# Patient Record
Sex: MALE | Race: WHITE | Employment: OTHER | URBAN - METROPOLITAN AREA
[De-identification: names, ages, dates, MRNs, and addresses within clinical notes are randomized per-mention and may not be internally consistent; named-entity substitution may affect disease eponyms.]

---

## 2017-01-05 RX ORDER — AZELASTINE 1 MG/ML
SPRAY, METERED NASAL
Qty: 1 BOTTLE | Refills: 5 | Status: SHIPPED | OUTPATIENT
Start: 2017-01-05 | End: 2018-03-11 | Stop reason: SDUPTHER

## 2017-01-26 ENCOUNTER — ANTI-COAG VISIT (OUTPATIENT)
Dept: INTERNAL MEDICINE CLINIC | Age: 77
End: 2017-01-26

## 2017-01-26 DIAGNOSIS — I48.20 CHRONIC ATRIAL FIBRILLATION (HCC): ICD-10-CM

## 2017-01-26 LAB
INR BLD: 1.8
PT POC: NORMAL SEC
VALID INTERNAL CONTROL?: YES

## 2017-01-26 NOTE — PROGRESS NOTES
Mr. Armida Rosas is here today for anticoagulation monitoring for the diagnosis of Atrial Fibrillation. His INR goal is 2.0-3.0 and his current Coumadin dose is 30 mg weekly. Today's findings include an INR of 1.8 (normal INR range 0.8-1.2). Considering Mr. Kirby Bonds past history, todays findings, and per the coumadin policy/protocol, Mr. Brown was instructed to take Coumadin as follows,  Continue same dose of 5 mg 5 days a week and 2.5 mg the other two days. He was also instructed to schedule an appointment in 2 weeks prior to leaving for an INR check. A full discussion of the nature of anticoagulants has been carried out. A full discussion of the need for frequent and regular monitoring, precise dosage adjustment and compliance was stressed. Side effects of potential bleeding were discussed and Mr. Brown was instructed to call [phone] if there are any signs of abnormal bleeding. Mr. Brown was instructed to avoid any OTC items containing aspirin or ibuprofen and prior to starting any new OTC products to consult with his physician or pharmacist to ensure no drug interactions are present. Mr. Brown was instructed to avoid any major changes in his general diet and to avoid alcohol consumption. .    Mr. Brown was provided a literature booklet, \"Treatment with Warfarin (Coumadin)\", that includes topics on understanding coumadin therapy, drug interaction considerations, vitamin K and coumadin use, interactions with foods and supplements containing vitamin K, and the use of herbal products. Mr. Brown verbalized his understanding of all instructions and will call the office with any questions, concerns, or signs of abnormal bleeding or blood clot.

## 2017-02-09 ENCOUNTER — ANTI-COAG VISIT (OUTPATIENT)
Dept: INTERNAL MEDICINE CLINIC | Age: 77
End: 2017-02-09

## 2017-02-09 DIAGNOSIS — I48.20 CHRONIC ATRIAL FIBRILLATION (HCC): ICD-10-CM

## 2017-02-09 LAB
INR BLD: 2
PT POC: NORMAL SEC
VALID INTERNAL CONTROL?: YES

## 2017-02-09 NOTE — PROGRESS NOTES
Mr. Mammie Felty is here today for anticoagulation monitoring for the diagnosis of Atrial Fibrillation. His INR goal is 2.0-3.0 and his current Coumadin dose is 2.5 mg on Mon and Fri, 5mg all other days. Today's findings include an INR of 2.0 (normal INR range 2.0-3.0). Considering Mr. [place] past history, todays findings, and per the coumadin policy/protocol, Mr. Lb Nye was instructed to take Coumadin as follows,   2.5 mg on Mon and Fri, 5mg all other days. He was also instructed to schedule an appointment in 2 1/2 weeks prior to leaving for an INR check. A full discussion of the nature of anticoagulants has been carried out. A full discussion of the need for frequent and regular monitoring, precise dosage adjustment and compliance was stressed. Side effects of potential bleeding were discussed and Mr. Lb Nye was instructed to call [phone] if there are any signs of abnormal bleeding. Mr. Lb Nye was instructed to avoid any OTC items containing aspirin or ibuprofen and prior to starting any new OTC products to consult with his physician or pharmacist to ensure no drug interactions are present. Mr. Lb Nye was instructed to avoid any major changes in his general diet and to avoid alcohol consumption. .      Mr. Lb Nye verbalized his understanding of all instructions and will call the office with any questions, concerns, or signs of abnormal bleeding or blood clot.

## 2017-03-06 ENCOUNTER — ANTI-COAG VISIT (OUTPATIENT)
Dept: INTERNAL MEDICINE CLINIC | Age: 77
End: 2017-03-06

## 2017-03-06 DIAGNOSIS — I48.20 CHRONIC ATRIAL FIBRILLATION (HCC): ICD-10-CM

## 2017-03-06 LAB
INR BLD: 2.6
PT POC: NORMAL SEC
VALID INTERNAL CONTROL?: YES

## 2017-03-06 NOTE — PROGRESS NOTES
Mr. Randi Soria is here today for anticoagulation monitoring for the diagnosis of Atrial Fibrillation. His INR goal is 2.0-3.0 and his current Coumadin dose is 30 mg weekly. Today's findings include an INR of 2.6 (normal INR range 0.8-1.2). Considering Mr. Lydia Lieberman past history, todays findings, and per the coumadin policy/protocol, Mr. Hallie Ricketts was instructed to take Coumadin as follows,  Continue same dose. He was also instructed to schedule an appointment in 2.5 weeks prior to leaving for an INR check. A full discussion of the nature of anticoagulants has been carried out. A full discussion of the need for frequent and regular monitoring, precise dosage adjustment and compliance was stressed. Side effects of potential bleeding were discussed and Mr. Hallie Ricketts was instructed to call [phone] if there are any signs of abnormal bleeding. Mr. Hallie Ricketts was instructed to avoid any OTC items containing aspirin or ibuprofen and prior to starting any new OTC products to consult with his physician or pharmacist to ensure no drug interactions are present. Mr. Hallie Ricketts was instructed to avoid any major changes in his general diet and to avoid alcohol consumption. .    Mr. Hallie Ricketts was provided a literature booklet, \"Treatment with Warfarin (Coumadin)\", that includes topics on understanding coumadin therapy, drug interaction considerations, vitamin K and coumadin use, interactions with foods and supplements containing vitamin K, and the use of herbal products. Mr. Hallie Ricketts verbalized his understanding of all instructions and will call the office with any questions, concerns, or signs of abnormal bleeding or blood clot.

## 2017-03-21 ENCOUNTER — HOSPITAL ENCOUNTER (OUTPATIENT)
Dept: LAB | Age: 77
Discharge: HOME | End: 2017-03-21
Payer: MEDICARE

## 2017-03-21 ENCOUNTER — OFFICE VISIT (OUTPATIENT)
Dept: INTERNAL MEDICINE CLINIC | Age: 77
End: 2017-03-21

## 2017-03-21 VITALS
SYSTOLIC BLOOD PRESSURE: 138 MMHG | TEMPERATURE: 96.8 F | WEIGHT: 172 LBS | DIASTOLIC BLOOD PRESSURE: 82 MMHG | HEART RATE: 73 BPM | RESPIRATION RATE: 16 BRPM | HEIGHT: 66 IN | BODY MASS INDEX: 27.64 KG/M2 | OXYGEN SATURATION: 100 %

## 2017-03-21 DIAGNOSIS — I48.20 CHRONIC ATRIAL FIBRILLATION (HCC): ICD-10-CM

## 2017-03-21 DIAGNOSIS — Z12.5 SCREENING FOR PROSTATE CANCER: Primary | ICD-10-CM

## 2017-03-21 DIAGNOSIS — Z12.5 SCREENING FOR PROSTATE CANCER: ICD-10-CM

## 2017-03-21 DIAGNOSIS — R42 DIZZINESS: ICD-10-CM

## 2017-03-21 DIAGNOSIS — R11.0 NAUSEA ALONE: ICD-10-CM

## 2017-03-21 LAB
ALBUMIN SERPL BCP-MCNC: 3.7 G/DL (ref 3.4–5)
ALBUMIN/GLOB SERPL: 0.9 {RATIO} (ref 0.8–1.7)
ALP SERPL-CCNC: 174 U/L (ref 45–117)
ALT SERPL-CCNC: 15 U/L (ref 16–61)
ANION GAP BLD CALC-SCNC: 5 MMOL/L (ref 3–18)
AST SERPL W P-5'-P-CCNC: 26 U/L (ref 15–37)
BASOPHILS # BLD AUTO: 0 K/UL (ref 0–0.06)
BASOPHILS # BLD: 1 % (ref 0–2)
BILIRUB SERPL-MCNC: 0.9 MG/DL (ref 0.2–1)
BUN SERPL-MCNC: 16 MG/DL (ref 7–18)
BUN/CREAT SERPL: 20 (ref 12–20)
CALCIUM SERPL-MCNC: 9 MG/DL (ref 8.5–10.1)
CHLORIDE SERPL-SCNC: 100 MMOL/L (ref 100–108)
CO2 SERPL-SCNC: 31 MMOL/L (ref 21–32)
CREAT SERPL-MCNC: 0.8 MG/DL (ref 0.6–1.3)
DIFFERENTIAL METHOD BLD: ABNORMAL
EOSINOPHIL # BLD: 0 K/UL (ref 0–0.4)
EOSINOPHIL NFR BLD: 1 % (ref 0–5)
ERYTHROCYTE [DISTWIDTH] IN BLOOD BY AUTOMATED COUNT: 15.6 % (ref 11.6–14.5)
GLOBULIN SER CALC-MCNC: 3.9 G/DL (ref 2–4)
GLUCOSE SERPL-MCNC: 68 MG/DL (ref 74–99)
HCT VFR BLD AUTO: 36 % (ref 36–48)
HGB BLD-MCNC: 12.1 G/DL (ref 13–16)
INR PPP: 2.4 (ref 0.8–1.2)
LYMPHOCYTES # BLD AUTO: 23 % (ref 21–52)
LYMPHOCYTES # BLD: 1 K/UL (ref 0.9–3.6)
MCH RBC QN AUTO: 33.4 PG (ref 24–34)
MCHC RBC AUTO-ENTMCNC: 33.6 G/DL (ref 31–37)
MCV RBC AUTO: 99.4 FL (ref 74–97)
MONOCYTES # BLD: 0.4 K/UL (ref 0.05–1.2)
MONOCYTES NFR BLD AUTO: 10 % (ref 3–10)
NEUTS SEG # BLD: 2.7 K/UL (ref 1.8–8)
NEUTS SEG NFR BLD AUTO: 65 % (ref 40–73)
PLATELET # BLD AUTO: 159 K/UL (ref 135–420)
PMV BLD AUTO: 10 FL (ref 9.2–11.8)
POTASSIUM SERPL-SCNC: 4.7 MMOL/L (ref 3.5–5.5)
PROT SERPL-MCNC: 7.6 G/DL (ref 6.4–8.2)
PROTHROMBIN TIME: 25.1 SEC (ref 11.5–15.2)
PSA SERPL-MCNC: 0.8 NG/ML (ref 0–4)
RBC # BLD AUTO: 3.62 M/UL (ref 4.7–5.5)
SODIUM SERPL-SCNC: 136 MMOL/L (ref 136–145)
WBC # BLD AUTO: 4.2 K/UL (ref 4.6–13.2)

## 2017-03-21 PROCEDURE — 85025 COMPLETE CBC W/AUTO DIFF WBC: CPT | Performed by: INTERNAL MEDICINE

## 2017-03-21 PROCEDURE — 80053 COMPREHEN METABOLIC PANEL: CPT | Performed by: INTERNAL MEDICINE

## 2017-03-21 PROCEDURE — 85610 PROTHROMBIN TIME: CPT | Performed by: INTERNAL MEDICINE

## 2017-03-21 PROCEDURE — 84153 ASSAY OF PSA TOTAL: CPT | Performed by: INTERNAL MEDICINE

## 2017-03-21 NOTE — PROGRESS NOTES
This is a Subsequent Medicare Annual Wellness Visit providing Personalized Prevention Plan Services (PPPS) (Performed 12 months after initial AWV and PPPS )    I have reviewed the patient's medical history in detail and updated the computerized patient record. History   The patient remains on anticoagulation and Omeprazole due to cancer of the esophagus. The patient is doing ok but he had an episode of severe lightl headedness without a clear  Inciting event. The symptoms were not orthostatic in nature. The episode occurred one week ago. It lasted several minutes. It then resolved. Past Medical History:   Diagnosis Date    Abdominal pain, unspecified site     Acute upper respiratory infections of unspecified site     Atrial fibrillation (HCC)     Cancer of esophagus ([facility] [address] 2001    RT and adjuvant chemotherapy    Esophageal reflux     Generalized osteoarthrosis, involving multiple sites     Gout, unspecified     Nausea alone     Sinoatrial node dysfunction (HCC)     Unspecified cataract     Unspecified constipation       Past Surgical History:   Procedure Laterality Date    HX PACEMAKER  2012     Current Outpatient Prescriptions   Medication Sig Dispense Refill    omeprazole (PRILOSEC) 20 mg capsule 1 capsule each AM 30 Cap 5    azelastine (ASTELIN) 137 mcg (0.1 %) nasal spray USE 1 SPRAY INTO EACH NOSTRIL TWICE DAILY 1 Bottle 5    raNITIdine (ZANTAC) 150 mg tablet TAKE ONE TABLET BY MOUTH TWICE DAILY 30 Tab 3    allopurinol (ZYLOPRIM) 300 mg tablet TAKE ONE TABLET BY MOUTH ONCE DAILY 90 Tab 2    lansoprazole (PREVACID) 30 mg capsule TAKE ONE CAPSULE BY MOUTH ONCE DAILY BEFORE BREAKFAST 30 Cap 4    warfarin (COUMADIN) 5 mg tablet TAKE ONE AND 1/2 TABLET BY MOUTH DAILY 60 Tab 2    promethazine (PHENERGAN) 25 mg tablet TAKE 1/2 TO 1 TABLET BY MOUTH EVERY 8 HOURS AS NEEDED FOR NAUSEA 40 Tab 2    docusate sodium (COLACE) 100 mg capsule Take 100 mg by mouth two (2) times a day.       CALCIUM POLYCARBOPHIL (FIBERCON PO) Take  by mouth.  magnesium 250 mg tab Take  by mouth.  cyanocobalamin (VITAMIN B12) 500 mcg tablet Take 500 mcg by mouth daily.  cholecalciferol, vitamin D3, 2,000 unit tab Take  by mouth.  metoprolol succinate (TOPROL-XL) 50 mg XL tablet Take 75 mg in the AM and 50 mg in the PM       Allergies   Allergen Reactions    Parafon Forte Dsc [Chlorzoxazone] Anaphylaxis     Family History   Problem Relation Age of Onset    Heart Disease Father     Alzheimer Father      Social History   Substance Use Topics    Smoking status: Never Smoker    Smokeless tobacco: Never Used    Alcohol use No     Patient Active Problem List   Diagnosis Code    Unspecified constipation K59.00    Abdominal pain, unspecified site R10.9    Unspecified cataract H26.9    Nausea alone R11.0    Esophageal reflux K21.9    Gout, unspecified M10.9    Sinoatrial node dysfunction (HCC) I49.5    Generalized osteoarthrosis, involving multiple sites M15.9    Atrial fibrillation (HCC) I48.91       Depression Risk Factor Screening:     PHQ 2 / 9, over the last two weeks 3/21/2017   Little interest or pleasure in doing things Not at all   Feeling down, depressed or hopeless Not at all   Total Score PHQ 2 0     Alcohol Risk Factor Screening: On any occasion during the past 3 months, have you had more than 4 drinks containing alcohol? No    Do you average more than 14 drinks per week? No      Functional Ability and Level of Safety:     Hearing Loss   mild    Activities of Daily Living   Self-care. Requires assistance with: no ADLs    Fall Risk     Fall Risk Assessment, last 12 mths 3/21/2017   Able to walk? Yes   Fall in past 12 months? No     Abuse Screen   Patient is not abused    Review of Systems   A comprehensive review of systems was negative except for that written in the HPI.     Physical Examination     Evaluation of Cognitive Function:  Mood/affect:  happy  Appearance: age appropriate  Family member/caregiver input: None    Visit Vitals    /82 (BP 1 Location: Left arm, BP Patient Position: Sitting)    Pulse 73    Temp 96.8 °F (36 °C) (Tympanic)    Resp 16    Ht 5' 6\" (1.676 m)    Wt 172 lb (78 kg)    SpO2 100%    BMI 27.76 kg/m2     General appearance: alert, cooperative, no distress, appears stated age  Neck: supple, symmetrical, trachea midline, no adenopathy, thyroid: not enlarged, symmetric, no tenderness/mass/nodules, no carotid bruit and no JVD  Back: symmetric, no curvature. ROM normal. No CVA tenderness. Lungs: clear to auscultation bilaterally  Chest wall: no tenderness  Heart: regular rate and rhythm, S1, S2 normal, no murmur, click, rub or gallop  Abdomen: soft, non-tender. Bowel sounds normal. No masses,  no organomegaly  Extremities: extremities normal, atraumatic, no cyanosis or edema  Pulses: 2+ and symmetric    Patient Care Team:  Angie Ramirez MD as PCP - General (Internal Medicine)    Advice/Referrals/Counseling   Education and counseling provided:  Are appropriate based on today's review and evaluation  The patient was advised and counseled regarding advanced directives. The patient was provided with an information packet      Assessment/Plan       ICD-10-CM ICD-9-CM    1. Screening for prostate cancer Z12.5 V76.44 PSA SCREENING (SCREENING)      AL COLLECTION VENOUS BLOOD,VENIPUNCTURE   2. Chronic atrial fibrillation (HCC) I48.2 427.31 CBC WITH AUTOMATED DIFF      METABOLIC PANEL, COMPREHENSIVE      PROTHROMBIN TIME + INR      AL COLLECTION VENOUS BLOOD,VENIPUNCTURE   3. Nausea alone R11.0 787.02 CBC WITH AUTOMATED DIFF      METABOLIC PANEL, COMPREHENSIVE      PROTHROMBIN TIME + INR      AL COLLECTION VENOUS BLOOD,VENIPUNCTURE   4. Dizziness R42 780.4 DUPLEX CAROTID BILATERAL   . Duplex of carotid arteries  Labs  he was advised to continue his maintenance medications    Next appointment 6 months    I asked Beatriz Cates if he has any questions and I answered the questions. Chip Brown states that he understands the treatment plan and agrees with the treatment plan

## 2017-03-21 NOTE — PROGRESS NOTES
1. Have you been to the ER, urgent care clinic since your last visit? Hospitalized since your last visit? No    2. Have you seen or consulted any other health care providers outside of the [address] [place] [address] since your last visit? Include any pap smears or colon screening.  No

## 2017-03-21 NOTE — MR AVS SNAPSHOT
Visit Information Date & Time Provider Department Dept. Phone Encounter #  
 3/21/2017  8:45 AM Derek Chilel MD [facility] INTERNAL MEDICINE OF [address] [place] [address] [phone] [id number] Upcoming Health Maintenance Date Due ZOSTER VACCINE AGE 60> 2/11/1999 GLAUCOMA SCREENING Q2Y 2/11/2004 DTaP/Tdap/Td series (1 - Tdap) 11/21/2009 Pneumococcal 65+ Low/Medium Risk (2 of 2 - PPSV23) 11/17/2010 MEDICARE YEARLY EXAM 1/19/2017 Allergies as of 3/21/2017  Review Complete On: 3/21/2017 By: Derek Chilel MD  
  
 Severity Noted Reaction Type Reactions Parafon Forte Dsc [Chlorzoxazone] High   Anaphylaxis Current Immunizations  Never Reviewed Name Date Influenza High Dose Vaccine PF 9/13/2016 11:14 AM  
 Influenza Vaccine (Quad) PF 10/13/2015 Pneumococcal Vaccine (Unspecified Type) 11/17/2005 Td 11/20/2009 Not reviewed this visit You Were Diagnosed With   
  
 Codes Comments Screening for prostate cancer    -  Primary ICD-10-CM: Z12.5 ICD-9-CM: V76.44 Chronic atrial fibrillation (HCC)     ICD-10-CM: V31.8 ICD-9-CM: 427.31 Nausea alone     ICD-10-CM: R11.0 ICD-9-CM: 787.02 Unspecified cataract     ICD-10-CM: H26.9 ICD-9-CM: 366.9 Vitals BP Pulse Temp Resp Height(growth percentile) Weight(growth percentile) 138/82 (BP 1 Location: Left arm, BP Patient Position: Sitting) 73 96.8 °F (36 °C) (Tympanic) 16 5' 6\" (1.676 m) 172 lb (78 kg) SpO2 BMI Smoking Status 100% 27.76 kg/m2 Never Smoker Vitals History BMI and BSA Data Body Mass Index Body Surface Area  
 27.76 kg/m 2 1.91 m 2 Preferred Pharmacy Pharmacy Name Phone [facility] [address] [facility], [facility] [address] [place], [place] [address] [phone] Your Updated Medication List  
  
   
This list is accurate as of: 3/21/17 10:00 AM.  Always use your most recent med list.  
  
  
  
  
 allopurinol 300 mg tablet Commonly known as:  Orpha Conquest  
 TAKE ONE TABLET BY MOUTH ONCE DAILY  
  
 azelastine 137 mcg (0.1 %) nasal spray Commonly known as:  ASTELIN  
USE 1 SPRAY INTO EACH NOSTRIL TWICE DAILY  
  
 cholecalciferol (vitamin D3) 2,000 unit Tab Take  by mouth.  
  
 cyanocobalamin 500 mcg tablet Commonly known as:  VITAMIN B12 Take 500 mcg by mouth daily. docusate sodium 100 mg capsule Commonly known as:  Lynne Neighbor Take 100 mg by mouth two (2) times a day. FIBERCON PO Take  by mouth.  
  
 lansoprazole 30 mg capsule Commonly known as:  PREVACID TAKE ONE CAPSULE BY MOUTH ONCE DAILY BEFORE BREAKFAST  
  
 magnesium 250 mg Tab Take  by mouth.  
  
 metoprolol succinate 50 mg XL tablet Commonly known as:  TOPROL-XL Take 75 mg in the AM and 50 mg in the PM  
  
 promethazine 25 mg tablet Commonly known as:  PHENERGAN  
TAKE 1/2 TO 1 TABLET BY MOUTH EVERY 8 HOURS AS NEEDED FOR NAUSEA  
  
 raNITIdine 150 mg tablet Commonly known as:  ZANTAC TAKE ONE TABLET BY MOUTH TWICE DAILY  
  
 warfarin 5 mg tablet Commonly known as:  COUMADIN  
TAKE ONE AND 1/2 TABLET BY MOUTH DAILY Patient Instructions Health Maintenance Due Topic Date Due  Shingles Vaccine  02/11/1999  Glaucoma Screening   02/11/2004  DTaP/Tdap/Td  (1 - Tdap) 11/21/2009  Pneumococcal Vaccine (2 of 2 - PPSV23) 11/17/2010 [facility] Annual Well Visit  01/19/2017 Introducing [facility]! Olivier Bailey introduces [employer] patient portal. Now you can access parts of your medical record, email your doctor's office, and request medication refills online. 1. In your internet browser, go to [email or web address] 2. Click on the First Time User? Click Here link in the Sign In box. You will see the New Member Sign Up page. 3. Enter your [employer] Access Code exactly as it appears below. You will not need to use this code after youve completed the sign-up process.  If you do not sign up before the expiration date, you must request a new code. · Amura Access Code: [email or web address] Expires: 3/30/2017  1:41 PM 
 
4. Enter the last four digits of your Social Security Number (xxxx) and Date of Birth (mm/dd/yyyy) as indicated and click Submit. You will be taken to the next sign-up page. 5. Create a Amura ID. This will be your Amura login ID and cannot be changed, so think of one that is secure and easy to remember. 6. Create a Amura password. You can change your password at any time. 7. Enter your Password Reset Question and Answer. This can be used at a later time if you forget your password. 8. Enter your e-mail address. You will receive e-mail notification when new information is available in [address] 9. Click Sign Up. You can now view and download portions of your medical record. 10. Click the Download Summary menu link to download a portable copy of your medical information. If you have questions, please visit the Frequently Asked Questions section of the Amura website. Remember, Amura is NOT to be used for urgent needs. For medical emergencies, dial 911. Now available from your iPhone and Android! Please provide this summary of care documentation to your next provider. Your primary care clinician is listed as Jesse Lara. If you have any questions after today's visit, please call [phone].

## 2017-03-21 NOTE — ACP (ADVANCE CARE PLANNING)
The patient has made an advanced directive. he was advised to bring a copy into the office for us to scan into the system.

## 2017-03-22 RX ORDER — OMEPRAZOLE 20 MG/1
CAPSULE, DELAYED RELEASE ORAL
Qty: 30 CAP | Refills: 5 | Status: SHIPPED | OUTPATIENT
Start: 2017-03-22 | End: 2017-11-27 | Stop reason: SDUPTHER

## 2017-03-22 NOTE — TELEPHONE ENCOUNTER
Patients insurance no longer covers prevacid. He would like for Dr Hermelindo Mar to send in prescription for Omeprazole to [address] [facility] on [address] [facility]

## 2017-03-27 ENCOUNTER — HOSPITAL ENCOUNTER (OUTPATIENT)
Dept: VASCULAR SURGERY | Age: 77
Discharge: HOME | End: 2017-03-27
Attending: INTERNAL MEDICINE
Payer: MEDICARE

## 2017-03-27 DIAGNOSIS — R42 DIZZINESS: ICD-10-CM

## 2017-03-27 PROCEDURE — 93880 EXTRACRANIAL BILAT STUDY: CPT

## 2017-03-27 NOTE — PROCEDURES
[facility]  *** [facility] ***    Name: Arlet Box  MRN: [id number]    Outpatient  : 1940  HIS Order #: [id number]  [address] [facility] Visit #: [id number]  Date: 27 Mar 2017    TYPE OF TEST: Cerebrovascular Duplex    REASON FOR TEST  Dizziness/vertigo    Right Carotid:-             Proximal               Mid                 Distal  cm/s  Systolic  Diastolic  Systolic  Diastolic  Systolic  Diastolic  CCA:     70.9      12.0       62.0      13.0       71.0      17.0  Bulb:  ECA:     71.0  ICA:     67.0      13.0       71.0      17.0       71.0      17.0  ICA/CCA:  1.0       1.0    ICA Stenosis: <50%    Right Vertebral:-  Finding:  Sys:  Emma:    Right Subclavian: Normal    Left Carotid:-            Proximal                Mid                 Distal  cm/s  Systolic  Diastolic  Systolic  Diastolic  Systolic  Diastolic  CCA:     68.6      14.0       77.0      14.0       71.0      14.0  Bulb:  ECA:     63.0      13.0  ICA:     58.0      12.0       81.0      20.0       73.0      18.0  ICA/CCA:  0.9       1.4    ICA Stenosis: <50%    Left Vertebral:-  Finding: Antegrade  Sys:       43.0  Emma:        8.0    Left Subclavian: Normal    INTERPRETATION/FINDINGS  Duplex images were obtained using 2-D gray scale, color flow and  spectral doppler analysis. 1. Bilateral <50% stenosis of the internal carotid arteries. 2. No significant stenosis in the external carotid arteries  bilaterally. 3. Antegrade flow in the left vertebral artery. 4. Triphasic doppler signals in both subclavian arteries. Plaque Morphology:  1. Heterogeneous plaque in the bulb and right ICA. 2. Heterogeneous plaque in the bulb and left ICA. ADDITIONAL COMMENTS  Right vertebral artery inadvertently omitted due to technical error. I have personally reviewed the data relevant to the interpretation of  this  study. TECHNOLOGIST: Efrain Acevedo RVKRYSTAL  Signed: 2017 02:26 PM    PHYSICIAN: Anais Francois MD  Signed: 2017 03:42 PM

## 2017-04-18 ENCOUNTER — ANTI-COAG VISIT (OUTPATIENT)
Dept: INTERNAL MEDICINE CLINIC | Age: 77
End: 2017-04-18

## 2017-04-18 DIAGNOSIS — I48.20 CHRONIC ATRIAL FIBRILLATION (HCC): ICD-10-CM

## 2017-04-18 LAB
INR BLD: 1.9
PT POC: NORMAL SEC
VALID INTERNAL CONTROL?: YES

## 2017-04-18 NOTE — PROGRESS NOTES
Mr. Yesenia Batista is here today for anticoagulation monitoring for the diagnosis of Atrial Fibrillation. His INR goal is 2.0-3.0 and his current Coumadin dose is 30 mg weekly. Today's findings include an INR of 1.9 (normal INR range 0.8-1.2). Considering Mr. Keara Wilkins past history, todays findings, and per the coumadin policy/protocol, Mr. Hay Diallo was instructed to take Coumadin as follows,  Continue same dose. He was also instructed to schedule an appointment in 2.5 weeks prior to leaving for an INR check. A full discussion of the nature of anticoagulants has been carried out. A full discussion of the need for frequent and regular monitoring, precise dosage adjustment and compliance was stressed. Side effects of potential bleeding were discussed and Mr. Hay Diallo was instructed to call [phone] if there are any signs of abnormal bleeding. Mr. Hay Diallo was instructed to avoid any OTC items containing aspirin or ibuprofen and prior to starting any new OTC products to consult with his physician or pharmacist to ensure no drug interactions are present. Mr. Hay Diallo was instructed to avoid any major changes in his general diet and to avoid alcohol consumption. .    Mr. Hay Diallo was provided a literature booklet, \"Treatment with Warfarin (Coumadin)\", that includes topics on understanding coumadin therapy, drug interaction considerations, vitamin K and coumadin use, interactions with foods and supplements containing vitamin K, and the use of herbal products. Mr. Hay Diallo verbalized his understanding of all instructions and will call the office with any questions, concerns, or signs of abnormal bleeding or blood clot.

## 2017-05-24 RX ORDER — WARFARIN SODIUM 5 MG/1
TABLET ORAL
Qty: 45 TAB | Refills: 1 | Status: SHIPPED | OUTPATIENT
Start: 2017-05-24 | End: 2017-08-15 | Stop reason: SDUPTHER

## 2017-05-25 ENCOUNTER — ANTI-COAG VISIT (OUTPATIENT)
Dept: INTERNAL MEDICINE CLINIC | Age: 77
End: 2017-05-25

## 2017-05-25 DIAGNOSIS — I48.20 CHRONIC ATRIAL FIBRILLATION (HCC): ICD-10-CM

## 2017-05-25 LAB
INR BLD: 1.8
PT POC: NORMAL SECONDS
VALID INTERNAL CONTROL?: YES

## 2017-05-25 NOTE — PROGRESS NOTES
Mr. Bhupinder Perez is here today for anticoagulation monitoring for the diagnosis of Atrial Fibrillation. His INR goal is 2.0-3.0 and his current Coumadin dose is 2.5mg Mon and Fri then 5 mg all other days. Today's findings include an INR of 1.8 (normal INR range 0.8-1.2). Considering Mr. Gareth Martin past history, todays findings, and per the coumadin policy/protocol, Mr. Brown was instructed to take Coumadin as follows,  2.5 mg Sun then 5 mg all other days. He was also instructed to schedule an appointment in 2 weeks prior to leaving for an INR check. A full discussion of the nature of anticoagulants has been carried out. A full discussion of the need for frequent and regular monitoring, precise dosage adjustment and compliance was stressed. Side effects of potential bleeding were discussed and Mr. Brown was instructed to call [phone] if there are any signs of abnormal bleeding. Mr. Brown was instructed to avoid any OTC items containing aspirin or ibuprofen and prior to starting any new OTC products to consult with his physician or pharmacist to ensure no drug interactions are present. Mr. Brown was instructed to avoid any major changes in his general diet and to avoid alcohol consumption. .      Mr. Brown verbalized his understanding of all instructions and will call the office with any questions, concerns, or signs of abnormal bleeding or blood clot.

## 2017-05-25 NOTE — PATIENT INSTRUCTIONS
Taking Warfarin Safely: Care Instructions  Your Care Instructions  Warfarin is a medicine that you take to prevent blood clots. It is often called a blood thinner. Doctors give warfarin (such as Coumadin) to reduce the risk of blood clots. You may be at risk for blood clots if you have atrial fibrillation or deep vein thrombosis. Some other health problems may also put you at risk. Warfarin slows the amount of time it takes for your blood to clot. It can cause bleeding problems. Even if you've been taking warfarin for a while, it's important to know how to take it safely. Foods and other medicines can affect the way warfarin works. Some can make warfarin work too well. This can cause bleeding problems. And some can make it work poorly, so that it does not prevent blood clots very well. You will need regular blood tests to check how long it takes for your blood to form a clot. This test is called a PT or prothrombin time test. The result of the test is called an INR level. Depending on the test results, your doctor or anticoagulation clinic may adjust your dose of warfarin. Follow-up care is a key part of your treatment and safety. Be sure to make and go to all appointments, and call your doctor if you are having problems. It's also a good idea to know your test results and keep a list of the medicines you take. How can you care for yourself at home? Take warfarin safely  · Take your warfarin at the same time each day. · If you miss a dose of warfarin, don't take an extra dose to make up for it. Your doctor can tell you exactly what to do so you don't take too much or too little. · Wear medical alert jewelry that lets others know that you take warfarin. You can buy this at most drugstores. · Don't take warfarin if you are pregnant or planning to get pregnant. Talk to your doctor about how you can prevent getting pregnant while you are taking it.   · Don't change your dose or stop taking warfarin unless your doctor tells you to. Effects of medicines and food on warfarin  · Don't start or stop taking any medicines, vitamins, or natural remedies unless you first talk to your doctor. Many medicines can affect how warfarin works. These include aspirin and other pain relievers, over-the-counter medicines, multivitamins, dietary supplements, and herbal products. · Tell all of your doctors and pharmacists that you take warfarin. Some prescription medicines can affect how warfarin works. · Keep the amount of vitamin K in your diet about the same from day to day. Do not suddenly eat a lot more or a lot less food that is rich in vitamin K than you usually do. Vitamin K affects how warfarin works and how your blood clots. Talk with your doctor before making big changes in your diet. Foods that have a lot of vitamin K include cooked green vegetables, such as:  ¨ Kale, spinach, turnip greens, coy greens, Swiss chard, and mustard greens. ¨ [place] sprouts, broccoli, and asparagus. · Limit your use of alcohol. Avoid bleeding by preventing falls and injuries  · Wear slippers or shoes with nonskid soles. · Remove throw rugs and clutter. · Rearrange furniture and electrical cords to keep them out of walking paths. · Keep stairways, porches, and outside walkways well lit. Use night-lights in hallways and bathrooms. · Be extra careful when you work with sharp tools or knives. When should you call for help? Call 911 anytime you think you may need emergency care. For example, call if:  · You have a sudden, severe headache that is different from past headaches. Call your doctor now or seek immediate medical care if:  · You have any abnormal bleeding, such as:  ¨ Nosebleeds. ¨ Vaginal bleeding that is different (heavier, more frequent, at a different time of the month) than what you are used to. ¨ Bloody or black stools, or rectal bleeding. ¨ Bloody or pink urine.   Watch closely for changes in your health, and be sure to contact your doctor if you have any problems. Where can you learn more? Go to http://bird-vito.info/. Enter M522 in the search box to learn more about \"Taking Warfarin Safely: Care Instructions. \"  Current as of: November 15, 2016  Content Version: 11.2  © 1338-8549 [employer]. Care instructions adapted under license by [employer] (which disclaims liability or warranty for this information). If you have questions about a medical condition or this instruction, always ask your healthcare professional. [address] any warranty or liability for your use of this information.

## 2017-06-08 ENCOUNTER — ANTI-COAG VISIT (OUTPATIENT)
Dept: INTERNAL MEDICINE CLINIC | Age: 77
End: 2017-06-08

## 2017-06-08 DIAGNOSIS — I48.91 ATRIAL FIBRILLATION, UNSPECIFIED TYPE (HCC): ICD-10-CM

## 2017-06-08 LAB
INR BLD: 3.3
PT POC: NORMAL SECONDS
VALID INTERNAL CONTROL?: YES

## 2017-06-08 NOTE — PROGRESS NOTES
Mr. Charla Donovan is here today for anticoagulation monitoring for the diagnosis of Atrial Fibrillation. His INR goal is 2.0-3.0 and his current Coumadin dose is 2.5 mg on Sun then 5 mg all other days. Today's findings include an INR of 3.3 (normal INR range 0.8-1.2). Considering Mr. Vana Krabbe past history, todays findings, and per the coumadin policy/protocol, Mr. Walton was instructed to take Coumadin as follows,  2.5 mg Tues and Thurs then 5 mg all other days. He was also instructed to schedule an appointment in 2 weeks prior to leaving for an INR check. A full discussion of the nature of anticoagulants has been carried out. A full discussion of the need for frequent and regular monitoring, precise dosage adjustment and compliance was stressed. Side effects of potential bleeding were discussed and Mr. Walton was instructed to call [phone] if there are any signs of abnormal bleeding. Mr. Walton was instructed to avoid any OTC items containing aspirin or ibuprofen and prior to starting any new OTC products to consult with his physician or pharmacist to ensure no drug interactions are present. Mr. Walton was instructed to avoid any major changes in his general diet and to avoid alcohol consumption. .      Mr. Walton verbalized his understanding of all instructions and will call the office with any questions, concerns, or signs of abnormal bleeding or blood clot.

## 2017-06-08 NOTE — PATIENT INSTRUCTIONS
Taking Warfarin Safely: Care Instructions  Your Care Instructions  Warfarin is a medicine that you take to prevent blood clots. It is often called a blood thinner. Doctors give warfarin (such as Coumadin) to reduce the risk of blood clots. You may be at risk for blood clots if you have atrial fibrillation or deep vein thrombosis. Some other health problems may also put you at risk. Warfarin slows the amount of time it takes for your blood to clot. It can cause bleeding problems. Even if you've been taking warfarin for a while, it's important to know how to take it safely. Foods and other medicines can affect the way warfarin works. Some can make warfarin work too well. This can cause bleeding problems. And some can make it work poorly, so that it does not prevent blood clots very well. You will need regular blood tests to check how long it takes for your blood to form a clot. This test is called a PT or prothrombin time test. The result of the test is called an INR level. Depending on the test results, your doctor or anticoagulation clinic may adjust your dose of warfarin. Follow-up care is a key part of your treatment and safety. Be sure to make and go to all appointments, and call your doctor if you are having problems. It's also a good idea to know your test results and keep a list of the medicines you take. How can you care for yourself at home? Take warfarin safely  · Take your warfarin at the same time each day. · If you miss a dose of warfarin, don't take an extra dose to make up for it. Your doctor can tell you exactly what to do so you don't take too much or too little. · Wear medical alert jewelry that lets others know that you take warfarin. You can buy this at most drugstores. · Don't take warfarin if you are pregnant or planning to get pregnant. Talk to your doctor about how you can prevent getting pregnant while you are taking it.   · Don't change your dose or stop taking warfarin unless your doctor tells you to. Effects of medicines and food on warfarin  · Don't start or stop taking any medicines, vitamins, or natural remedies unless you first talk to your doctor. Many medicines can affect how warfarin works. These include aspirin and other pain relievers, over-the-counter medicines, multivitamins, dietary supplements, and herbal products. · Tell all of your doctors and pharmacists that you take warfarin. Some prescription medicines can affect how warfarin works. · Keep the amount of vitamin K in your diet about the same from day to day. Do not suddenly eat a lot more or a lot less food that is rich in vitamin K than you usually do. Vitamin K affects how warfarin works and how your blood clots. Talk with your doctor before making big changes in your diet. Foods that have a lot of vitamin K include cooked green vegetables, such as:  ¨ Kale, spinach, turnip greens, coy greens, Swiss chard, and mustard greens. ¨ [place] sprouts, broccoli, and asparagus. · Limit your use of alcohol. Avoid bleeding by preventing falls and injuries  · Wear slippers or shoes with nonskid soles. · Remove throw rugs and clutter. · Rearrange furniture and electrical cords to keep them out of walking paths. · Keep stairways, porches, and outside walkways well lit. Use night-lights in hallways and bathrooms. · Be extra careful when you work with sharp tools or knives. When should you call for help? Call 911 anytime you think you may need emergency care. For example, call if:  · You have a sudden, severe headache that is different from past headaches. Call your doctor now or seek immediate medical care if:  · You have any abnormal bleeding, such as:  ¨ Nosebleeds. ¨ Vaginal bleeding that is different (heavier, more frequent, at a different time of the month) than what you are used to. ¨ Bloody or black stools, or rectal bleeding. ¨ Bloody or pink urine.   Watch closely for changes in your health, and be sure to contact your doctor if you have any problems. Where can you learn more? Go to http://bird-vito.info/. Enter A270 in the search box to learn more about \"Taking Warfarin Safely: Care Instructions. \"  Current as of: November 15, 2016  Content Version: 11.2  © 8572-6056 [employer]. Care instructions adapted under license by [employer] (which disclaims liability or warranty for this information). If you have questions about a medical condition or this instruction, always ask your healthcare professional. [address] any warranty or liability for your use of this information.

## 2017-06-16 ENCOUNTER — ANTI-COAG VISIT (OUTPATIENT)
Dept: INTERNAL MEDICINE CLINIC | Age: 77
End: 2017-06-16

## 2017-06-16 DIAGNOSIS — I48.91 ATRIAL FIBRILLATION, UNSPECIFIED TYPE (HCC): ICD-10-CM

## 2017-06-16 LAB
INR BLD: 2
PT POC: NORMAL SECONDS
VALID INTERNAL CONTROL?: YES

## 2017-06-16 NOTE — PATIENT INSTRUCTIONS
Taking Warfarin Safely: Care Instructions  Your Care Instructions  Warfarin is a medicine that you take to prevent blood clots. It is often called a blood thinner. Doctors give warfarin (such as Coumadin) to reduce the risk of blood clots. You may be at risk for blood clots if you have atrial fibrillation or deep vein thrombosis. Some other health problems may also put you at risk. Warfarin slows the amount of time it takes for your blood to clot. It can cause bleeding problems. Even if you've been taking warfarin for a while, it's important to know how to take it safely. Foods and other medicines can affect the way warfarin works. Some can make warfarin work too well. This can cause bleeding problems. And some can make it work poorly, so that it does not prevent blood clots very well. You will need regular blood tests to check how long it takes for your blood to form a clot. This test is called a PT or prothrombin time test. The result of the test is called an INR level. Depending on the test results, your doctor or anticoagulation clinic may adjust your dose of warfarin. Follow-up care is a key part of your treatment and safety. Be sure to make and go to all appointments, and call your doctor if you are having problems. It's also a good idea to know your test results and keep a list of the medicines you take. How can you care for yourself at home? Take warfarin safely  · Take your warfarin at the same time each day. · If you miss a dose of warfarin, don't take an extra dose to make up for it. Your doctor can tell you exactly what to do so you don't take too much or too little. · Wear medical alert jewelry that lets others know that you take warfarin. You can buy this at most drugstores. · Don't take warfarin if you are pregnant or planning to get pregnant. Talk to your doctor about how you can prevent getting pregnant while you are taking it.   · Don't change your dose or stop taking warfarin unless your doctor tells you to. Effects of medicines and food on warfarin  · Don't start or stop taking any medicines, vitamins, or natural remedies unless you first talk to your doctor. Many medicines can affect how warfarin works. These include aspirin and other pain relievers, over-the-counter medicines, multivitamins, dietary supplements, and herbal products. · Tell all of your doctors and pharmacists that you take warfarin. Some prescription medicines can affect how warfarin works. · Keep the amount of vitamin K in your diet about the same from day to day. Do not suddenly eat a lot more or a lot less food that is rich in vitamin K than you usually do. Vitamin K affects how warfarin works and how your blood clots. Talk with your doctor before making big changes in your diet. Foods that have a lot of vitamin K include cooked green vegetables, such as:  ¨ Kale, spinach, turnip greens, coy greens, Swiss chard, and mustard greens. ¨ [place] sprouts, broccoli, and asparagus. · Limit your use of alcohol. Avoid bleeding by preventing falls and injuries  · Wear slippers or shoes with nonskid soles. · Remove throw rugs and clutter. · Rearrange furniture and electrical cords to keep them out of walking paths. · Keep stairways, porches, and outside walkways well lit. Use night-lights in hallways and bathrooms. · Be extra careful when you work with sharp tools or knives. When should you call for help? Call 911 anytime you think you may need emergency care. For example, call if:  · You have a sudden, severe headache that is different from past headaches. Call your doctor now or seek immediate medical care if:  · You have any abnormal bleeding, such as:  ¨ Nosebleeds. ¨ Vaginal bleeding that is different (heavier, more frequent, at a different time of the month) than what you are used to. ¨ Bloody or black stools, or rectal bleeding. ¨ Bloody or pink urine.   Watch closely for changes in your health, and be sure to contact your doctor if you have any problems. Where can you learn more? Go to http://bird-vito.info/. Enter J969 in the search box to learn more about \"Taking Warfarin Safely: Care Instructions. \"  Current as of: November 15, 2016  Content Version: 11.2  © 5397-1287 [employer]. Care instructions adapted under license by [employer] (which disclaims liability or warranty for this information). If you have questions about a medical condition or this instruction, always ask your healthcare professional. [address] any warranty or liability for your use of this information.

## 2017-06-28 ENCOUNTER — ANTI-COAG VISIT (OUTPATIENT)
Dept: INTERNAL MEDICINE CLINIC | Age: 77
End: 2017-06-28

## 2017-06-28 DIAGNOSIS — I48.91 ATRIAL FIBRILLATION, UNSPECIFIED TYPE (HCC): ICD-10-CM

## 2017-06-28 LAB
INR BLD: 2.3
PT POC: NORMAL SECONDS
VALID INTERNAL CONTROL?: YES

## 2017-06-28 NOTE — PROGRESS NOTES
Mr. Toi Mccallum is here today for anticoagulation monitoring for the diagnosis of Atrial Fibrillation. His INR goal is 2.0-3.0 and his current Coumadin dose is 30 mg weekly. Today's findings include an INR of 2.3 (normal INR range 0.8-1.2). Considering Mr. Jonathan Cortez past history, todays findings, and per the coumadin policy/protocol, Mr. SAVAGE [facility] was instructed to take Coumadin as follows,  Continue same dose. He was also instructed to schedule an appointment in 4 weeks prior to leaving for an INR check. A full discussion of the nature of anticoagulants has been carried out. A full discussion of the need for frequent and regular monitoring, precise dosage adjustment and compliance was stressed. Side effects of potential bleeding were discussed and Mr. SAVAGE [facility]ly[facility] was instructed to call [phone] if there are any signs of abnormal bleeding. Mr. SAVAGE [place] was instructed to avoid any OTC items containing aspirin or ibuprofen and prior to starting any new OTC products to consult with his physician or pharmacist to ensure no drug interactions are present. Mr. SAVAGE [place] was instructed to avoid any major changes in his general diet and to avoid alcohol consumption. .    Mr. SAVAGE [facility] was provided a literature booklet, \"Treatment with Warfarin (Coumadin)\", that includes topics on understanding coumadin therapy, drug interaction considerations, vitamin K and coumadin use, interactions with foods and supplements containing vitamin K, and the use of herbal products. Mr. SAVAGE [facility] verbalized his understanding of all instructions and will call the office with any questions, concerns, or signs of abnormal bleeding or blood clot.

## 2017-08-15 ENCOUNTER — ANTI-COAG VISIT (OUTPATIENT)
Dept: INTERNAL MEDICINE CLINIC | Age: 77
End: 2017-08-15

## 2017-08-15 DIAGNOSIS — I48.20 CHRONIC ATRIAL FIBRILLATION (HCC): ICD-10-CM

## 2017-08-15 LAB
INR BLD: 2.4
PT POC: NORMAL SECONDS
VALID INTERNAL CONTROL?: YES

## 2017-08-15 RX ORDER — PROMETHAZINE HYDROCHLORIDE 25 MG/1
TABLET ORAL
Qty: 40 TAB | Refills: 1 | Status: SHIPPED | OUTPATIENT
Start: 2017-08-15 | End: 2017-12-07

## 2017-08-15 RX ORDER — ALLOPURINOL 300 MG/1
TABLET ORAL
Qty: 90 TAB | Refills: 1 | Status: SHIPPED | OUTPATIENT
Start: 2017-08-15 | End: 2017-09-19 | Stop reason: SDUPTHER

## 2017-08-15 RX ORDER — WARFARIN SODIUM 5 MG/1
TABLET ORAL
Qty: 45 TAB | Refills: 0 | Status: SHIPPED | OUTPATIENT
Start: 2017-08-15 | End: 2017-10-23 | Stop reason: SDUPTHER

## 2017-08-15 NOTE — PROGRESS NOTES
Mr. Andrew Pierre is here today for anticoagulation monitoring for the diagnosis of Atrial Fibrillation. His INR goal is 2.0-3.0 and his current Coumadin dose is 2.5 mg Tues and Thurs then 5 mg all other days     Today's findings include an INR of 2.4 (normal INR range 0.8-1.2). Considering Mr. Michi Tavera past history, todays findings, and per the coumadin policy/protocol, Mr. Janis Barros was instructed to take Coumadin as follows,  2.5 mg Tues and Thurs then 5 mg all other days. He was also instructed to schedule an appointment in 3 weeks prior to leaving for an INR check. A full discussion of the nature of anticoagulants has been carried out. A full discussion of the need for frequent and regular monitoring, precise dosage adjustment and compliance was stressed. Side effects of potential bleeding were discussed and Mr. Janis Barros was instructed to call [phone] if there are any signs of abnormal bleeding. Mr. Janis Barros was instructed to avoid any OTC items containing aspirin or ibuprofen and prior to starting any new OTC products to consult with his physician or pharmacist to ensure no drug interactions are present. Mr. Janis Barros was instructed to avoid any major changes in his general diet and to avoid alcohol consumption. .        Mr. Janis Barros verbalized his understanding of all instructions and will call the office with any questions, concerns, or signs of abnormal bleeding or blood clot.

## 2017-08-15 NOTE — PATIENT INSTRUCTIONS
Taking Warfarin Safely: Care Instructions  Your Care Instructions  Warfarin is a medicine that you take to prevent blood clots. It is often called a blood thinner. Doctors give warfarin (such as Coumadin) to reduce the risk of blood clots. You may be at risk for blood clots if you have atrial fibrillation or deep vein thrombosis. Some other health problems may also put you at risk. Warfarin slows the amount of time it takes for your blood to clot. It can cause bleeding problems. Even if you've been taking warfarin for a while, it's important to know how to take it safely. Foods and other medicines can affect the way warfarin works. Some can make warfarin work too well. This can cause bleeding problems. And some can make it work poorly, so that it does not prevent blood clots very well. You will need regular blood tests to check how long it takes for your blood to form a clot. This test is called a PT or prothrombin time test. The result of the test is called an INR level. Depending on the test results, your doctor or anticoagulation clinic may adjust your dose of warfarin. Follow-up care is a key part of your treatment and safety. Be sure to make and go to all appointments, and call your doctor if you are having problems. It's also a good idea to know your test results and keep a list of the medicines you take. How can you care for yourself at home? Take warfarin safely  · Take your warfarin at the same time each day. · If you miss a dose of warfarin, don't take an extra dose to make up for it. Your doctor can tell you exactly what to do so you don't take too much or too little. · Wear medical alert jewelry that lets others know that you take warfarin. You can buy this at most drugstores. · Don't take warfarin if you are pregnant or planning to get pregnant. Talk to your doctor about how you can prevent getting pregnant while you are taking it.   · Don't change your dose or stop taking warfarin unless your doctor tells you to. Effects of medicines and food on warfarin  · Don't start or stop taking any medicines, vitamins, or natural remedies unless you first talk to your doctor. Many medicines can affect how warfarin works. These include aspirin and other pain relievers, over-the-counter medicines, multivitamins, dietary supplements, and herbal products. · Tell all of your doctors and pharmacists that you take warfarin. Some prescription medicines can affect how warfarin works. · Keep the amount of vitamin K in your diet about the same from day to day. Do not suddenly eat a lot more or a lot less food that is rich in vitamin K than you usually do. Vitamin K affects how warfarin works and how your blood clots. Talk with your doctor before making big changes in your diet. Foods that have a lot of vitamin K include cooked green vegetables, such as:  ¨ Kale, spinach, turnip greens, coy greens, Swiss chard, and mustard greens. ¨ [place] sprouts, broccoli, and asparagus. · Limit your use of alcohol. Avoid bleeding by preventing falls and injuries  · Wear slippers or shoes with nonskid soles. · Remove throw rugs and clutter. · Rearrange furniture and electrical cords to keep them out of walking paths. · Keep stairways, porches, and outside walkways well lit. Use night-lights in hallways and bathrooms. · Be extra careful when you work with sharp tools or knives. When should you call for help? Call 911 anytime you think you may need emergency care. For example, call if:  · You have a sudden, severe headache that is different from past headaches. Call your doctor now or seek immediate medical care if:  · You have any abnormal bleeding, such as:  ¨ Nosebleeds. ¨ Vaginal bleeding that is different (heavier, more frequent, at a different time of the month) than what you are used to. ¨ Bloody or black stools, or rectal bleeding. ¨ Bloody or pink urine.   Watch closely for changes in your health, and be sure to contact your doctor if you have any problems. Where can you learn more? Go to http://bird-vito.info/. Enter G347 in the search box to learn more about \"Taking Warfarin Safely: Care Instructions. \"  Current as of: November 15, 2016  Content Version: 11.3  © 6799-4182 [employer]. Care instructions adapted under license by [employer] (which disclaims liability or warranty for this information). If you have questions about a medical condition or this instruction, always ask your healthcare professional. [address] any warranty or liability for your use of this information.

## 2017-09-08 ENCOUNTER — ANTI-COAG VISIT (OUTPATIENT)
Dept: INTERNAL MEDICINE CLINIC | Age: 77
End: 2017-09-08

## 2017-09-08 DIAGNOSIS — I48.20 CHRONIC ATRIAL FIBRILLATION (HCC): ICD-10-CM

## 2017-09-08 LAB
INR BLD: 2
PT POC: NORMAL SECONDS
VALID INTERNAL CONTROL?: YES

## 2017-09-08 NOTE — PROGRESS NOTES
Mr. Andrew Pierre is here today for anticoagulation monitoring for the diagnosis of Atrial Fibrillation. His INR goal is 2.0-3.0 and his current Coumadin dose is 2.5 mg tues and thurs then 5 mg all other days . Today's findings include an INR of 2.0 (normal INR range 0.8-1.2). Considering Mr. Michi Tavera past history, todays findings, and per the coumadin policy/protocol, Mr. SAVAGE [facility] was instructed to take Coumadin as follows,  2.5 mg tues and thurs then 5 mg all other days. He was also instructed to schedule an appointment in 2 weeks prior to leaving for an INR check. A full discussion of the nature of anticoagulants has been carried out. A full discussion of the need for frequent and regular monitoring, precise dosage adjustment and compliance was stressed. Side effects of potential bleeding were discussed and Mr. SAVAGE [facility] was instructed to call [phone] if there are any signs of abnormal bleeding. Mr. SAVAGE [facility] was instructed to avoid any OTC items containing aspirin or ibuprofen and prior to starting any new OTC products to consult with his physician or pharmacist to ensure no drug interactions are present. Mr. SAVAGE [facility] was instructed to avoid any major changes in his general diet and to avoid alcohol consumption. .      Mr. SAVAGE [facility] verbalized his understanding of all instructions and will call the office with any questions, concerns, or signs of abnormal bleeding or blood clot.

## 2017-09-19 ENCOUNTER — OFFICE VISIT (OUTPATIENT)
Dept: INTERNAL MEDICINE CLINIC | Age: 77
End: 2017-09-19

## 2017-09-19 VITALS
BODY MASS INDEX: 27.32 KG/M2 | OXYGEN SATURATION: 98 % | WEIGHT: 170 LBS | DIASTOLIC BLOOD PRESSURE: 72 MMHG | SYSTOLIC BLOOD PRESSURE: 132 MMHG | HEART RATE: 65 BPM | RESPIRATION RATE: 16 BRPM | TEMPERATURE: 96.7 F | HEIGHT: 66 IN

## 2017-09-19 DIAGNOSIS — I48.20 CHRONIC ATRIAL FIBRILLATION (HCC): Primary | ICD-10-CM

## 2017-09-19 DIAGNOSIS — Z23 ENCOUNTER FOR IMMUNIZATION: ICD-10-CM

## 2017-09-19 DIAGNOSIS — K21.9 GASTROESOPHAGEAL REFLUX DISEASE WITHOUT ESOPHAGITIS: ICD-10-CM

## 2017-09-19 NOTE — PROGRESS NOTES
1. Have you been to the ER, urgent care clinic since your last visit? Hospitalized since your last visit? No    2. Have you seen or consulted any other health care providers outside of the [address] [place] [address] since your last visit? Include any pap smears or colon screening.  No

## 2017-09-19 NOTE — MR AVS SNAPSHOT
Visit Information Date & Time Provider Department Dept. Phone Encounter #  
 9/19/2017  8:30 AM Cyndi Bernal MD [facility] [place] [phone] [id number] Follow-up Instructions Return in about 3 months (around 12/11/2017). Upcoming Health Maintenance Date Due ZOSTER VACCINE AGE 60> 12/11/1998 GLAUCOMA SCREENING Q2Y 2/11/2004 DTaP/Tdap/Td series (1 - Tdap) 11/21/2009 Pneumococcal 65+ Low/Medium Risk (2 of 2 - PPSV23) 11/17/2010 MEDICARE YEARLY EXAM 3/22/2018 Allergies as of 9/19/2017  Review Complete On: 9/19/2017 By: Yehuda Mathew LPN Severity Noted Reaction Type Reactions Parafon Forte Dsc [Chlorzoxazone] High   Anaphylaxis Current Immunizations  Never Reviewed Name Date Influenza High Dose Vaccine PF 9/19/2017  9:05 AM, 9/13/2016 11:14 AM  
 Influenza Vaccine (Quad) PF 10/13/2015 Pneumococcal Vaccine (Unspecified Type) 11/17/2005 Td 11/20/2009 Not reviewed this visit You Were Diagnosed With   
  
 Codes Comments Chronic atrial fibrillation (HCC)    -  Primary ICD-10-CM: Q61.1 ICD-9-CM: 427.31 Encounter for immunization     ICD-10-CM: V98 ICD-9-CM: V03.89 Gastroesophageal reflux disease without esophagitis     ICD-10-CM: K21.9 ICD-9-CM: 530.81 Vitals BP Pulse Temp Resp Height(growth percentile) 132/72 (BP 1 Location: Left arm, BP Patient Position: Sitting) 65 96.7 °F (35.9 °C) (Tympanic) 16 5' 6\" (1.676 m) Weight(growth percentile) SpO2 BMI Smoking Status 170 lb (77.1 kg) 98% 27.44 kg/m2 Never Smoker Vitals History BMI and BSA Data Body Mass Index Body Surface Area  
 27.44 kg/m 2 1.89 m 2 Preferred Pharmacy Pharmacy Name Phone [facility] [place], [address] [place] [address],[address] [place], [place] [address] [phone] Your Updated Medication List  
  
   
This list is accurate as of: 9/19/17 10:12 AM.  Always use your most recent med list.  
  
  
 allopurinol 300 mg tablet Commonly known as:  ZYLOPRIM  
TAKE ONE TABLET BY MOUTH ONCE DAILY  
  
 azelastine 137 mcg (0.1 %) nasal spray Commonly known as:  ASTELIN  
USE 1 SPRAY INTO EACH NOSTRIL TWICE DAILY  
  
 cholecalciferol (vitamin D3) 2,000 unit Tab Take  by mouth.  
  
 cyanocobalamin 500 mcg tablet Commonly known as:  VITAMIN B12 Take 500 mcg by mouth daily. docusate sodium 100 mg capsule Commonly known as:  Candelario Colby Take 100 mg by mouth two (2) times a day. FIBERCON PO Take  by mouth.  
  
 lansoprazole 30 mg capsule Commonly known as:  PREVACID TAKE ONE CAPSULE BY MOUTH ONCE DAILY BEFORE BREAKFAST  
  
 magnesium 250 mg Tab Take  by mouth.  
  
 metoprolol succinate 50 mg XL tablet Commonly known as:  TOPROL-XL Take 75 mg in the AM and 50 mg in the PM  
  
 omeprazole 20 mg capsule Commonly known as:  PRILOSEC  
1 capsule each AM  
  
 promethazine 25 mg tablet Commonly known as:  PHENERGAN  
TAKE 1/2 TO ONE TABLET BY MOUTH EVERY 8 HOURS AS NEEDED FOR NAUSEA  
  
 raNITIdine 150 mg tablet Commonly known as:  ZANTAC TAKE ONE TABLET BY MOUTH TWICE DAILY  
  
 warfarin 5 mg tablet Commonly known as:  COUMADIN  
TAKE ONE AND ONE-HALF TABLET BY MOUTH ONCE DAILY We Performed the Following ADMIN INFLUENZA VIRUS VAC [ [facility]] INFLUENZA VIRUS VACCINE, HIGH DOSE SEASONAL, PRESERVATIVE FREE [05445 CPT(R)] REFERRAL TO GASTROENTEROLOGY [UGG93 [facility]] Comments: Hx of esophogeal cancer with early satiety Follow-up Instructions Return in about 3 months (around 12/11/2017). To-Do List   
 09/19/2017 Lab:  METABOLIC PANEL, COMPREHENSIVE   
  
 09/19/2017 Lab:  PROTHROMBIN TIME + INR   
  
 09/19/2017 Lab:  TSH 3RD GENERATION   
  
 01/19/2018 Lab:  CBC WITH AUTOMATED DIFF Referral Information Referral ID Referred By Referred To [id number] [address] [place] bruno, [address] Phone: [phone] Fax: [phone] Visits Status Start Date End Date 1 New Request 9/19/17 9/19/18 If your referral has a status of pending review or denied, additional information will be sent to support the outcome of this decision. Patient Instructions Health Maintenance Due Topic Date Due  Shingles Vaccine  12/11/1998  Glaucoma Screening   02/11/2004  DTaP/Tdap/Td  (1 - Tdap) 11/21/2009  Pneumococcal Vaccine (2 of 2 - PPSV23) 11/17/2010  Flu Vaccine  08/01/2017 Introducing [facility]! Vazquez Alvarez introduces Pensqr patient portal. Now you can access parts of your medical record, email your doctor's office, and request medication refills online. 1. In your internet browser, go to [email or web address] 2. Click on the First Time User? Click Here link in the Sign In box. You will see the New Member Sign Up page. 3. Enter your Pensqr Access Code exactly as it appears below. You will not need to use this code after youve completed the sign-up process. If you do not sign up before the expiration date, you must request a new code. · Pensqr Access Code: [email or web address] Expires: 12/7/2017  2:08 PM 
 
4. Enter the last four digits of your Social Security Number (xxxx) and Date of Birth (mm/dd/yyyy) as indicated and click Submit. You will be taken to the next sign-up page. 5. Create a [employer] ID. This will be your Pensqr login ID and cannot be changed, so think of one that is secure and easy to remember. 6. Create a Pensqr password. You can change your password at any time. 7. Enter your Password Reset Question and Answer. This can be used at a later time if you forget your password. 8. Enter your e-mail address.  You will receive e-mail notification when new information is available in iRise. 9. Click Sign Up. You can now view and download portions of your medical record. 10. Click the Download Summary menu link to download a portable copy of your medical information. If you have questions, please visit the Frequently Asked Questions section of the iRise website. Remember, iRise is NOT to be used for urgent needs. For medical emergencies, dial 911. Now available from your iPhone and Android! Please provide this summary of care documentation to your next provider. Your primary care clinician is listed as Lujan Buys. If you have any questions after today's visit, please call [phone].

## 2017-09-19 NOTE — PATIENT INSTRUCTIONS
Health Maintenance Due   Topic Date Due    Shingles Vaccine  12/11/1998    Glaucoma Screening   02/11/2004    DTaP/Tdap/Td  (1 - Tdap) 11/21/2009    Pneumococcal Vaccine (2 of 2 - PPSV23) 11/17/2010    Flu Vaccine  08/01/2017

## 2017-09-22 NOTE — PROGRESS NOTES
The patient presents to the office today with the chief complaint of atrial fibrillation    HPI    The patient remains on coumadin due to chronic atrial fibrillation. The patient is status post surgery for esophogeal cancer years ago. The patient has no evidence of reoccurrence but recently his developed mild dysphasia and early satiety. The patient's weight has been stable. Review of Systems   Respiratory: Negative for shortness of breath. Cardiovascular: Negative for chest pain and leg swelling. Gastrointestinal: Positive for heartburn. Allergies   Allergen Reactions    Parafon Forte Dsc [Chlorzoxazone] Anaphylaxis       Current Outpatient Prescriptions   Medication Sig Dispense Refill    promethazine (PHENERGAN) 25 mg tablet TAKE 1/2 TO ONE TABLET BY MOUTH EVERY 8 HOURS AS NEEDED FOR NAUSEA 40 Tab 1    warfarin (COUMADIN) 5 mg tablet TAKE ONE AND ONE-HALF TABLET BY MOUTH ONCE DAILY 45 Tab 0    omeprazole (PRILOSEC) 20 mg capsule 1 capsule each AM 30 Cap 5    azelastine (ASTELIN) 137 mcg (0.1 %) nasal spray USE 1 SPRAY INTO EACH NOSTRIL TWICE DAILY 1 Bottle 5    raNITIdine (ZANTAC) 150 mg tablet TAKE ONE TABLET BY MOUTH TWICE DAILY 30 Tab 3    allopurinol (ZYLOPRIM) 300 mg tablet TAKE ONE TABLET BY MOUTH ONCE DAILY 90 Tab 2    docusate sodium (COLACE) 100 mg capsule Take 100 mg by mouth two (2) times a day.  CALCIUM POLYCARBOPHIL (FIBERCON PO) Take  by mouth.  magnesium 250 mg tab Take  by mouth.  cyanocobalamin (VITAMIN B12) 500 mcg tablet Take 500 mcg by mouth daily.  cholecalciferol, vitamin D3, 2,000 unit tab Take  by mouth.       metoprolol succinate (TOPROL-XL) 50 mg XL tablet Take 75 mg in the AM and 50 mg in the PM         Past Medical History:   Diagnosis Date    Abdominal pain, unspecified site     Acute upper respiratory infections of unspecified site     [facility]rial fibrillation ([facility]     Cancer of esophagus ([facility] 2001    RT and adjuvant chemotherapy    Esophageal reflux     Generalized osteoarthrosis, involving multiple sites     Gout, unspecified     Nausea alone     Sinoatrial node dysfunction (HCC)     Unspecified cataract     Unspecified constipation        Past Surgical History:   Procedure Laterality Date    HX PACEMAKER  2012       Social History     Social History    Marital status: SINGLE     Spouse name: N/A    Number of children: N/A    Years of education: N/A     Occupational History    Not on file. Social History Main Topics    Smoking status: Never Smoker    Smokeless tobacco: Never Used    Alcohol use No    Drug use: No    Sexual activity: No     Other Topics Concern    Not on file     Social History Narrative       Patient does not have an advanced directive on file    Visit Vitals    /72 (BP 1 Location: Left arm, BP Patient Position: Sitting)    Pulse 65    Temp 96.7 °F (35.9 °C) (Tympanic)    Resp 16    Ht 5' 6\" (1.676 m)    Wt 170 lb (77.1 kg)    SpO2 98%    BMI 27.44 kg/m2       Physical Exam   No Cervical Lymphadenopathy  No Supraclavicular Lymphadenopathy  Thyroid is Normal  Lungs are clear to ausculation and percussion  Heart:  S1 S2 are normal, No gallops, No mummers  No Carotid Bruits  Abdomen:  Normal Bowel Sounds. No tenderness. No masses. No Hepatomegaly or Splenomegly  LE:  Strong Pedal Pulses. No Edema      BMI:  OK    Anti-Coag visit on 09/08/2017   Component Date Value Ref Range Status    VALID INTERNAL CONTROL POC 09/08/2017 Yes   Final    INR POC 09/08/2017 2.0   Final   Anti-Coag visit on 08/15/2017   Component Date Value Ref Range Status    VALID INTERNAL CONTROL POC 08/15/2017 Yes   Final    INR POC 08/15/2017 2.4   Final   Anti-Coag visit on 06/28/2017   Component Date Value Ref Range Status    VALID INTERNAL CONTROL POC 06/28/2017 Yes   Final    INR POC 06/28/2017 2.3   Final       .No results found for any visits on 09/19/17. Assessment / Plan      ICD-10-CM ICD-9-CM    1. Chronic atrial fibrillation (HCC) I48.2 427.31 INFLUENZA VIRUS VACCINE, HIGH DOSE SEASONAL, PRESERVATIVE FREE      ADMIN INFLUENZA VIRUS VAC      CBC WITH AUTOMATED DIFF      METABOLIC PANEL, COMPREHENSIVE      TSH 3RD GENERATION      PROTHROMBIN TIME + INR      REFERRAL TO GASTROENTEROLOGY   2. Encounter for immunization Z23 V03.89 INFLUENZA VIRUS VACCINE, HIGH DOSE SEASONAL, PRESERVATIVE FREE      ADMIN INFLUENZA VIRUS VAC      CBC WITH AUTOMATED DIFF      METABOLIC PANEL, COMPREHENSIVE      TSH 3RD GENERATION      PROTHROMBIN TIME + INR      REFERRAL TO GASTROENTEROLOGY   3. Gastroesophageal reflux disease without esophagitis K21.9 530.81 INFLUENZA VIRUS VACCINE, HIGH DOSE SEASONAL, PRESERVATIVE FREE      ADMIN INFLUENZA VIRUS VAC      CBC WITH AUTOMATED DIFF      METABOLIC PANEL, COMPREHENSIVE      TSH 3RD GENERATION      PROTHROMBIN TIME + INR      REFERRAL TO GASTROENTEROLOGY     Labs  To GI  Flu Shot    he was advised to continue his maintenance medications      Follow-up Disposition:  Return in about 6 months (around 3/19/2018). I asked Tasneem Cates if he has any questions and I answered the questions. Tasneem Brown states that he understands the treatment plan and agrees with the treatment plan

## 2017-09-28 ENCOUNTER — HOSPITAL ENCOUNTER (OUTPATIENT)
Dept: LAB | Age: 77
Discharge: HOME | End: 2017-09-28
Payer: MEDICARE

## 2017-09-28 DIAGNOSIS — Z23 ENCOUNTER FOR IMMUNIZATION: ICD-10-CM

## 2017-09-28 DIAGNOSIS — I48.20 CHRONIC ATRIAL FIBRILLATION (HCC): ICD-10-CM

## 2017-09-28 DIAGNOSIS — K21.9 GASTROESOPHAGEAL REFLUX DISEASE WITHOUT ESOPHAGITIS: ICD-10-CM

## 2017-09-28 LAB
ALBUMIN SERPL-MCNC: 3.6 G/DL (ref 3.4–5)
ALBUMIN/GLOB SERPL: 0.9 {RATIO} (ref 0.8–1.7)
ALP SERPL-CCNC: 193 U/L (ref 45–117)
ALT SERPL-CCNC: 15 U/L (ref 16–61)
ANION GAP SERPL CALC-SCNC: 7 MMOL/L (ref 3–18)
AST SERPL-CCNC: 25 U/L (ref 15–37)
BASOPHILS # BLD: 0 K/UL (ref 0–0.06)
BASOPHILS NFR BLD: 1 % (ref 0–2)
BILIRUB SERPL-MCNC: 0.8 MG/DL (ref 0.2–1)
BUN SERPL-MCNC: 12 MG/DL (ref 7–18)
BUN/CREAT SERPL: 14 (ref 12–20)
CALCIUM SERPL-MCNC: 8.9 MG/DL (ref 8.5–10.1)
CHLORIDE SERPL-SCNC: 98 MMOL/L (ref 100–108)
CO2 SERPL-SCNC: 28 MMOL/L (ref 21–32)
CREAT SERPL-MCNC: 0.85 MG/DL (ref 0.6–1.3)
DIFFERENTIAL METHOD BLD: ABNORMAL
EOSINOPHIL # BLD: 0 K/UL (ref 0–0.4)
EOSINOPHIL NFR BLD: 1 % (ref 0–5)
ERYTHROCYTE [DISTWIDTH] IN BLOOD BY AUTOMATED COUNT: 15.2 % (ref 11.6–14.5)
GLOBULIN SER CALC-MCNC: 4.2 G/DL (ref 2–4)
GLUCOSE SERPL-MCNC: 66 MG/DL (ref 74–99)
HCT VFR BLD AUTO: 35.4 % (ref 36–48)
HGB BLD-MCNC: 12 G/DL (ref 13–16)
INR PPP: 1.9 (ref 0.8–1.2)
LYMPHOCYTES # BLD: 1 K/UL (ref 0.9–3.6)
LYMPHOCYTES NFR BLD: 24 % (ref 21–52)
MCH RBC QN AUTO: 33.6 PG (ref 24–34)
MCHC RBC AUTO-ENTMCNC: 33.9 G/DL (ref 31–37)
MCV RBC AUTO: 99.2 FL (ref 74–97)
MONOCYTES # BLD: 0.3 K/UL (ref 0.05–1.2)
MONOCYTES NFR BLD: 6 % (ref 3–10)
NEUTS SEG # BLD: 3 K/UL (ref 1.8–8)
NEUTS SEG NFR BLD: 68 % (ref 40–73)
PLATELET # BLD AUTO: 164 K/UL (ref 135–420)
PMV BLD AUTO: 9.8 FL (ref 9.2–11.8)
POTASSIUM SERPL-SCNC: 4.4 MMOL/L (ref 3.5–5.5)
PROT SERPL-MCNC: 7.8 G/DL (ref 6.4–8.2)
PROTHROMBIN TIME: 20.7 SEC (ref 11.5–15.2)
RBC # BLD AUTO: 3.57 M/UL (ref 4.7–5.5)
SODIUM SERPL-SCNC: 133 MMOL/L (ref 136–145)
TSH SERPL DL<=0.05 MIU/L-ACNC: 1.8 UIU/ML (ref 0.36–3.74)
WBC # BLD AUTO: 4.4 K/UL (ref 4.6–13.2)

## 2017-09-28 PROCEDURE — 85610 PROTHROMBIN TIME: CPT | Performed by: INTERNAL MEDICINE

## 2017-09-28 PROCEDURE — 80053 COMPREHEN METABOLIC PANEL: CPT | Performed by: INTERNAL MEDICINE

## 2017-09-28 PROCEDURE — 84443 ASSAY THYROID STIM HORMONE: CPT | Performed by: INTERNAL MEDICINE

## 2017-09-28 PROCEDURE — 85025 COMPLETE CBC W/AUTO DIFF WBC: CPT | Performed by: INTERNAL MEDICINE

## 2017-10-23 ENCOUNTER — ANTI-COAG VISIT (OUTPATIENT)
Dept: INTERNAL MEDICINE CLINIC | Age: 77
End: 2017-10-23

## 2017-10-23 DIAGNOSIS — I48.20 CHRONIC ATRIAL FIBRILLATION (HCC): ICD-10-CM

## 2017-10-23 LAB
INR BLD: 2.6
PT POC: NORMAL SECONDS
VALID INTERNAL CONTROL?: YES

## 2017-10-23 RX ORDER — WARFARIN SODIUM 5 MG/1
TABLET ORAL
Qty: 45 TAB | Refills: 0 | Status: SHIPPED | OUTPATIENT
Start: 2017-10-23 | End: 2018-02-05 | Stop reason: SDUPTHER

## 2017-10-23 NOTE — PROGRESS NOTES
Mr. Shawna Abdi is here today for anticoagulation monitoring for the diagnosis of Atrial Fibrillation. His INR goal is 2.0-3.0 and his current Coumadin dose is 30 mg weekly. Today's findings include an INR of 2.6 (normal INR range 0.8-1.2). Considering Mr. Willis Officer past history, todays findings, and per the coumadin policy/protocol, Mr. Sharon Hatchet was instructed to take Coumadin as follows,  Continue same dose. He was also instructed to schedule an appointment in 3 weeks prior to leaving for an INR check. A full discussion of the nature of anticoagulants has been carried out. A full discussion of the need for frequent and regular monitoring, precise dosage adjustment and compliance was stressed. Side effects of potential bleeding were discussed and Mr. Sharon Hatchet was instructed to call [phone] if there are any signs of abnormal bleeding. Mr. Sharon Hatchet was instructed to avoid any OTC items containing aspirin or ibuprofen and prior to starting any new OTC products to consult with his physician or pharmacist to ensure no drug interactions are present. Mr. Sharon Hatchet was instructed to avoid any major changes in his general diet and to avoid alcohol consumption. .    Mr. Sharon Hatchet was provided a literature booklet, \"Treatment with Warfarin (Coumadin)\", that includes topics on understanding coumadin therapy, drug interaction considerations, vitamin K and coumadin use, interactions with foods and supplements containing vitamin K, and the use of herbal products. Mr. Sharon Hatchet verbalized his understanding of all instructions and will call the office with any questions, concerns, or signs of abnormal bleeding or blood clot.

## 2017-10-24 RX ORDER — RANITIDINE 150 MG/1
TABLET, FILM COATED ORAL
Qty: 60 TAB | Refills: 3 | Status: SHIPPED | OUTPATIENT
Start: 2017-10-24 | End: 2018-11-15 | Stop reason: SDUPTHER

## 2017-11-15 RX ORDER — WARFARIN SODIUM 5 MG/1
TABLET ORAL
Qty: 45 TAB | Refills: 0 | Status: SHIPPED | OUTPATIENT
Start: 2017-11-15 | End: 2017-12-12 | Stop reason: SDUPTHER

## 2017-11-20 ENCOUNTER — ANTI-COAG VISIT (OUTPATIENT)
Dept: INTERNAL MEDICINE CLINIC | Age: 77
End: 2017-11-20

## 2017-11-20 DIAGNOSIS — I48.20 CHRONIC ATRIAL FIBRILLATION (HCC): ICD-10-CM

## 2017-11-24 LAB
INR BLD: 1.9
PT POC: NORMAL SECONDS
VALID INTERNAL CONTROL?: YES

## 2017-11-27 RX ORDER — PROMETHAZINE HYDROCHLORIDE 25 MG/1
TABLET ORAL
Qty: 40 TAB | Refills: 1 | Status: SHIPPED | OUTPATIENT
Start: 2017-11-27 | End: 2018-02-08 | Stop reason: SDUPTHER

## 2017-11-27 RX ORDER — OMEPRAZOLE 20 MG/1
CAPSULE, DELAYED RELEASE ORAL
Qty: 30 CAP | Refills: 4 | Status: SHIPPED | OUTPATIENT
Start: 2017-11-27 | End: 2018-06-11 | Stop reason: SDUPTHER

## 2017-11-30 ENCOUNTER — TELEPHONE (OUTPATIENT)
Dept: INTERNAL MEDICINE CLINIC | Age: 77
End: 2017-11-30

## 2017-11-30 RX ORDER — ENOXAPARIN SODIUM 100 MG/ML
INJECTION SUBCUTANEOUS
Qty: 10 SYRINGE | Refills: 0 | OUTPATIENT
Start: 2017-11-30 | End: 2018-04-19 | Stop reason: ALTCHOICE

## 2017-11-30 NOTE — TELEPHONE ENCOUNTER
Patient is having Colonoscopy on 12/13/17 by Dr Shakira Aponte. They want him to go on Lovenox. Please call him at [phone] to advise.

## 2017-11-30 NOTE — TELEPHONE ENCOUNTER
Called and spoke with patient--informed [facility] of [facility]. Advised if there are any additional questions upon picking up rx to call the office. Understanding was voiced.

## 2017-12-05 ENCOUNTER — ANTI-COAG VISIT (OUTPATIENT)
Dept: INTERNAL MEDICINE CLINIC | Age: 77
End: 2017-12-05

## 2017-12-05 DIAGNOSIS — I48.20 CHRONIC ATRIAL FIBRILLATION (HCC): ICD-10-CM

## 2017-12-05 LAB
INR BLD: 1.8
PT POC: NORMAL SECONDS
VALID INTERNAL CONTROL?: YES

## 2017-12-05 NOTE — PROGRESS NOTES
Mr. Randi Soria is here today for anticoagulation monitoring for the diagnosis of Atrial Fibrillation. His INR goal is 2.0-3.0 and his current Coumadin dose is 30 mg weekly. Today's findings include an INR of 1.8 (normal INR range 0.8-1.2). Considering Mr. Lydia Lieberman past history, todays findings, and per the coumadin policy/protocol, Mr. Hallie Ricketts was instructed to take Coumadin as follows,  See chart (patient bridging with lovenox for procedure). He was also instructed to schedule an appointment for 12/18/17 prior to leaving for an INR check. A full discussion of the nature of anticoagulants has been carried out. A full discussion of the need for frequent and regular monitoring, precise dosage adjustment and compliance was stressed. Side effects of potential bleeding were discussed and Mr. Hallie Ricketts was instructed to call [phone] if there are any signs of abnormal bleeding. Mr. Hallie Ricketts was instructed to avoid any OTC items containing aspirin or ibuprofen and prior to starting any new OTC products to consult with his physician or pharmacist to ensure no drug interactions are present. Mr. Hallie Ricketts was instructed to avoid any major changes in his general diet and to avoid alcohol consumption. .    Mr. Hallie Ricketts was provided a literature booklet, \"Treatment with Warfarin (Coumadin)\", that includes topics on understanding coumadin therapy, drug interaction considerations, vitamin K and coumadin use, interactions with foods and supplements containing vitamin K, and the use of herbal products. Mr. Hallie Ricketts verbalized his understanding of all instructions and will call the office with any questions, concerns, or signs of abnormal bleeding or blood clot.

## 2017-12-07 RX ORDER — METOPROLOL TARTRATE 50 MG/1
50 TABLET ORAL 2 TIMES DAILY
COMMUNITY
End: 2021-08-04

## 2017-12-08 ENCOUNTER — HOSPITAL ENCOUNTER (OUTPATIENT)
Dept: PREADMISSION TESTING | Age: 77
Discharge: HOME | End: 2017-12-08
Payer: MEDICARE

## 2017-12-08 DIAGNOSIS — K21.9 ESOPHAGEAL REFLUX: ICD-10-CM

## 2017-12-08 DIAGNOSIS — Z12.12 SCREENING FOR COLORECTAL CANCER: ICD-10-CM

## 2017-12-08 DIAGNOSIS — R11.2 NAUSEA WITH VOMITING: ICD-10-CM

## 2017-12-08 DIAGNOSIS — Z12.11 SCREENING FOR COLORECTAL CANCER: ICD-10-CM

## 2017-12-08 LAB
ANION GAP SERPL CALC-SCNC: 5 MMOL/L (ref 3–18)
BUN SERPL-MCNC: 15 MG/DL (ref 7–18)
BUN/CREAT SERPL: 16 (ref 12–20)
CALCIUM SERPL-MCNC: 8.8 MG/DL (ref 8.5–10.1)
CHLORIDE SERPL-SCNC: 97 MMOL/L (ref 100–108)
CO2 SERPL-SCNC: 31 MMOL/L (ref 21–32)
CREAT SERPL-MCNC: 0.92 MG/DL (ref 0.6–1.3)
GLUCOSE SERPL-MCNC: 96 MG/DL (ref 74–99)
POTASSIUM SERPL-SCNC: 4.9 MMOL/L (ref 3.5–5.5)
SODIUM SERPL-SCNC: 133 MMOL/L (ref 136–145)

## 2017-12-08 PROCEDURE — 80048 BASIC METABOLIC PNL TOTAL CA: CPT | Performed by: NURSE PRACTITIONER

## 2017-12-08 PROCEDURE — 36415 COLL VENOUS BLD VENIPUNCTURE: CPT | Performed by: NURSE PRACTITIONER

## 2017-12-12 ENCOUNTER — OFFICE VISIT (OUTPATIENT)
Dept: INTERNAL MEDICINE CLINIC | Age: 77
End: 2017-12-12

## 2017-12-12 ENCOUNTER — ANESTHESIA EVENT (OUTPATIENT)
Dept: ENDOSCOPY | Age: 77
End: 2017-12-12
Payer: MEDICARE

## 2017-12-12 VITALS
DIASTOLIC BLOOD PRESSURE: 84 MMHG | OXYGEN SATURATION: 98 % | SYSTOLIC BLOOD PRESSURE: 140 MMHG | HEIGHT: 66 IN | RESPIRATION RATE: 16 BRPM | TEMPERATURE: 97 F | BODY MASS INDEX: 25.71 KG/M2 | WEIGHT: 160 LBS | HEART RATE: 72 BPM

## 2017-12-12 DIAGNOSIS — R42 DIZZINESS: Primary | ICD-10-CM

## 2017-12-12 DIAGNOSIS — I48.20 CHRONIC ATRIAL FIBRILLATION (HCC): ICD-10-CM

## 2017-12-12 DIAGNOSIS — R13.19 OTHER DYSPHAGIA: ICD-10-CM

## 2017-12-12 NOTE — PROGRESS NOTES
1. Have you been to the ER, urgent care clinic since your last visit? Hospitalized since your last visit? No    2. Have you seen or consulted any other health care providers outside of the [address] [place] [address] since your last visit? Include any pap smears or colon screening.  No

## 2017-12-12 NOTE — PATIENT INSTRUCTIONS
Health Maintenance Due   Topic Date Due    Shingles Vaccine  12/11/1998    Glaucoma Screening   02/11/2004    DTaP/Tdap/Td  (1 - Tdap) 11/21/2009    Pneumococcal Vaccine (2 of 2 - PPSV23) 11/17/2010

## 2017-12-13 ENCOUNTER — ANESTHESIA (OUTPATIENT)
Dept: ENDOSCOPY | Age: 77
End: 2017-12-13
Payer: MEDICARE

## 2017-12-13 ENCOUNTER — HOSPITAL ENCOUNTER (OUTPATIENT)
Age: 77
Setting detail: OUTPATIENT SURGERY
Discharge: HOME | End: 2017-12-13
Attending: INTERNAL MEDICINE | Admitting: INTERNAL MEDICINE
Payer: MEDICARE

## 2017-12-13 VITALS
TEMPERATURE: 97 F | HEIGHT: 66 IN | OXYGEN SATURATION: 100 % | WEIGHT: 162.38 LBS | BODY MASS INDEX: 26.09 KG/M2 | SYSTOLIC BLOOD PRESSURE: 130 MMHG | RESPIRATION RATE: 18 BRPM | HEART RATE: 74 BPM | DIASTOLIC BLOOD PRESSURE: 75 MMHG

## 2017-12-13 PROCEDURE — 77030013992 HC SNR POLYP ENDOSC BSC -B: Performed by: INTERNAL MEDICINE

## 2017-12-13 PROCEDURE — 77030011640 HC PAD GRND REM COVD -A: Performed by: INTERNAL MEDICINE

## 2017-12-13 PROCEDURE — 77030018846 HC SOL IRR STRL H20 ICUM -A: Performed by: INTERNAL MEDICINE

## 2017-12-13 PROCEDURE — 74011250636 HC RX REV CODE- 250/636: Performed by: NURSE ANESTHETIST, CERTIFIED REGISTERED

## 2017-12-13 PROCEDURE — 74011250636 HC RX REV CODE- 250/636

## 2017-12-13 PROCEDURE — 76060000032 HC ANESTHESIA 0.5 TO 1 HR: Performed by: INTERNAL MEDICINE

## 2017-12-13 PROCEDURE — 88305 TISSUE EXAM BY PATHOLOGIST: CPT | Performed by: INTERNAL MEDICINE

## 2017-12-13 PROCEDURE — 74011000250 HC RX REV CODE- 250: Performed by: NURSE ANESTHETIST, CERTIFIED REGISTERED

## 2017-12-13 PROCEDURE — 76040000007: Performed by: INTERNAL MEDICINE

## 2017-12-13 PROCEDURE — 77030008565 HC TBNG SUC IRR ERBE -B: Performed by: INTERNAL MEDICINE

## 2017-12-13 RX ORDER — PROPOFOL 10 MG/ML
INJECTION, EMULSION INTRAVENOUS AS NEEDED
Status: DISCONTINUED | OUTPATIENT
Start: 2017-12-13 | End: 2017-12-13 | Stop reason: HOSPADM

## 2017-12-13 RX ORDER — SODIUM CHLORIDE, SODIUM LACTATE, POTASSIUM CHLORIDE, CALCIUM CHLORIDE 600; 310; 30; 20 MG/100ML; MG/100ML; MG/100ML; MG/100ML
50 INJECTION, SOLUTION INTRAVENOUS CONTINUOUS
Status: DISCONTINUED | OUTPATIENT
Start: 2017-12-13 | End: 2017-12-13 | Stop reason: HOSPADM

## 2017-12-13 RX ORDER — SODIUM CHLORIDE 0.9 % (FLUSH) 0.9 %
5-10 SYRINGE (ML) INJECTION AS NEEDED
Status: DISCONTINUED | OUTPATIENT
Start: 2017-12-13 | End: 2017-12-13 | Stop reason: HOSPADM

## 2017-12-13 RX ORDER — INSULIN LISPRO 100 [IU]/ML
INJECTION, SOLUTION INTRAVENOUS; SUBCUTANEOUS ONCE
Status: DISCONTINUED | OUTPATIENT
Start: 2017-12-13 | End: 2017-12-13 | Stop reason: HOSPADM

## 2017-12-13 RX ORDER — SODIUM CHLORIDE, SODIUM LACTATE, POTASSIUM CHLORIDE, CALCIUM CHLORIDE 600; 310; 30; 20 MG/100ML; MG/100ML; MG/100ML; MG/100ML
INJECTION, SOLUTION INTRAVENOUS
Status: DISCONTINUED | OUTPATIENT
Start: 2017-12-13 | End: 2017-12-13 | Stop reason: HOSPADM

## 2017-12-13 RX ORDER — SODIUM CHLORIDE, SODIUM LACTATE, POTASSIUM CHLORIDE, CALCIUM CHLORIDE 600; 310; 30; 20 MG/100ML; MG/100ML; MG/100ML; MG/100ML
75 INJECTION, SOLUTION INTRAVENOUS CONTINUOUS
Status: DISCONTINUED | OUTPATIENT
Start: 2017-12-13 | End: 2017-12-13 | Stop reason: HOSPADM

## 2017-12-13 RX ORDER — LIDOCAINE HYDROCHLORIDE 10 MG/ML
0.1 INJECTION, SOLUTION EPIDURAL; INFILTRATION; INTRACAUDAL; PERINEURAL AS NEEDED
Status: DISCONTINUED | OUTPATIENT
Start: 2017-12-13 | End: 2017-12-13 | Stop reason: HOSPADM

## 2017-12-13 RX ORDER — SODIUM CHLORIDE 0.9 % (FLUSH) 0.9 %
5-10 SYRINGE (ML) INJECTION EVERY 8 HOURS
Status: DISCONTINUED | OUTPATIENT
Start: 2017-12-13 | End: 2017-12-13 | Stop reason: HOSPADM

## 2017-12-13 RX ADMIN — PROPOFOL 100 MG: 10 INJECTION, EMULSION INTRAVENOUS at 09:55

## 2017-12-13 RX ADMIN — SODIUM CHLORIDE, SODIUM LACTATE, POTASSIUM CHLORIDE, AND CALCIUM CHLORIDE 75 ML/HR: 600; 310; 30; 20 INJECTION, SOLUTION INTRAVENOUS at 09:24

## 2017-12-13 RX ADMIN — PROPOFOL 30 MG: 10 INJECTION, EMULSION INTRAVENOUS at 09:44

## 2017-12-13 RX ADMIN — PROPOFOL 20 MG: 10 INJECTION, EMULSION INTRAVENOUS at 10:03

## 2017-12-13 RX ADMIN — SODIUM CHLORIDE, SODIUM LACTATE, POTASSIUM CHLORIDE, CALCIUM CHLORIDE: 600; 310; 30; 20 INJECTION, SOLUTION INTRAVENOUS at 09:36

## 2017-12-13 RX ADMIN — PROPOFOL 50 MG: 10 INJECTION, EMULSION INTRAVENOUS at 09:42

## 2017-12-13 RX ADMIN — FAMOTIDINE 20 MG: 10 INJECTION, SOLUTION INTRAVENOUS at 09:25

## 2017-12-13 NOTE — DISCHARGE INSTRUCTIONS
Resume Coumadin tomorrow. Upper GI Endoscopy: What to Expect at [address] [place] [address]  After you have an endoscopy, you will stay at the hospital or clinic for 1 to 2 hours. This will allow the medicine to wear off. You will be able to go home after your doctor or nurse checks to make sure you are not having any problems. You may have to stay overnight if you had treatment during the test. You may have a sore throat for a day or two after the test.  This care sheet gives you a general idea about what to expect after the test.  How can you care for yourself at home? Activity  · Rest as much as you need to after you go home. · You should be able to go back to your usual activities the day after the test.  Diet  · Follow your doctor's directions for eating after the test.  · Drink plenty of fluids (unless your doctor has told you not to). Medications  · If you have a sore throat the day after the test, use an over-the-counter spray to numb your throat. Follow-up care is a key part of your treatment and safety. Be sure to make and go to all appointments, and call your doctor if you are having problems. It's also a good idea to know your test results and keep a list of the medicines you take. When should you call for help? Call 911 anytime you think you may need emergency care. For example, call if:  ? · You passed out (loses consciousness). ? · You have trouble breathing. ? · You pass maroon or bloody stools. ?Call your doctor now or seek immediate medical care if:  ? · You have pain that does not get better after your take pain medicine. ? · You have new or worse belly pain. ? · You have blood in your stools. ? · You are sick to your stomach and cannot keep fluids down. ? · You have a fever. ? · You cannot pass stools or gas. ? Watch closely for changes in your health, and be sure to contact your doctor if:  ? · Your throat still hurts after a day or two.    ? · You do not get better as expected. Where can you learn more? Go to http://bird-vito.info/. Enter [phone] in the search box to learn more about \"Upper GI Endoscopy: What to Expect at Home. \"  Current as of: May 12, 2017  Content Version: 11.4  © 7645-8391 [employer]. Care instructions adapted under license by [employer] (which disclaims liability or warranty for this information). If you have questions about a medical condition or this instruction, always ask your healthcare professional. [address] any warranty or liability for your use of this information. Colonoscopy: What to Expect at [address] [place] [address]  After you have a colonoscopy, you will stay at the clinic for 1 to 2 hours until the medicines wear off. Then you can go home. But you will need to arrange for a ride. Your doctor will tell you when you can eat and do your other usual activities. Your doctor will talk to you about when you will need your next colonoscopy. Your doctor can help you decide how often you need to be checked. This will depend on the results of your test and your risk for colorectal cancer. After the test, you may be bloated or have gas pains. You may need to pass gas. If a biopsy was done or a polyp was removed, you may have streaks of blood in your stool (feces) for a few days. This care sheet gives you a general idea about how long it will take for you to recover. But each person recovers at a different pace. Follow the steps below to get better as quickly as possible. How can you care for yourself at home? Activity  ? · Rest when you feel tired. ? · You can do your normal activities when it feels okay to do so. Diet  ? · Follow your doctor's directions for eating. ? · Unless your doctor has told you not to, drink plenty of fluids. This helps to replace the fluids that were lost during the colon prep. ? · Do not drink alcohol. Medicines  ?  · Your doctor will tell you if and when you can restart your medicines. He or she will also give you instructions about taking any new medicines. ? · If you take blood thinners, such as warfarin (Coumadin), clopidogrel (Plavix), or aspirin, be sure to talk to your doctor. He or she will tell you if and when to start taking those medicines again. Make sure that you understand exactly what your doctor wants you to do. ? · If polyps were removed or a biopsy was done during the test, your doctor may tell you not to take aspirin or other anti-inflammatory medicines for a few days. These include ibuprofen (Advil, Motrin) and naproxen (Aleve). Other instructions  ? · For your safety, do not drive or operate machinery until the medicine wears off and you can think clearly. Your doctor may tell you not to drive or operate machinery until the day after your test.   ? · Do not sign legal documents or make major decisions until the medicine wears off and you can think clearly. The anesthesia can make it hard for you to fully understand what you are agreeing to. Follow-up care is a key part of your treatment and safety. Be sure to make and go to all appointments, and call your doctor if you are having problems. It's also a good idea to know your test results and keep a list of the medicines you take. When should you call for help? Call 911 anytime you think you may need emergency care. For example, call if:  ? · You passed out (lost consciousness). ? · You pass maroon or bloody stools. ? · You have trouble breathing. ?Call your doctor now or seek immediate medical care if:  ? · You have pain that does not get better after you take pain medicine. ? · You are sick to your stomach or cannot drink fluids. ? · You have new or worse belly pain. ? · You have blood in your stools. ? · You have a fever. ? · You cannot pass stools or gas. ? Watch closely for changes in your health, and be sure to contact your doctor if you have any problems. Where can you learn more? Go to http://bird-vito.info/. Enter E264 in the search box to learn more about \"Colonoscopy: What to Expect at Home. \"  Current as of: May 12, 2017  Content Version: 11.4  © 9145-9733 [employer]. Care instructions adapted under license by [employer] (which disclaims liability or warranty for this information). If you have questions about a medical condition or this instruction, always ask your healthcare professional. [address] any warranty or liability for your use of this information. DISCHARGE SUMMARY from Nurse    PATIENT INSTRUCTIONS:    After general anesthesia or intravenous sedation, for 24 hours or while taking prescription Narcotics:  · Limit your activities  · Do not drive and operate hazardous machinery  · Do not make important personal or business decisions  · Do  not drink alcoholic beverages  · If you have not urinated within 8 hours after discharge, please contact your surgeon on call. Report the following to your surgeon:  · Excessive pain, swelling, redness or odor of or around the surgical area  · Temperature over 100.5  · Nausea and vomiting lasting longer than 4 hours or if unable to take medications  · Any signs of decreased circulation or nerve impairment to extremity: change in color, persistent  numbness, tingling, coldness or increase pain  · Any questions    *  Please give a list of your current medications to your Primary Care Provider. *  Please update this list whenever your medications are discontinued, doses are      changed, or new medications (including over-the-counter products) are added. *  Please carry medication information at all times in case of emergency situations.     These are general instructions for a healthy lifestyle:    No smoking/ No tobacco products/ Avoid exposure to second hand smoke  Surgeon General's Warning:  Quitting smoking now greatly reduces serious risk to your health. Obesity, smoking, and sedentary lifestyle greatly increases your risk for illness    A healthy diet, regular physical exercise & weight monitoring are important for maintaining a healthy lifestyle    You may be retaining fluid if you have a history of heart failure or if you experience any of the following symptoms:  Weight gain of 3 pounds or more overnight or 5 pounds in a week, increased swelling in our hands or feet or shortness of breath while lying flat in bed. Please call your doctor as soon as you notice any of these symptoms; do not wait until your next office visit. Recognize signs and symptoms of STROKE:    F-face looks uneven    A-arms unable to move or move unevenly    S-speech slurred or non-existent    T-time-call 911 as soon as signs and symptoms begin-DO NOT go       Back to bed or wait to see if you get better-TIME IS BRAIN. Warning Signs of HEART ATTACK     Call 911 if you have these symptoms:   Chest discomfort. Most heart attacks involve discomfort in the center of the chest that lasts more than a few minutes, or that goes away and comes back. It can feel like uncomfortable pressure, squeezing, fullness, or pain.  Discomfort in other areas of the upper body. Symptoms can include pain or discomfort in one or both arms, the back, neck, jaw, or stomach.  Shortness of breath with or without chest discomfort.  Other signs may include breaking out in a cold sweat, nausea, or lightheadedness. Don't wait more than five minutes to call 911 - MINUTES MATTER! Fast action can save your life. Calling 911 is almost always the fastest way to get lifesaving treatment. Emergency Medical Services staff can begin treatment when they arrive -- up to an hour sooner than if someone gets to the hospital by car. The discharge information has been reviewed with the patient. The patient verbalized understanding.   Discharge medications reviewed with the patient and appropriate educational materials and side effects teaching were provided. ___________________________________________________________________________________________________________________________________    Colon Polyps: Care Instructions  Your Care Instructions    Colon polyps are growths in the colon or the rectum. The cause of most colon polyps is not known, and most people who get them do not have any problems. But a certain kind can turn into cancer. For this reason, regular testing for colon polyps is important for people age 48 and older and anyone who has an increased risk for colon cancer. Polyps are usually found through routine colon cancer screening tests. Although most colon polyps are not cancerous, they are usually removed and then tested for cancer. Screening for colon cancer saves lives because the cancer can usually be cured if it is caught early. If you have a polyp that is the type that can turn into cancer, you may need more tests to examine your entire colon. The doctor will remove any other polyps that he or she finds, and you will be tested more often. Follow-up care is a key part of your treatment and safety. Be sure to make and go to all appointments, and call your doctor if you are having problems. It's also a good idea to know your test results and keep a list of the medicines you take. How can you care for yourself at home? Regular exams to look for colon polyps are the best way to prevent polyps from turning into colon cancer. These can include stool tests, sigmoidoscopy, colonoscopy, and CT colonography. Talk with your doctor about a testing schedule that is right for you. To prevent polyps  There is no home treatment that can prevent colon polyps. But these steps may help lower your risk for cancer. · Stay active. Being active can help you get to and stay at a healthy weight. Try to exercise on most days of the week. Walking is a good choice. · Eat well.  Choose a variety of vegetables, fruits, legumes (such as peas and beans), fish, poultry, and whole grains. · Do not smoke. If you need help quitting, talk to your doctor about stop-smoking programs and medicines. These can increase your chances of quitting for good. · If you drink alcohol, limit how much you drink. Limit alcohol to 2 drinks a day for men and 1 drink a day for women. When should you call for help? Call your doctor now or seek immediate medical care if:  ? · You have severe belly pain. ? · Your stools are maroon or very bloody. ? Watch closely for changes in your health, and be sure to contact your doctor if:  ? · You have a fever. ? · You have nausea or vomiting. ? · You have a change in bowel habits (new constipation or diarrhea). ? · Your symptoms get worse or are not improving as expected. Where can you learn more? Go to http://bird-vito.info/. Enter [id number] in the search box to learn more about \"Colon Polyps: Care Instructions. \"  Current as of: May 12, 2017  Content Version: 11.4  © 2838-8363 [employer]. Care instructions adapted under license by [employer] (which disclaims liability or warranty for this information). If you have questions about a medical condition or this instruction, always ask your healthcare professional. [address] any warranty or liability for your use of this information.

## 2017-12-13 NOTE — IP AVS SNAPSHOT
Jamie Monroe 
 
 
 [address] Patient: Estefania Gonzáles 
MRN: [id number] WEO:[id number] About your hospitalization You were admitted on:  December 13, 2017 You last received care in the:  [facility] PHASE 2 RECOVERY You were discharged on:  December 13, 2017 Why you were hospitalized Your primary diagnosis was:  Not on File Things You Need To Do (next 8 weeks) Follow up with Citlali Hernandez MD  
  
Phone:  [phone] Where:  [address], [address] Follow up with Bronwyn Valentino MD  
Follow up within 4 to 6 weeks. Phone:  [phone] Where:  [address], [address] [facility] [address], [facility] [place], [place] [facility] [place] [address] Discharge Orders None A check landry indicates which time of day the medication should be taken. My Medications TAKE these medications as instructed Instructions Each Dose to Equal  
 Morning Noon Evening Bedtime  
 allopurinol 300 mg tablet Commonly known as:  Jose Gutierrez Your last dose was: Your next dose is: TAKE ONE TABLET BY MOUTH ONCE DAILY  
     
   
   
   
  
 azelastine 137 mcg (0.1 %) nasal spray Commonly known as:  ASTELIN Your last dose was: Your next dose is:    
   
   
 USE 1 SPRAY INTO EACH NOSTRIL TWICE DAILY  
     
   
   
   
  
 cholecalciferol (vitamin D3) 2,000 unit Tab Your last dose was: Your next dose is: Take  by mouth.  
     
   
   
   
  
 cyanocobalamin 500 mcg tablet Commonly known as:  VITAMIN B12 Your last dose was: Your next dose is: Take 500 mcg by mouth daily. 500 mcg  
    
   
   
   
  
 docusate sodium 100 mg capsule Commonly known as:  Micki Eisenmenger Your last dose was: Your next dose is: Take 100 mg by mouth two (2) times a day.   
 100 mg  
    
   
 enoxaparin 80 mg/0.8 mL injection Commonly known as:  LOVENOX Your last dose was: Your next dose is:    
   
   
 Last coumadin 12/7.12/10-11 take injection twice daily Nothing 12/12. Restart coumadin 12/13 pm 12/14-16 add lovenox twice daily. Recheck INR 12/18 FIBERCON PO Your last dose was: Your next dose is: Take  by mouth.  
     
   
   
   
  
 magnesium 250 mg Tab Your last dose was: Your next dose is: Take  by mouth.  
     
   
   
   
  
 metoprolol tartrate 50 mg tablet Commonly known as:  LOPRESSOR Your last dose was: Your next dose is: Take 50 mg by mouth two (2) times a day. 50 mg  
    
   
   
   
  
 omeprazole 20 mg capsule Commonly known as:  PRILOSEC Your last dose was: Your next dose is: TAKE ONE CAPSULE BY MOUTH EVERY MORNING  
     
   
   
   
  
 promethazine 25 mg tablet Commonly known as:  PHENERGAN Your last dose was: Your next dose is: TAKE 1/2 TO ONE TABLET BY MOUTH EVERY 8 HOURS AS NEEDED FOR NAUSEA  
     
   
   
   
  
 raNITIdine 150 mg tablet Commonly known as:  ZANTAC Your last dose was: Your next dose is: TAKE ONE TABLET BY MOUTH TWICE DAILY  
     
   
   
   
  
 warfarin 5 mg tablet Commonly known as:  COUMADIN Your last dose was: Your next dose is: TAKE ONE AND ONE-HALF TABLET BY MOUTH ONCE DAILY Discharge Instructions Resume Coumadin tomorrow. Upper GI Endoscopy: What to Expect at [facility] Your Recovery After you have an endoscopy, you will stay at the hospital or clinic for 1 to 2 hours. This will allow the medicine to wear off. You will be able to go home after your doctor or nurse checks to make sure you are not having any problems. You may have to stay overnight if you had treatment during the test. You may have a sore throat for a day or two after the test. 
This care sheet gives you a general idea about what to expect after the test. 
How can you care for yourself at home? Activity · Rest as much as you need to after you go home. · You should be able to go back to your usual activities the day after the test. 
Diet · Follow your doctor's directions for eating after the test. 
· Drink plenty of fluids (unless your doctor has told you not to). Medications · If you have a sore throat the day after the test, use an over-the-counter spray to numb your throat. Follow-up care is a key part of your treatment and safety. Be sure to make and go to all appointments, and call your doctor if you are having problems. It's also a good idea to know your test results and keep a list of the medicines you take. When should you call for help? Call 911 anytime you think you may need emergency care. For example, call if: 
? · You passed out (loses consciousness). ? · You have trouble breathing. ? · You pass maroon or bloody stools. ?Call your doctor now or seek immediate medical care if: 
? · You have pain that does not get better after your take pain medicine. ? · You have new or worse belly pain. ? · You have blood in your stools. ? · You are sick to your stomach and cannot keep fluids down. ? · You have a fever. ? · You cannot pass stools or gas. ? Watch closely for changes in your health, and be sure to contact your doctor if: 
? · Your throat still hurts after a day or two. ? · You do not get better as expected. Where can you learn more? Go to http://bird-vito.info/. Enter [phone] in the search box to learn more about \"Upper GI Endoscopy: What to Expect at Home. \" Current as of: May 12, 2017 Content Version: 11.4 © 3152-9341 Healthwise, Incorporated.  Care instructions adapted under license by [address] (which disclaims liability or warranty for this information). If you have questions about a medical condition or this instruction, always ask your healthcare professional. [address] any warranty or liability for your use of this information. Colonoscopy: What to Expect at [facility] Your Recovery After you have a colonoscopy, you will stay at the clinic for 1 to 2 hours until the medicines wear off. Then you can go home. But you will need to arrange for a ride. Your doctor will tell you when you can eat and do your other usual activities. Your doctor will talk to you about when you will need your next colonoscopy. Your doctor can help you decide how often you need to be checked. This will depend on the results of your test and your risk for colorectal cancer. After the test, you may be bloated or have gas pains. You may need to pass gas. If a biopsy was done or a polyp was removed, you may have streaks of blood in your stool (feces) for a few days. This care sheet gives you a general idea about how long it will take for you to recover. But each person recovers at a different pace. Follow the steps below to get better as quickly as possible. How can you care for yourself at home? Activity ? · Rest when you feel tired. ? · You can do your normal activities when it feels okay to do so. Diet ? · Follow your doctor's directions for eating. ? · Unless your doctor has told you not to, drink plenty of fluids. This helps to replace the fluids that were lost during the colon prep. ? · Do not drink alcohol. Medicines ? · Your doctor will tell you if and when you can restart your medicines. He or she will also give you instructions about taking any new medicines. ? · If you take blood thinners, such as warfarin (Coumadin), clopidogrel (Plavix), or aspirin, be sure to talk to your doctor.  He or she will tell you if and when to start taking those medicines again. Make sure that you understand exactly what your doctor wants you to do. ? · If polyps were removed or a biopsy was done during the test, your doctor may tell you not to take aspirin or other anti-inflammatory medicines for a few days. These include ibuprofen (Advil, Motrin) and naproxen (Aleve). Other instructions ? · For your safety, do not drive or operate machinery until the medicine wears off and you can think clearly. Your doctor may tell you not to drive or operate machinery until the day after your test.  
? · Do not sign legal documents or make major decisions until the medicine wears off and you can think clearly. The anesthesia can make it hard for you to fully understand what you are agreeing to. Follow-up care is a key part of your treatment and safety. Be sure to make and go to all appointments, and call your doctor if you are having problems. It's also a good idea to know your test results and keep a list of the medicines you take. When should you call for help? Call 911 anytime you think you may need emergency care. For example, call if: 
? · You passed out (lost consciousness). ? · You pass maroon or bloody stools. ? · You have trouble breathing. ?Call your doctor now or seek immediate medical care if: 
? · You have pain that does not get better after you take pain medicine. ? · You are sick to your stomach or cannot drink fluids. ? · You have new or worse belly pain. ? · You have blood in your stools. ? · You have a fever. ? · You cannot pass stools or gas. ? Watch closely for changes in your health, and be sure to contact your doctor if you have any problems. Where can you learn more? Go to http://bird-vito.info/. Enter E264 in the search box to learn more about \"Colonoscopy: What to Expect at Home. \" Current as of: May 12, 2017 Content Version: 11.4 © 1366-8848 Healthwise, [employer]. Care instructions adapted under license by [employer] (which disclaims liability or warranty for this information). If you have questions about a medical condition or this instruction, always ask your healthcare professional. [address] any warranty or liability for your use of this information. DISCHARGE SUMMARY from Nurse PATIENT INSTRUCTIONS: 
 
 
F-face looks uneven A-arms unable to move or move unevenly S-speech slurred or non-existent T-time-call 911 as soon as signs and symptoms begin-DO NOT go Back to bed or wait to see if you get better-TIME IS BRAIN. Warning Signs of HEART ATTACK Call 911 if you have these symptoms: 
? Chest discomfort. Most heart attacks involve discomfort in the center of the chest that lasts more than a few minutes, or that goes away and comes back. It can feel like uncomfortable pressure, squeezing, fullness, or pain. ? Discomfort in other areas of the upper body. Symptoms can include pain or discomfort in one or both arms, the back, neck, jaw, or stomach. ? Shortness of breath with or without chest discomfort. ? Other signs may include breaking out in a cold sweat, nausea, or lightheadedness. Don't wait more than five minutes to call [address]! Fast action can save your life. Calling 911 is almost always the fastest way to get lifesaving treatment. Emergency Medical Services staff can begin treatment when they arrive  up to an hour sooner than if someone gets to the hospital by car. The discharge information has been reviewed with the patient. The patient verbalized understanding. Discharge medications reviewed with the patient and appropriate educational materials and side effects teaching were provided.  
___________________________________________________________________________ ________________________________________________________ Colon Polyps: Care Instructions Your Care Instructions Colon polyps are growths in the colon or the rectum. The cause of most colon polyps is not known, and most people who get them do not have any problems. But a certain kind can turn into cancer. For this reason, regular testing for colon polyps is important for people age 48 and older and anyone who has an increased risk for colon cancer. Polyps are usually found through routine colon cancer screening tests. Although most colon polyps are not cancerous, they are usually removed and then tested for cancer. Screening for colon cancer saves lives because the cancer can usually be cured if it is caught early. If you have a polyp that is the type that can turn into cancer, you may need more tests to examine your entire colon. The doctor will remove any other polyps that he or she finds, and you will be tested more often. Follow-up care is a key part of your treatment and safety. Be sure to make and go to all appointments, and call your doctor if you are having problems. It's also a good idea to know your test results and keep a list of the medicines you take. How can you care for yourself at home? Regular exams to look for colon polyps are the best way to prevent polyps from turning into colon cancer. These can include stool tests, sigmoidoscopy, colonoscopy, and CT colonography. Talk with your doctor about a testing schedule that is right for you. To prevent polyps There is no home treatment that can prevent colon polyps. But these steps may help lower your risk for cancer. · Stay active. Being active can help you get to and stay at a healthy weight. Try to exercise on most days of the week. Walking is a good choice. · Eat well. Choose a variety of vegetables, fruits, legumes (such as peas and beans), fish, poultry, and whole grains. · Do not smoke. If you need help quitting, talk to your doctor about stop-smoking programs and medicines. These can increase your chances of quitting for good. · If you drink alcohol, limit how much you drink. Limit alcohol to 2 drinks a day for men and 1 drink a day for women. When should you call for help? Call your doctor now or seek immediate medical care if: 
? · You have severe belly pain. ? · Your stools are maroon or very bloody. ? Watch closely for changes in your health, and be sure to contact your doctor if: 
? · You have a fever. ? · You have nausea or vomiting. ? · You have a change in bowel habits (new constipation or diarrhea). ? · Your symptoms get worse or are not improving as expected. Where can you learn more? Go to http://bird-vito.info/. Enter [phone] [id number] in the search box to learn more about \"Colon Polyps: Care Instructions. \" Current as of: May 12, 2017 Content Version: 11.4 © 5196-7849 [employer]. Care instructions adapted under license by [employer] (which disclaims liability or warranty for this information). If you have questions about a medical condition or this instruction, always ask your healthcare professional. [place] [address] any warranty or liability for your use of this information. ACO Transitions of Care Introducing [employer] [address] Kacy Tomlin offers a voluntary care coordination program to provide high quality service and care to [facility] fee-for-service beneficiaries. Vilma Mcnair was designed to help you enhance your health and well-being through the following services: ? Transitions of Care  support for individuals who are transitioning from one care setting to another (example: Hospital to home). ?  Chronic and Complex Care Coordination  support for individuals and caregivers of those with serious or chronic illnesses or with more than one chronic (ongoing) condition and those who take a number of different medications. If you meet specific medical criteria, a [facility] [address] may call you directly to coordinate your care with your primary care physician and your other care providers. For questions about the [facility] programs, please, contact your physicians office. For general questions or additional information about Accountable Care Organizations: 
Please visit www.medicare.gov/acos. html or call 1-800-MEDICARE (4-827.637.5305) TTY users should call 7-211.438.5793. Introducing [facility]! Amy Mejia introduces enosiX patient portal. Now you can access parts of your medical record, email your doctor's office, and request medication refills online. 1. In your internet browser, go to [email or web address] 2. Click on the First Time User? Click Here link in the Sign In box. You will see the New Member Sign Up page. 3. Enter your enosiX Access Code exactly as it appears below. You will not need to use this code after youve completed the sign-up process. If you do not sign up before the expiration date, you must request a new code. · enosiX Access Code: [email or web address] Expires: 3/8/2018 12:03 PM 
 
4. Enter the last four digits of your Social Security Number (xxxx) and Date of Birth (mm/dd/yyyy) as indicated and click Submit. You will be taken to the next sign-up page. 5. Create a Keynoirt ID. This will be your enosiX login ID and cannot be changed, so think of one that is secure and easy to remember. 6. Create a enosiX password. You can change your password at any time. 7. Enter your Password Reset Question and Answer. This can be used at a later time if you forget your password. 8. Enter your e-mail address.  You will receive e-mail notification when new information is available in [employer]. 9. Click Sign Up. You can now view and download portions of your medical record. 10. Click the Download Summary menu link to download a portable copy of your medical information. If you have questions, please visit the Frequently Asked Questions section of the Compressus website. Remember, Compressus is NOT to be used for urgent needs. For medical emergencies, dial 911. Now available from your iPhone and Android! Providers Seen During Your Hospitalization Provider Specialty Primary office phone Bronwyn Valentino MD Gastroenterology [phone] Your Primary Care Physician (PCP) Primary Care Physician Office Phone Office Fax [address] [place] , [address] [place] [address] [address] [phone] You are allergic to the following Allergen Reactions Parafon Forte Dsc (Chlorzoxazone) Anaphylaxis Aspirin Other (comments) Cannot take due to on coumadin Recent Documentation Height Weight BMI Smoking Status 1.676 m 73.7 kg 26.21 kg/m2 Former Smoker Emergency Contacts Name Discharge Info Relation Home Work Mobile Ary Cates DISCHARGE CAREGIVER [3] Daughter [21] [phone] Patient Belongings The following personal items are in your possession at time of discharge: 
  Dental Appliances: None  Visual Aid: Glasses Please provide this summary of care documentation to your next provider. Signatures-by signing, you are acknowledging that this After Visit Summary has been reviewed with you and you have received a copy. Patient Signature:  ____________________________________________________________ Date:  ____________________________________________________________  
  
[facility] Provider Signature:  ____________________________________________________________ Date:  ____________________________________________________________

## 2017-12-13 NOTE — ANESTHESIA POSTPROCEDURE EVALUATION
Post-Anesthesia Evaluation & Assessment    Visit Vitals    /64    Pulse 71    Temp 36.2 °C (97.2 °F)    Resp 13    Ht 5' 6\" (1.676 m)    Wt 73.7 kg (162 lb 6 oz)    SpO2 100%    BMI 26.21 kg/m2       Post-operative hydration adequate. Pain score (VAS): 0 Pain Scale 1: Numeric (0 - 10) (12/13/17 1035)  Pain Intensity 1: 0 (12/13/17 1035)   Managed. Mental status & Level of consciousness: returned to baseline    Neurological status: returned to baseline    Pulmonary status: airway patent, oxygen given as needed. Complications related to anesthesia: none    Patient has met all discharge requirements.     Additional comments:        Avi Clemons MD  December 13, 2017

## 2017-12-13 NOTE — PROCEDURES
Ariel  [facility], [address]      Brief Procedure Note    Tiffany Ovalles  1940  [id number]    Date of Procedure: 12/13/2017    Preoperative diagnosis: Gastroesophageal reflux disease, esophagitis presence not specified [K21.9]  Amphotericin-induced nausea and vomiting [R11.2, T36.7X5A]  Early satiety [R68.81]  Abnormal intentional weight loss [R63.4]    Postoperative diagnosis:  esophagectomy, gastritis, ascending polyps, rectal polyp, hemorrhoids    Type of Anesthesia: MAC (monitered anesthesia care)    Description of Findings: same as post op dx    Procedure: Procedure(s):  COLONOSCOPY with hot polypectomies  ENDOSCOPY    :  Dr. Keegan Mares MD    Assistant(s): [unfilled]    Type of Anesthesia:MAC     EBL:None    Specimens:   ID Type Source Tests Collected by Time Destination   1 : ascending polyps Preservative Colon  Keegan Mares MD 12/13/2017 7062 Pathology   2 : rectal polyp Preservative Rectum  Keegan Mares MD 12/13/2017 1007 Pathology       Findings: See printed and scanned procedure note    Complications: None    Dr. Keegan Mares MD  12/13/2017  10:19 AM

## 2017-12-13 NOTE — ANESTHESIA PREPROCEDURE EVALUATION
Anesthetic History   No history of anesthetic complications            Review of Systems / Medical History  Patient summary reviewed and pertinent labs reviewed    Pulmonary  Within defined limits                 Neuro/Psych   Within defined limits           Cardiovascular            Dysrhythmias : atrial fibrillation  Pacemaker         GI/Hepatic/Renal  Within defined limits              Endo/Other        Arthritis     Other Findings   Comments:   Risk Factors for Postoperative nausea/vomiting:       History of postoperative nausea/vomiting? NO       Female? NO       Motion sickness? NO       Intended opioid administration for postoperative analgesia? NO      Smoking Abstinence  Current Smoker? NO  Elective Surgery? YES  Seen preoperatively by anesthesiologist or proxy prior to day of surgery? YES  Pt abstained from smoking 24 hours prior to anesthesia?  N/A         Physical Exam    Airway  Mallampati: II  TM Distance: 4 - 6 cm  Neck ROM: normal range of motion   Mouth opening: Normal     Cardiovascular    Rhythm: irregular  Rate: abnormal         Dental    Dentition: Poor dentition     Pulmonary  Breath sounds clear to auscultation               Abdominal  GI exam deferred       Other Findings            Anesthetic Plan    ASA: 3  Anesthesia type: MAC          Induction: Intravenous  Anesthetic plan and risks discussed with: Patient

## 2017-12-13 NOTE — H&P
Chief Complaint: Dysphagia and weight loss. History of present illness: Incomplete colonoscopy last time. PMH:   Past Medical History:   Diagnosis Date    Abdominal pain, unspecified site     Acute upper respiratory infections of unspecified site     [facility]     Cancer of esophagus ([facility] 2001    RT and adjuvant chemotherapy, surgery    Esophageal reflux     Generalized osteoarthrosis, involving multiple sites     Gout, unspecified     Nausea alone     Sinoatrial node dysfunction ([facility]     Thromboembolus ([facility] 2003 (approx)    DVT in leg (IVC filter placed)    Unspecified cataract     Unspecified constipation      Allergies:    Allergies   Allergen Reactions    Parafon Forte Dsc [Chlorzoxazone] Anaphylaxis    Aspirin Other (comments)     Cannot take due to on coumadin     Medications:   Current Facility-Administered Medications:     lidocaine (PF) (XYLOCAINE) 10 mg/mL (1 %) injection 0.1 mL, 0.1 mL, SubCUTAneous, PRN, Sarah Borer, CRNA    lactated Ringers infusion, 75 mL/hr, IntraVENous, CONTINUOUS, Sarah Borer, CRNA, Last Rate: 75 mL/hr at 12/13/17 0924, 75 mL/hr at 12/13/17 0924    sodium chloride (NS) flush 5-10 mL, 5-10 mL, IntraVENous, Q8H, Sarah Borer, CRNA    sodium chloride (NS) flush 5-10 mL, 5-10 mL, IntraVENous, PRN, Sarah Borer, CRNA    insulin lispro (HUMALOG) injection, , SubCUTAneous, ONCE, Sarah Borer, CRNA  FH:   Family History   Problem Relation Age of Onset    Heart Disease Father     Alzheimer Father      Social:   Social History     Social History    Marital status: SINGLE     Spouse name: N/A    Number of children: N/A    Years of education: N/A     Social History Main Topics    Smoking status: Former Smoker    Smokeless tobacco: Never Used    Alcohol use Yes      Comment: 2 drinks per day    Drug use: No    Sexual activity: No     Other Topics Concern    None     Social History Narrative     Surgical H:   Past Surgical History:   Procedure Laterality Date    HX GI      esophagectomy    HX HEENT  2001    Esophageal Cancer Surgery    HX HIP REPLACEMENT Left 2005 (approx)    HX OTHER SURGICAL  2003    ivc filter placed    HX PACEMAKER  2012    [employer]       ROS: positive for dysphagia and change in bowel habits    Physical Exam:   Visit Vitals    /75    Pulse 72    Temp 97.4 °F (36.3 °C)    Resp 16    Ht 5' 6\" (1.676 m)    Wt 73.7 kg (162 lb 6 oz)    SpO2 97%    BMI 26.21 kg/m2     General appearance: alert, no distress  Eyes: pupils equal and reactive, extraocular eye movements intact  Nodes: No gross adenopathy in neck. Skin: no spider angiomata, jaundice, palmar erythema   Respiratory: clear to auscultation bilaterally  Cardiovascular: regular heart rate, no murmurs, no JVD, normal rate and regular rhythm  Abdomen: soft, non-tender, liver not enlarged, spleen not palpable, no obvious ascites  Extremities: no muscle wasting, no gross arthritic changes  Neurologic: alert and oriented, cranial nerves grossly intact, no asterixis    Labs: No results found for this or any previous visit (from the past 24 hour(s)). Imp/ Plan: Will proceed with egd and colonoscopy as planned. Risk benefits alternative including but not limited to infection, bleeding, perforation of viscous, allergic reaction and resultant morbidity and mortality was discussed. Chance of missing a significant lesion due to various reasons were discussed.       Jose Manuel Queen MD  [facility], [address]

## 2017-12-18 ENCOUNTER — ANTI-COAG VISIT (OUTPATIENT)
Dept: INTERNAL MEDICINE CLINIC | Age: 77
End: 2017-12-18

## 2017-12-18 DIAGNOSIS — I48.20 CHRONIC ATRIAL FIBRILLATION (HCC): Primary | ICD-10-CM

## 2017-12-21 ENCOUNTER — ANTI-COAG VISIT (OUTPATIENT)
Dept: INTERNAL MEDICINE CLINIC | Age: 77
End: 2017-12-21

## 2017-12-21 DIAGNOSIS — I48.20 CHRONIC ATRIAL FIBRILLATION (HCC): ICD-10-CM

## 2017-12-21 LAB
INR BLD: 1.5
PT POC: NORMAL SECONDS
VALID INTERNAL CONTROL?: YES

## 2017-12-21 NOTE — PROGRESS NOTES
Mr. Rosalina Oconnell is here today for anticoagulation monitoring for the diagnosis of Atrial Fibrillation. His INR goal is 2.0-3.0 and his current Coumadin dose is 5 mg five days and 2.5 mg two days. Today's findings include an INR of 1.5 (normal INR range 0.8-1.2). Considering Mr. Jodie Ridley past history, todays findings, and per the coumadin policy/protocol, Mr. Walton was instructed to take Coumadin as follows,  5 mg daily. He was also instructed to schedule an appointment in 1 weeks prior to leaving for an INR check. A full discussion of the nature of anticoagulants has been carried out. A full discussion of the need for frequent and regular monitoring, precise dosage adjustment and compliance was stressed. Side effects of potential bleeding were discussed and Mr. Walton was instructed to call [phone] if there are any signs of abnormal bleeding. Mr. Walton was instructed to avoid any OTC items containing aspirin or ibuprofen and prior to starting any new OTC products to consult with his physician or pharmacist to ensure no drug interactions are present. Mr. Walton was instructed to avoid any major changes in his general diet and to avoid alcohol consumption. .      Mr. Walton verbalized his understanding of all instructions and will call the office with any questions, concerns, or signs of abnormal bleeding or blood clot.

## 2017-12-21 NOTE — PATIENT INSTRUCTIONS
Taking Warfarin Safely: Care Instructions  Your Care Instructions    Warfarin is a medicine that you take to prevent blood clots. It is often called a blood thinner. Doctors give warfarin (such as Coumadin) to reduce the risk of blood clots. You may be at risk for blood clots if you have atrial fibrillation or deep vein thrombosis. Some other health problems may also put you at risk. Warfarin slows the amount of time it takes for your blood to clot. It can cause bleeding problems. Even if you've been taking warfarin for a while, it's important to know how to take it safely. Foods and other medicines can affect the way warfarin works. Some can make warfarin work too well. This can cause bleeding problems. And some can make it work poorly, so that it does not prevent blood clots very well. You will need regular blood tests to check how long it takes for your blood to form a clot. This test is called a PT or prothrombin time test. The result of the test is called an INR level. Depending on the test results, your doctor or anticoagulation clinic may adjust your dose of warfarin. Follow-up care is a key part of your treatment and safety. Be sure to make and go to all appointments, and call your doctor if you are having problems. It's also a good idea to know your test results and keep a list of the medicines you take. How can you care for yourself at home? Take warfarin safely  · Take your warfarin at the same time each day. · If you miss a dose of warfarin, don't take an extra dose to make up for it. Your doctor can tell you exactly what to do so you don't take too much or too little. · Wear medical alert jewelry that lets others know that you take warfarin. You can buy this at most drugstores. · Don't take warfarin if you are pregnant or planning to get pregnant. Talk to your doctor about how you can prevent getting pregnant while you are taking it.   · Don't change your dose or stop taking warfarin unless your doctor tells you to. Effects of medicines and food on warfarin  · Don't start or stop taking any medicines, vitamins, or natural remedies unless you first talk to your doctor. Many medicines can affect how warfarin works. These include aspirin and other pain relievers, over-the-counter medicines, multivitamins, dietary supplements, and herbal products. · Tell all of your doctors and pharmacists that you take warfarin. Some prescription medicines can affect how warfarin works. · Keep the amount of vitamin K in your diet about the same from day to day. Do not suddenly eat a lot more or a lot less food that is rich in vitamin K than you usually do. Vitamin K affects how warfarin works and how your blood clots. Talk with your doctor before making big changes in your diet. Foods that have a lot of vitamin K include cooked green vegetables, such as:  ¨ Kale, spinach, turnip greens, coy greens, Swiss chard, and mustard greens. ¨ [place] sprouts, broccoli, and asparagus. · Limit your use of alcohol. Avoid bleeding by preventing falls and injuries  · Wear slippers or shoes with nonskid soles. · Remove throw rugs and clutter. · Rearrange furniture and electrical cords to keep them out of walking paths. · Keep stairways, porches, and outside walkways well lit. Use night-lights in hallways and bathrooms. · Be extra careful when you work with sharp tools or knives. When should you call for help? Call 911 anytime you think you may need emergency care. For example, call if:  ? · You have a sudden, severe headache that is different from past headaches. ?Call your doctor now or seek immediate medical care if:  ? · You have any abnormal bleeding, such as:  ¨ Nosebleeds. ¨ Vaginal bleeding that is different (heavier, more frequent, at a different time of the month) than what you are used to. ¨ Bloody or black stools, or rectal bleeding. ¨ Bloody or pink urine. ? Watch closely for changes in your health, and be sure to contact your doctor if you have any problems. Where can you learn more? Go to http://bird-vito.info/. Enter H030 in the search box to learn more about \"Taking Warfarin Safely: Care Instructions. \"  Current as of: September 21, 2016  Content Version: 11.4  © 3757-6084 [employer]. Care instructions adapted under license by [employer] (which disclaims liability or warranty for this information). If you have questions about a medical condition or this instruction, always ask your healthcare professional. [address] any warranty or liability for your use of this information.

## 2017-12-26 LAB
INR BLD: 1.4
INR BLD: NORMAL
PT POC: NORMAL SECONDS
PT POC: NORMAL SECONDS
VALID INTERNAL CONTROL?: YES
VALID INTERNAL CONTROL?: YES

## 2018-01-10 ENCOUNTER — ANTI-COAG VISIT (OUTPATIENT)
Dept: INTERNAL MEDICINE CLINIC | Age: 78
End: 2018-01-10

## 2018-01-10 DIAGNOSIS — I48.20 CHRONIC ATRIAL FIBRILLATION (HCC): ICD-10-CM

## 2018-01-25 ENCOUNTER — ANTI-COAG VISIT (OUTPATIENT)
Dept: INTERNAL MEDICINE CLINIC | Age: 78
End: 2018-01-25

## 2018-01-25 DIAGNOSIS — I48.20 CHRONIC ATRIAL FIBRILLATION (HCC): ICD-10-CM

## 2018-01-31 LAB
INR BLD: 2.4
PT POC: NORMAL SECONDS
VALID INTERNAL CONTROL?: YES

## 2018-02-05 RX ORDER — WARFARIN SODIUM 5 MG/1
TABLET ORAL
Qty: 45 TAB | Refills: 3 | Status: SHIPPED | OUTPATIENT
Start: 2018-02-05 | End: 2018-08-10 | Stop reason: SDUPTHER

## 2018-02-05 NOTE — TELEPHONE ENCOUNTER
[facility] sent fax requesting refill on warfarin. Contacted patient by phone (confirmed name/) to confirm current dosing of warfarin is still 7.5 mg daily using a 5 mg tablet #45 per month. Patient denied any signs/symptoms of bruising/bleeding or any concerns for clotting. No missed doses reported. Patient is requesting more than one refill at this time - will refill x3 per Dr. Andrew Navarrete verbal order. Patient to have f/u INR as previously planned. MD to approve e-prescription.     Howard Covarrubias, PharmD, BCACP

## 2018-02-06 ENCOUNTER — TELEPHONE (OUTPATIENT)
Dept: INTERNAL MEDICINE CLINIC | Age: 78
End: 2018-02-06

## 2018-02-08 RX ORDER — PROMETHAZINE HYDROCHLORIDE 25 MG/1
TABLET ORAL
Qty: 40 TAB | Refills: 1 | Status: SHIPPED | OUTPATIENT
Start: 2018-02-08 | End: 2018-10-27 | Stop reason: SDUPTHER

## 2018-02-14 ENCOUNTER — ANTI-COAG VISIT (OUTPATIENT)
Dept: INTERNAL MEDICINE CLINIC | Age: 78
End: 2018-02-14

## 2018-02-14 ENCOUNTER — TELEPHONE (OUTPATIENT)
Dept: INTERNAL MEDICINE CLINIC | Age: 78
End: 2018-02-14

## 2018-02-14 DIAGNOSIS — I48.20 CHRONIC ATRIAL FIBRILLATION (HCC): ICD-10-CM

## 2018-02-14 LAB
INR BLD: 2.2
PT POC: NORMAL SECONDS
VALID INTERNAL CONTROL?: YES

## 2018-02-14 NOTE — TELEPHONE ENCOUNTER
Prior Auth request received from Pharmacy for Promethazine. PA initiated on [email or web address]. Awaiting response.

## 2018-02-16 NOTE — PROGRESS NOTES
Mr. Franine Rosado is here today for anticoagulation monitoring for the diagnosis of Atrial Fibrillation. His INR goal is 2.0-3.0 and his current Coumadin dose is 5 mg daily. Today's findings include an INR of 2.2 (normal INR range 0.8-1.2). Considering Mr. Horta Blind past history, todays findings, and per the coumadin policy/protocol, Mr. HUSSEIN VILLASEÑOR [facility] MARY was instructed to take Coumadin as follows,  5 mg daily. He was also instructed to schedule an appointment in 3 weeks prior to leaving for an INR check. A full discussion of the nature of anticoagulants has been carried out. A full discussion of the need for frequent and regular monitoring, precise dosage adjustment and compliance was stressed. Side effects of potential bleeding were discussed and Mr. HUSSEIN VILLASEÑOR [facility] was instructed to call [phone] if there are any signs of abnormal bleeding. Mr. SAVAGE [facility] was instructed to avoid any OTC items containing aspirin or ibuprofen and prior to starting any new OTC products to consult with his physician or pharmacist to ensure no drug interactions are present. Mr. SAVAGE [facility]ARY was instructed to avoid any major changes in his general diet and to avoid alcohol consumption. .        Mr. SAVAGE [facility] verbalized his understanding of all instructions and will call the office with any questions, concerns, or signs of abnormal bleeding or blood clot.

## 2018-02-27 ENCOUNTER — ANTI-COAG VISIT (OUTPATIENT)
Dept: INTERNAL MEDICINE CLINIC | Age: 78
End: 2018-02-27

## 2018-02-27 DIAGNOSIS — I48.20 CHRONIC ATRIAL FIBRILLATION (HCC): ICD-10-CM

## 2018-02-27 LAB
INR BLD: 2.3
PT POC: NORMAL SECONDS
VALID INTERNAL CONTROL?: YES

## 2018-03-01 NOTE — PATIENT INSTRUCTIONS
Taking Warfarin Safely: Care Instructions  Your Care Instructions    Warfarin is a medicine that you take to prevent blood clots. It is often called a blood thinner. Doctors give warfarin (such as Coumadin) to reduce the risk of blood clots. You may be at risk for blood clots if you have atrial fibrillation or deep vein thrombosis. Some other health problems may also put you at risk. Warfarin slows the amount of time it takes for your blood to clot. It can cause bleeding problems. Even if you've been taking warfarin for a while, it's important to know how to take it safely. Foods and other medicines can affect the way warfarin works. Some can make warfarin work too well. This can cause bleeding problems. And some can make it work poorly, so that it does not prevent blood clots very well. You will need regular blood tests to check how long it takes for your blood to form a clot. This test is called a PT or prothrombin time test. The result of the test is called an INR level. Depending on the test results, your doctor or anticoagulation clinic may adjust your dose of warfarin. Follow-up care is a key part of your treatment and safety. Be sure to make and go to all appointments, and call your doctor if you are having problems. It's also a good idea to know your test results and keep a list of the medicines you take. How can you care for yourself at home? Take warfarin safely  · Take your warfarin at the same time each day. · If you miss a dose of warfarin, don't take an extra dose to make up for it. Your doctor can tell you exactly what to do so you don't take too much or too little. · Wear medical alert jewelry that lets others know that you take warfarin. You can buy this at most drugstores. · Don't take warfarin if you are pregnant or planning to get pregnant. Talk to your doctor about how you can prevent getting pregnant while you are taking it.   · Don't change your dose or stop taking warfarin unless your doctor tells you to. Effects of medicines and food on warfarin  · Don't start or stop taking any medicines, vitamins, or natural remedies unless you first talk to your doctor. Many medicines can affect how warfarin works. These include aspirin and other pain relievers, over-the-counter medicines, multivitamins, dietary supplements, and herbal products. · Tell all of your doctors and pharmacists that you take warfarin. Some prescription medicines can affect how warfarin works. · Keep the amount of vitamin K in your diet about the same from day to day. Do not suddenly eat a lot more or a lot less food that is rich in vitamin K than you usually do. Vitamin K affects how warfarin works and how your blood clots. Talk with your doctor before making big changes in your diet. Foods that have a lot of vitamin K include cooked green vegetables, such as:  ¨ Kale, spinach, turnip greens, coy greens, Swiss chard, and mustard greens. ¨ [place] sprouts, broccoli, and asparagus. · Limit your use of alcohol. Avoid bleeding by preventing falls and injuries  · Wear slippers or shoes with nonskid soles. · Remove throw rugs and clutter. · Rearrange furniture and electrical cords to keep them out of walking paths. · Keep stairways, porches, and outside walkways well lit. Use night-lights in hallways and bathrooms. · Be extra careful when you work with sharp tools or knives. When should you call for help? Call 911 anytime you think you may need emergency care. For example, call if:  ? · You have a sudden, severe headache that is different from past headaches. ?Call your doctor now or seek immediate medical care if:  ? · You have any abnormal bleeding, such as:  ¨ Nosebleeds. ¨ Vaginal bleeding that is different (heavier, more frequent, at a different time of the month) than what you are used to. ¨ Bloody or black stools, or rectal bleeding. ¨ Bloody or pink urine. ? Watch closely for changes in your health, and be sure to contact your doctor if you have any problems. Where can you learn more? Go to http://bird-vito.info/. Enter C281 in the search box to learn more about \"Taking Warfarin Safely: Care Instructions. \"  Current as of: September 21, 2016  Content Version: 11.4  © 5493-4249 [employer]. Care instructions adapted under license by [employer] (which disclaims liability or warranty for this information). If you have questions about a medical condition or this instruction, always ask your healthcare professional. [address] any warranty or liability for your use of this information.

## 2018-03-01 NOTE — PROGRESS NOTES
Mr. John Syed is here today for anticoagulation monitoring for the diagnosis of Atrial Fibrillation. His INR goal is 2.0-3.0 and his current Coumadin dose is 5 mg daily. Today's findings include an INR of 2.3 (normal INR range 0.8-1.2). Considering Mr. Chawla Even past history, todays findings, and per the coumadin policy/protocol, Mr. SAVAGE [facility] was instructed to take Coumadin as follows,  5 mg daily. He was also instructed to schedule an appointment in 3 weeks prior to leaving for an INR check. A full discussion of the nature of anticoagulants has been carried out. A full discussion of the need for frequent and regular monitoring, precise dosage adjustment and compliance was stressed. Side effects of potential bleeding were discussed and [facility]. SAVAGE [facility] was instructed to call [phone] if there are any signs of abnormal bleeding. Mr. SAVAGE [facility] was instructed to avoid any OTC items containing aspirin or ibuprofen and prior to starting any new OTC products to consult with his physician or pharmacist to ensure no drug interactions are present. Mr. SAVAGE [facility] was instructed to avoid any major changes in his general diet and to avoid alcohol consumption. .      Mr. SAVAGE [facility] verbalized his understanding of all instructions and will call the office with any questions, concerns, or signs of abnormal bleeding or blood clot.

## 2018-03-12 RX ORDER — AZELASTINE 1 MG/ML
SPRAY, METERED NASAL
Qty: 1 BOTTLE | Refills: 2 | Status: SHIPPED | OUTPATIENT
Start: 2018-03-12 | End: 2019-11-15 | Stop reason: ALTCHOICE

## 2018-03-12 RX ORDER — ALLOPURINOL 300 MG/1
TABLET ORAL
Qty: 90 TAB | Refills: 1 | Status: SHIPPED | OUTPATIENT
Start: 2018-03-12 | End: 2018-10-16 | Stop reason: SDUPTHER

## 2018-03-13 ENCOUNTER — OFFICE VISIT (OUTPATIENT)
Dept: INTERNAL MEDICINE CLINIC | Age: 78
End: 2018-03-13

## 2018-03-13 DIAGNOSIS — R13.19 ESOPHAGEAL DYSPHAGIA: ICD-10-CM

## 2018-03-13 DIAGNOSIS — K21.9 GASTROESOPHAGEAL REFLUX DISEASE WITHOUT ESOPHAGITIS: ICD-10-CM

## 2018-03-13 DIAGNOSIS — Z95.0 PACEMAKER: ICD-10-CM

## 2018-03-13 DIAGNOSIS — I48.20 CHRONIC ATRIAL FIBRILLATION (HCC): Primary | ICD-10-CM

## 2018-03-13 DIAGNOSIS — C15.9 MALIGNANT NEOPLASM OF ESOPHAGUS, UNSPECIFIED LOCATION (HCC): ICD-10-CM

## 2018-03-13 NOTE — MR AVS SNAPSHOT
[address] [place] [address], [address] [place] [id number] 
[phone] Patient: Marisela Vogt 
MRN: [id number] Visit Information Date & Time Provider Department Dept. Phone Encounter #  
 3/13/2018  9:45 AM Per Miramontes MD [facility] INTERNAL MEDICINE [facility] [phone] [id number] Follow-up Instructions Return in about 4 months (around 7/13/2018). Upcoming Health Maintenance Date Due ZOSTER VACCINE AGE 60> 12/11/1998 GLAUCOMA SCREENING Q2Y 2/11/2004 Bone Densitometry (Dexa) Screening 2/11/2004 DTaP/Tdap/Td series (1 - Tdap) 11/21/2009 Pneumococcal 65+ Low/Medium Risk (2 of 2 - PPSV23) 11/17/2010 MEDICARE YEARLY EXAM 3/22/2018 Allergies as of 3/13/2018  Review Complete On: 12/16/2017 By: Per Miramontes MD  
  
 Severity Noted Reaction Type Reactions Parafon Forte Dsc [Chlorzoxazone] High   Anaphylaxis Aspirin  12/13/2017    Other (comments) Cannot take due to on coumadin Current Immunizations  Never Reviewed Name Date Influenza High Dose Vaccine PF 9/19/2017  9:05 AM, 9/13/2016 11:14 AM  
 Influenza Vaccine (Quad) PF 10/13/2015 Pneumococcal Vaccine (Unspecified Type) 11/17/2005 Td 11/20/2009 Not reviewed this visit Vitals Smoking Status Former Smoker Preferred Pharmacy Pharmacy Name Phone [facility] [address] [place] [address], [address] [place] [address],[address] [facility] [address] [place] [address] [phone] Your Updated Medication List  
  
   
This list is accurate as of 3/13/18 11:30 AM.  Always use your most recent med list.  
  
  
  
  
 allopurinol 300 mg tablet Commonly known as:  ZYLOPRIM  
TAKE ONE TABLET BY MOUTH ONCE DAILY  
  
 azelastine 137 mcg (0.1 %) nasal spray Commonly known as:  ASTELIN  
USE 1 SPRAY INTO EACH NOSTRIL TWICE DAILY  
  
 cholecalciferol (vitamin D3) 2,000 unit Tab Take  by mouth.  
  
 cyanocobalamin 500 mcg tablet Commonly known as:  VITAMIN B12 Take 500 mcg by mouth daily. docusate sodium 100 mg capsule Commonly known as:  Rolley Ke Take 100 mg by mouth two (2) times a day. enoxaparin 80 mg/0.8 mL injection Commonly known as:  LOVENOX Last coumadin 12/7.12/10-11 take injection twice daily Nothing 12/12. Restart coumadin 12/13 pm 12/14-16 add lovenox twice daily. Recheck INR 12/18 FIBERCON PO Take  by mouth.  
  
 magnesium 250 mg Tab Take  by mouth.  
  
 metoprolol tartrate 50 mg tablet Commonly known as:  LOPRESSOR Take 50 mg by mouth two (2) times a day. omeprazole 20 mg capsule Commonly known as:  PRILOSEC  
TAKE ONE CAPSULE BY MOUTH EVERY MORNING  
  
 promethazine 25 mg tablet Commonly known as:  PHENERGAN  
TAKE 1/2 TO 1 TABLET BY MOUTH EVERY 8 HOURS AS NEEDED FOR NAUSEA  
  
 raNITIdine 150 mg tablet Commonly known as:  ZANTAC TAKE ONE TABLET BY MOUTH TWICE DAILY  
  
 warfarin 5 mg tablet Commonly known as:  COUMADIN  
TAKE ONE AND ONE-HALF TABLETS (7.5 MG) BY MOUTH ONCE DAILY OR AS INSTRUCTED BASED ON LABWORK. Indications: PREVENT THROMBOEMBOLISM IN CHRONIC ATRIAL FIBRILLATION Follow-up Instructions Return in about 4 months (around 7/13/2018). Introducing [facility]! Cody Rodriguez introduces [employer] patient portal. Now you can access parts of your medical record, email your doctor's office, and request medication refills online. 1. In your internet browser, go to [email or web address] 2. Click on the First Time User? Click Here link in the Sign In box. You will see the New Member Sign Up page. 3. Enter your [employer] Access Code exactly as it appears below. You will not need to use this code after youve completed the sign-up process. If you do not sign up before the expiration date, you must request a new code. · [employer] Access Code: [email or web address] Expires: 6/11/2018 11:30 AM 
 
 4. Enter the last four digits of your Social Security Number (xxxx) and Date of Birth (mm/dd/yyyy) as indicated and click Submit. You will be taken to the next sign-up page. 5. Create a [employer] ID. This will be your [employer] login ID and cannot be changed, so think of one that is secure and easy to remember. 6. Create a [employer] password. You can change your password at any time. 7. Enter your Password Reset Question and Answer. This can be used at a later time if you forget your password. 8. Enter your e-mail address. You will receive e-mail notification when new information is available in [address] 9. Click Sign Up. You can now view and download portions of your medical record. 10. Click the Download Summary menu link to download a portable copy of your medical information. If you have questions, please visit the Frequently Asked Questions section of the [employer] website. Remember, [employer] is NOT to be used for urgent needs. For medical emergencies, dial 911. Now available from your iPhone and Android! Please provide this summary of care documentation to your next provider. Your primary care clinician is listed as Chely Mancini. If you have any questions after today's visit, please call [phone].

## 2018-03-17 VITALS
SYSTOLIC BLOOD PRESSURE: 138 MMHG | OXYGEN SATURATION: 96 % | HEART RATE: 82 BPM | TEMPERATURE: 98.4 F | DIASTOLIC BLOOD PRESSURE: 82 MMHG | HEIGHT: 66 IN | WEIGHT: 163.5 LBS | BODY MASS INDEX: 26.28 KG/M2

## 2018-03-17 PROBLEM — C15.9 MALIGNANT NEOPLASM OF ESOPHAGUS (HCC): Status: ACTIVE | Noted: 2018-03-17

## 2018-03-17 PROBLEM — Z95.0 PACEMAKER: Status: ACTIVE | Noted: 2018-03-17

## 2018-03-17 NOTE — PROGRESS NOTES
The patient presents to the office today with the chief complaint of dysphagia    HPI    The patient has a history of cancer of the esophagus. There is no evidence of reoccurrence. The patient has dysthagia with food sticking in his mid throat. The patient has been followed by GI. The patient has reflux which is fair control on medications. The patient remains on Coumadin due to atrial fibrillation. The patient continues with a pacemaker due to sinoatrial dysfunction      Review of Systems   Respiratory: Negative for shortness of breath. Cardiovascular: Negative for chest pain and leg swelling. Gastrointestinal:        Dysphagia       Allergies   Allergen Reactions    Parafon Forte Dsc [Chlorzoxazone] Anaphylaxis    Aspirin Other (comments)     Cannot take due to on coumadin       Current Outpatient Prescriptions   Medication Sig Dispense Refill    azelastine (ASTELIN) 137 mcg (0.1 %) nasal spray USE 1 SPRAY INTO EACH NOSTRIL TWICE DAILY 1 Bottle 2    allopurinol (ZYLOPRIM) 300 mg tablet TAKE ONE TABLET BY MOUTH ONCE DAILY 90 Tab 1    warfarin (COUMADIN) 5 mg tablet TAKE ONE AND ONE-HALF TABLETS (7.5 MG) BY MOUTH ONCE DAILY OR AS INSTRUCTED BASED ON LABWORK. Indications: PREVENT THROMBOEMBOLISM IN CHRONIC ATRIAL FIBRILLATION 45 Tab 3    metoprolol tartrate (LOPRESSOR) 50 mg tablet Take 50 mg by mouth two (2) times a day.  omeprazole (PRILOSEC) 20 mg capsule TAKE ONE CAPSULE BY MOUTH EVERY MORNING 30 Cap 4    raNITIdine (ZANTAC) 150 mg tablet TAKE ONE TABLET BY MOUTH TWICE DAILY 60 Tab 3    docusate sodium (COLACE) 100 mg capsule Take 100 mg by mouth two (2) times a day.  CALCIUM POLYCARBOPHIL (FIBERCON PO) Take  by mouth.  magnesium 250 mg tab Take  by mouth.  cyanocobalamin (VITAMIN B12) 500 mcg tablet Take 500 mcg by mouth daily.  cholecalciferol, vitamin D3, 2,000 unit tab Take  by mouth.       promethazine (PHENERGAN) 25 mg tablet TAKE 1/2 TO 1 TABLET BY MOUTH EVERY 8 HOURS AS NEEDED FOR NAUSEA 40 Tab 1    enoxaparin (LOVENOX) 80 mg/0.8 mL injection Last coumadin 12/7.12/10-11 take injection twice daily Nothing 12/12. Restart coumadin 12/13 pm 12/14-16 add lovenox twice daily. Recheck INR 12/18 10 Syringe 0       Past Medical History:   Diagnosis Date    Abdominal pain, unspecified site     Acute upper respiratory infections of unspecified site     Atrial fibrillation ([address]     Cancer of esophagus ([address] 2001    RT and adjuvant chemotherapy, surgery    Esophageal reflux     Generalized osteoarthrosis, involving multiple sites     Gout, unspecified     Nausea alone     Sinoatrial node dysfunction ([address]     Thromboembolus ([address] 2003 (approx)    DVT in leg (IVC filter placed)    Unspecified cataract     Unspecified constipation        Past Surgical History:   Procedure Laterality Date    COLONOSCOPY N/A 12/13/2017    COLONOSCOPY with hot polypectomies performed by Jack Denis MD at [address] [facility]      esophagectomy    HX HEENT  2001    Esophageal Cancer Surgery    HX HIP REPLACEMENT Left 2005 (approx)    HX OTHER SURGICAL  2003    ivc filter placed    HX PACEMAKER  2012    [employer]       Social History     Social History    Marital status: SINGLE     Spouse name: N/A    Number of children: N/A    Years of education: N/A     Occupational History    Not on file.      Social History Main Topics    Smoking status: Former Smoker    Smokeless tobacco: Never Used    Alcohol use Yes      Comment: 2 drinks per day    Drug use: No    Sexual activity: No     Other Topics Concern    Not on file     Social History Narrative       Patient does not have an advanced directive on file    Visit Vitals    /82 (BP 1 Location: Right arm, BP Patient Position: Sitting)    Pulse 82    Temp 98.4 °F (36.9 °C)    Ht 5' 6\" (1.676 m)    Wt 163 lb 8 oz (74.2 kg)    SpO2 96%    BMI 26.39 kg/m2       Physical Exam   No Cervical Lymphadenopathy  No Supraclavicular Lymphadenopathy  Thyroid is Normal  Lungs are normal to percussion. Clear to auscultation   Heart:  S1 S2 are normal, No gallops, No mummers  No Carotid Bruits  Abdomen:  Normal Bowel Sounds. No tenderness. No masses. No Hepatomegaly or Splenomegly  LE:  Strong Pedal Pulses. No Edema    BMI:  OK    Anti-Coag visit on 02/27/2018   Component Date Value Ref Range Status    VALID INTERNAL CONTROL POC 02/27/2018 Yes   Final    INR POC 02/27/2018 2.3   Final   Anti-Coag visit on 02/14/2018   Component Date Value Ref Range Status    VALID INTERNAL CONTROL POC 02/14/2018 Yes   Final    INR POC 02/14/2018 2.2   Final   Anti-Coag visit on 01/10/2018   Component Date Value Ref Range Status    VALID INTERNAL CONTROL POC 01/10/2018 Yes   Final    INR POC 01/10/2018 2.4   Final   Anti-Coag visit on 12/21/2017   Component Date Value Ref Range Status    VALID INTERNAL CONTROL POC 12/21/2017 Yes   Final    INR POC 12/21/2017 1.5   Final   Anti-Coag visit on 12/18/2017   Component Date Value Ref Range Status    VALID INTERNAL CONTROL POC 12/26/2017 Yes   Final    VALID INTERNAL CONTROL POC 12/18/2017 Yes   Final    INR POC 12/18/2017 1.4   Final       .No results found for any visits on 03/13/18. Assessment / Plan      ICD-10-CM ICD-9-CM    1. Chronic atrial fibrillation (HCC) I48.2 427.31    2. Gastroesophageal reflux disease without esophagitis K21.9 530.81    3. Esophageal dysphagia R13.10 787.20    4. Malignant neoplasm of esophagus, unspecified location (HCC) C15.9 150.9    5. Pacemaker Z95.0 V45.01        he was advised to continue his maintenance medications  Encouraged the patient to inform GI of the continued dysphagia    Follow-up Disposition:  Return in about 4 months (around 7/13/2018). I asked Osiris Cates if he has any questions and I answered the questions. Osiris Lee  [facility] states that he understands the treatment plan and agrees with the treatment plan

## 2018-03-23 ENCOUNTER — ANTI-COAG VISIT (OUTPATIENT)
Dept: INTERNAL MEDICINE CLINIC | Age: 78
End: 2018-03-23

## 2018-03-23 DIAGNOSIS — I48.20 CHRONIC ATRIAL FIBRILLATION (HCC): ICD-10-CM

## 2018-03-23 LAB
INR BLD: 2.6
PT POC: NORMAL SECONDS
VALID INTERNAL CONTROL?: YES

## 2018-03-26 NOTE — PROGRESS NOTES
Mr. Roger Pacheco is here today for anticoagulation monitoring for the diagnosis of Atrial Fibrillation. His INR goal is 2.0-3.0 and his current Coumadin dose is 5 mg dainly. Today's findings include an INR of 2.6 (normal INR range 0.8-1.2). Considering Mr. Lisa Colby past history, todays findings, and per the coumadin policy/protocol, Mr. Walton was instructed to take Coumadin as follows,  5 mg daily. He was also instructed to schedule an appointment in 3 weeks prior to leaving for an INR check. A full discussion of the nature of anticoagulants has been carried out. A full discussion of the need for frequent and regular monitoring, precise dosage adjustment and compliance was stressed. Side effects of potential bleeding were discussed and Mr. Walton was instructed to call [phone] if there are any signs of abnormal bleeding. Mr. Walton was instructed to avoid any OTC items containing aspirin or ibuprofen and prior to starting any new OTC products to consult with his physician or pharmacist to ensure no drug interactions are present. Mr. Walton was instructed to avoid any major changes in his general diet and to avoid alcohol consumption. .      Mr. Walton verbalized his understanding of all instructions and will call the office with any questions, concerns, or signs of abnormal bleeding or blood clot.

## 2018-04-17 ENCOUNTER — OFFICE VISIT (OUTPATIENT)
Dept: INTERNAL MEDICINE CLINIC | Age: 78
End: 2018-04-17

## 2018-04-17 VITALS
HEART RATE: 82 BPM | HEIGHT: 66 IN | WEIGHT: 165 LBS | BODY MASS INDEX: 26.52 KG/M2 | SYSTOLIC BLOOD PRESSURE: 132 MMHG | TEMPERATURE: 96.8 F | DIASTOLIC BLOOD PRESSURE: 88 MMHG | OXYGEN SATURATION: 99 %

## 2018-04-17 DIAGNOSIS — I48.20 CHRONIC ATRIAL FIBRILLATION (HCC): ICD-10-CM

## 2018-04-17 DIAGNOSIS — Z95.0 PACEMAKER: ICD-10-CM

## 2018-04-17 DIAGNOSIS — C15.9 MALIGNANT NEOPLASM OF ESOPHAGUS, UNSPECIFIED LOCATION (HCC): ICD-10-CM

## 2018-04-17 DIAGNOSIS — R42 DIZZINESS: Primary | ICD-10-CM

## 2018-04-17 NOTE — MR AVS SNAPSHOT
[address] [place] [address] 
 
 
 [address] [facility], [address] [facility] [address] 
[phone] Patient: Samira Moss 
MRN: [id number] BENNY:[id number] Visit Information Date & Time Provider Department Dept. Phone Encounter #  
 4/17/2018 12:45 PM Jose Roberto Mcnair MD [facility]NAL MEDICINE OF [facility] [phone] [id number] Your Appointments 7/10/2018  9:15 AM  
Follow Up with Jose Roberto Mcnair MD  
[facility] INTERNAL MEDICINE OF [facility]) Appt Note: 4 mo f/u  
 [address], [address] [facility] [id number]  
  
   
 [address] [facility] [address], [address] [facility] [address] Upcoming Health Maintenance Date Due ZOSTER VACCINE AGE 60> 12/11/1998 GLAUCOMA SCREENING Q2Y 2/11/2004 DTaP/Tdap/Td series (1 - Tdap) 11/21/2009 Pneumococcal 65+ High/Highest Risk (2 of 2 - PPSV23) 11/17/2010 MEDICARE YEARLY EXAM 3/22/2018 Allergies as of 4/17/2018  Review Complete On: 4/17/2018 By: Italo Soler LPN Severity Noted Reaction Type Reactions Parafon Forte Dsc [Chlorzoxazone] High   Anaphylaxis Aspirin  12/13/2017    Other (comments) Cannot take due to on coumadin Current Immunizations  Never Reviewed Name Date Influenza High Dose Vaccine PF 9/19/2017  9:05 AM, 9/13/2016 11:14 AM  
 Influenza Vaccine (Quad) PF 10/13/2015 Pneumococcal Vaccine (Unspecified Type) 11/17/2005 Td 11/20/2009 Not reviewed this visit You Were Diagnosed With   
  
 Codes Comments Dizziness    -  Primary ICD-10-CM: U14 ICD-9-CM: 780.4 Vitals BP Pulse Temp Height(growth percentile) Weight(growth percentile) SpO2  
 132/88 (BP 1 Location: Right arm, BP Patient Position: Sitting) 82 96.8 °F (36 °C) (Tympanic) 5' 6\" (1.676 m) 165 lb (74.8 kg) 99% BMI Smoking Status 26.63 kg/m2 Former Smoker Vitals History BMI and BSA Data Body Mass Index Body Surface Area 26.63 kg/m 2 1.87 m 2 Preferred Pharmacy Pharmacy Name Phone [facility] - [address] [facility], [address] [place] [address],[address] [place] [address] [phone] Your Updated Medication List  
  
   
This list is accurate as of 4/17/18  1:41 PM.  Always use your most recent med list.  
  
  
  
  
 allopurinol 300 mg tablet Commonly known as:  ZYLOPRIM  
TAKE ONE TABLET BY MOUTH ONCE DAILY  
  
 azelastine 137 mcg (0.1 %) nasal spray Commonly known as:  ASTELIN  
USE 1 SPRAY INTO EACH NOSTRIL TWICE DAILY  
  
 cholecalciferol (vitamin D3) 2,000 unit Tab Take  by mouth.  
  
 cyanocobalamin 500 mcg tablet Commonly known as:  VITAMIN B12 Take 500 mcg by mouth daily. docusate sodium 100 mg capsule Commonly known as:  [place] Pian Take 100 mg by mouth two (2) times a day. enoxaparin 80 mg/0.8 mL injection Commonly known as:  LOVENOX Last coumadin 12/7.12/10-11 take injection twice daily Nothing 12/12. Restart coumadin 12/13 pm 12/14-16 add lovenox twice daily. Recheck INR 12/18 FIBERCON PO Take  by mouth.  
  
 magnesium 250 mg Tab Take  by mouth.  
  
 metoprolol tartrate 50 mg tablet Commonly known as:  LOPRESSOR Take 50 mg by mouth two (2) times a day. omeprazole 20 mg capsule Commonly known as:  PRILOSEC  
TAKE ONE CAPSULE BY MOUTH EVERY MORNING  
  
 promethazine 25 mg tablet Commonly known as:  PHENERGAN  
TAKE 1/2 TO 1 TABLET BY MOUTH EVERY 8 HOURS AS NEEDED FOR NAUSEA  
  
 raNITIdine 150 mg tablet Commonly known as:  ZANTAC TAKE ONE TABLET BY MOUTH TWICE DAILY  
  
 warfarin 5 mg tablet Commonly known as:  COUMADIN  
TAKE ONE AND ONE-HALF TABLETS (7.5 MG) BY MOUTH ONCE DAILY OR AS INSTRUCTED BASED ON LABWORK. Indications: PREVENT THROMBOEMBOLISM IN CHRONIC ATRIAL FIBRILLATION To-Do List   
 04/18/2018 Imaging:  CT HEAD W WO CONT Introducing [facility]!    
 Liana Roth introduces [employer] patient portal. Now you can access parts of your medical record, email your doctor's office, and request medication refills online. 1. In your internet browser, go to [email or web address] 2. Click on the First Time User? Click Here link in the Sign In box. You will see the New Member Sign Up page. 3. Enter your [employer] Access Code exactly as it appears below. You will not need to use this code after youve completed the sign-up process. If you do not sign up before the expiration date, you must request a new code. · [employer] Access Code: [email or web address] Expires: 6/11/2018 11:30 AM 
 
4. Enter the last four digits of your Social Security Number (xxxx) and Date of Birth (mm/dd/yyyy) as indicated and click Submit. You will be taken to the next sign-up page. 5. Create a [employer] ID. This will be your [employer] login ID and cannot be changed, so think of one that is secure and easy to remember. 6. Create a [employer] password. You can change your password at any time. 7. Enter your Password Reset Question and Answer. This can be used at a later time if you forget your password. 8. Enter your e-mail address. You will receive e-mail notification when new information is available in [address] 9. Click Sign Up. You can now view and download portions of your medical record. 10. Click the Download Summary menu link to download a portable copy of your medical information. If you have questions, please visit the Frequently Asked Questions section of the [employer] website. Remember, [employer] is NOT to be used for urgent needs. For medical emergencies, dial 911. Now available from your iPhone and Android! Please provide this summary of care documentation to your next provider. Your primary care clinician is listed as Kori Cardoso. If you have any questions after today's visit, please call [phone].

## 2018-04-19 ENCOUNTER — HOSPITAL ENCOUNTER (OUTPATIENT)
Dept: CT IMAGING | Age: 78
Discharge: HOME | End: 2018-04-19
Attending: INTERNAL MEDICINE
Payer: MEDICARE

## 2018-04-19 DIAGNOSIS — R42 DIZZINESS: ICD-10-CM

## 2018-04-19 PROCEDURE — 70470 CT HEAD/BRAIN W/O & W/DYE: CPT

## 2018-04-19 PROCEDURE — 74011636320 HC RX REV CODE- 636/320: Performed by: INTERNAL MEDICINE

## 2018-04-19 RX ADMIN — IOPAMIDOL 100 ML: 612 INJECTION, SOLUTION INTRAVENOUS at 08:51

## 2018-04-19 NOTE — PROGRESS NOTES
The patient presents to the office today with the chief complaint of dizziness    HPI    The patient is doing fairly well but the patient has intermittent dizziness. As of late the dizziness is worse. Not truly vertiginous. Not orthorstatic. The symptoms respond some to Meclizine. The patient has Atrial Fibrillation with a Pacemaker in place. He remains on anticoagulation. He is doing well on the therapy. The patient is status post esophagectomy for esophagageal cancer. There is no evidence of reoccurrence. The patient has some dysphagia - this is stable and the weight is stable      Review of Systems   Respiratory: Negative for shortness of breath. Cardiovascular: Negative for chest pain and leg swelling. Neurological: Positive for dizziness. Negative for headaches. Allergies   Allergen Reactions    Parafon Forte Dsc [Chlorzoxazone] Anaphylaxis    Aspirin Other (comments)     Cannot take due to on coumadin       Current Outpatient Prescriptions   Medication Sig Dispense Refill    azelastine (ASTELIN) 137 mcg (0.1 %) nasal spray USE 1 SPRAY INTO EACH NOSTRIL TWICE DAILY 1 Bottle 2    allopurinol (ZYLOPRIM) 300 mg tablet TAKE ONE TABLET BY MOUTH ONCE DAILY 90 Tab 1    promethazine (PHENERGAN) 25 mg tablet TAKE 1/2 TO 1 TABLET BY MOUTH EVERY 8 HOURS AS NEEDED FOR NAUSEA 40 Tab 1    warfarin (COUMADIN) 5 mg tablet TAKE ONE AND ONE-HALF TABLETS (7.5 MG) BY MOUTH ONCE DAILY OR AS INSTRUCTED BASED ON LABWORK. Indications: PREVENT THROMBOEMBOLISM IN CHRONIC ATRIAL FIBRILLATION 45 Tab 3    metoprolol tartrate (LOPRESSOR) 50 mg tablet Take 50 mg by mouth two (2) times a day.  omeprazole (PRILOSEC) 20 mg capsule TAKE ONE CAPSULE BY MOUTH EVERY MORNING 30 Cap 4    raNITIdine (ZANTAC) 150 mg tablet TAKE ONE TABLET BY MOUTH TWICE DAILY 60 Tab 3    docusate sodium (COLACE) 100 mg capsule Take 100 mg by mouth two (2) times a day.  CALCIUM POLYCARBOPHIL (FIBERCON PO) Take  by mouth.       magnesium 250 mg tab Take  by mouth.  cyanocobalamin (VITAMIN B12) 500 mcg tablet Take 500 mcg by mouth daily.  cholecalciferol, vitamin D3, 2,000 unit tab Take  by mouth. Past Medical History:   Diagnosis Date    Abdominal pain, unspecified site     Acute upper respiratory infections of unspecified site     Atrial fibrillation ([address]     Cancer of es[address] ([facility] 2001    RT and adjuvant chemotherapy, surgery    Esophageal reflux     Generalized osteoarthrosis, involving multiple sites     Gout, unspecified     Nausea alone     Sinoatrial node dysfunction ([address]     Thromboembolus ([facility] 2003 (approx)    DVT in leg (IVC filter placed)    Unspecified cataract     Unspecified constipation        Past Surgical History:   Procedure Laterality Date    COLONOSCOPY N/A 12/13/2017    COLONOSCOPY with hot polypectomies performed by Timothy Puente MD at [address] HX GI      esophagectomy    HX HEENT  2001    Esophageal Cancer Surgery    HX HIP REPLACEMENT Left 2005 (approx)    HX OTHER SURGICAL  2003    ivc filter placed    HX PACEMAKER  2012    [employer]       Social History     Social History    Marital status: SINGLE     Spouse name: N/A    Number of children: N/A    Years of education: N/A     Occupational History    Not on file.      Social History Main Topics    Smoking status: Former Smoker    Smokeless tobacco: Never Used    Alcohol use Yes      Comment: 2 drinks per day    Drug use: No    Sexual activity: No     Other Topics Concern    Not on file     Social History Narrative       Patient does not have an advanced directive on file    Visit Vitals    /88 (BP 1 Location: Right arm, BP Patient Position: Sitting)    Pulse 82    Temp 96.8 °F (36 °C) (Tympanic)    Ht 5' 6\" (1.676 m)    Wt 165 lb (74.8 kg)    SpO2 99%    BMI 26.63 kg/m2   O    Physical Exam   Ears:  Normal  No Cervical Lymphadenopathy  No Supraclavicular Lymphadenopathy  Thyroid is Normal  Lungs are normal to percussion. Clear to auscultation   Heart:  S1 S2 are normal, No gallops, No mummers  No Carotid Bruits  Abdomen:  Normal Bowel Sounds. No tenderness. No masses. No Hepatomegaly or Splenomegly  LE:  Strong Pedal Pulses. No Edema      BMI:  OK    Anti-Coag visit on 03/23/2018   Component Date Value Ref Range Status    VALID INTERNAL CONTROL POC 03/23/2018 Yes   Final    INR POC 03/23/2018 2.6   Final   Anti-Coag visit on 02/27/2018   Component Date Value Ref Range Status    VALID INTERNAL CONTROL POC 02/27/2018 Yes   Final    INR POC 02/27/2018 2.3   Final   Anti-Coag visit on 02/14/2018   Component Date Value Ref Range Status    VALID INTERNAL CONTROL POC 02/14/2018 Yes   Final    INR POC 02/14/2018 2.2   Final       .No results found for any visits on 04/17/18. Assessment / Plan      ICD-10-CM ICD-9-CM    1. Dizziness R42 780.4 CT HEAD W WO CONT   2. Malignant neoplasm of esophagus, unspecified location (HCC) C15.9 150.9    3. Chronic atrial fibrillation (HCC) I48.2 427.31    4. Pacemaker Z95.0 V45.01      Head CT  he was advised to continue his maintenance medications  Further plans will be based on head ct results    Follow-up Disposition:  Return in about 4 months (around 8/17/2018). I asked Dianna Cates if he has any questions and I answered the questions. Dianna Brown states that he understands the treatment plan and agrees with the treatment plan

## 2018-04-24 ENCOUNTER — TELEPHONE (OUTPATIENT)
Dept: INTERNAL MEDICINE CLINIC | Age: 78
End: 2018-04-24

## 2018-04-26 ENCOUNTER — ANTI-COAG VISIT (OUTPATIENT)
Dept: INTERNAL MEDICINE CLINIC | Age: 78
End: 2018-04-26

## 2018-04-26 DIAGNOSIS — I48.20 CHRONIC ATRIAL FIBRILLATION (HCC): ICD-10-CM

## 2018-04-26 LAB
INR BLD: 3
PT POC: NORMAL SECONDS
VALID INTERNAL CONTROL?: YES

## 2018-05-01 NOTE — TELEPHONE ENCOUNTER
[address] [place] [address] and informed Him of CT results per Dr Davie Cisneros request. Jaime Guerra expressed understanding.  CT results normal

## 2018-05-08 ENCOUNTER — OFFICE VISIT (OUTPATIENT)
Dept: INTERNAL MEDICINE CLINIC | Age: 78
End: 2018-05-08

## 2018-05-08 VITALS
TEMPERATURE: 96.9 F | OXYGEN SATURATION: 97 % | RESPIRATION RATE: 16 BRPM | WEIGHT: 165 LBS | DIASTOLIC BLOOD PRESSURE: 76 MMHG | HEIGHT: 66 IN | BODY MASS INDEX: 26.52 KG/M2 | SYSTOLIC BLOOD PRESSURE: 130 MMHG | HEART RATE: 72 BPM

## 2018-05-08 DIAGNOSIS — Z85.01 PERSONAL HISTORY OF ESOPHAGEAL CANCER: ICD-10-CM

## 2018-05-08 DIAGNOSIS — R42 DIZZINESS: Primary | ICD-10-CM

## 2018-05-08 NOTE — PROGRESS NOTES
Chief Complaint   Patient presents with    Results     CT 04/17/2018    Insect Bite     tick yesterday      . Is someone accompanying this pt? no    Is the patient using any DME equipment during OV? no    Depression Screening:  PHQ over the last two weeks 3/21/2017 10/15/2015   Little interest or pleasure in doing things Not at all Not at all   Feeling down, depressed or hopeless Not at all Not at all   Total Score PHQ 2 0 0       Learning Assessment:  Learning Assessment 1/19/2016   PRIMARY LEARNER Patient   HIGHEST LEVEL OF EDUCATION - PRIMARY LEARNER  SOME COLLEGE   BARRIERS PRIMARY LEARNER NONE   CO-LEARNER CAREGIVER No   PRIMARY LANGUAGE ENGLISH    NEED No   LEARNER PREFERENCE PRIMARY DEMONSTRATION   ANSWERED BY patient   RELATIONSHIP SELF       Abuse Screening:  Abuse Screening Questionnaire 9/13/2016   Do you ever feel afraid of your partner? N   Are you in a relationship with someone who physically or mentally threatens you? N   Is it safe for you to go home? Y       Fall Risk  Fall Risk Assessment, last 12 mths 3/21/2017 10/15/2015   Able to walk? Yes Yes   Fall in past 12 months? No No         Lizzeth Berger is not updated on all     Pt currently taking Antiplatelet therapy? yes    Advance Directive:  1. Do you have an advance directive in place? Patient Reply:yes        Coordination of Care:  1. Have you been to the ER, urgent care clinic since your last visit? Hospitalized since your last visit? no    2. Have you seen or consulted any other health care providers outside of the [address] [place] [address] since your last visit? Include any pap smears or colon screening.  no

## 2018-05-22 ENCOUNTER — ANTI-COAG VISIT (OUTPATIENT)
Dept: INTERNAL MEDICINE CLINIC | Age: 78
End: 2018-05-22

## 2018-05-22 DIAGNOSIS — I48.20 CHRONIC ATRIAL FIBRILLATION (HCC): ICD-10-CM

## 2018-05-22 PROBLEM — Z85.01 PERSONAL HISTORY OF ESOPHAGEAL CANCER: Status: ACTIVE | Noted: 2018-05-22

## 2018-05-22 PROBLEM — C15.9 MALIGNANT NEOPLASM OF ESOPHAGUS (HCC): Status: RESOLVED | Noted: 2018-03-17 | Resolved: 2018-05-22

## 2018-05-22 LAB
INR BLD: 3.6
PT POC: NORMAL SECONDS
VALID INTERNAL CONTROL?: YES

## 2018-05-22 NOTE — PROGRESS NOTES
Mr. Luna Bailey is here today for anticoagulation monitoring for the diagnosis of Atrial Fibrillation. His INR goal is 2.0-3.0 and his current Coumadin dose is 5 gm daily. Today's findings include an INR of 3.6 (normal INR range 0.8-1.2)     Considering Mr. Erickson Quiet past history, todays findings, and per the coumadin policy/protocol, Mr. Cali Parkinson was instructed to take Coumadin as follows,  Hold for 2 days then resume . He was also instructed to schedule an appointment in 1 weeks prior to leaving for an INR check. A full discussion of the nature of anticoagulants has been carried out. A full discussion of the need for frequent and regular monitoring, precise dosage adjustment and compliance was stressed. Side effects of potential bleeding were discussed and Mr. Cali Parkinson was instructed to call [phone] if there are any signs of abnormal bleeding. Mr. Cali Parkinson was instructed to avoid any OTC items containing aspirin or ibuprofen and prior to starting any new OTC products to consult with his physician or pharmacist to ensure no drug interactions are present. Mr. Cali Parkinson was instructed to avoid any major changes in his general diet and to avoid alcohol consumption. ..    Mr. Cali Parkinson verbalized his understanding of all instructions and will call the office with any questions, concerns, or signs of abnormal bleeding or blood clot.

## 2018-05-23 NOTE — PROGRESS NOTES
The patient presents to the office today with the chief complaint of dizziness    HPI    The patient persists with dizziness. Medications have not been helpful. The patient has a history of cancer of the esophagus. There is no evidence of reoccurrence. The patient has mild dysphagia      Review of Systems   Respiratory: Negative for shortness of breath. Cardiovascular: Negative for chest pain and leg swelling. Neurological: Positive for dizziness. Allergies   Allergen Reactions    Parafon Forte Dsc [Chlorzoxazone] Anaphylaxis    Aspirin Other (comments)     Cannot take due to on coumadin       Current Outpatient Prescriptions   Medication Sig Dispense Refill    azelastine (ASTELIN) 137 mcg (0.1 %) nasal spray USE 1 SPRAY INTO EACH NOSTRIL TWICE DAILY 1 Bottle 2    allopurinol (ZYLOPRIM) 300 mg tablet TAKE ONE TABLET BY MOUTH ONCE DAILY 90 Tab 1    promethazine (PHENERGAN) 25 mg tablet TAKE 1/2 TO 1 TABLET BY MOUTH EVERY 8 HOURS AS NEEDED FOR NAUSEA 40 Tab 1    warfarin (COUMADIN) 5 mg tablet TAKE ONE AND ONE-HALF TABLETS (7.5 MG) BY MOUTH ONCE DAILY OR AS INSTRUCTED BASED ON LABWORK. Indications: PREVENT THROMBOEMBOLISM IN CHRONIC ATRIAL FIBRILLATION 45 Tab 3    metoprolol tartrate (LOPRESSOR) 50 mg tablet Take 50 mg by mouth two (2) times a day.  omeprazole (PRILOSEC) 20 mg capsule TAKE ONE CAPSULE BY MOUTH EVERY MORNING 30 Cap 4    raNITIdine (ZANTAC) 150 mg tablet TAKE ONE TABLET BY MOUTH TWICE DAILY 60 Tab 3    docusate sodium (COLACE) 100 mg capsule Take 100 mg by mouth two (2) times a day.  CALCIUM POLYCARBOPHIL (FIBERCON PO) Take  by mouth.  magnesium 250 mg tab Take  by mouth.  cyanocobalamin (VITAMIN B12) 500 mcg tablet Take 500 mcg by mouth daily.  cholecalciferol, vitamin D3, 2,000 unit tab Take  by mouth.          Past Medical History:   Diagnosis Date    Abdominal pain, unspecified site     Acute upper respiratory infections of unspecified site     [facility]     Cancer of esophagus ([facility] [address] 2001    RT and adjuvant chemotherapy, surgery    Esophageal reflux     Generalized osteoarthrosis, involving multiple sites     Gout, unspecified     Nausea alone     Sinoatrial node dysfunction ([address]     Thromboembolus ([address] 2003 (approx)    DVT in leg (IVC filter placed)    Unspecified cataract     Unspecified constipation        Past Surgical History:   Procedure Laterality Date    COLONOSCOPY N/A 12/13/2017    COLONOSCOPY with hot polypectomies performed by Saba Bond MD at [address] [facility]      esophagectomy    HX HEENT  2001    Esophageal Cancer Surgery    HX HIP REPLACEMENT Left 2005 (approx)    HX OTHER SURGICAL  2003    ivc filter placed    HX PACEMAKER  2012    [employer]       Social History     Social History    Marital status: SINGLE     Spouse name: N/A    Number of children: N/A    Years of education: N/A     Occupational History    Not on file. Social History Main Topics    Smoking status: Former Smoker    Smokeless tobacco: Never Used    Alcohol use Yes      Comment: 2 drinks per day    Drug use: No    Sexual activity: No     Other Topics Concern    Not on file     Social History Narrative       Patient does not have an advanced directive on file    Visit Vitals    /76 (BP 1 Location: Left arm, BP Patient Position: Sitting)    Pulse 72    Temp 96.9 °F (36.1 °C) (Tympanic)    Resp 16    Ht 5' 6\" (1.676 m)    Wt 165 lb (74.8 kg)    SpO2 97%    BMI 26.63 kg/m2       Physical Exam   Neck: Carotid bruit is not present. Cardiovascular: Exam reveals no gallop. No murmur heard. Pulmonary/Chest: He has no wheezes. He has no rales.        BMI:  OK    Anti-Coag visit on 04/26/2018   Component Date Value Ref Range Status    VALID INTERNAL CONTROL POC 04/26/2018 Yes   Final    INR POC 04/26/2018 3.0   Final   Anti-Coag visit on 03/23/2018   Component Date Value Ref Range Status    VALID INTERNAL CONTROL POC 03/23/2018 Yes   Final    INR POC 03/23/2018 2.6   Final   Anti-Coag visit on 02/27/2018   Component Date Value Ref Range Status    VALID INTERNAL CONTROL POC 02/27/2018 Yes   Final    INR POC 02/27/2018 2.3   Final   Anti-Coag visit on 02/14/2018   Component Date Value Ref Range Status    VALID INTERNAL CONTROL POC 02/14/2018 Yes   Final    INR POC 02/14/2018 2.2   Final       .No results found for any visits on 05/08/18. Assessment / Plan      ICD-10-CM ICD-9-CM    1. Dizziness R42 780.4 REFERRAL TO ENT-OTOLARYNGOLOGY   2. Personal history of esophageal cancer Z85.01 V10.03        To Neuro otolaryngology  he was advised to continue his maintenance medications      Follow-up Disposition:  Return in about 3 months (around 8/8/2018). I asked Sofi Cates if he has any questions and I answered the questions. Sofi Soto  [facility] states that he understands the treatment plan and agrees with the treatment plan

## 2018-06-01 ENCOUNTER — ANTI-COAG VISIT (OUTPATIENT)
Dept: INTERNAL MEDICINE CLINIC | Age: 78
End: 2018-06-01

## 2018-06-01 DIAGNOSIS — I48.20 CHRONIC ATRIAL FIBRILLATION (HCC): ICD-10-CM

## 2018-06-01 LAB
INR BLD: 2
PT POC: NORMAL SECONDS
VALID INTERNAL CONTROL?: YES

## 2018-06-01 NOTE — PROGRESS NOTES
Mr. Miguel Bar is here today for anticoagulation monitoring for the diagnosis of Atrial Fibrillation. His INR goal is 2.0-3.0 and his current Coumadin dose is 5 mg daily. Today's findings include an INR of 2.0 (normal INR range 0.8-1.2)    Considering Mr. Asha Contreras past history, todays findings, and per the coumadin policy/protocol, Mr. Charly Whalen was instructed to take Coumadin as follows,  Continue same dose . He was also instructed to schedule an appointment in 3 weeks prior to leaving for an INR check. A full discussion of the nature of anticoagulants has been carried out. A full discussion of the need for frequent and regular monitoring, precise dosage adjustment and compliance was stressed. Side effects of potential bleeding were discussed and Mr. Charly Whalen was instructed to call [phone] if there are any signs of abnormal bleeding. Mr. Charly Whalen was instructed to avoid any OTC items containing aspirin or ibuprofen and prior to starting any new OTC products to consult with his physician or pharmacist to ensure no drug interactions are present. Mr. Charly Whalen was instructed to avoid any major changes in his general diet and to avoid alcohol consumption. .    Mr. Charly Whalen verbalized his understanding of all instructions and will call the office with any questions, concerns, or signs of abnormal bleeding or blood clot.

## 2018-06-11 RX ORDER — OMEPRAZOLE 20 MG/1
CAPSULE, DELAYED RELEASE ORAL
Qty: 30 CAP | Refills: 2 | Status: SHIPPED | OUTPATIENT
Start: 2018-06-11 | End: 2018-11-15 | Stop reason: SDUPTHER

## 2018-06-21 ENCOUNTER — ANTI-COAG VISIT (OUTPATIENT)
Dept: INTERNAL MEDICINE CLINIC | Age: 78
End: 2018-06-21

## 2018-06-21 DIAGNOSIS — I48.20 CHRONIC ATRIAL FIBRILLATION (HCC): ICD-10-CM

## 2018-06-21 LAB
INR BLD: 2.5
PT POC: NORMAL SECONDS
VALID INTERNAL CONTROL?: YES

## 2018-06-21 NOTE — PROGRESS NOTES
Mr. Lizz Cabrera is here today for anticoagulation monitoring for the diagnosis of Atrial Fibrillation. His INR goal is 2.0-3.0 and his current Coumadin dose is 5 mg daily. Today's findings include an INR of 2.5 (normal INR range 0.8-1.2). Considering Mr. Jesus Goncalves past history, todays findings, and per the coumadin policy/protocol, Mr. SAVAGE [facility] was instructed to take Coumadin as follows,  5 mg daily. He was also instructed to schedule an appointment in 3 weeks prior to leaving for an INR check. A full discussion of the nature of anticoagulants has been carried out. A full discussion of the need for frequent and regular monitoring, precise dosage adjustment and compliance was stressed. Side effects of potential bleeding were discussed and Mr. SAVAGE [facility] was instructed to call [phone] if there are any signs of abnormal bleeding. Mr. SAVAGE [facility] was instructed to avoid any OTC items containing aspirin or ibuprofen and prior to starting any new OTC products to consult with his physician or pharmacist to ensure no drug interactions are present. Mr. SAVAGE [facility] was instructed to avoid any major changes in his general diet and to avoid alcohol consumption. .      Mr. SAVAGE [facility] verbalized his understanding of all instructions and will call the office with any questions, concerns, or signs of abnormal bleeding or blood clot.

## 2018-07-10 ENCOUNTER — HOSPITAL ENCOUNTER (OUTPATIENT)
Dept: LAB | Age: 78
Discharge: HOME | End: 2018-07-10
Payer: MEDICARE

## 2018-07-10 ENCOUNTER — OFFICE VISIT (OUTPATIENT)
Dept: INTERNAL MEDICINE CLINIC | Age: 78
End: 2018-07-10

## 2018-07-10 VITALS
TEMPERATURE: 97.5 F | BODY MASS INDEX: 24.91 KG/M2 | HEIGHT: 66 IN | OXYGEN SATURATION: 93 % | DIASTOLIC BLOOD PRESSURE: 78 MMHG | SYSTOLIC BLOOD PRESSURE: 136 MMHG | HEART RATE: 78 BPM | WEIGHT: 155 LBS | RESPIRATION RATE: 16 BRPM

## 2018-07-10 DIAGNOSIS — Z85.01 PERSONAL HISTORY OF ESOPHAGEAL CANCER: ICD-10-CM

## 2018-07-10 DIAGNOSIS — Z23 ENCOUNTER FOR IMMUNIZATION: ICD-10-CM

## 2018-07-10 DIAGNOSIS — Z12.5 PROSTATE CANCER SCREENING: ICD-10-CM

## 2018-07-10 DIAGNOSIS — Z95.0 PACEMAKER: ICD-10-CM

## 2018-07-10 DIAGNOSIS — I48.20 CHRONIC ATRIAL FIBRILLATION (HCC): ICD-10-CM

## 2018-07-10 DIAGNOSIS — K21.9 GASTROESOPHAGEAL REFLUX DISEASE WITHOUT ESOPHAGITIS: ICD-10-CM

## 2018-07-10 DIAGNOSIS — R63.4 WEIGHT LOSS: ICD-10-CM

## 2018-07-10 DIAGNOSIS — Z00.00 MEDICARE ANNUAL WELLNESS VISIT, SUBSEQUENT: ICD-10-CM

## 2018-07-10 DIAGNOSIS — Z85.01 PERSONAL HISTORY OF ESOPHAGEAL CANCER: Primary | ICD-10-CM

## 2018-07-10 LAB
ALBUMIN SERPL-MCNC: 3.7 G/DL (ref 3.4–5)
ALBUMIN/GLOB SERPL: 0.9 {RATIO} (ref 0.8–1.7)
ALP SERPL-CCNC: 157 U/L (ref 45–117)
ALT SERPL-CCNC: 15 U/L (ref 16–61)
ANION GAP SERPL CALC-SCNC: 7 MMOL/L (ref 3–18)
AST SERPL-CCNC: 25 U/L (ref 15–37)
BASOPHILS # BLD: 0 K/UL (ref 0–0.1)
BASOPHILS NFR BLD: 1 % (ref 0–2)
BILIRUB SERPL-MCNC: 0.9 MG/DL (ref 0.2–1)
BUN SERPL-MCNC: 16 MG/DL (ref 7–18)
BUN/CREAT SERPL: 22 (ref 12–20)
CALCIUM SERPL-MCNC: 9.4 MG/DL (ref 8.5–10.1)
CHLORIDE SERPL-SCNC: 99 MMOL/L (ref 100–108)
CO2 SERPL-SCNC: 29 MMOL/L (ref 21–32)
CREAT SERPL-MCNC: 0.73 MG/DL (ref 0.6–1.3)
DIFFERENTIAL METHOD BLD: ABNORMAL
EOSINOPHIL # BLD: 0.1 K/UL (ref 0–0.4)
EOSINOPHIL NFR BLD: 1 % (ref 0–5)
ERYTHROCYTE [DISTWIDTH] IN BLOOD BY AUTOMATED COUNT: 15.5 % (ref 11.6–14.5)
GLOBULIN SER CALC-MCNC: 4.3 G/DL (ref 2–4)
GLUCOSE SERPL-MCNC: 91 MG/DL (ref 74–99)
HCT VFR BLD AUTO: 36.6 % (ref 36–48)
HGB BLD-MCNC: 12.4 G/DL (ref 13–16)
INR PPP: 3 (ref 0.8–1.2)
LYMPHOCYTES # BLD: 1 K/UL (ref 0.9–3.6)
LYMPHOCYTES NFR BLD: 22 % (ref 21–52)
MCH RBC QN AUTO: 33.4 PG (ref 24–34)
MCHC RBC AUTO-ENTMCNC: 33.9 G/DL (ref 31–37)
MCV RBC AUTO: 98.7 FL (ref 74–97)
MONOCYTES # BLD: 0.4 K/UL (ref 0.05–1.2)
MONOCYTES NFR BLD: 10 % (ref 3–10)
NEUTS SEG # BLD: 2.8 K/UL (ref 1.8–8)
NEUTS SEG NFR BLD: 66 % (ref 40–73)
PLATELET # BLD AUTO: 157 K/UL (ref 135–420)
PMV BLD AUTO: 9.8 FL (ref 9.2–11.8)
POTASSIUM SERPL-SCNC: 5.1 MMOL/L (ref 3.5–5.5)
PROT SERPL-MCNC: 8 G/DL (ref 6.4–8.2)
PROTHROMBIN TIME: 30.6 SEC (ref 11.5–15.2)
PSA SERPL-MCNC: 1 NG/ML (ref 0–4)
RBC # BLD AUTO: 3.71 M/UL (ref 4.7–5.5)
SODIUM SERPL-SCNC: 135 MMOL/L (ref 136–145)
TSH SERPL DL<=0.05 MIU/L-ACNC: 2.3 UIU/ML (ref 0.36–3.74)
WBC # BLD AUTO: 4.3 K/UL (ref 4.6–13.2)

## 2018-07-10 PROCEDURE — 85025 COMPLETE CBC W/AUTO DIFF WBC: CPT | Performed by: INTERNAL MEDICINE

## 2018-07-10 PROCEDURE — 85610 PROTHROMBIN TIME: CPT | Performed by: INTERNAL MEDICINE

## 2018-07-10 PROCEDURE — 80053 COMPREHEN METABOLIC PANEL: CPT | Performed by: INTERNAL MEDICINE

## 2018-07-10 PROCEDURE — 84443 ASSAY THYROID STIM HORMONE: CPT | Performed by: INTERNAL MEDICINE

## 2018-07-10 PROCEDURE — 84153 ASSAY OF PSA TOTAL: CPT | Performed by: INTERNAL MEDICINE

## 2018-07-10 PROCEDURE — 36415 COLL VENOUS BLD VENIPUNCTURE: CPT | Performed by: INTERNAL MEDICINE

## 2018-07-10 RX ORDER — METOCLOPRAMIDE 5 MG/1
5 TABLET ORAL
Qty: 30 TAB | Refills: 0 | Status: SHIPPED | OUTPATIENT
Start: 2018-07-10 | End: 2018-07-20

## 2018-07-10 NOTE — PROGRESS NOTES
The patient presents to the office today with the chief complaint of dysphagia    HPI    The patient has a history of cancer of the esophagus. There is no evidence of a reoccurrence. The patient continues to have mild dysphagia. The patient patient has reflux with occasional regurgitation. He remains on Omeprazole for this with fair control of symptoms. Review of Systems   Respiratory: Negative for shortness of breath. Cardiovascular: Negative for chest pain and leg swelling. Gastrointestinal: Positive for nausea. Allergies   Allergen Reactions    Parafon Forte Dsc [Chlorzoxazone] Anaphylaxis    Aspirin Other (comments)     Cannot take due to on coumadin       Current Outpatient Prescriptions   Medication Sig Dispense Refill    omeprazole (PRILOSEC) 20 mg capsule TAKE ONE CAPSULE BY MOUTH EVERY MORNING 30 Cap 2    azelastine (ASTELIN) 137 mcg (0.1 %) nasal spray USE 1 SPRAY INTO EACH NOSTRIL TWICE DAILY 1 Bottle 2    allopurinol (ZYLOPRIM) 300 mg tablet TAKE ONE TABLET BY MOUTH ONCE DAILY 90 Tab 1    promethazine (PHENERGAN) 25 mg tablet TAKE 1/2 TO 1 TABLET BY MOUTH EVERY 8 HOURS AS NEEDED FOR NAUSEA 40 Tab 1    warfarin (COUMADIN) 5 mg tablet TAKE ONE AND ONE-HALF TABLETS (7.5 MG) BY MOUTH ONCE DAILY OR AS INSTRUCTED BASED ON LABWORK. Indications: PREVENT THROMBOEMBOLISM IN CHRONIC ATRIAL FIBRILLATION 45 Tab 3    metoprolol tartrate (LOPRESSOR) 50 mg tablet Take 50 mg by mouth two (2) times a day.  raNITIdine (ZANTAC) 150 mg tablet TAKE ONE TABLET BY MOUTH TWICE DAILY 60 Tab 3    docusate sodium (COLACE) 100 mg capsule Take 100 mg by mouth two (2) times a day.  CALCIUM POLYCARBOPHIL (FIBERCON PO) Take  by mouth.  magnesium 250 mg tab Take  by mouth.  cyanocobalamin (VITAMIN B12) 500 mcg tablet Take 500 mcg by mouth daily.  cholecalciferol, vitamin D3, 2,000 unit tab Take  by mouth.          Past Medical History:   Diagnosis Date    Abdominal pain, unspecified site     Acute upper respiratory infections of unspecified site     Atrial fibrillation ([address]     Cancer of esophagus ([address] 2001    RT and adjuvant chemotherapy, surgery    Esophageal reflux     Generalized osteoarthrosis, involving multiple sites     Gout, unspecified     Nausea alone     Sinoatrial node dysfunction ([address]     Thromboembolus ([address] 2003 (approx)    DVT in leg (IVC filter placed)    Unspecified cataract     Unspecified constipation        Past Surgical History:   Procedure Laterality Date    COLONOSCOPY N/A 12/13/2017    COLONOSCOPY with hot polypectomies performed by Nancy Sethi MD at [address] [facility]      esophagectomy    HX HEENT  2001    Esophageal Cancer Surgery    HX HIP REPLACEMENT Left 2005 (approx)    HX OTHER SURGICAL  2003    ivc filter placed    HX PACEMAKER  2012    [employer]       Social History     Social History    Marital status: SINGLE     Spouse name: N/A    Number of children: N/A    Years of education: N/A     Occupational History    Not on file. Social History Main Topics    Smoking status: Former Smoker    Smokeless tobacco: Never Used    Alcohol use Yes      Comment: 2 drinks per day    Drug use: No    Sexual activity: No     Other Topics Concern    Not on file     Social History Narrative       Patient does not have an advanced directive on file    There were no vitals taken for this visit.     Physical Exam    BMI:  OK    Anti-Coag visit on 06/21/2018   Component Date Value Ref Range Status    VALID INTERNAL CONTROL POC 06/21/2018 Yes   Final    INR POC 06/21/2018 2.5   Final   Anti-Coag visit on 06/01/2018   Component Date Value Ref Range Status    VALID INTERNAL CONTROL POC 06/01/2018 Yes   Final    INR POC 06/01/2018 2.0   Final   Anti-Coag visit on 05/22/2018   Component Date Value Ref Range Status    VALID INTERNAL CONTROL POC 05/22/2018 Yes   Final    INR POC 05/22/2018 3.6   Final   Anti-Coag visit on 04/26/2018 Component Date Value Ref Range Status    VALID INTERNAL CONTROL POC 04/26/2018 Yes   Final    INR POC 04/26/2018 3.0   Final       .No results found for any visits on 07/10/18. Assessment / Plan      ICD-10-CM ICD-9-CM    1. Personal history of esophageal cancer Z85.01 V10.03    2. Chronic atrial fibrillation (HCC) I48.2 427.31    3. Pacemaker Z95.0 V45.01    4. Gastroesophageal reflux disease without esophagitis K21.9 530.81        he was advised to continue his maintenance medications        Follow-up Disposition: Not on File    I asked Nain Cates if he has any questions and I answered the questions. Nain Nye  [facility] states that he understands the treatment plan and agrees with the treatment plan

## 2018-07-10 NOTE — PROGRESS NOTES
Chief Complaint   Patient presents with    Well Male     4 month         Depression Screening:  PHQ over the last two weeks 3/21/2017 10/15/2015   Little interest or pleasure in doing things Not at all Not at all   Feeling down, depressed or hopeless Not at all Not at all   Total Score PHQ 2 0 0       Learning Assessment:  Learning Assessment 1/19/2016   PRIMARY LEARNER Patient   HIGHEST LEVEL OF EDUCATION - PRIMARY LEARNER  SOME COLLEGE   BARRIERS PRIMARY LEARNER NONE   CO-LEARNER CAREGIVER No   PRIMARY LANGUAGE ENGLISH    NEED No   LEARNER PREFERENCE PRIMARY DEMONSTRATION   ANSWERED BY patient   RELATIONSHIP SELF       Abuse Screening:  Abuse Screening Questionnaire 9/13/2016   Do you ever feel afraid of your partner? N   Are you in a relationship with someone who physically or mentally threatens you? N   Is it safe for you to go home? Y       Fall Risk  Fall Risk Assessment, last 12 mths 3/21/2017 10/15/2015   Able to walk? Yes Yes   Fall in past 12 months? No No         Uma Carbon is not updated on all HM tdap, glaucoma. Pt currently taking Antiplatelet therapy? yes    Advance Directive:  1. Do you have an advance directive in place? Patient Reply:yes will bring to scan         1. Have you been to the ER, urgent care clinic since your last visit? Hospitalized since your last visit? No    2. Have you seen or consulted any other health care providers outside of the [address] [place] [address] since your last visit? Include any pap smears or colon screening.  No

## 2018-07-10 NOTE — PROGRESS NOTES
This is the Subsequent Medicare Annual Wellness Exam, performed 12 months or more after the Initial AWV or the last Subsequent AWV    I have reviewed the patient's medical history in detail and updated the computerized patient record. History   The patient complains of the feeling of \"food not moving down\" in her lower esophagus. He is eating ok but he is losing weight. The patient has mild reflux. The patient remains on anticoagulation for atrial fibrillation. The patient has a pacemaker. He is due for a pacemaker check today. Past Medical History:   Diagnosis Date    Abdominal pain, unspecified site     Acute upper respiratory infections of unspecified site     Atrial fibrillation ([address]     Cancer of esophagus ([address] 2001    RT and adjuvant chemotherapy, surgery    Esophageal reflux     Generalized osteoarthrosis, involving multiple sites     Gout, unspecified     Nausea alone     Sinoatrial node dysfunction ([address]     Thromboembolus ([address] 2003 (approx)    DVT in leg (IVC filter placed)    Unspecified cataract     Unspecified constipation       Past Surgical History:   Procedure Laterality Date    COLONOSCOPY N/A 12/13/2017    COLONOSCOPY with hot polypectomies performed by Jessica Becerra MD [address] [facility]      esophagectomy    HX HEENT  2001    Esophageal Cancer Surgery    HX HIP REPLACEMENT Left 2005 (approx)    HX OTHER SURGICAL  2003    ivc filter placed    HX PACEMAKER  2012    [employer]     Current Outpatient Prescriptions   Medication Sig Dispense Refill    metoclopramide HCl (REGLAN) 5 mg tablet Take 1 Tab by mouth Before breakfast, lunch, and dinner for 10 days.  30 Tab 0    omeprazole (PRILOSEC) 20 mg capsule TAKE ONE CAPSULE BY MOUTH EVERY MORNING 30 Cap 2    azelastine (ASTELIN) 137 mcg (0.1 %) nasal spray USE 1 SPRAY INTO EACH NOSTRIL TWICE DAILY 1 Bottle 2    allopurinol (ZYLOPRIM) 300 mg tablet TAKE ONE TABLET BY MOUTH ONCE DAILY 90 Tab 1    promethazine (PHENERGAN) 25 mg tablet TAKE 1/2 TO 1 TABLET BY MOUTH EVERY 8 HOURS AS NEEDED FOR NAUSEA 40 Tab 1    warfarin (COUMADIN) 5 mg tablet TAKE ONE AND ONE-HALF TABLETS (7.5 MG) BY MOUTH ONCE DAILY OR AS INSTRUCTED BASED ON LABWORK. Indications: PREVENT THROMBOEMBOLISM IN CHRONIC ATRIAL FIBRILLATION 45 Tab 3    metoprolol tartrate (LOPRESSOR) 50 mg tablet Take 50 mg by mouth two (2) times a day.  raNITIdine (ZANTAC) 150 mg tablet TAKE ONE TABLET BY MOUTH TWICE DAILY 60 Tab 3    docusate sodium (COLACE) 100 mg capsule Take 100 mg by mouth two (2) times a day.  CALCIUM POLYCARBOPHIL (FIBERCON PO) Take  by mouth.  magnesium 250 mg tab Take  by mouth.  cyanocobalamin (VITAMIN B12) 500 mcg tablet Take 500 mcg by mouth daily.  cholecalciferol, vitamin D3, 2,000 unit tab Take  by mouth. Allergies   Allergen Reactions    Parafon Forte Dsc [Chlorzoxazone] Anaphylaxis    Aspirin Other (comments)     Cannot take due to on coumadin     Family History   Problem Relation Age of Onset    Heart Disease Father     Alzheimer Father      Social History   Substance Use Topics    Smoking status: Former Smoker    Smokeless tobacco: Never Used    Alcohol use Yes      Comment: 2 drinks per day     Patient Active Problem List   Diagnosis Code    Nausea alone R11.0    Esophageal reflux K21.9    Gout, unspecified M10.9    Generalized osteoarthrosis, involving multiple sites M15.9    Atrial fibrillation (HCC) I48.91    Pacemaker Z95.0    Dizziness R42    Personal history of esophageal cancer Z85.01    Weight loss R63.4       Depression Risk Factor Screening:     PHQ over the last two weeks 3/21/2017   Little interest or pleasure in doing things Not at all   Feeling down, depressed or hopeless Not at all   Total Score PHQ 2 0     Alcohol Risk Factor Screening: You do not drink alcohol or very rarely. Functional Ability and Level of Safety:   Hearing Loss  Hearing is good.     Activities of Daily Living  The home contains: no safety equipment. Patient does total self care    Fall Risk  Fall Risk Assessment, last 12 mths 3/21/2017   Able to walk? Yes   Fall in past 12 months? No       Abuse Screen  Patient is not abused    Cognitive Screening   Evaluation of Cognitive Function:  Has your family/caregiver stated any concerns about your memory: no  Normal     No Cervical Lymphadenopathy  No Supraclavicular Lymphadenopathy  Thyroid is Normal  Lungs are normal to percussion. Clear to auscultation   Heart:  S1 S2 are normal, No gallops, No mummers  No Carotid Bruits  Abdomen:  Normal Bowel Sounds. No tenderness. No masses. No Hepatomegaly or Splenomegly  LE:  Strong Pedal Pulses. No Edema      Patient Care Team   Patient Care Team:  Vee Smith MD as PCP - General (Internal Medicine)    Assessment/Plan   Education and counseling provided:  Are appropriate based on today's review and evaluation    Diagnoses and all orders for this visit:    1. Personal history of esophageal cancer  -     Administration fee () for Medicare insured patients  -     Pneumococcal Conjugate vaccine - 13 valent (50 years and older)  -     CBC WITH AUTOMATED DIFF; Future  -     METABOLIC PANEL, COMPREHENSIVE; Future  -     TSH 3RD GENERATION; Future  -     PROTHROMBIN TIME + INR; Future    2. Chronic atrial fibrillation (HCC)  -     Administration fee () for Medicare insured patients  -     Pneumococcal Conjugate vaccine - 13 valent (50 years and older)  -     CBC WITH AUTOMATED DIFF; Future  -     METABOLIC PANEL, COMPREHENSIVE; Future  -     TSH 3RD GENERATION; Future  -     PROTHROMBIN TIME + INR; Future    3. Pacemaker  -     Administration fee () for Medicare insured patients  -     Pneumococcal Conjugate vaccine - 13 valent (50 years and older)  -     CBC WITH AUTOMATED DIFF; Future  -     METABOLIC PANEL, COMPREHENSIVE; Future  -     TSH 3RD GENERATION; Future  -     PROTHROMBIN TIME + INR; Future    4. Gastroesophageal reflux disease without esophagitis  -     Administration fee () for Medicare insured patients  -     Pneumococcal Conjugate vaccine - 13 valent (50 years and older)  -     CBC WITH AUTOMATED DIFF; Future  -     METABOLIC PANEL, COMPREHENSIVE; Future  -     TSH 3RD GENERATION; Future  -     PROTHROMBIN TIME + INR; Future    5. Encounter for immunization  -     Administration fee () for Medicare insured patients  -     Pneumococcal Conjugate vaccine - 13 valent (50 years and older)  -     CBC WITH AUTOMATED DIFF; Future  -     METABOLIC PANEL, COMPREHENSIVE; Future  -     TSH 3RD GENERATION; Future  -     PROTHROMBIN TIME + INR; Future    6. Weight loss  -     Administration fee () for Medicare insured patients  -     Pneumococcal Conjugate vaccine - 13 valent (50 years and older)  -     CBC WITH AUTOMATED DIFF; Future  -     METABOLIC PANEL, COMPREHENSIVE; Future  -     TSH 3RD GENERATION; Future  -     PROTHROMBIN TIME + INR; Future    7. Prostate cancer screening  -     Administration fee () for Medicare insured patients  -     Pneumococcal Conjugate vaccine - 13 valent (50 years and older)  -     CBC WITH AUTOMATED DIFF; Future  -     METABOLIC PANEL, COMPREHENSIVE; Future  -     PSA SCREENING (SCREENING); Future  -     TSH 3RD GENERATION; Future  -     PROTHROMBIN TIME + INR; Future    8. Medicare annual wellness visit, subsequent    Other orders  -     metoclopramide HCl (REGLAN) 5 mg tablet; Take 1 Tab by mouth Before breakfast, lunch, and dinner for 10 days. Health Maintenance Due   Topic Date Due    ZOSTER VACCINE AGE 60>  12/11/1998    GLAUCOMA SCREENING Q2Y  02/11/2004    DTaP/Tdap/Td series (1 - Tdap) 11/21/2009       Add [place]  he was advised to continue his maintenance medications      Follow-up Disposition:  Return in about 4 months (around 11/10/2018). I asked Coleen Hamilton. Darryn if he has any questions and I answered the questions. Coleen Hamilton.  [place] states that he understands the treatment plan and agrees with the treatment plan

## 2018-07-12 ENCOUNTER — TELEPHONE (OUTPATIENT)
Dept: INTERNAL MEDICINE CLINIC | Age: 78
End: 2018-07-12

## 2018-07-16 NOTE — TELEPHONE ENCOUNTER
Unable to reach patient left message to return call to office    Patients labs per Dr Marveen Homans are normal/withon range

## 2018-08-02 ENCOUNTER — ANTI-COAG VISIT (OUTPATIENT)
Dept: INTERNAL MEDICINE CLINIC | Age: 78
End: 2018-08-02

## 2018-08-02 DIAGNOSIS — I48.20 CHRONIC ATRIAL FIBRILLATION (HCC): ICD-10-CM

## 2018-08-02 LAB
INR BLD: 2.3
PT POC: NORMAL SECONDS
VALID INTERNAL CONTROL?: YES

## 2018-08-02 NOTE — PROGRESS NOTES
Mr. Rosalina Oconnell is here today for anticoagulation monitoring for the diagnosis of Atrial Fibrillation. His INR goal is 2.0-3.0 and his current Coumadin dose is 5mg daily. Today's findings include an INR of 2.3 (normal INR range 0.8-1.2) . Considering Mr. Jodie Ridley past history, todays findings, and per the coumadin policy/protocol, Mr. HUSSEIN VILLASEÑOR [facility] MARY was instructed to take Coumadin as follows,  5mg daily . He was also instructed to schedule an appointment in 2 weeks prior to leaving for an INR check. A full discussion of the nature of anticoagulants has been carried out. A full discussion of the need for frequent and regular monitoring, precise dosage adjustment and compliance was stressed. Side effects of potential bleeding were discussed and [facility] instructed to call [phone] if there are any signs of abnormal bleeding. Mr. SAVAGE [facility] was instructed to avoid any OTC items containing aspirin or ibuprofen and prior to starting any new OTC products to consult with his physician or pharmacist to ensure no drug interactions are present. Mr. SAVAGE [facility] was instructed to avoid any major changes in his general diet and to avoid alcohol consumption. . 
 
Mr. SAVAGE [facility] verbalized his understanding of all instructions and will call the office with any questions, concerns, or signs of abnormal bleeding or blood clot.

## 2018-08-12 RX ORDER — WARFARIN SODIUM 5 MG/1
TABLET ORAL
Qty: 45 TAB | Refills: 2 | Status: SHIPPED | OUTPATIENT
Start: 2018-08-12 | End: 2019-02-28 | Stop reason: SDUPTHER

## 2018-08-14 ENCOUNTER — ANTI-COAG VISIT (OUTPATIENT)
Dept: INTERNAL MEDICINE CLINIC | Age: 78
End: 2018-08-14

## 2018-08-14 DIAGNOSIS — I48.20 CHRONIC ATRIAL FIBRILLATION (HCC): ICD-10-CM

## 2018-08-14 LAB
INR BLD: 2.8
PT POC: NORMAL SECONDS
VALID INTERNAL CONTROL?: YES

## 2018-08-14 NOTE — PROGRESS NOTES
Mr. Randi Soria is here today for anticoagulation monitoring for the diagnosis of Atrial Fibrillation. His INR goal is 2.0-3.0 and his current Coumadin dose is 5mg daily. Today's findings include an INR of 2.8 (normal INR range 0.8-1.2). Considering Mr. Lydia Lieberman past history, todays findings, and per the coumadin policy/protocol, Mr. Hallie Ricketts was instructed to take Coumadin as follows,  5mg daily . He was also instructed to schedule an appointment in 2 weeks prior to leaving for an INR check. A full discussion of the nature of anticoagulants has been carried out. A full discussion of the need for frequent and regular monitoring, precise dosage adjustment and compliance was stressed. Side effects of potential bleeding were discussed and Mr. Hallie Ricketts was instructed to call [phone] if there are any signs of abnormal bleeding. Mr. Hallie Ricketts was instructed to avoid any OTC items containing aspirin or ibuprofen and prior to starting any new OTC products to consult with his physician or pharmacist to ensure no drug interactions are present. Mr. Hallie Ricketts was instructed to avoid any major changes in his general diet and to avoid alcohol consumption. .  Mr. Hallie Ricketts verbalized his understanding of all instructions and will call the office with any questions, concerns, or signs of abnormal bleeding or blood clot.

## 2018-09-11 ENCOUNTER — OFFICE VISIT (OUTPATIENT)
Dept: INTERNAL MEDICINE CLINIC | Age: 78
End: 2018-09-11

## 2018-09-11 VITALS
SYSTOLIC BLOOD PRESSURE: 120 MMHG | HEART RATE: 93 BPM | HEIGHT: 66 IN | BODY MASS INDEX: 26.03 KG/M2 | DIASTOLIC BLOOD PRESSURE: 80 MMHG | OXYGEN SATURATION: 99 % | WEIGHT: 162 LBS | TEMPERATURE: 96.8 F

## 2018-09-11 DIAGNOSIS — R42 DIZZINESS: Primary | ICD-10-CM

## 2018-09-11 DIAGNOSIS — Z85.01 PERSONAL HISTORY OF ESOPHAGEAL CANCER: ICD-10-CM

## 2018-09-11 DIAGNOSIS — I48.20 CHRONIC ATRIAL FIBRILLATION (HCC): ICD-10-CM

## 2018-09-11 NOTE — MR AVS SNAPSHOT
[address] [facility] 
 
 
 [address], [address] [place] [address] 
[phone] Patient: George Wall 
MRN: [id number] TAWANA:[id number] Visit Information Date & Time Provider Department Dept. Phone Encounter #  
 9/11/2018  2:15 PM Nguyễn Quesada MD [facility] INTERNAL MEDICINE OF Cecilio Goldberg [phone] [id number] Your Appointments 12/11/2018  3:00 PM  
Follow Up with Nguyễn Quesada MD  
[facility] INTERNAL MEDICINE OF [place] [address]) Appt Note: 3mo  
 [address], [address] [place] [address]  
  
   
 [address], [address] [place] [address] Upcoming Health Maintenance Date Due ZOSTER VACCINE AGE 60> 12/11/1998 GLAUCOMA SCREENING Q2Y 2/11/2004 DTaP/Tdap/Td series (1 - Tdap) 11/21/2009 Influenza Age 5 to Adult 8/1/2018 MEDICARE YEARLY EXAM 7/11/2019 Allergies as of 9/11/2018  Review Complete On: 9/11/2018 By: Roshan Ivory LPN Severity Noted Reaction Type Reactions Parafon Forte Dsc [Chlorzoxazone] High   Anaphylaxis Aspirin  12/13/2017    Other (comments) Cannot take due to on coumadin Current Immunizations  Never Reviewed Name Date Influenza High Dose Vaccine PF 9/19/2017  9:05 AM, 9/13/2016 11:14 AM  
 Influenza Vaccine (Quad) PF 10/13/2015 Pneumococcal Conjugate (PCV-13) 7/10/2018 Pneumococcal Vaccine (Unspecified Type) 11/17/2005 Td 11/20/2009 Not reviewed this visit You Were Diagnosed With   
  
 Codes Comments Chronic atrial fibrillation (HCC)    -  Primary ICD-10-CM: B38.6 ICD-9-CM: 427.31 Vitals BP Pulse Temp Height(growth percentile) Weight(growth percentile) SpO2  
 120/80 (BP 1 Location: Left arm, BP Patient Position: Sitting) 93 96.8 °F (36 °C) (Tympanic) 5' 6\" (1.676 m) 162 lb (73.5 kg) 99% BMI Smoking Status 26.15 kg/m2 Former Smoker BMI and BSA Data Body Mass Index Body Surface Area  
 26.15 kg/m 2 1.85 m 2 Preferred Pharmacy Pharmacy Name [facility] #Rhina Nguyen, [address] [place] [address] [phone] Your Updated Medication List  
  
   
This list is accurate as of 9/11/18  3:02 PM.  Always use your most recent med list.  
  
  
  
  
 allopurinol 300 mg tablet Commonly known as:  ZYLOPRIM  
TAKE ONE TABLET BY MOUTH ONCE DAILY  
  
 azelastine 137 mcg (0.1 %) nasal spray Commonly known as:  ASTELIN  
USE 1 SPRAY INTO EACH NOSTRIL TWICE DAILY  
  
 cholecalciferol (vitamin D3) 2,000 unit Tab Take  by mouth.  
  
 cyanocobalamin 500 mcg tablet Commonly known as:  VITAMIN B12 Take 500 mcg by mouth daily. docusate sodium 100 mg capsule Commonly known as:  Sherl Bicker Take 100 mg by mouth two (2) times a day. FIBERCON PO Take  by mouth.  
  
 magnesium 250 mg Tab Take  by mouth.  
  
 metoprolol tartrate 50 mg tablet Commonly known as:  LOPRESSOR Take 50 mg by mouth two (2) times a day. omeprazole 20 mg capsule Commonly known as:  PRILOSEC  
TAKE ONE CAPSULE BY MOUTH EVERY MORNING  
  
 promethazine 25 mg tablet Commonly known as:  PHENERGAN  
TAKE 1/2 TO 1 TABLET BY MOUTH EVERY 8 HOURS AS NEEDED FOR NAUSEA  
  
 raNITIdine 150 mg tablet Commonly known as:  ZANTAC TAKE ONE TABLET BY MOUTH TWICE DAILY  
  
 warfarin 5 mg tablet Commonly known as:  COUMADIN  
TAKE ONE AND ONE-HALF TABLET BY MOUTH ONCE DAILY We Performed the Following AMB POC PT/INR [90353 CPT(R)] Introducing [facility]! Sebastien Gee introduces "[employer]" patient portal. Now you can access parts of your medical record, email your doctor's office, and request medication refills online. 1. In your internet browser, go to [email or web address] 2. Click on the First Time User? Click Here link in the Sign In box. You will see the New Member Sign Up page. 3. Enter your [employer] Access Code exactly as it appears below. You will not need to use this code after youve completed the sign-up process. If you do not sign up before the expiration date, you must request a new code. · [employer] Access Code: [email or web address] Expires: 9/19/2018  4:02 PM 
 
4. Enter the last four digits of your Social Security Number (xxxx) and Date of Birth (mm/dd/yyyy) as indicated and click Submit. You will be taken to the next sign-up page. 5. Create a [employer] ID. This will be your [employer] login ID and cannot be changed, so think of one that is secure and easy to remember. 6. Create a [employer] password. You can change your password at any time. 7. Enter your Password Reset Question and Answer. This can be used at a later time if you forget your password. 8. Enter your e-mail address. You will receive e-mail notification when new information is available in [address] 9. Click Sign Up. You can now view and download portions of your medical record. 10. Click the Download Summary menu link to download a portable copy of your medical information. If you have questions, please visit the Frequently Asked Questions section of the [employer] website. Remember, [employer] is NOT to be used for urgent needs. For medical emergencies, dial 911. Now available from your iPhone and Android! Please provide this summary of care documentation to your next provider. Your primary care clinician is listed as Glen Choi. If you have any questions after today's visit, please call [phone].

## 2018-09-11 NOTE — PROGRESS NOTES
Malgorazta Daniel presents today for Chief Complaint Patient presents with  Well Male Malgorzata Daniel preferred language for health care discussion is english/other. Is someone accompanying this pt? no 
 
Is the patient using any DME equipment during OV? no 
 
Depression Screening: PHQ over the last two weeks 9/11/2018 Little interest or pleasure in doing things Not at all Feeling down, depressed, irritable, or hopeless Not at all Total Score PHQ 2 0 Learning Assessment: 
Learning Assessment 1/19/2016 PRIMARY LEARNER Patient HIGHEST LEVEL OF EDUCATION - PRIMARY LEARNER  SOME COLLEGE  
BARRIERS PRIMARY LEARNER NONE  
CO-LEARNER CAREGIVER No  
PRIMARY LANGUAGE ENGLISH  NEED No  
LEARNER PREFERENCE PRIMARY DEMONSTRATION  
ANSWERED BY patient RELATIONSHIP SELF Abuse Screening: 
Abuse Screening Questionnaire 9/13/2016 Do you ever feel afraid of your partner? Freeman Guard Are you in a relationship with someone who physically or mentally threatens you? Freeman Guard Is it safe for you to go home? [employer] Fall Risk Fall Risk Assessment, last 12 mths 9/11/2018 Able to walk? Yes Fall in past 12 months? No  
 
 
Health Maintenance reviewed and discussed and ordered per Provider. Health Maintenance Due Topic Date Due  
 ZOSTER VACCINE AGE 60>  12/11/1998  GLAUCOMA SCREENING Q2Y  02/11/2004  DTaP/Tdap/Td series (1 - Tdap) 11/21/2009  Influenza Age 5 to Adult  08/01/2018 [place] Malgorzata Daniel is updated on all  Pt currently taking Antiplatelet therapy? Yes Warfarin Coordination of Care: 1. Have you been to the ER, urgent care clinic since your last visit? no Hospitalized since your last visit? no 
 
2. Have you seen or consulted any other health care providers outside of the [address] [place] [address] since your last visit? no Include any pap smears or colon screening.  no

## 2018-09-14 NOTE — PROGRESS NOTES
The patient presents to the office today with the chief complaint of dizziness HPI The patient had been greatly bothered by dizziness. The patient is post a work up and therapy. He is doing better. The patient remains on Coumadin for atrial fibrillation. His protimes have been ok. The patient has a history of cancer of the esophagus. There is no evidence of a reoccurrence Review of Systems Respiratory: Negative for shortness of breath. Cardiovascular: Negative for chest pain and leg swelling. Neurological: Positive for dizziness (Very mild). Allergies Allergen Reactions  Parafon Forte Dsc [Chlorzoxazone] Anaphylaxis  Aspirin Other (comments) Cannot take due to on coumadin Current Outpatient Prescriptions Medication Sig Dispense Refill  warfarin (COUMADIN) 5 mg tablet TAKE ONE AND ONE-HALF TABLET BY MOUTH ONCE DAILY 45 Tab 2  
 omeprazole (PRILOSEC) 20 mg capsule TAKE ONE CAPSULE BY MOUTH EVERY MORNING 30 Cap 2  
 azelastine (ASTELIN) 137 mcg (0.1 %) nasal spray USE 1 SPRAY INTO EACH NOSTRIL TWICE DAILY 1 Bottle 2  
 allopurinol (ZYLOPRIM) 300 mg tablet TAKE ONE TABLET BY MOUTH ONCE DAILY 90 Tab 1  promethazine (PHENERGAN) 25 mg tablet TAKE 1/2 TO 1 TABLET BY MOUTH EVERY 8 HOURS AS NEEDED FOR NAUSEA 40 Tab 1  
 metoprolol tartrate (LOPRESSOR) 50 mg tablet Take 50 mg by mouth two (2) times a day.  raNITIdine (ZANTAC) 150 mg tablet TAKE ONE TABLET BY MOUTH TWICE DAILY 60 Tab 3  
 docusate sodium (COLACE) 100 mg capsule Take 100 mg by mouth two (2) times a day.  CALCIUM POLYCARBOPHIL (FIBERCON PO) Take  by mouth.  magnesium 250 mg tab Take  by mouth.  cyanocobalamin (VITAMIN B12) 500 mcg tablet Take 500 mcg by mouth daily.  cholecalciferol, vitamin D3, 2,000 unit tab Take  by mouth. Past Medical History:  
Diagnosis Date  Abdominal pain, unspecified site  Acute upper respiratory infections of unspecified site  Atrial fibrillation ([facility] [address]  Cancer of esophagus ([facility] [address] 2001 RT and adjuvant chemotherapy, surgery  Esophageal reflux  Generalized osteoarthrosis, involving multiple sites  Gout, unspecified  Nausea alone  Sinoatrial node dysfunction (HCC)  Thromboembolus ([address] 2003 (approx) DVT in leg (IVC filter placed)  Unspecified cataract  Unspecified constipation Past Surgical History:  
Procedure Laterality Date  COLONOSCOPY N/A 12/13/2017 COLONOSCOPY with hot polypectomies performed by Waqas Cordova MD at [facility] ENDOSCOPY  
 HX GI    
 esophagectomy  HX HEENT  2001 Esophageal Cancer Surgery  HX HIP REPLACEMENT Left 2005 (approx)  HX OTHER SURGICAL  2003 ivc filter placed  HX PACEMAKER  2012 [employer] Social History Social History  Marital status: SINGLE Spouse name: N/A  
 Number of children: N/A  
 Years of education: N/A Occupational History  Not on file. Social History Main Topics  Smoking status: Former Smoker  Smokeless tobacco: Never Used  Alcohol use Yes Comment: 2 drinks per day  Drug use: No  
 Sexual activity: No  
 
Other Topics Concern  Not on file Social History Narrative Patient does not have an advanced directive on file Visit Vitals  /80 (BP 1 Location: Left arm, BP Patient Position: Sitting)  Pulse 93  Temp 96.8 °F (36 °C) (Tympanic)  Ht 5' 6\" (1.676 m)  Wt 162 lb (73.5 kg)  SpO2 99%  BMI 26.15 kg/m2 Physical Exam  
Neck: Carotid bruit is not present. Cardiovascular: Normal rate. Exam reveals no gallop. No murmur heard. Pulmonary/Chest: He has no wheezes. He has no rales. Abdominal: Soft. He exhibits no distension. There is no tenderness. Musculoskeletal: He exhibits no edema. BMI:  OK Anti-Coag visit on 08/14/2018 Component Date Value Ref Range Status  VALID INTERNAL CONTROL POC 08/14/2018 Yes   Final  
  INR POC 08/14/2018 2.8   Final  
Anti-Coag visit on 08/02/2018 Component Date Value Ref Range Status  VALID INTERNAL CONTROL POC 08/02/2018 Yes   Final  
 INR POC 08/02/2018 2.3   Final  
Hospital Outpatient Visit on 07/10/2018 Component Date Value Ref Range Status  WBC 07/10/2018 4.3* 4.6 - 13.2 K/uL Final  
 RBC 07/10/2018 3.71* 4.70 - 5.50 M/uL Final  
 HGB 07/10/2018 12.4* 13.0 - 16.0 g/dL Final  
 HCT 07/10/2018 36.6  36.0 - 48.0 % Final  
 MCV 07/10/2018 98.7* 74.0 - 97.0 FL Final  
 MCH 07/10/2018 33.4  24.0 - 34.0 PG Final  
 MCHC 07/10/2018 33.9  31.0 - 37.0 g/dL Final  
 RDW 07/10/2018 15.5* 11.6 - 14.5 % Final  
 PLATELET 06/65/5970 085  135 - 420 K/uL Final  
 MPV 07/10/2018 9.8  9.2 - 11.8 FL Final  
 NEUTROPHILS 07/10/2018 66  40 - 73 % Final  
 LYMPHOCYTES 07/10/2018 22  21 - 52 % Final  
 MONOCYTES 07/10/2018 10  3 - 10 % Final  
 EOSINOPHILS 07/10/2018 1  0 - 5 % Final  
 BASOPHILS 07/10/2018 1  0 - 2 % Final  
 ABS. NEUTROPHILS 07/10/2018 2.8  1.8 - 8.0 K/UL Final  
 ABS. LYMPHOCYTES 07/10/2018 1.0  0.9 - 3.6 K/UL Final  
 ABS. MONOCYTES 07/10/2018 0.4  0.05 - 1.2 K/UL Final  
 ABS. EOSINOPHILS 07/10/2018 0.1  0.0 - 0.4 K/UL Final  
 ABS. BASOPHILS 07/10/2018 0.0  0.0 - 0.1 K/UL Final  
 DF 07/10/2018 AUTOMATED    Final  
 Sodium 07/10/2018 135* 136 - 145 mmol/L Final  
 Potassium 07/10/2018 5.1  3.5 - 5.5 mmol/L Final  
 Chloride 07/10/2018 99* 100 - 108 mmol/L Final  
 CO2 07/10/2018 29  21 - 32 mmol/L Final  
 Anion gap 07/10/2018 7  3.0 - 18 mmol/L Final  
 Glucose 07/10/2018 91  74 - 99 mg/dL Final  
 BUN 07/10/2018 16  7.0 - 18 MG/DL Final  
 Creatinine 07/10/2018 0.73  0.6 - 1.3 MG/DL Final  
 BUN/Creatinine ratio 07/10/2018 22* 12 - 20   Final  
 GFR est AA 07/10/2018 >60  >60 ml/min/1.73m2 Final  
 GFR est non-AA 07/10/2018 >60  >60 ml/min/1.73m2 Final  
 Comment: (NOTE) Estimated GFR is calculated using the Modification of Diet in Renal  
 Disease (MDRD) Study equation, reported for both  Americans [facility]) and non- Americans (GFRNA), and normalized to 1.73m2  
body surface area. The physician must decide which value applies to  
the patient. The MDRD study equation should only be used in  
individuals age 25 or older. It has not been validated for the  
following: pregnant women, patients with serious comorbid conditions,  
or on certain medications, or persons with extremes of body size,  
muscle mass, or nutritional status.  Calcium 07/10/2018 9.4  8.5 - 10.1 MG/DL Final  
 Bilirubin, total 07/10/2018 0.9  0.2 - 1.0 MG/DL Final  
 ALT (SGPT) 07/10/2018 15* 16 - 61 U/L Final  
 AST (SGOT) 07/10/2018 25  15 - 37 U/L Final  
 Alk. phosphatase 07/10/2018 157* 45 - 117 U/L Final  
 Protein, total 07/10/2018 8.0  6.4 - 8.2 g/dL Final  
 Albumin 07/10/2018 3.7  3.4 - 5.0 g/dL Final  
 Globulin 07/10/2018 4.3* 2.0 - 4.0 g/dL Final  
 A-G Ratio 07/10/2018 0.9  0.8 - 1.7   Final  
 Prostate Specific Ag 07/10/2018 1.0  0.0 - 4.0 ng/mL Final  
 TSH 07/10/2018 2.30  0.36 - 3.74 uIU/mL Final  
 Prothrombin time 07/10/2018 30.6* 11.5 - 15.2 sec Final  
 INR 07/10/2018 3.0* 0.8 - 1.2   Final  
 Comment:            INR Therapeutic Ranges (on stable oral anticoagulant): INDICATION                INR 
DVT/PE/Atrial Fib          2.0-3.0 MI/Mechanical Heart Valve  2.5-3.5 Anti-Coag visit on 06/21/2018 Component Date Value Ref Range Status  VALID INTERNAL CONTROL POC 06/21/2018 Yes   Final  
 INR POC 06/21/2018 2.5   Final  
 
 
.No results found for any visits on 09/11/18. Assessment / Plan ICD-10-CM ICD-9-CM 1. Dizziness R42 780.4 2. Chronic atrial fibrillation (HCC) I48.2 427.31 AMB POC PT/INR 3. Personal history of esophageal cancer Z85.01 V10.03   
 
 
POC INR 
he was advised to continue his maintenance medications Follow-up Disposition: 
Return in about 4 months (around 1/11/2019). I asked Pat Dallas Cates if he has any questions and I answered the questions. Pat Dallas Brown states that he understands the treatment plan and agrees with the treatment plan

## 2018-10-04 ENCOUNTER — ANTI-COAG VISIT (OUTPATIENT)
Dept: INTERNAL MEDICINE CLINIC | Age: 78
End: 2018-10-04

## 2018-10-04 DIAGNOSIS — I48.20 CHRONIC ATRIAL FIBRILLATION (HCC): ICD-10-CM

## 2018-10-05 LAB
INR BLD: 4
PT POC: NORMAL SECONDS
VALID INTERNAL CONTROL?: YES

## 2018-10-05 NOTE — PROGRESS NOTES
Mr. Joni Queen is here today for anticoagulation monitoring for the diagnosis of Atrial Fibrillation. His INR goal is 2.0-3.0 and his current Coumadin dose is 5mg daily . Today's findings include an INR of 4.0 (normal INR range 0.8-1.2). Considering Mr. Dayanara Hatch past history, todays findings, and per the coumadin policy/protocol, Mr. SAVAGE Alycia [facility] MARY was instructed to take Coumadin as follows,  Skip three days 5mg after . He was also instructed to schedule an appointment in 1 weeks prior to leaving for an INR check. A full discussion of the nature of anticoagulants has been carried out. A full discussion of the need for frequent and regular monitoring, precise dosage adjustment and compliance was stressed. Side effects of potential bleeding were discussed and Mr. HUSSEIN VILLASEÑOR [facility] MARY was instructed to call [phone] if there are any signs of abnormal bleeding. Mr. SAVAGE [facility] was instructed to avoid any OTC items containing aspirin or ibuprofen and prior to starting any new OTC products to consult with his physician or pharmacist to ensure no drug interactions are present. Mr. SAVAGE [facility] MARY was instructed to avoid any major changes in his general diet and to avoid alcohol consumption. . 
 
 
 
Mr. SAVAGE [facility] verbalized his understanding of all instructions and will call the office with any questions, concerns, or signs of abnormal bleeding or blood clot.

## 2018-10-16 ENCOUNTER — ANTI-COAG VISIT (OUTPATIENT)
Dept: INTERNAL MEDICINE CLINIC | Age: 78
End: 2018-10-16

## 2018-10-16 DIAGNOSIS — I48.20 CHRONIC ATRIAL FIBRILLATION (HCC): ICD-10-CM

## 2018-10-16 LAB
INR BLD: 3.6
PT POC: NORMAL SECONDS
VALID INTERNAL CONTROL?: YES

## 2018-10-17 RX ORDER — ALLOPURINOL 300 MG/1
TABLET ORAL
Qty: 90 TAB | Refills: 0 | Status: SHIPPED | OUTPATIENT
Start: 2018-10-17 | End: 2019-01-23 | Stop reason: SDUPTHER

## 2018-10-23 ENCOUNTER — ANTI-COAG VISIT (OUTPATIENT)
Dept: INTERNAL MEDICINE CLINIC | Age: 78
End: 2018-10-23

## 2018-10-23 DIAGNOSIS — I48.20 CHRONIC ATRIAL FIBRILLATION (HCC): ICD-10-CM

## 2018-10-23 LAB
INR BLD: 2.4
PT POC: NORMAL SECONDS
VALID INTERNAL CONTROL?: YES

## 2018-10-23 NOTE — PROGRESS NOTES
Mr. Roshan Carey is here today for anticoagulation monitoring for the diagnosis of Atrial Fibrillation. His INR goal is 2.0-3.0 and his current Coumadin dose is 2.5mg Monday and Friday and 5mg all other days . Today's findings include an INR of 2.4 (normal INR range 0.8-1.2). Considering Mr. Juan Huynh past history, todays findings, and per the coumadin policy/protocol, Mr. Tamara Jimenez was instructed to take Coumadin as follows,  2.5mg Monday and Friday and 5mg all other days . He was also instructed to schedule an appointment in 2 weeks prior to leaving for an INR check. A full discussion of the nature of anticoagulants has been carried out. A full discussion of the need for frequent and regular monitoring, precise dosage adjustment and compliance was stressed. Side effects of potential bleeding were discussed and Mr. Tamara Jimenez was instructed to call [phone] if there are any signs of abnormal bleeding. Mr. Tamara Jimenez was instructed to avoid any OTC items containing aspirin or ibuprofen and prior to starting any new OTC products to consult with his physician or pharmacist to ensure no drug interactions are present. Mr. Tamara Jimenez was instructed to avoid any major changes in his general diet and to avoid alcohol consumption. . 
  
 
Mr. Tamara Jimenez verbalized his understanding of all instructions and will call the office with any questions, concerns, or signs of abnormal bleeding or blood clot.

## 2018-10-28 RX ORDER — PROMETHAZINE HYDROCHLORIDE 25 MG/1
TABLET ORAL
Qty: 40 TAB | Refills: 1 | Status: SHIPPED | OUTPATIENT
Start: 2018-10-28 | End: 2019-01-11 | Stop reason: SDUPTHER

## 2018-11-13 ENCOUNTER — ANTI-COAG VISIT (OUTPATIENT)
Dept: INTERNAL MEDICINE CLINIC | Age: 78
End: 2018-11-13

## 2018-11-13 DIAGNOSIS — I48.20 CHRONIC ATRIAL FIBRILLATION (HCC): ICD-10-CM

## 2018-11-13 LAB
INR BLD: 2.8
PT POC: NORMAL SECONDS
VALID INTERNAL CONTROL?: YES

## 2018-11-13 NOTE — PROGRESS NOTES
Mr. Bari Simms is here today for anticoagulation monitoring for the diagnosis of Atrial Fibrillation. His INR goal is 2.0-3.0 and his current Coumadin dose is 2.5 mg Monday and Friday 5 mg all other days . Today's findings include an INR of 2.8 (normal INR range 0.8-1.2). Considering Mr. Casey Flanagan past history, todays findings, and per the coumadin policy/protocol, Mr. Lb Nye was instructed to take Coumadin as follows,  Same . He was also instructed to schedule an appointment in 3 weeks prior to leaving for an INR check. A full discussion of the nature of anticoagulants has been carried out. A full discussion of the need for frequent and regular monitoring, precise dosage adjustment and compliance was stressed. Side effects of potential bleeding were discussed and Mr. Lb Nye was instructed to call [phone] if there are any signs of abnormal bleeding. Mr. Lb Nye was instructed to avoid any OTC items containing aspirin or ibuprofen and prior to starting any new OTC products to consult with his physician or pharmacist to ensure no drug interactions are present. Mr. Lb Nye was instructed to avoid any major changes in his general diet and to avoid alcohol consumption. . 
 
 
Mr. Lb Nye verbalized his understanding of all instructions and will call the office with any questions, concerns, or signs of abnormal bleeding or blood clot.

## 2018-11-15 RX ORDER — OMEPRAZOLE 20 MG/1
CAPSULE, DELAYED RELEASE ORAL
Qty: 30 CAP | Refills: 1 | Status: SHIPPED | OUTPATIENT
Start: 2018-11-15 | End: 2019-02-28 | Stop reason: SDUPTHER

## 2018-11-15 RX ORDER — RANITIDINE 150 MG/1
TABLET, FILM COATED ORAL
Qty: 60 TAB | Refills: 2 | Status: SHIPPED | OUTPATIENT
Start: 2018-11-15 | End: 2019-10-18 | Stop reason: ALTCHOICE

## 2018-12-11 ENCOUNTER — HOSPITAL ENCOUNTER (OUTPATIENT)
Dept: LAB | Age: 78
Discharge: HOME | End: 2018-12-11
Payer: MEDICARE

## 2018-12-11 ENCOUNTER — OFFICE VISIT (OUTPATIENT)
Dept: INTERNAL MEDICINE CLINIC | Age: 78
End: 2018-12-11

## 2018-12-11 VITALS
SYSTOLIC BLOOD PRESSURE: 118 MMHG | HEART RATE: 70 BPM | DIASTOLIC BLOOD PRESSURE: 68 MMHG | WEIGHT: 162 LBS | OXYGEN SATURATION: 99 % | HEIGHT: 66 IN | TEMPERATURE: 97 F | BODY MASS INDEX: 26.03 KG/M2

## 2018-12-11 DIAGNOSIS — I48.20 CHRONIC ATRIAL FIBRILLATION (HCC): ICD-10-CM

## 2018-12-11 DIAGNOSIS — R42 DIZZINESS: Primary | ICD-10-CM

## 2018-12-11 DIAGNOSIS — R42 DIZZINESS: ICD-10-CM

## 2018-12-11 LAB
INR PPP: 2.2 (ref 0.8–1.2)
PROTHROMBIN TIME: 24.3 SEC (ref 11.5–15.2)

## 2018-12-11 PROCEDURE — 36415 COLL VENOUS BLD VENIPUNCTURE: CPT

## 2018-12-11 PROCEDURE — 85610 PROTHROMBIN TIME: CPT

## 2018-12-11 PROCEDURE — 83921 ORGANIC ACID SINGLE QUANT: CPT

## 2018-12-11 NOTE — PROGRESS NOTES
Chiara Sloan presents today for   Chief Complaint   Patient presents with    Well Male       Chiara Sloan preferred language for health care discussion is Stephany Leija. Is someone accompanying this pt? no    Is the patient using any DME equipment during OV? no    Depression Screening:  PHQ over the last two weeks 9/11/2018   Little interest or pleasure in doing things Not at all   Feeling down, depressed, irritable, or hopeless Not at all   Total Score PHQ 2 0       Learning Assessment:  Learning Assessment 1/19/2016   PRIMARY LEARNER Patient   HIGHEST LEVEL OF EDUCATION - PRIMARY LEARNER  SOME COLLEGE   BARRIERS PRIMARY LEARNER NONE   CO-LEARNER CAREGIVER No   PRIMARY LANGUAGE ENGLISH    NEED No   LEARNER PREFERENCE PRIMARY DEMONSTRATION   ANSWERED BY patient   RELATIONSHIP SELF       Abuse Screening:  Abuse Screening Questionnaire 9/13/2016   Do you ever feel afraid of your partner? N   Are you in a relationship with someone who physically or mentally threatens you? N   Is it safe for you to go home? Y       Fall Risk  Fall Risk Assessment, last 12 mths 9/11/2018   Able to walk? Yes   Fall in past 12 months? No       Health Maintenance reviewed and discussed and ordered per Provider. Health Maintenance Due   Topic Date Due    Shingrix Vaccine Age 49> (1 of 2) 02/11/1989    GLAUCOMA SCREENING Q2Y  02/11/2004    DTaP/Tdap/Td series (1 - Tdap) 11/21/2009    Influenza Age 5 to Adult  08/01/2018   . Chiara Sloan is updated on all     Pt currently taking Antiplatelet therapy? no    Coordination of Care:  1. Have you been to the ER, urgent care clinic since your last visit? Yoseph Georges 12/2018  Hospitalized since your last visit? no    2. Have you seen or consulted any other health care providers outside of the [address] [place] [address] since your last visit? No  Include any pap smears or colon screening.  no

## 2018-12-15 LAB
Lab: NORMAL
METHYLMALONATE SERPL-SCNC: 201 NMOL/L (ref 0–378)

## 2018-12-15 NOTE — PROGRESS NOTES
The patient presents to the office today with the chief complaint of dizziness    HPI    The patient has episodes of dizziness. The patient has undergone physical therapy with improvement of the the symptoms. The patient remains on Coumadin due to atrial fibrillation. His protimes have been doing ok      Review of Systems   Respiratory: Negative for shortness of breath. Cardiovascular: Negative for chest pain and leg swelling. Neurological: Positive for dizziness. Allergies   Allergen Reactions    Parafon Forte Dsc [Chlorzoxazone] Anaphylaxis    Aspirin Other (comments)     Cannot take due to on coumadin       Current Outpatient Medications   Medication Sig Dispense Refill    omeprazole (PRILOSEC) 20 mg capsule TAKE ONE CAPSULE BY MOUTH EVERY MORNING 30 Cap 1    raNITIdine (ZANTAC) 150 mg tablet TAKE ONE TABLET BY MOUTH TWICE DAILY 60 Tab 2    promethazine (PHENERGAN) 25 mg tablet TAKE 1/2 TO 1 TABLET BY MOUTH EVERY 8 HOURS AS NEEDED FOR NAUSEA 40 Tab 1    allopurinol (ZYLOPRIM) 300 mg tablet TAKE ONE TABLET BY MOUTH ONCE DAILY 90 Tab 0    warfarin (COUMADIN) 5 mg tablet TAKE ONE AND ONE-HALF TABLET BY MOUTH ONCE DAILY 45 Tab 2    azelastine (ASTELIN) 137 mcg (0.1 %) nasal spray USE 1 SPRAY INTO EACH NOSTRIL TWICE DAILY 1 Bottle 2    metoprolol tartrate (LOPRESSOR) 50 mg tablet Take 50 mg by mouth two (2) times a day.  docusate sodium (COLACE) 100 mg capsule Take 100 mg by mouth two (2) times a day.  CALCIUM POLYCARBOPHIL (FIBERCON PO) Take  by mouth.  magnesium 250 mg tab Take  by mouth.  cyanocobalamin (VITAMIN B12) 500 mcg tablet Take 500 mcg by mouth daily.  cholecalciferol, vitamin D3, 2,000 unit tab Take  by mouth.          Past Medical History:   Diagnosis Date    Abdominal pain, unspecified site     Acute upper respiratory infections of unspecified site     [facility]rial [facility] ([facility]     Cancer of esophagus ([facility] [address] [facility] 2001    RT and adjuvant chemotherapy, surgery    Esophageal reflux     Generalized osteoarthrosis, involving multiple sites     Gout, unspecified     Nausea alone     S[facility]atrial node dysfunction ([facility] [address]     Thromboembolus ([facility] [address] 2003 (approx)    DVT in leg (IVC filter placed)    Unspecified cataract     Unspecified constipation        Past Surgical History:   Procedure Laterality Date    COLONOSCOPY N/A 12/13/2017    COLONOSCOPY with hot polypectomies performed by Shashank Peacock MD at [facility] END[facility]COPY    HX GI      esophagectomy    HX HEENT  2001    Esophageal Cancer Surgery    HX HIP REPLACEMENT Left 2005 (approx)    HX OTHER SURGICAL  2003    ivc filter placed    HX PACEMAKER  2012    [employer]       Social History     Socioeconomic History    Marital status: SINGLE     Spouse name: Not on file    Number of children: Not on file    Years of education: Not on file    Highest education level: Not on file   Social Needs    Financial resource strain: Not on file    Food insecurity - worry: Not on file    Food insecurity - inability: Not on file   [employer] needs - medical: Not on file   [employer] needs - non-medical: Not on file   Occupational History    Not on file   Tobacco Use    Smoking status: Former Smoker    Smokeless tobacco: Never Used   Substance and Sexual Activity    Alcohol use: Yes     Comment: 2 drinks per day    Drug use: No    Sexual activity: No   Other Topics Concern    Not on file   Social History Narrative    Not on file       Patient does not have an advanced directive on file    Visit Vitals  /68 (BP 1 Location: Left arm, BP Patient Position: Sitting)   Pulse 70   Temp 97 °F (36.1 °C) (Tympanic)   Ht 5' 6\" (1.676 m)   Wt 162 lb (73.5 kg)   SpO2 99%   BMI 26.15 kg/m²       Physical Exam    BMI:  [facility] Outpatient Visit on 12/11/2018   Component Date Value Ref Range Status    Prothrombin time 12/11/2018 24.3* 11.5 - 15.2 sec Final    INR 12/11/2018 2.2* 0.8 - 1.2   Final    Comment: INR Therapeutic Ranges         (on stable oral anticoagulant):     INDICATION                INR  DVT/PE/Atrial Fib          2.0-3.0  MI/Mechanical Heart Valve  2.5-3.5     Anti-Coag visit on 11/13/2018   Component Date Value Ref Range Status    VALID INTERNAL CONTROL POC 11/13/2018 Yes   Final    INR POC 11/13/2018 2.8   Final   Anti-Coag visit on 10/23/2018   Component Date Value Ref Range Status    VALID INTERNAL CONTROL POC 10/23/2018 Yes   Final    INR POC 10/23/2018 2.4   Final   Anti-Coag visit on 10/16/2018   Component Date Value Ref Range Status    VALID INTERNAL CONTROL POC 10/16/2018 Yes   Final    INR POC 10/16/2018 3.6   Final   Anti-Coag visit on 10/04/2018   Component Date Value Ref Range Status    VALID INTERNAL CONTROL POC 10/04/2018 Yes   Final    INR POC 10/04/2018 4.0   Final       .  Results for orders placed or performed during the hospital encounter of 12/11/18   PROTHROMBIN TIME + INR   Result Value Ref Range    Prothrombin time 24.3 (H) 11.5 - 15.2 sec    INR 2.2 (H) 0.8 - 1.2         Assessment / Plan      ICD-10-CM ICD-9-CM    1. Dizziness R42 780.4 METHYLMALONIC ACID   2. Chronic atrial fibrillation (HCC) I48.2 427.31 PROTHROMBIN TIME + INR       Labs ordered  he was advised to continue his maintenance medications      Follow-up Disposition:  Return in about 5 months (around 5/11/2019). I asked Toño Cates if he has any questions and I answered the questions. Toño Brown states that he understands the treatment plan and agrees with the treatment plan

## 2019-01-07 ENCOUNTER — ANTI-COAG VISIT (OUTPATIENT)
Dept: INTERNAL MEDICINE CLINIC | Age: 79
End: 2019-01-07

## 2019-01-07 DIAGNOSIS — I48.20 CHRONIC ATRIAL FIBRILLATION (HCC): ICD-10-CM

## 2019-01-07 LAB
INR BLD: 2.5
PT POC: NORMAL SECONDS
VALID INTERNAL CONTROL?: YES

## 2019-01-07 NOTE — PROGRESS NOTES
Mr. Ronald Steele is here today for anticoagulation monitoring for the diagnosis of Atrial Fibrillation. His INR goal is 2.0-3.0 and his current Coumadin dose is 5 mg daily except Monday and fridays he takes 2.5 mg . Today's findings include an INR of 2.5 (normal INR range 0.8-1.2) Considering Mr. Taylor Waldron past history, todays findings, and per the coumadin policy/protocol, Mr. Tricia Cabrera was instructed to take Coumadin as follows,  Continue same dose . He was also instructed to schedule an appointment in 3 weeks prior to leaving for an INR check. A full discussion of the nature of anticoagulants has been carried out. A full discussion of the need for frequent and regular monitoring, precise dosage adjustment and compliance was stressed. Side effects of potential bleeding were discussed and Mr. Tricia Cabrera was instructed to call [phone] if there are any signs of abnormal bleeding. Mr. Tricia Cabrera was instructed to avoid any OTC items containing aspirin or ibuprofen and prior to starting any new OTC products to consult with his physician or pharmacist to ensure no drug interactions are present. Mr. Tricia Cabrera was instructed to avoid any major changes in his general diet and to avoid alcohol consumption. . 
 
Mr. Tricia Cabrera verbalized his understanding of all instructions and will call the office with any questions, concerns, or signs of abnormal bleeding or blood clot.

## 2019-01-11 RX ORDER — PROMETHAZINE HYDROCHLORIDE 25 MG/1
TABLET ORAL
Qty: 40 TAB | Refills: 0 | Status: SHIPPED | OUTPATIENT
Start: 2019-01-11 | End: 2019-03-14 | Stop reason: SDUPTHER

## 2019-01-23 RX ORDER — ALLOPURINOL 300 MG/1
TABLET ORAL
Qty: 90 TAB | Refills: 0 | Status: SHIPPED | OUTPATIENT
Start: 2019-01-23

## 2019-01-24 RX ORDER — ALLOPURINOL 300 MG/1
TABLET ORAL
Qty: 90 TAB | Refills: 0 | Status: SHIPPED | OUTPATIENT
Start: 2019-01-24 | End: 2019-02-09 | Stop reason: ALTCHOICE

## 2019-01-29 ENCOUNTER — ANTI-COAG VISIT (OUTPATIENT)
Dept: INTERNAL MEDICINE CLINIC | Age: 79
End: 2019-01-29

## 2019-01-29 DIAGNOSIS — I48.20 CHRONIC ATRIAL FIBRILLATION (HCC): ICD-10-CM

## 2019-01-29 LAB
INR BLD: 2.8
PT POC: NORMAL SECONDS
VALID INTERNAL CONTROL?: YES

## 2019-01-29 NOTE — PROGRESS NOTES
Mr. Ladarius Castillo is here today for anticoagulation monitoring for the diagnosis of Atrial Fibrillation. His INR goal is 2.0-3.0 and his current Coumadin dose is 5 mg for 5 days a week and 2.5 mg 2 days a week . Today's findings include an INR of 2.5 (normal INR range 0.8-1.2) Considering Mr. Shannan Thompson past history, todays findings, and per the coumadin policy/protocol, Mr. Brown was instructed to take Coumadin as follows,  Continue same dose . He was also instructed to schedule an appointment in 2.5 weeks prior to leaving for an INR check. A full discussion of the nature of anticoagulants has been carried out. A full discussion of the need for frequent and regular monitoring, precise dosage adjustment and compliance was stressed. Side effects of potential bleeding were discussed and Mr. Brown was instructed to call [phone] if there are any signs of abnormal bleeding. Mr. Brown was instructed to avoid any OTC items containing aspirin or ibuprofen and prior to starting any new OTC products to consult with his physician or pharmacist to ensure no drug interactions are present. Mr. Brown was instructed to avoid any major changes in his general diet and to avoid alcohol consumption. . 
 
Mr. Brown verbalized his understanding of all instructions and will call the office with any questions, concerns, or signs of abnormal bleeding or blood clot.

## 2019-02-05 ENCOUNTER — OFFICE VISIT (OUTPATIENT)
Dept: INTERNAL MEDICINE CLINIC | Age: 79
End: 2019-02-05

## 2019-02-05 ENCOUNTER — HOSPITAL ENCOUNTER (OUTPATIENT)
Dept: LAB | Age: 79
Discharge: HOME | End: 2019-02-05
Payer: MEDICARE

## 2019-02-05 VITALS
SYSTOLIC BLOOD PRESSURE: 138 MMHG | DIASTOLIC BLOOD PRESSURE: 80 MMHG | HEIGHT: 66 IN | TEMPERATURE: 97.2 F | OXYGEN SATURATION: 98 % | HEART RATE: 89 BPM | WEIGHT: 158 LBS | BODY MASS INDEX: 25.39 KG/M2

## 2019-02-05 DIAGNOSIS — R53.82 CHRONIC FATIGUE: Primary | ICD-10-CM

## 2019-02-05 DIAGNOSIS — Z85.01 HISTORY OF ESOPHAGEAL CANCER: ICD-10-CM

## 2019-02-05 DIAGNOSIS — R63.4 WEIGHT LOSS: ICD-10-CM

## 2019-02-05 DIAGNOSIS — R53.82 CHRONIC FATIGUE: ICD-10-CM

## 2019-02-05 DIAGNOSIS — I48.20 CHRONIC ATRIAL FIBRILLATION (HCC): ICD-10-CM

## 2019-02-05 LAB
ALBUMIN SERPL-MCNC: 3.7 G/DL (ref 3.4–5)
ALBUMIN/GLOB SERPL: 0.9 {RATIO} (ref 0.8–1.7)
ALP SERPL-CCNC: 162 U/L (ref 45–117)
ALT SERPL-CCNC: 14 U/L (ref 16–61)
ANION GAP SERPL CALC-SCNC: 8 MMOL/L (ref 3–18)
AST SERPL-CCNC: 27 U/L (ref 15–37)
BASOPHILS # BLD: 0 K/UL (ref 0–0.1)
BASOPHILS NFR BLD: 1 % (ref 0–2)
BILIRUB SERPL-MCNC: 1 MG/DL (ref 0.2–1)
BUN SERPL-MCNC: 15 MG/DL (ref 7–18)
BUN/CREAT SERPL: 18 (ref 12–20)
CALCIUM SERPL-MCNC: 9.1 MG/DL (ref 8.5–10.1)
CHLORIDE SERPL-SCNC: 97 MMOL/L (ref 100–108)
CO2 SERPL-SCNC: 29 MMOL/L (ref 21–32)
CREAT SERPL-MCNC: 0.83 MG/DL (ref 0.6–1.3)
DIFFERENTIAL METHOD BLD: ABNORMAL
EOSINOPHIL # BLD: 0 K/UL (ref 0–0.4)
EOSINOPHIL NFR BLD: 1 % (ref 0–5)
ERYTHROCYTE [DISTWIDTH] IN BLOOD BY AUTOMATED COUNT: 15.8 % (ref 11.6–14.5)
GLOBULIN SER CALC-MCNC: 4.1 G/DL (ref 2–4)
GLUCOSE SERPL-MCNC: 116 MG/DL (ref 74–99)
HCT VFR BLD AUTO: 34.8 % (ref 36–48)
HGB BLD-MCNC: 11.9 G/DL (ref 13–16)
LYMPHOCYTES # BLD: 1 K/UL (ref 0.9–3.6)
LYMPHOCYTES NFR BLD: 22 % (ref 21–52)
MCH RBC QN AUTO: 32.5 PG (ref 24–34)
MCHC RBC AUTO-ENTMCNC: 34.2 G/DL (ref 31–37)
MCV RBC AUTO: 95.1 FL (ref 74–97)
MONOCYTES # BLD: 0.3 K/UL (ref 0.05–1.2)
MONOCYTES NFR BLD: 7 % (ref 3–10)
NEUTS SEG # BLD: 3.2 K/UL (ref 1.8–8)
NEUTS SEG NFR BLD: 69 % (ref 40–73)
PLATELET # BLD AUTO: 185 K/UL (ref 135–420)
PMV BLD AUTO: 9.9 FL (ref 9.2–11.8)
POTASSIUM SERPL-SCNC: 4.8 MMOL/L (ref 3.5–5.5)
PROT SERPL-MCNC: 7.8 G/DL (ref 6.4–8.2)
RBC # BLD AUTO: 3.66 M/UL (ref 4.7–5.5)
SODIUM SERPL-SCNC: 134 MMOL/L (ref 136–145)
TSH SERPL DL<=0.05 MIU/L-ACNC: 2.6 UIU/ML (ref 0.36–3.74)
WBC # BLD AUTO: 4.6 K/UL (ref 4.6–13.2)

## 2019-02-05 PROCEDURE — 36415 COLL VENOUS BLD VENIPUNCTURE: CPT

## 2019-02-05 PROCEDURE — 84443 ASSAY THYROID STIM HORMONE: CPT

## 2019-02-05 PROCEDURE — 85025 COMPLETE CBC W/AUTO DIFF WBC: CPT

## 2019-02-05 PROCEDURE — 80053 COMPREHEN METABOLIC PANEL: CPT

## 2019-02-05 NOTE — PROGRESS NOTES
Chief Complaint Patient presents with  Well Male Depression Screening: PHQ over the last two weeks 2/5/2019 Little interest or pleasure in doing things Not at all Feeling down, depressed, irritable, or hopeless Not at all Total Score PHQ 2 0 Learning Assessment: 
Learning Assessment 1/19/2016 PRIMARY LEARNER Patient HIGHEST LEVEL OF EDUCATION - PRIMARY LEARNER  SOME COLLEGE  
BARRIERS PRIMARY LEARNER NONE  
CO-LEARNER CAREGIVER No  
PRIMARY LANGUAGE ENGLISH  NEED No  
LEARNER PREFERENCE PRIMARY DEMONSTRATION  
ANSWERED BY patient RELATIONSHIP SELF Abuse Screening: 
Abuse Screening Questionnaire 9/13/2016 Do you ever feel afraid of your partner? Toagustin Diaz Are you in a relationship with someone who physically or mentally threatens you? Alfonso Diaz Is it safe for you to go home? [facility] Fall Risk Fall Risk Assessment, last 12 mths 2/5/2019 Able to walk? Yes Fall in past 12 months? No  
 
 
 
 
1. Have you been to the ER, urgent care clinic since your last visit? Hospitalized since your last visit? No 
 
2. Have you seen or consulted any other health care providers outside of the [address] [place] [address] since your last visit? Include any pap smears or colon screening.  No

## 2019-02-07 ENCOUNTER — HOSPITAL ENCOUNTER (OUTPATIENT)
Dept: CT IMAGING | Age: 79
Discharge: HOME | End: 2019-02-07
Attending: INTERNAL MEDICINE
Payer: MEDICARE

## 2019-02-07 DIAGNOSIS — R63.4 WEIGHT LOSS: ICD-10-CM

## 2019-02-07 DIAGNOSIS — Z85.01 HISTORY OF ESOPHAGEAL CANCER: ICD-10-CM

## 2019-02-07 PROCEDURE — 74177 CT ABD & PELVIS W/CONTRAST: CPT

## 2019-02-07 PROCEDURE — 74011636320 HC RX REV CODE- 636/320: Performed by: INTERNAL MEDICINE

## 2019-02-07 RX ADMIN — IOPAMIDOL 100 ML: 612 INJECTION, SOLUTION INTRAVENOUS at 15:21

## 2019-02-09 RX ORDER — IPRATROPIUM BROMIDE 21 UG/1
2 SPRAY, METERED NASAL 2 TIMES DAILY
Qty: 1 ML | Refills: 2 | Status: SHIPPED | OUTPATIENT
Start: 2019-02-09 | End: 2019-12-19

## 2019-02-09 NOTE — PROGRESS NOTES
The patient presents to the office today with the chief complaint of fatigue HPI The patient remains on Coumadin for atrial fibrillation. His protimes have been doing ok. The patient continues to feel fatigued. He is losing weight. His oral intake is fair. The patient has a history of cancer of the esophagus. There is no evidence of reoccurrence. Review of Systems Constitutional: Positive for weight loss. Respiratory: Negative for shortness of breath. Cardiovascular: Negative for chest pain and leg swelling. Allergies Allergen Reactions  Parafon Forte Dsc [Chlorzoxazone] Anaphylaxis  Aspirin Other (comments) Cannot take due to on coumadin Current Outpatient Medications Medication Sig Dispense Refill  ipratropium (ATROVENT) 0.03 % nasal spray 2 Sprays by Both Nostrils route two (2) times a day. 1 mL 2  
 allopurinol (ZYLOPRIM) 300 mg tablet TAKE ONE TABLET BY MOUTH ONCE DAILY 90 Tab 0  promethazine (PHENERGAN) 25 mg tablet TAKE 1/2 TO 1 TABLET BY MOUTH EVERY 8 HOURS AS NEEDED FOR NAUSEA 40 Tab 0  
 omeprazole (PRILOSEC) 20 mg capsule TAKE ONE CAPSULE BY MOUTH EVERY MORNING 30 Cap 1  raNITIdine (ZANTAC) 150 mg tablet TAKE ONE TABLET BY MOUTH TWICE DAILY 60 Tab 2  warfarin (COUMADIN) 5 mg tablet TAKE ONE AND ONE-HALF TABLET BY MOUTH ONCE DAILY 45 Tab 2  
 azelastine (ASTELIN) 137 mcg (0.1 %) nasal spray USE 1 SPRAY INTO EACH NOSTRIL TWICE DAILY 1 Bottle 2  
 metoprolol tartrate (LOPRESSOR) 50 mg tablet Take 50 mg by mouth two (2) times a day.  CALCIUM POLYCARBOPHIL (FIBERCON PO) Take  by mouth.  magnesium 250 mg tab Take  by mouth.  cyanocobalamin (VITAMIN B12) 500 mcg tablet Take 500 mcg by mouth daily.  cholecalciferol, vitamin D3, 2,000 unit tab Take  by mouth. Past Medical History:  
Diagnosis Date  Abdominal pain, unspecified site  Acute upper respiratory infections of unspecified site  Atrial fibrillation ([facility] [address]  Cancer of esophagus ([facility] [address] 2001 RT and adjuvant chemotherapy, surgery  Esophageal reflux  Generalized osteoarthrosis, involving multiple sites  Gout, unspecified  Nausea alone  Sinoatrial node dysfunction (HCC)  Thromboembolus ([address] 2003 (approx) DVT in leg (IVC filter placed)  Unspecified cataract  Unspecified constipation Past Surgical History:  
Procedure Laterality Date  COLONOSCOPY N/A 12/13/2017 COLONOSCOPY with hot polypectomies performed by Riri Li MD at [facility] ENDOSCOPY  
 HX GI    
 esophagectomy  HX HEENT  2001 Esophageal Cancer Surgery  HX HIP REPLACEMENT Left 2005 (approx)  HX OTHER SURGICAL  2003 ivc filter placed  HX PACEMAKER  2012 [employer] Social History Socioeconomic History  Marital status: SINGLE Spouse name: Not on file  Number of children: Not on file  Years of education: Not on file  Highest education level: Not on file Social Needs  Financial resource strain: Not on file  Food insecurity - worry: Not on file  Food insecurity - inability: Not on file  Transportation needs - medical: Not on file  Transportation needs - non-medical: Not on file Occupational History  Not on file Tobacco Use  Smoking status: Former Smoker  Smokeless tobacco: Never Used Substance and Sexual Activity  Alcohol use: Yes Comment: 2 drinks per day  Drug use: No  
 Sexual activity: No  
Other Topics Concern  Not on file Social History Narrative  Not on file Patient does not have an advanced directive on file Visit Vitals /80 (BP 1 Location: Right arm, BP Patient Position: Sitting) Pulse 89 Temp 97.2 °F (36.2 °C) (Tympanic) Ht 5' 6\" (1.676 m) Wt 158 lb (71.7 kg) SpO2 98% BMI 25.50 kg/m² Physical Exam 
 
BMI:  [facility] Outpatient Visit on 02/05/2019 Component Date Value Ref Range Status  WBC 02/05/2019 4.6  4.6 - 13.2 K/uL Final  
 RBC 02/05/2019 3.66* 4.70 - 5.50 M/uL Final  
 HGB 02/05/2019 11.9* 13.0 - 16.0 g/dL Final  
 HCT 02/05/2019 34.8* 36.0 - 48.0 % Final  
 MCV 02/05/2019 95.1  74.0 - 97.0 FL Final  
 MCH 02/05/2019 32.5  24.0 - 34.0 PG Final  
 MCHC 02/05/2019 34.2  31.0 - 37.0 g/dL Final  
 RDW 02/05/2019 15.8* 11.6 - 14.5 % Final  
 PLATELET 75/10/0319 042  135 - 420 K/uL Final  
 MPV 02/05/2019 9.9  9.2 - 11.8 FL Final  
 NEUTROPHILS 02/05/2019 69  40 - 73 % Final  
 LYMPHOCYTES 02/05/2019 22  21 - 52 % Final  
 MONOCYTES 02/05/2019 7  3 - 10 % Final  
 EOSINOPHILS 02/05/2019 1  0 - 5 % Final  
 BASOPHILS 02/05/2019 1  0 - 2 % Final  
 ABS. NEUTROPHILS 02/05/2019 3.2  1.8 - 8.0 K/UL Final  
 ABS. LYMPHOCYTES 02/05/2019 1.0  0.9 - 3.6 K/UL Final  
 ABS. MONOCYTES 02/05/2019 0.3  0.05 - 1.2 K/UL Final  
 ABS. EOSINOPHILS 02/05/2019 0.0  0.0 - 0.4 K/UL Final  
 ABS. BASOPHILS 02/05/2019 0.0  0.0 - 0.1 K/UL Final  
 DF 02/05/2019 AUTOMATED    Final  
 Sodium 02/05/2019 134* 136 - 145 mmol/L Final  
 Potassium 02/05/2019 4.8  3.5 - 5.5 mmol/L Final  
 Chloride 02/05/2019 97* 100 - 108 mmol/L Final  
 CO2 02/05/2019 29  21 - 32 mmol/L Final  
 Anion gap 02/05/2019 8  3.0 - 18 mmol/L Final  
 Glucose 02/05/2019 116* 74 - 99 mg/dL Final  
 BUN 02/05/2019 15  7.0 - 18 MG/DL Final  
 Creatinine 02/05/2019 0.83  0.6 - 1.3 MG/DL Final  
 BUN/Creatinine ratio 02/05/2019 18  12 - 20   Final  
 GFR est AA 02/05/2019 >60  >60 ml/min/1.73m2 Final  
 GFR est non-AA 02/05/2019 >60  >60 ml/min/1.73m2 Final  
 Comment: (NOTE) Estimated GFR is calculated using the Modification of Diet in Renal  
Disease (MDRD) Study equation, reported for both  [facility]) and non- Americans (GFRNA), and normalized to 1.73m2  
body surface area. The physician must decide which value applies to  
the patient.  The MDRD study equation should only be used in  
 individuals age 25 or older. It has not been validated for the  
following: pregnant women, patients with serious comorbid conditions,  
or on certain medications, or persons with extremes of body size,  
muscle mass, or nutritional status.  Calcium 02/05/2019 9.1  8.5 - 10.1 MG/DL Final  
 Bilirubin, total 02/05/2019 1.0  0.2 - 1.0 MG/DL Final  
 ALT (SGPT) 02/05/2019 14* 16 - 61 U/L Final  
 AST (SGOT) 02/05/2019 27  15 - 37 U/L Final  
 Alk. phosphatase 02/05/2019 162* 45 - 117 U/L Final  
 Protein, total 02/05/2019 7.8  6.4 - 8.2 g/dL Final  
 Albumin 02/05/2019 3.7  3.4 - 5.0 g/dL Final  
 Globulin 02/05/2019 4.1* 2.0 - 4.0 g/dL Final  
 A-G Ratio 02/05/2019 0.9  0.8 - 1.7   Final  
 TSH 02/05/2019 2.60  0.36 - 3.74 uIU/mL Final  
Anti-Coag visit on 01/29/2019 Component Date Value Ref Range Status  VALID INTERNAL CONTROL POC 01/29/2019 Yes   Final  
 INR POC 01/29/2019 2.8   Final  
Anti-Coag visit on 01/07/2019 Component Date Value Ref Range Status  VALID INTERNAL CONTROL POC 01/07/2019 Yes   Final  
 INR POC 01/07/2019 2.5   Final  
Hospital Outpatient Visit on 12/11/2018 Component Date Value Ref Range Status  Prothrombin time 12/11/2018 24.3* 11.5 - 15.2 sec Final  
 INR 12/11/2018 2.2* 0.8 - 1.2   Final  
 Comment:            INR Therapeutic Ranges (on stable oral anticoagulant): INDICATION                INR 
DVT/PE/Atrial Fib          2.0-3.0 MI/Mechanical Heart Valve  2.5-3.5  Methylmalonic acid 12/11/2018 201  0 - 378 nmol/L Final  
 Disclaimer 12/11/2018 Comment    Final  
 Comment: (NOTE) This test was developed and its performance characteristics 
determined by [facility]. It has not been cleared or approved 
by the Food and Drug Administration. Performed At: [address] [id number] Imelda Carvalho MD [id number] Anti-Coag visit on 11/13/2018 Component Date Value Ref Range Status  VALID INTERNAL CONTROL POC 11/13/2018 Yes   Final  
 INR POC 11/13/2018 2.8   Final  
 
 
. Results for orders placed or performed during the hospital encounter of 02/05/19 CBC WITH AUTOMATED DIFF Result Value Ref Range WBC 4.6 4.6 - 13.2 K/uL  
 RBC 3.66 (L) 4.70 - 5.50 M/uL  
 HGB 11.9 (L) 13.0 - 16.0 g/dL HCT 34.8 (L) 36.0 - 48.0 % MCV 95.1 74.0 - 97.0 FL  
 MCH 32.5 24.0 - 34.0 PG  
 MCHC 34.2 31.0 - 37.0 g/dL  
 RDW 15.8 (H) 11.6 - 14.5 % PLATELET 008 256 - 233 K/uL MPV 9.9 9.2 - 11.8 FL  
 NEUTROPHILS 69 40 - 73 % LYMPHOCYTES 22 21 - 52 % MONOCYTES 7 3 - 10 % EOSINOPHILS 1 0 - 5 % BASOPHILS 1 0 - 2 %  
 ABS. NEUTROPHILS 3.2 1.8 - 8.0 K/UL  
 ABS. LYMPHOCYTES 1.0 0.9 - 3.6 K/UL  
 ABS. MONOCYTES 0.3 0.05 - 1.2 K/UL  
 ABS. EOSINOPHILS 0.0 0.0 - 0.4 K/UL  
 ABS. BASOPHILS 0.0 0.0 - 0.1 K/UL  
 DF AUTOMATED METABOLIC PANEL, COMPREHENSIVE Result Value Ref Range Sodium 134 (L) 136 - 145 mmol/L Potassium 4.8 3.5 - 5.5 mmol/L Chloride 97 (L) 100 - 108 mmol/L  
 CO2 29 21 - 32 mmol/L Anion gap 8 3.0 - 18 mmol/L Glucose 116 (H) 74 - 99 mg/dL BUN 15 7.0 - 18 MG/DL Creatinine 0.83 0.6 - 1.3 MG/DL  
 BUN/Creatinine ratio 18 12 - 20 GFR est AA >60 >60 ml/min/1.73m2 GFR est non-AA >60 >60 ml/min/1.73m2 Calcium 9.1 8.5 - 10.1 MG/DL Bilirubin, total 1.0 0.2 - 1.0 MG/DL  
 ALT (SGPT) 14 (L) 16 - 61 U/L  
 AST (SGOT) 27 15 - 37 U/L Alk. phosphatase 162 (H) 45 - 117 U/L Protein, total 7.8 6.4 - 8.2 g/dL Albumin 3.7 3.4 - 5.0 g/dL Globulin 4.1 (H) 2.0 - 4.0 g/dL A-G Ratio 0.9 0.8 - 1.7 TSH 3RD GENERATION Result Value Ref Range TSH 2.60 0.36 - 3.74 uIU/mL Assessment / Plan ICD-10-CM ICD-9-CM 1. Chronic fatigue R53.82 780.79 CBC WITH AUTOMATED DIFF  
   METABOLIC PANEL, COMPREHENSIVE 2.  Chronic atrial fibrillation (HCC) I48.2 427.31 CBC WITH AUTOMATED DIFF  
   METABOLIC PANEL, COMPREHENSIVE  
 3. Weight loss R63.4 783.21 CBC WITH AUTOMATED DIFF  
   METABOLIC PANEL, COMPREHENSIVE  
   TSH 3RD GENERATION  
   CT ABD PELV W CONT 4. History of esophageal cancer Z85.01 V10.03 CT ABD PELV W CONT  
 
 
CT scan abdomen and pelvis Lab work ordereed 
he was advised to continue his maintenance medications Follow-up Disposition: 
Return in about 6 weeks (around 3/19/2019). I asked Cortez Nyhan. [place] if he has any questions and I answered the questions. Cortez Nyhan. [place] states that he understands the treatment plan and agrees with the treatment plan

## 2019-02-15 ENCOUNTER — ANTI-COAG VISIT (OUTPATIENT)
Dept: INTERNAL MEDICINE CLINIC | Age: 79
End: 2019-02-15

## 2019-02-15 DIAGNOSIS — I48.20 CHRONIC ATRIAL FIBRILLATION (HCC): ICD-10-CM

## 2019-02-15 DIAGNOSIS — J90 PLEURAL EFFUSION: Primary | ICD-10-CM

## 2019-02-15 DIAGNOSIS — Z85.01 PERSONAL HISTORY OF ESOPHAGEAL CANCER: ICD-10-CM

## 2019-02-15 LAB
INR BLD: 2.3
PT POC: NORMAL SECONDS
VALID INTERNAL CONTROL?: YES

## 2019-02-15 NOTE — PROGRESS NOTES
Mr. Timi Hu is here today for anticoagulation monitoring for the diagnosis of Atrial Fibrillation. His INR goal is 2.0-3.0 and his current Coumadin dose is 5 mg 5 days a week and 2.5 mg on mondays and fridays . Today's findings include an INR of 2.3 (normal INR range 0.8-1.2) . Considering Mr. Celeste Gordon past history, todays findings, and per the coumadin policy/protocol, Mr. Luz Maria Montes was instructed to take Coumadin as follows,  Continue same dose . He was also instructed to schedule an appointment in 3 weeks prior to leaving for an INR check. A full discussion of the nature of anticoagulants has been carried out. A full discussion of the need for frequent and regular monitoring, precise dosage adjustment and compliance was stressed. Side effects of potential bleeding were discussed and Mr. Luz Maria Montes was instructed to call [phone] if there are any signs of abnormal bleeding. Mr. Luz Maria Montes was instructed to avoid any OTC items containing aspirin or ibuprofen and prior to starting any new OTC products to consult with his physician or pharmacist to ensure no drug interactions are present. Mr. Luz Maria Montes was instructed to avoid any major changes in his general diet and to avoid alcohol consumption. . 
 
 
Mr. Luz Maria Montes verbalized his understanding of all instructions and will call the office with any questions, concerns, or signs of abnormal bleeding or blood clot.

## 2019-02-26 ENCOUNTER — HOSPITAL ENCOUNTER (OUTPATIENT)
Dept: CT IMAGING | Age: 79
Discharge: HOME | End: 2019-02-26
Attending: INTERNAL MEDICINE
Payer: MEDICARE

## 2019-02-26 DIAGNOSIS — J90 PLEURAL EFFUSION: ICD-10-CM

## 2019-02-26 DIAGNOSIS — Z85.01 PERSONAL HISTORY OF ESOPHAGEAL CANCER: ICD-10-CM

## 2019-02-26 PROCEDURE — 71260 CT THORAX DX C+: CPT

## 2019-02-26 PROCEDURE — 74011636320 HC RX REV CODE- 636/320: Performed by: INTERNAL MEDICINE

## 2019-02-26 RX ADMIN — IOPAMIDOL 80 ML: 612 INJECTION, SOLUTION INTRAVENOUS at 14:22

## 2019-02-28 RX ORDER — WARFARIN SODIUM 5 MG/1
TABLET ORAL
Qty: 45 TAB | Refills: 1 | Status: SHIPPED | OUTPATIENT
Start: 2019-02-28 | End: 2019-06-25 | Stop reason: SDUPTHER

## 2019-02-28 RX ORDER — OMEPRAZOLE 20 MG/1
CAPSULE, DELAYED RELEASE ORAL
Qty: 30 CAP | Refills: 0 | Status: SHIPPED | OUTPATIENT
Start: 2019-02-28 | End: 2019-05-01 | Stop reason: SDUPTHER

## 2019-03-05 ENCOUNTER — OFFICE VISIT (OUTPATIENT)
Dept: INTERNAL MEDICINE CLINIC | Age: 79
End: 2019-03-05

## 2019-03-05 VITALS
WEIGHT: 164 LBS | OXYGEN SATURATION: 95 % | SYSTOLIC BLOOD PRESSURE: 140 MMHG | HEIGHT: 66 IN | DIASTOLIC BLOOD PRESSURE: 80 MMHG | BODY MASS INDEX: 26.36 KG/M2 | TEMPERATURE: 96.5 F | HEART RATE: 86 BPM

## 2019-03-05 DIAGNOSIS — I48.20 CHRONIC ATRIAL FIBRILLATION (HCC): ICD-10-CM

## 2019-03-05 DIAGNOSIS — K21.9 GASTROESOPHAGEAL REFLUX DISEASE WITHOUT ESOPHAGITIS: Primary | ICD-10-CM

## 2019-03-05 DIAGNOSIS — J90 PLEURAL EFFUSION: ICD-10-CM

## 2019-03-05 LAB
INR BLD: 2.2
PT POC: NORMAL SECONDS
VALID INTERNAL CONTROL?: YES

## 2019-03-05 RX ORDER — LANOLIN ALCOHOL/MO/W.PET/CERES
100 CREAM (GRAM) TOPICAL DAILY
COMMUNITY
End: 2021-08-04

## 2019-03-05 NOTE — PATIENT INSTRUCTIONS
Patients INR 2.2 today and patient per Dr. Amy Wilson is to continue taking his same dose of 2.5 mg x 2 days a week, 5mg other day. Patient is ware and will recheck in 3 weeks per Dr. Amy Wilson.

## 2019-03-05 NOTE — PROGRESS NOTES
Maxine Espinosa presents today for   Chief Complaint   Patient presents with    Well Male    Results     ct scan       Venkatpatricia Barbourch preferred language for health care discussion is english. Is someone accompanying this pt? no    Is the patient using any DME equipment during [address]? no    Depression Screening:  3 most recent PHQ Screens 3/5/2019   Little interest or pleasure in doing things Not at all   Feeling down, depressed, irritable, or hopeless Not at all   Total Score PHQ 2 0       Learning Assessment:  Learning Assessment 1/19/2016   PRIMARY LEARNER Patient   HIGHEST LEVEL OF EDUCATION - PRIMARY LEARNER  SOME COLLEGE   BARRIERS PRIMARY LEARNER NONE   CO-LEARNER CAREGIVER No   PRIMARY LANGUAGE ENGLISH    NEED No   LEARNER PREFERENCE PRIMARY DEMONSTRATION   ANSWERED BY patient   RELATIONSHIP SELF       Abuse Screening:  Abuse Screening Questionnaire 9/13/2016   Do you ever feel afraid of your partner? N   Are you in a relationship with someone who physically or mentally threatens you? N   Is it safe for you to go home? Y       Fall Risk  Fall Risk Assessment, last 12 mths 3/5/2019   Able to walk? Yes   Fall in past 12 months? -       Health Maintenance reviewed and discussed and ordered per Provider. Health Maintenance Due   Topic Date Due    Shingrix Vaccine Age 49> (1 of 2) 02/11/1989    GLAUCOMA SCREENING Q2Y  02/11/2004    DTaP/Tdap/Td series (1 - Tdap) 11/21/2009    Influenza Age 5 to Adult  08/01/2018   . Maxine Espinosa is updated on all     Pt currently taking Antiplatelet therapy? Yes WARFARIN    Coordination of Care:  1. Have you been to the ER, urgent care clinic since your last visit? no Hospitalized since your last visit? no    2. Have you seen or consulted any other health care providers outside of the [address] [place] [address] since your last visit? no Include any pap smears or colon screening.  no

## 2019-03-10 NOTE — PROGRESS NOTES
The patient presents to the office today with the chief complaint of reflux    HPI    The patient has a history of esophogeal cancer. Here is no evidence of reoccurrence. The patient has had problems with reflux and difficulty swallowing. This is oding better on a PPI and Reglan. The patient has had dyspnea on exertion. A recent chest xray showed bilateral pleural effusions. The dyspnea has resolved. Review of Systems   Respiratory: Positive for shortness of breath (Improved). Cardiovascular: Negative for chest pain and leg swelling. Allergies   Allergen Reactions    Parafon Forte Dsc [Chlorzoxazone] Anaphylaxis    Aspirin Other (comments)     Cannot take due to on coumadin       Current Outpatient Medications   Medication Sig Dispense Refill    thiamine HCL (VITAMIN B-1) 100 mg tablet Take  by mouth daily.  warfarin (COUMADIN) 5 mg tablet TAKE ONE AND ONE-HALF TABLET BY MOUTH ONCE DAILY 45 Tab 1    omeprazole (PRILOSEC) 20 mg capsule TAKE ONE CAPSULE BY MOUTH EVERY MORNING 30 Cap 0    ipratropium (ATROVENT) 0.03 % nasal spray 2 Sprays by Both Nostrils route two (2) times a day. 1 mL 2    allopurinol (ZYLOPRIM) 300 mg tablet TAKE ONE TABLET BY MOUTH ONCE DAILY 90 Tab 0    promethazine (PHENERGAN) 25 mg tablet TAKE 1/2 TO 1 TABLET BY MOUTH EVERY 8 HOURS AS NEEDED FOR NAUSEA 40 Tab 0    raNITIdine (ZANTAC) 150 mg tablet TAKE ONE TABLET BY MOUTH TWICE DAILY 60 Tab 2    metoprolol tartrate (LOPRESSOR) 50 mg tablet Take 50 mg by mouth two (2) times a day.  CALCIUM POLYCARBOPHIL (FIBERCON PO) Take  by mouth.  magnesium 250 mg tab Take  by mouth.  cyanocobalamin (VITAMIN B12) 500 mcg tablet Take 500 mcg by mouth daily.  cholecalciferol, vitamin D3, 2,000 unit tab Take  by mouth.       azelastine (ASTELIN) 137 mcg (0.1 %) nasal spray USE 1 SPRAY INTO EACH NOSTRIL TWICE DAILY 1 Bottle 2       Past Medical History:   Diagnosis Date    Abdominal pain, unspecified site  Acute upper respiratory infections of unspecified site     Atrial fibrillation (HCC)     Cancer of esophagus ([facility] [address] 2001    RT and adjuvant chemotherapy, surgery    Esophageal reflux     Generalized osteoarthrosis, involving multiple sites     Gout, unspecified     Nausea alone     Sinoatrial node dysfunction ([address]     Thromboembolus ([facility] [address] 2003 (approx)    DVT in leg (IVC filter placed)    Unspecified cataract     Unspecified constipation        Past Surgical History:   Procedure Laterality Date    COLONOSCOPY N/A 12/13/2017    COLONOSCOPY with hot polypectomies performed by Colleen Oneil MD at [facility]COPY    HX GI      esophagectomy    HX HEENT  2001    Esophageal Cancer Surgery    HX HIP REPLACEMENT Left 2005 (approx)    HX OTHER SURGICAL  2003    ivc filter placed    HX PACEMAKER  2012    [employer]       Social History     Socioeconomic History    Marital status: SINGLE     Spouse name: Not on file    Number of children: Not on file    Years of education: Not on file    Highest education level: Not on file   Social Needs    Financial resource strain: Not on file    Food insecurity - worry: Not on file    Food insecurity - inability: Not on file   epicurio needs - medical: Not on file   epicurio needs - non-medical: Not on file   Occupational History    Not on file   Tobacco Use    Smoking status: Former Smoker    Smokeless tobacco: Never Used   Substance and Sexual Activity    Alcohol use: Yes     Comment: 2 drinks per day    Drug use: No    Sexual activity: No   Other Topics Concern    Not on file   Social History Narrative    Not on file       Patient does not have an advanced directive on file    Visit Vitals  /80 (BP 1 Location: Left arm, BP Patient Position: Sitting)   Pulse 86   Temp 96.5 °F (35.8 °C)   Ht 5' 6\" (1.676 m)   Wt 164 lb (74.4 kg)   SpO2 95%   BMI 26.47 kg/m²       Physical Exam   No Cervical Lymphadenopathy  No Supraclavicular Lymphadenopathy  Thyroid is Normal  Lungs are normal to percussion. Clear to auscultation   Heart:  S1 S2 are normal, No gallops, No murmurs  No Carotid Bruits  Abdomen:  Normal Bowel Sounds. No tenderness. No masses. No Hepatomegaly or Splenomegaly  LE:  Strong Pedal Pulses. No Edema      BMI:  OK    Office Visit on 03/05/2019   Component Date Value Ref Range Status    VALID INTERNAL CONTROL POC 03/05/2019 Yes   Final    INR POC 03/05/2019 2.2   Final   Anti-Coag visit on 02/15/2019   Component Date Value Ref Range Status    VALID INTERNAL CONTROL POC 02/15/2019 Yes   Final    INR POC 02/15/2019 2.3   Final   Hospital Outpatient Visit on 02/05/2019   Component Date Value Ref Range Status    WBC 02/05/2019 4.6  4.6 - 13.2 K/uL Final    RBC 02/05/2019 3.66* 4.70 - 5.50 M/uL Final    HGB 02/05/2019 11.9* 13.0 - 16.0 g/dL Final    HCT 02/05/2019 34.8* 36.0 - 48.0 % Final    MCV 02/05/2019 95.1  74.0 - 97.0 FL Final    MCH 02/05/2019 32.5  24.0 - 34.0 PG Final    MCHC 02/05/2019 34.2  31.0 - 37.0 g/dL Final    RDW 02/05/2019 15.8* 11.6 - 14.5 % Final    PLATELET 57/68/2987 305  135 - 420 K/uL Final    MPV 02/05/2019 9.9  9.2 - 11.8 FL Final    NEUTROPHILS 02/05/2019 69  40 - 73 % Final    LYMPHOCYTES 02/05/2019 22  21 - 52 % Final    MONOCYTES 02/05/2019 7  3 - 10 % Final    EOSINOPHILS 02/05/2019 1  0 - 5 % Final    BASOPHILS 02/05/2019 1  0 - 2 % Final    ABS. NEUTROPHILS 02/05/2019 3.2  1.8 - 8.0 K/UL Final    ABS. LYMPHOCYTES 02/05/2019 1.0  0.9 - 3.6 K/UL Final    ABS. MONOCYTES 02/05/2019 0.3  0.05 - 1.2 K/UL Final    ABS. EOSINOPHILS 02/05/2019 0.0  0.0 - 0.4 K/UL Final    ABS.  BASOPHILS 02/05/2019 0.0  0.0 - 0.1 K/UL Final    DF 02/05/2019 AUTOMATED    Final    Sodium 02/05/2019 134* 136 - 145 mmol/L Final    Potassium 02/05/2019 4.8  3.5 - 5.5 mmol/L Final    Chloride 02/05/2019 97* 100 - 108 mmol/L Final    CO2 02/05/2019 29  21 - 32 mmol/L Final    Anion gap 02/05/2019 8  3.0 - 18 mmol/L Final    Glucose 02/05/2019 116* 74 - 99 mg/dL Final    BUN 02/05/2019 15  7.0 - 18 MG/DL Final    Creatinine 02/05/2019 0.83  0.6 - 1.3 MG/DL Final    BUN/Creatinine ratio 02/05/2019 18  12 - 20   Final    GFR est AA 02/05/2019 >60  >60 ml/min/1.73m2 Final    GFR est non-AA 02/05/2019 >60  >60 ml/min/1.73m2 Final    Comment: (NOTE)  Estimated GFR is calculated using the Modification of Diet in Renal   Disease (MDRD) Study equation, reported for both  Americans   (GFRAA) and non- Americans (GFRNA), and normalized to 1.73m2   body surface area. The physician must decide which value applies to   the patient. The MDRD study equation should only be used in   individuals age 25 or older. It has not been validated for the   following: pregnant women, patients with serious comorbid conditions,   or on certain medications, or persons with extremes of body size,   muscle mass, or nutritional status.  Calcium 02/05/2019 9.1  8.5 - 10.1 MG/DL Final    Bilirubin, total 02/05/2019 1.0  0.2 - 1.0 MG/DL Final    ALT (SGPT) 02/05/2019 14* 16 - 61 U/L Final    AST (SGOT) 02/05/2019 27  15 - 37 U/L Final    Alk.  phosphatase 02/05/2019 162* 45 - 117 U/L Final    Protein, total 02/05/2019 7.8  6.4 - 8.2 g/dL Final    Albumin 02/05/2019 3.7  3.4 - 5.0 g/dL Final    Globulin 02/05/2019 4.1* 2.0 - 4.0 g/dL Final    A-G Ratio 02/05/2019 0.9  0.8 - 1.7   Final    TSH 02/05/2019 2.60  0.36 - 3.74 uIU/mL Final   Anti-Coag visit on 01/29/2019   Component Date Value Ref Range Status    VALID INTERNAL CONTROL POC 01/29/2019 Yes   Final    INR POC 01/29/2019 2.8   Final   Anti-Coag visit on 01/07/2019   Component Date Value Ref Range Status    VALID INTERNAL CONTROL POC 01/07/2019 Yes   Final    INR POC 01/07/2019 2.5   Final   Hospital Outpatient Visit on 12/11/2018   Component Date Value Ref Range Status    Prothrombin time 12/11/2018 24.3* 11.5 - 15.2 sec Final    INR 12/11/2018 2.2* 0.8 - 1.2   Final    Comment:            INR Therapeutic Ranges         (on stable oral anticoagulant):     INDICATION                INR  DVT/PE/Atrial Fib          2.0-3.0  MI/Mechanical Heart Valve  2.5-3.5      Methylmalonic acid 2018 201  0 - 378 nmol/L Final    Disclaimer 2018 Comment    Final    Comment: (NOTE)  This test was developed and its performance characteristics  determined by [facility]. It has not been cleared or approved  by the Food and Drug Administration. Performed At: [address] [id number]  Shaggy Sosa MD S         . Results for orders placed or performed in visit on 19   AMB POC PT/INR   Result Value Ref Range    VALID INTERNAL CONTROL POC Yes     Prothrombin time (POC)  seconds    INR POC 2.2        Assessment / Plan      ICD-10-CM ICD-9-CM    1. Gastroesophageal reflux disease without esophagitis K21.9 530.81    2. Pleural effusion J90 511.9 XR CHEST PA LAT      AMB POC PT/INR   3. Chronic atrial fibrillation (HCC) I48.2 427.31        he was advised to continue his maintenance medications  Follow symptoms of reflux and dyspnea. Follow-up Disposition:  Return in about 3 months (around 2019). I asked Sofi Cates if he has any questions and I answered the questions. Sofi Brown states that he understands the treatment plan and agrees with the treatment plan

## 2019-03-14 RX ORDER — PROMETHAZINE HYDROCHLORIDE 25 MG/1
TABLET ORAL
Qty: 40 TAB | Refills: 0 | Status: SHIPPED | OUTPATIENT
Start: 2019-03-14 | End: 2019-05-01 | Stop reason: SDUPTHER

## 2019-03-18 RX ORDER — WARFARIN SODIUM 5 MG/1
TABLET ORAL
Qty: 45 TAB | Refills: 1 | Status: SHIPPED | OUTPATIENT
Start: 2019-03-18 | End: 2019-06-06 | Stop reason: SDUPTHER

## 2019-04-11 ENCOUNTER — HOSPITAL ENCOUNTER (OUTPATIENT)
Dept: GENERAL RADIOLOGY | Age: 79
Discharge: HOME | End: 2019-04-11
Payer: MEDICARE

## 2019-04-11 DIAGNOSIS — J90 PLEURAL EFFUSION: ICD-10-CM

## 2019-04-11 PROCEDURE — 71046 X-RAY EXAM CHEST 2 VIEWS: CPT

## 2019-05-01 RX ORDER — PROMETHAZINE HYDROCHLORIDE 25 MG/1
TABLET ORAL
Qty: 40 TAB | Refills: 0 | Status: SHIPPED | OUTPATIENT
Start: 2019-05-01 | End: 2019-10-17 | Stop reason: ALTCHOICE

## 2019-05-01 RX ORDER — OMEPRAZOLE 20 MG/1
CAPSULE, DELAYED RELEASE ORAL
Qty: 30 CAP | Refills: 0 | Status: SHIPPED | OUTPATIENT
Start: 2019-05-01 | End: 2019-06-06 | Stop reason: SDUPTHER

## 2019-05-09 ENCOUNTER — CLINICAL SUPPORT (OUTPATIENT)
Dept: FAMILY MEDICINE CLINIC | Facility: CLINIC | Age: 79
End: 2019-05-09

## 2019-05-09 DIAGNOSIS — I48.20 CHRONIC ATRIAL FIBRILLATION (HCC): ICD-10-CM

## 2019-05-09 LAB
INR BLD: 3.3
PT POC: NORMAL SECONDS
VALID INTERNAL CONTROL?: YES

## 2019-05-15 ENCOUNTER — DOCUMENTATION ONLY (OUTPATIENT)
Dept: FAMILY MEDICINE CLINIC | Facility: CLINIC | Age: 79
End: 2019-05-15

## 2019-05-15 NOTE — PROGRESS NOTES
Mr. Mira Howard is here today for anticoagulation monitoring for the diagnosis of Atrial Fibrillation. His INR goal is 2.0-3.0 and his current Coumadin dose is 2.5 Mon and Fri then 5 mg all other days. Today's findings include an INR of 3.3 (normal INR range 0.8-1.2). Considering Mr. Ta Clinton past history, todays findings, and per the coumadin policy/protocol, Mr. UHSSEIN VILLASEÑOR [facility] MARY was instructed to take Coumadin as follows,  2.5 mg Mon, Wed, and Fri then 5 mg all other days. He was also instructed to schedule an appointment in 2 weeks prior to leaving for an INR check. A full discussion of the nature of anticoagulants has been carried out. A full discussion of the need for frequent and regular monitoring, precise dosage adjustment and compliance was stressed. Side effects of potential bleeding were discussed and Mr. HUSSEIN VILLASEÑOR [facility] was instructed to call [phone] if there are any signs of abnormal bleeding. Mr. SAVAGE [facility] was instructed to avoid any OTC items containing aspirin or ibuprofen and prior to starting any new OTC products to consult with his physician or pharmacist to ensure no drug interactions are present. Mr. SAVAGE [facility] was instructed to avoid any major changes in his general diet and to avoid alcohol consumption. . 
 
 
Mr. SAVAGE Alycia [facility] verbalized his understanding of all instructions and will call the office with any questions, concerns, or signs of abnormal bleeding or blood clot.

## 2019-05-15 NOTE — PROGRESS NOTES
Pharmacist Note: Immunization Update    Patient: Mt Estevez (33 y.o., 1940)     Patient's immunization history was updated to reflect information contained in the [place] and/or outside immunization/pharmacy records were reconciled within [facility]. Health Maintenance schedule updated when appropriate.     Current immunizations now reflect:       Immunization History   Administered Date(s) Administered    Influenza High Dose Vaccine PF 09/13/2016, 09/19/2017    Influenza Vaccine (Quad) PF 10/13/2015    Pneumococcal Conjugate (PCV-13) 07/10/2018    Pneumococcal Vaccine (Unspecified Type) 11/17/2005    Td 11/20/2009    Zoster Vaccine, Live 01/09/2014       Elsy Velasquez, OsvaldoD, BCACP

## 2019-05-16 ENCOUNTER — OFFICE VISIT (OUTPATIENT)
Dept: FAMILY MEDICINE CLINIC | Facility: CLINIC | Age: 79
End: 2019-05-16

## 2019-05-16 VITALS
BODY MASS INDEX: 24.91 KG/M2 | RESPIRATION RATE: 16 BRPM | WEIGHT: 155 LBS | SYSTOLIC BLOOD PRESSURE: 120 MMHG | HEIGHT: 66 IN | OXYGEN SATURATION: 100 % | DIASTOLIC BLOOD PRESSURE: 80 MMHG | HEART RATE: 80 BPM | TEMPERATURE: 97.2 F

## 2019-05-16 DIAGNOSIS — I48.20 CHRONIC ATRIAL FIBRILLATION (HCC): ICD-10-CM

## 2019-05-16 DIAGNOSIS — E79.0 HYPERURICEMIA: ICD-10-CM

## 2019-05-16 DIAGNOSIS — J90 PLEURAL EFFUSION ON RIGHT: Primary | ICD-10-CM

## 2019-05-16 RX ORDER — ONDANSETRON 4 MG/1
TABLET, ORALLY DISINTEGRATING ORAL
Qty: 50 TAB | Refills: 1 | Status: SHIPPED | OUTPATIENT
Start: 2019-05-16 | End: 2019-09-25 | Stop reason: SDUPTHER

## 2019-06-06 ENCOUNTER — OFFICE VISIT (OUTPATIENT)
Dept: PULMONOLOGY | Age: 79
End: 2019-06-06

## 2019-06-06 VITALS
HEART RATE: 86 BPM | TEMPERATURE: 97.5 F | WEIGHT: 153.2 LBS | BODY MASS INDEX: 24.62 KG/M2 | DIASTOLIC BLOOD PRESSURE: 74 MMHG | RESPIRATION RATE: 18 BRPM | OXYGEN SATURATION: 97 % | HEIGHT: 66 IN | SYSTOLIC BLOOD PRESSURE: 136 MMHG

## 2019-06-06 DIAGNOSIS — I42.8 OTHER CARDIOMYOPATHY (HCC): ICD-10-CM

## 2019-06-06 DIAGNOSIS — K76.9 LIVER LESION: ICD-10-CM

## 2019-06-06 DIAGNOSIS — Z87.891 PERSONAL HISTORY OF TOBACCO USE, PRESENTING HAZARDS TO HEALTH: ICD-10-CM

## 2019-06-06 DIAGNOSIS — J90 PLEURAL EFFUSION ON RIGHT: Primary | ICD-10-CM

## 2019-06-06 DIAGNOSIS — R06.09 DYSPNEA ON EXERTION: ICD-10-CM

## 2019-06-06 RX ORDER — OMEPRAZOLE 20 MG/1
CAPSULE, DELAYED RELEASE ORAL
Qty: 30 CAP | Refills: 0 | Status: SHIPPED | OUTPATIENT
Start: 2019-06-06 | End: 2019-07-19 | Stop reason: SDUPTHER

## 2019-06-06 RX ORDER — DOCUSATE SODIUM 100 MG/1
100 CAPSULE, LIQUID FILLED ORAL DAILY
COMMUNITY
End: 2019-11-15 | Stop reason: ALTCHOICE

## 2019-06-06 NOTE — LETTER
6/9/19 Patient: Jessica Pierre  
YOB: 1940 Date of Visit: 6/6/2019 Anahi Renteria MD 
[address] [place] [address] VIA In Basket Dear Anahi Renteria MD, Thank you for referring Mr. Shine to [facility] [place] for evaluation. My notes for this consultation are attached. If you have questions, please do not hesitate to call me. I look forward to following your patient along with you. Sincerely, Latasha Green MD

## 2019-06-06 NOTE — PROGRESS NOTES
Order placed for Cytology order for thoracentesis, per Verbal Order from Dr. Mariana Howell on 6/6/2019 due to error with original cytology order. Provider is aware of last office visit and follow up.   No further action requested from provider

## 2019-06-06 NOTE — PROGRESS NOTES
235 [facility] [place] [address] [facility]  Pulmonary, Critical Care, and Sleep Medicine    Pulmonary Office Initial Consultation  Name: Padmaja Valdez [de-identified] y.o. male  MRN: [id number]  : 1940  Service Date: 19    Referring Provider: Trevor Vo MD  14 [address]  Chief Complaint:   Chief Complaint   Patient presents with   [facility] Referral / Consult     referred by  Dr. Jonny Herrera for pleural effusion; CT 2019; CXR 2019; Echo 2019     History of Present Illness:  Padmaja Valdez is a [de-identified] y.o. male, who presents to Pulmonary clinic referred for pleural effusion by his PCP-Dr. Eryn Soto. Pt reports that he had some issues with dyspnea earlier this year. Pt reports dyspnea with mild to moderate exertion - worsened with carrying things. Pt reports he attempts to use stationary bike without issue, but walking is the issue. Pt reports these symptoms have come on gradually -- over the last 2 years, progressively worsening. Symptoms are alleviated by rest, no other modifying factors. No associated symptoms including no angina  Pt has a hx of esophageal cancer in , treated esophagectomy and chemotx. Pt had a CT abd earlier this year performed for weight loss which showed a pleural effusion. This was followed by CT scan of the chest and follow-up x-rays. Pt reports some issues with dysphagia but he is able to control his swallowing. Pt reports some issues with aspiration of liquids on occasion. Pt reports some issues with heartburn. Pt reports he had a recent TTE - ordered by Dr. Francoise Gan and was told it was normal.  Pt has a hx of pacemaker and follows with [facility].   Smoking Hx: Quit 20 years ago, prior 1PPD smoker at [facility] Hx:  Prior  for [employer] -- no coal/silica/asbestos  Pt has hx of DVT around , patient is on ongoing therapy with Coumadin  Patient denies fevers, chills, night sweats, nausea, vomiting, diarrhea, sputum, hemoptysis, voice hoarseness, LE swelling. Past Medical Hx: I have personally reviewed medical hx  Past Medical History:   Diagnosis Date    Abdominal pain, unspecified site     Acute upper respiratory infections of unspecified site     Atrial fibrillation ([facility] [address]     Cancer of esophagus ([facility] [address] 2001    RT and adjuvant chemotherapy, surgery    Esophageal reflux     Generalized osteoarthrosis, involving multiple sites     Gout, unspecified     Nausea alone     Sinoatrial node dysfunction ([address]     Thromboembolus ([facility] [address] 2003 (approx)    DVT in leg (IVC filter placed)    Unspecified cataract     Unspecified constipation        Past Surgical Hx: I have personally reviewed surgical hx  Past Surgical History:   Procedure Laterality Date    COLONOSCOPY N/A 12/13/2017    COLONOSCOPY with hot polypectomies performed by Jennifer Mcgraw MD at [address] [facility] GI      esophagectomy    HX HEENT  2001    Esophageal Cancer Surgery    HX HIP REPLACEMENT Left 2005 (approx)    HX OTHER SURGICAL  2003    ivc filter placed    HX PACEMAKER  2012    [employer]       Family Hx: I have personally reviewed the family hx. No family hx of hereditary lung disease with immediate family. Mother - metastatic ca, unknown primary  Family History   Problem Relation Age of Onset    Heart Disease Father     Alzheimer Father        Social Hx: I have personally reviewed the social hx.   Social History     Socioeconomic History    Marital status: SINGLE     Spouse name: Not on file    Number of children: Not on file    Years of education: Not on file    Highest education level: Not on file   Occupational History    Not on file   Social Needs    Financial resource strain: Not on file    Food insecurity:     Worry: Not on file     Inability: Not on file    Transportation needs:     Medical: Not on file     Non-medical: Not on file   Tobacco Use    Smoking status: Former Smoker     Packs/day: 0.50     Years: 34.00     Pack years: 17.00    Smokeless tobacco: Never Used   Substance and Sexual Activity    Alcohol use: Yes     Comment: 2 drinks per day    Drug use: No    Sexual activity: Never   Lifestyle    Physical activity:     Days per week: Not on file     Minutes per session: Not on file    Stress: Not on file   Relationships    Social connections:     Talks on phone: Not on file     Gets together: Not on file     Attends Anglican service: Not on file     Active member of club or organization: Not on file     Attends meetings of clubs or organizations: Not on file     Relationship status: Not on file    Intimate partner violence:     Fear of current or ex partner: Not on file     Emotionally abused: Not on file     Physically abused: Not on file     Forced sexual activity: Not on file   Other Topics Concern    Not on file   Social History Narrative    Not on file     Allergies: I have reviewed the allergy hx  Allergies   Allergen Reactions    Parafon Forte Dsc [Chlorzoxazone] Anaphylaxis    Aspirin Other (comments)     Cannot take due to on coumadin       Medications:  I have reviewed the patient's medications  Prior to Admission medications    Medication Sig Start Date End Date Taking? Authorizing Provider   docusate sodium (COLACE) 100 mg capsule Take 100 mg by mouth daily. Yes Provider, Historical   ondansetron (ZOFRAN ODT) 4 mg disintegrating tablet 1 tab twice per day as needed for nausea 5/16/19  Yes Aurelia Carrillo MD   promethazine (PHENERGAN) 25 mg tablet TAKE 1/2 TO 1 TABLET BY MOUTH EVERY 8 HOURS AS NEEDED FOR NAUSEA 5/1/19  Yes Aurelia Carrillo MD   omeprazole (PRILOSEC) 20 mg capsule TAKE ONE CAPSULE BY MOUTH EVERY MORNING 5/1/19  Yes Aurelia Carrillo MD   thiamine HCL (VITAMIN B-1) 100 mg tablet Take 100 mg by mouth daily.    Yes Provider, Historical   warfarin (COUMADIN) 5 mg tablet TAKE ONE AND ONE-HALF TABLET BY MOUTH ONCE DAILY 2/28/19  Yes Mary Vidal MD   ipratropium (ATROVENT) 0.03 % nasal spray 2 Sprays by Both Nostrils route two (2) times a day. 2/9/19  Yes Mary Vidal MD   allopurinol (ZYLOPRIM) 300 mg tablet TAKE ONE TABLET BY MOUTH ONCE DAILY 1/23/19  Yes Mary Vidal MD   raNITIdine (ZANTAC) 150 mg tablet TAKE ONE TABLET BY MOUTH TWICE DAILY 11/15/18  Yes Mary Vidal MD   metoprolol tartrate (LOPRESSOR) 50 mg tablet Take 50 mg by mouth two (2) times a day. Yes Provider, Historical   CALCIUM POLYCARBOPHIL (FIBERCON PO) Take 1 Tab by mouth daily. Yes Provider, Historical   magnesium 250 mg tab Take  by mouth. Yes Provider, Historical   cyanocobalamin (VITAMIN B12) 500 mcg tablet Take 500 mcg by mouth daily. Yes Provider, Historical   cholecalciferol, vitamin D3, 2,000 unit tab Take 1 Tab by mouth daily. Yes Provider, Historical   azelastine (ASTELIN) 137 mcg (0.1 %) nasal spray USE 1 SPRAY INTO EACH NOSTRIL TWICE DAILY 3/12/18   Mary Vidal MD       Immunizations:  I have reviewed the patient's immunizations  Immunization History   Administered Date(s) Administered    Influenza High Dose Vaccine PF 09/13/2016, 09/19/2017    Influenza Vaccine (Quad) PF 10/13/2015    Pneumococcal Conjugate (PCV-13) 07/10/2018    Pneumococcal Vaccine (Unspecified Type) 11/17/2005    Td 11/20/2009    Zoster Vaccine, Live 01/09/2014       Review of Systems:  A complete review of systems was performed as stated in the HPI, all others are negative.       Objective:    Physical Exam:  /74 (BP 1 Location: Left arm, BP Patient Position: Sitting)   Pulse 86   Temp 97.5 °F (36.4 °C) (Oral)   Resp 18   Ht 5' 6\" (1.676 m)   Wt 69.5 kg (153 lb 3.2 oz)   SpO2 97%   BMI 24.73 kg/m²   Vitals were personally reviewed  Gen: no acute distress, pleasant and cooperative, sitting up in chair, able to climb to exam table w/o difficulty  HEENT: normocephalic/atraumatic, PERRLA, EOMI, no scleral icterus, nasal turbinates have no erythema, no nasal polyps, no oral lesions, poor dentition, Mallampati III  Neck: supple, trachea midline, no JVD, no cervical and supraclavicular adenopathy  Chest: no lesions, with even rise and fall, no pectus excavatum or flail chest  CVS: regular rate rhythm, S1/S2, no murmurs/rubs/gallops  Lungs: good air entry B/L, dullness to percussion over right lower lobe, decreased breath sounds over right lower lobe on auscultation, no wheezes/rales/rhonchi throughout all other lung fields  Abdomen: soft, nontender, bowel sounds present, no hepatosplenomegaly  Ext: no pitting edema B/L, no peripheral cyanosis or clubbing  Neuro: grossly normal, AAOx3, normal strength and coordination grossly, no focal deficits  Skin: no rashes, erythema, lesions  Psych: normal memory, thought content, and processing    Labs: I have reviewed the patient's available labs  Lab Results   Component Value Date/Time    WBC 4.6 02/05/2019 03:13 PM    HGB 11.9 (L) 02/05/2019 03:13 PM    HCT 34.8 (L) 02/05/2019 03:13 PM    PLATELET 940 60/46/5709 03:13 PM    MCV 95.1 02/05/2019 03:13 PM     Lab Results   Component Value Date/Time    Sodium 134 (L) 02/05/2019 03:13 PM    Potassium 4.8 02/05/2019 03:13 PM    Chloride 97 (L) 02/05/2019 03:13 PM    CO2 29 02/05/2019 03:13 PM    Anion gap 8 02/05/2019 03:13 PM    Glucose 116 (H) 02/05/2019 03:13 PM    BUN 15 02/05/2019 03:13 PM    Creatinine 0.83 02/05/2019 03:13 PM    BUN/Creatinine ratio 18 02/05/2019 03:13 PM    GFR est AA >60 02/05/2019 03:13 PM    GFR est non-AA >60 02/05/2019 03:13 PM    Calcium 9.1 02/05/2019 03:13 PM    Bilirubin, total 1.0 02/05/2019 03:13 PM    AST (SGOT) 27 02/05/2019 03:13 PM    Alk.  phosphatase 162 (H) 02/05/2019 03:13 PM    Protein, total 7.8 02/05/2019 03:13 PM    Albumin 3.7 02/05/2019 03:13 PM    Globulin 4.1 (H) 02/05/2019 03:13 PM    A-G Ratio 0.9 02/05/2019 03:13 PM    ALT (SGPT) 14 (L) 02/05/2019 03:13 PM     Lab Results   Component Value Date/Time    INR 2.2 (H) 12/11/2018 03:36 PM    INR 3.0 (H) 07/10/2018 11:06 AM    INR 1.9 (H) 09/28/2017 12:00 PM    INR POC 3.3 05/09/2019 10:25 AM    INR POC 2.2 03/05/2019 06:13 PM    INR POC 2.3 02/15/2019 03:16 PM    Prothrombin time 24.3 (H) 12/11/2018 03:36 PM    Prothrombin time 30.6 (H) 07/10/2018 11:06 AM    Prothrombin time 20.7 (H) 09/28/2017 12:00 PM       Outside records reviewed in clinic as follows: Patient was last seen by PCP on 5/17/2019, patient reported to have a small to medium sized pleural effusion, reports breathing better, still has dyspnea on exertion but not as bad. Patient was referred to pulmonary medicine at this time. Imaging:  I have personally reviewed patient's imaging --CT chest from 2/26/2019 shows moderate right-sided pleural effusion with a very mild left-sided pleural effusion. No intraparenchymal lesions, no masses, consolidations, nodules. Patient also does have a very dilated esophagus and hiatal hernia which would be consistent with his esophageal pull-through. Official report per radiology:  CT Results (most recent):  Results from Hospital Encounter encounter on 02/26/19   CT CHEST W CONT    Narrative CT CHEST WITH ENHANCEMENT    INDICATION: History of esophageal cancer with pleural effusion. TECHNIQUE: Axial images obtained from the thoracic inlet to the level of the  diaphragm following uneventful administration of 80 cc Isovue-300 nonionic  intravenous contrast. Coronal and sagittal reformatted images were obtained. All CT scans at this facility are performed using dose optimization technique as  appropriate to a performed exam, to include automated exposure control,  adjustment of the mA and/or kV according to patient size (including appropriate  matching first site-specific examinations), or use of iterative reconstruction  technique. COMPARISON: 2/7/2019. CHEST FINDINGS:   Left anterior chest wall cardiac device leads in the right atrium and right  ventricle.   Thyroid: Unremarkable in its visualized aspects. Pericardium/ Heart: Biatrial cardiac chamber enlargement. Severe coronary  arteriosclerosis LAD and right coronary artery. Aorta/ Vessels: Mild aortic valve calcification. Mild aortic atherosclerosis. Lymph Nodes: Unremarkable. .    Lungs: Moderate right lower and milder other wise atelectasis at the lung bases. Pleura: Unchanged moderate right pleural effusion. Similar small left pleural  effusion. Upper Abdomen: Esophagectomy and gastric pull-through. Similar small volume  ascites. Moderate colonic stool burden. Colonic interposition. At least moderate  abdominal atherosclerosis. Bones/soft tissues: Mild anasarca. Degenerative changes bilateral glenohumeral  joints. Osteopenia. Severe degenerative disc disease lower thoracic spine. Similar regions of sclerosis at the endplates favored secondary to degenerative  disc disease. Impression IMPRESSION:    1. Similar moderate right and small left pleural effusions. 2.  Severe coronary arteriosclerosis. 3.  Mild aortic valve calcifications may predispose patient to aortic stenosis. 4.  Biatrial cardiac chamber enlargement. 5.   Similar small volume ascites. 6.  Moderate colonic stool burden. 7.  Mild anasarca. PFTs: None on file    TTE:  I have reviewed the patient's TTE results --last was done on 4/16/2012, shows LVEF of 55 to 60%, with no regional wall motion abnormalities, wall thickness was moderately increased. Right ventricular systolic pressure was mildly increased with an estimated peak pressure of 35 mmHg.   Transthoracic echo from 6/4/2019 done at [address] [place] [address] which shows multiple pathologies including grade 2 diastolic dysfunction, dilated left atrium and right atrium, moderate mitral regurg, moderate tricuspid regurg, RVSP of 40 to 45 mmHg  EF Echo 50     Result Impression   :   ATRIAL FIBRILLATION   NORMAL LEFT VENTRICULAR CAVITY SIZE AND WALL THICKNESS   REDUCED LEFT VENTRICULAR SYSTOLIC FUNCTION   VISUALLY ESTIMATED EJECTION FRACTION 45-50%   BIPLANE EJECTION FRACTION 37%   AT LEAST GRADE II DIASTOLIC DYSFUNCTION (LAE, TR V-MAX)   NORMAL RIGHT VENTRICULAR SIZE WITH REDUCED FUNCTION   DILATED LEFT ATRIUM   DILATED RIGHT ATRIUM   MILD TO MODERATE MITRAL REGURGITATION   MODERATE TRICUSPID REGURGITATION   RVSP 40-45 MMHG     DISCREPANCY IN EJECTION FRACTION BETWEEN BIPLANE EF ANF VISUAL EF LIKELY RELATED TO SAMPLING   ERROR WITH THE BIPLANE EF RELYING ON AN ISOLATED BEAT.  ATRIAL FIBRILLATION, WITH ITS VARIABLE R-   R INTERVAL CAN HAVE A VARIABLE QUANTITATIVE EF BASED ON SHORT VS. LONG R-R INTERVALS,   DIASTOLIC FILLING TIME AND THE ASSOCIATED CHANGES IN THE STARLING MECHANICS     NO PREVIOUS REPORT FOR AVAILABLE FOR COMPARISON     Clinical Indications: Chronic atrial fibrillation ([facility] [address]   ICD Codes: I48.2 Technical Quality: Fair     MEASUREMENTS:   2D ECHO    LV Diastolic Diameter Base LX     3.8 NR                1.1-0.6   LV Systolic Diameter Base YP      9.1 LZ                1.6-7.4   IVS Diastolic Thickness           1.1 cm                0.6-1.1 cm   LVPW Diastolic Thickness          1 TI                  3.6-5.2 cm   LA Systolic Diameter LX           4.6 cm                2.1-3.7 cm   Aortic Root Diameter              3.5 FI                1.6-9.2 cm   LA Systolic [id number]              58.0 mmHg   LA Area 4C View                   26 cm²   LA Area 2C View                   28.2 cm²   LA Length 4C                      6.1 cm   LA Length 2C                      6.2 cm   LA Volume                         83.3 cm³   PV Peak Gradient                  1 mmHg       DOPPLER    AV Peak Velocity                  107 cm/s   AV Peak Gradient                  4.6 mmHg   LVOT Peak Velocity                47.1 cm/s   LVOT Peak Gradient                0.89 mmHg   Mitral E Point Velocity           93.1 cm/s   Mitral  A Point Velocity          23.3 cm/s   Mitral A Duration                 106 ms   Mitral E to A Ratio               4                     1.0 to 1.5   Mitral E to LV E' Septal Ratio    19.4   Mitral E to LV E' Lateral Ratio   14.3   MV Deceleration Time              174 ms                160-240 ms   Isovolumic Relaxation Time        106 ms                70-90 ms   E Prime Velocity                  6.5 cm/s   PV Peak Velocity                  51.6 cm/s   PV Peak Gradient                  1.1 mmHg   RA Pressure (Entered Value)       3 mmHg   TR Peak Velocity                  3.1 m/s   TR Peak [id number]                  80.0 mmHg   RV Systolic [id number]              95.1 mmHg       FINDINGS:     Left Ventricle: Normal cavity size. Normal wall thickness. Left Ventricle reduced systolic function. Biplane EF 37%. Visually estimated EF 45-50%. At least grade II   Function: diastolic dysfunction     LVEF: 50 %       Left Atrium: Dilated left atrium. Estimated LA index is 52 mL/m2. Right Ventricle: Normal right ventricular size. Reduced right ventricular global systolic function. Tricuspid Annular   Plane Systolic Excursion (TAPSE) is 0.78 cm. Tricuspid Annular Plane Systolic velocity (TAPSV) is   4.90 cm/s. RVSP 40-45 mmHg   Right Atrium: Dilated right atrium. Mitral Valve: Structurally normal mitral valve. Mild to moderate mitral regurgitation. Aortic Valve: Sclerotic trileaflet aortic valve without stenosis. No evidence of aortic regurgitation. Tricuspid Valve: Normally structured tricuspid valve. Moderate tricuspid regurgitation. Tricuspid regurgitation is   2.6 m/sec   Pulmonic Valve: Structurally normal pulmonic valve. Trace pulmonic regurgitation. Aorta: Normal aortic root diameter. Pericardium: No pericardial effusion. Masses / Shunts: No masses, shunts or thrombi seen. Willian Dickinson MD   (Electronically Signed)   Final Date: 04 June 2019 13:13   Other Result Information   This result has an attachment that is not available.    Result Narrative        ECHOCARDIOGRAPHIC REPORT     Exam Date: 2019 09:31 Gender: M Referring Physician: Derek Romero   Name: Ollie Little :  Sonographer: Jayla Acevedo   CPI: [id number] BP: 146 / 81 Ht: 66 Wt: 153 BSA: 1.8     Type of Exam: ECHO CARDIOGRAM COMPLETE   Procedure: 2-D echocardiogram,Color flow analysis, Spectral Doppler analysis   ____________________________________________________________________________________________________   __   Other Result Information   Interface, [employer] - 2019  1:13 PM EDT       ECHOCARDIOGRAPHIC REPORT     Exam Date: 2019 09:31 Gender: M Referring Physician: Derek Romero   Name: Ollie Little :  Sonographer: Jayla Acevedo   CPI: [id number] BP: 146 / 81 Ht: 77 Wt: 153 BSA: 1.8     Type of Exam: ECHO CARDIOGRAM COMPLETE   Procedure: 2-D echocardiogram,Color flow analysis, Spectral Doppler analysis  ____________________________________________________________________________________________________   __   IMPRESSION:   ATRIAL FIBRILLATION   NORMAL LEFT VENTRICULAR CAVITY SIZE AND WALL THICKNESS   REDUCED LEFT VENTRICULAR SYSTOLIC FUNCTION   VISUALLY ESTIMATED EJECTION FRACTION 45-50%   BIPLANE EJECTION FRACTION 37%   AT LEAST GRADE II DIASTOLIC DYSFUNCTION (LAE, TR V-MAX)   NORMAL RIGHT VENTRICULAR SIZE WITH REDUCED FUNCTION   DILATED LEFT ATRIUM   DILATED RIGHT ATRIUM   MILD TO MODERATE MITRAL REGURGITATION   MODERATE TRICUSPID REGURGITATION   RVSP 40-45 MMHG     DISCREPANCY IN EJECTION FRACTION BETWEEN BIPLANE EF ANF VISUAL EF LIKELY RELATED TO SAMPLING   ERROR WITH THE BIPLANE EF RELYING ON AN ISOLATED BEAT.  ATRIAL FIBRILLATION, WITH ITS VARIABLE R-   R INTERVAL CAN HAVE A VARIABLE QUANTITATIVE EF BASED ON SHORT VS. LONG R-R INTERVALS,   DIASTOLIC FILLING TIME AND THE ASSOCIATED CHANGES IN THE STARLING MECHANICS     NO PREVIOUS REPORT FOR AVAILABLE FOR COMPARISON     Clinical Indications: Chronic atrial fibrillation ([address]   ICD Codes: I48.2 Technical Quality: Fair     MEASUREMENTS:   2D ECHO    LV Diastolic Diameter Base LX     3.8 cm                6.3-4.0   LV Systolic Diameter Base LX      2.9 cm                8.3-8.1   IVS Diastolic Thickness           1.1 cm                0.6-1.1 cm   LVPW Diastolic Thickness          1 cm                  9.3-3.6 cm   LA Systolic Diameter LX           4.6 cm                2.1-3.7 cm   Aortic Root Diameter              3.5 cm                1.6-4.4 cm   LA Systolic Pressure              19.7 mmHg   LA Area 4C View                   26 cm²   LA Area 2C View                   28.2 cm²   LA Length 4C                      6.1 cm   LA Length 2C                      6.2 cm   LA Volume                         83.3 cm³   PV Peak Gradient                  1 mmHg       DOPPLER    AV Peak Velocity                  107 cm/s   AV Peak Gradient                  4.6 mmHg   LVOT Peak Velocity                47.1 cm/s   LVOT Peak Gradient                0.89 mmHg   Mitral E Point Velocity           93.1 cm/s   Mitral  A Point Velocity          23.3 cm/s   Mitral A Duration                 106 ms   Mitral E to A Ratio               4                     1.0 to 1.5   Mitral E to LV E' Septal Ratio    19.4   Mitral E to LV E' Lateral Ratio   14.3   MV Deceleration Time              174 ms                160-240 ms   Isovolumic Relaxation Time        106 ms                70-90 ms   E Prime Velocity                  6.5 cm/s   PV Peak Velocity                  51.6 cm/s   PV Peak Gradient                  1.1 mmHg   RA Pressure (Entered Value)       3 mmHg   TR Peak Velocity                  3.1 m/s   TR Peak Gradient                  49.0 mmHg   RV Systolic Pressure              40.7 mmHg       FINDINGS:     Left Ventricle: Normal cavity size. Normal wall thickness. Left Ventricle reduced systolic function. Biplane EF 37%. Visually estimated EF 45-50%.  At least grade II   Function: diastolic dysfunction     LVEF: 50 %       Left Atrium: Dilated left atrium. Estimated LA index is 52 mL/m2. Right Ventricle: Normal right ventricular size. Reduced right ventricular global systolic function. Tricuspid Annular   Plane Systolic Excursion (TAPSE) is 0.78 cm. Tricuspid Annular Plane Systolic velocity (TAPSV) is   4.90 cm/s. RVSP 40-45 mmHg   Right Atrium: Dilated right atrium. Mitral Valve: Structurally normal mitral valve. Mild to moderate mitral regurgitation. Aortic Valve: Sclerotic trileaflet aortic valve without stenosis. No evidence of aortic regurgitation. Tricuspid Valve: Normally structured tricuspid valve. Moderate tricuspid regurgitation. Tricuspid regurgitation is   2.6 m/sec   Pulmonic Valve: Structurally normal pulmonic valve. Trace pulmonic regurgitation. Aorta: Normal aortic root diameter. Pericardium: No pericardial effusion. Masses / Shunts: No masses, shunts or thrombi seen. Tim Green MD   (Electronically Signed)   Final Date: 04 June 2019 13:13           Assessment and Plan:  [de-identified] y.o. male with:    Impression:  1. Right-sided pleural effusion: Incidentally found in February on CT abdomen pelvis performed for weight loss. Appears to persist on repeat imaging in April and again today in clinic. Etiology unclear. Patient does have a history of esophageal cancer status post esophagectomy with pull-through, which would make malignancy on the differential, but much less likely as patient is relatively asymptomatic and effusion has not increased in size. Otherwise the differential remains fairly broad, cardiac etiologies versus liver versus hypoalbuminemia, versus lymphatic damage during surgery. 2.  Dyspnea on exertion: Etiology likely cardiac in nature given abnormal echo, not likely due to pleural effusion as the effusion is small and the patient has no underlying intraparenchymal pulmonary pathology.   3.  Diastolic CHF/cardiomyopathy/valvular heart disease: Seen on transthoracic echo from 6/4/2019 showing biatrial dilation with moderate mitral regurg and grade 2 diastolic dysfunction  4. Liver lesion: Seen on CT abdomen pelvis with IV contrast from 2/7/2019. Report shows heterogeneous hypoattenuation of the liver with a hypodense lesion 5 mm in size in the inferior right hepatic lobe. Radiology notes that the patient likely has hepatic steatosis but they cannot exclude metastatic disease or early cirrhotic changes. 5.  History of DVT: Diagnosed in 2003. Patient on warfarin therapy for chronic A. Fib. 6.  History of tobacco use: Quit 20 years ago, prior 1PPD smoker at max  7. Mild dysphagia: Etiology due to prior esophagectomy. Plan:  -Chest x-ray PA and lateral obtained from clinic today, personally interpreted, shows chronic right-sided pleural effusion, small. Based on the persistence of this effusion, and patient's history of esophageal cancer, I have advised the patient undergo diagnostic thoracentesis. I reviewed the potential etiologies at this lesion including those listed above, noting that many of these are benign. Patient agreeable to sampling of fluid in order to assist with determining the etiology.  -Ultrasound-guided thoracentesis by IR ordered. This will need to be coordinated with patient's PCP given that the patient is on warfarin therapy, my staff will contact Dr. Dominic Vizcaino office to assist with this.  -Pleural fluid studies ordered including, cell count with differential, Gram stain and culture, LDH, protein, albumin, cytology, cholesterol. Advised patient to get labs on same day, serum LDH and hepatic function panel ordered  -Full PFTs at next visit  -Given liver lesion seen on CT abdomen, and hepatic steatosis, we will refer patient to GI for further work-up.   I will defer to their discretion whether to order CT liver protocol versus MRI liver protocol regarding liver lesion  -Advised patient to follow-up with his cardiologist regarding abnormal transthoracic echo. Further management of diastolic heart failure/cardiomyopathy/valvular heart disease per cardiology  -Counseled patient regarding aspiration lifestyle precautions. Advised patient that if he continues to have worsening dysphagia, he may pursue pharyngeal strengthening therapy with speech-language pathology  -Advised patient remain active  -Immunizations reviewed, pneumococcal and influenza vaccinations up-to-date. Follow-up and Dispositions    · Return in about 1 month (around 7/4/2019).          Orders Placed This Encounter    AMB POC PFT COMPLETE W/BRONCHODILATOR    AMB POC PFT COMPLETE W/O BRONCHODILATOR    GAS DILUT/WASHOUT LUNG VOL W/WO DISTRIB VENT&VOL    DIFFUSING CAPACITY    CULTURE, BODY FLUID W GRAM STAIN    XR CHEST PA LAT    US THORACENTESIS RT NDL W IMAGE    PROTEIN TOTAL, FLUID ([employer] Only)    ALBUMIN, FLUID ([facility] Only)    LDH, BODY FLUID    LD    HEPATIC FUNCTION PANEL    CELL COUNT AND DIFF, BODY FLUID    PATHOLOGY SPECIMEN TO LAB    CHOLESTEROL, FLUID    REFERRAL TO GASTROENTEROLOGY       Miguel A Alicia MD/MPH     Pulmonary, Critical Care Medicine  [facility]

## 2019-06-06 NOTE — PROGRESS NOTES
Malgorzata Daniel presents today for   Chief Complaint   Patient presents with   [facility] Referral / Consult     referred by  Dr. Renee Brooks for pleural effusion; CT 2/26/2019; CXR 4/11/2019; Echo 6/4/2019       Is someone accompanying this pt? No    Is the patient using any DME equipment during OV? No    -DME Company N/A    Depression Screening:  3 most recent PHQ Screens 6/6/2019   Little interest or pleasure in doing things Not at all   Feeling down, depressed, irritable, or hopeless Not at all   Total Score PHQ 2 0       Learning Assessment:  Learning Assessment 1/19/2016   PRIMARY LEARNER Patient   HIGHEST LEVEL OF EDUCATION - PRIMARY LEARNER  SOME COLLEGE   BARRIERS PRIMARY LEARNER NONE   CO-LEARNER CAREGIVER No   PRIMARY LANGUAGE ENGLISH    NEED No   LEARNER PREFERENCE PRIMARY DEMONSTRATION   ANSWERED BY patient   RELATIONSHIP SELF       Abuse Screening:  Abuse Screening Questionnaire 6/6/2019   Do you ever feel afraid of your partner? N   Are you in a relationship with someone who physically or mentally threatens you? N   Is it safe for you to go home? Y       Fall Risk  Fall Risk Assessment, last 12 mths 6/6/2019   Able to walk? Yes   Fall in past 12 months? No         Coordination of Care:  1. Have you been to the ER, urgent care clinic since your last visit? Hospitalized since your last visit? No    2. Have you seen or consulted any other health care providers outside of the [address] [place] [address] since your last visit? Include any pap smears or colon screening. Yes. Dr. Laura Maynard, Cardiologist    Medication variance in dosage/sig per patient as follows: None.

## 2019-06-07 NOTE — PROGRESS NOTES
The patient presents to the office today with the chief complaint of dizziness    HPI    The patient complains of intermittent dizziness - not orthostatic. The patient has a history of cancer of the esophagus. He has dysphagia. He is scheduled for an upper endoscopy and a colonoscopy. The patient os on anticoagulation for atrial fibrillation. Bridging Lovenox is being used. Review of Systems   Respiratory: Negative for shortness of breath. Cardiovascular: Negative for chest pain and leg swelling. Gastrointestinal:        Dysphagia       Allergies   Allergen Reactions    Parafon Forte Dsc [Chlorzoxazone] Anaphylaxis    Aspirin Other (comments)     Cannot take due to on coumadin       Current Outpatient Prescriptions   Medication Sig Dispense Refill    metoprolol tartrate (LOPRESSOR) 50 mg tablet Take 50 mg by mouth two (2) times a day.  enoxaparin (LOVENOX) 80 mg/0.8 mL injection Last coumadin 12/7.12/10-11 take injection twice daily Nothing 12/12. Restart coumadin 12/13 pm 12/14-16 add lovenox twice daily. Recheck INR 12/18 10 Syringe 0    promethazine (PHENERGAN) 25 mg tablet TAKE 1/2 TO ONE TABLET BY MOUTH EVERY 8 HOURS AS NEEDED FOR NAUSEA 40 Tab 1    omeprazole (PRILOSEC) 20 mg capsule TAKE ONE CAPSULE BY MOUTH EVERY MORNING 30 Cap 4    raNITIdine (ZANTAC) 150 mg tablet TAKE ONE TABLET BY MOUTH TWICE DAILY 60 Tab 3    warfarin (COUMADIN) 5 mg tablet TAKE ONE AND ONE-HALF TABLET BY MOUTH ONCE DAILY 45 Tab 0    azelastine (ASTELIN) 137 mcg (0.1 %) nasal spray USE 1 SPRAY INTO EACH NOSTRIL TWICE DAILY 1 Bottle 5    allopurinol (ZYLOPRIM) 300 mg tablet TAKE ONE TABLET BY MOUTH ONCE DAILY 90 Tab 2    docusate sodium (COLACE) 100 mg capsule Take 100 mg by mouth two (2) times a day.  CALCIUM POLYCARBOPHIL (FIBERCON PO) Take  by mouth.  magnesium 250 mg tab Take  by mouth.  cyanocobalamin (VITAMIN B12) 500 mcg tablet Take 500 mcg by mouth daily.       cholecalciferol, vitamin D3, 2,000 unit tab Take  by mouth. Past Medical History:   Diagnosis Date    Abdominal pain, unspecified site     Acute upper respiratory infections of unspecified site     Atrial fibrillation ([address]     Cancer of esophagus ([address] 2001    RT and adjuvant chemotherapy, surgery    Esophageal reflux     Generalized osteoarthrosis, involving multiple sites     Gout, unspecified     Nausea alone     Sinoatrial node dysfunction ([address]     Thromboembolus ([address] 2003 (approx)    DVT in leg (IVC filter placed)    Unspecified cataract     Unspecified constipation        Past Surgical History:   Procedure Laterality Date    COLONOSCOPY N/A 12/13/2017    COLONOSCOPY with hot polypectomies performed by Ross Vo MD at [address] [facility]      esophagectomy    HX HEENT  2001    Esophageal Cancer Surgery    HX HIP REPLACEMENT Left 2005 (approx)    HX OTHER SURGICAL  2003    ivc filter placed    HX PACEMAKER  2012    [employer]       Social History     Social History    Marital status: SINGLE     Spouse name: N/A    Number of children: N/A    Years of education: N/A     Occupational History    Not on file. Social History Main Topics    Smoking status: Former Smoker    Smokeless tobacco: Never Used    Alcohol use Yes      Comment: 2 drinks per day    Drug use: No    Sexual activity: No     Other Topics Concern    Not on file     Social History Narrative       Patient does not have an advanced directive on file    Visit Vitals    /84 (BP 1 Location: Left arm, BP Patient Position: Sitting)    Pulse 72    Temp 97 °F (36.1 °C) (Tympanic)    Resp 16    Ht 5' 6\" (1.676 m)    Wt 160 lb (72.6 kg)    SpO2 98%    BMI 25.82 kg/m2       Physical Exam   No Cervical Lymphadenopathy  No Supraclavicular Lymphadenopathy  Thyroid is Normal  Lungs are normal to percussion.   Clear to auscultation   Heart:  S1 S2 are normal, No gallops, No mummers  No Carotid Bruits  Abdomen:  Normal Bowel Sounds. No tenderness. No masses. No Hepatomegaly or Splenomegly  LE:  Strong Pedal Pulses. No Edema      BMI:  [facility] Out[facility] Visit on 12/08/2017   Component Date Value Ref Range Status    Sodium 12/08/2017 133* 136 - 145 mmol/L Final    Potassium 12/08/2017 4.9  3.5 - 5.5 mmol/L Final    Chloride 12/08/2017 97* 100 - 108 mmol/L Final    CO2 12/08/2017 31  21 - 32 mmol/L Final    Anion gap 12/08/2017 5  3.0 - 18 mmol/L Final    Glucose 12/08/2017 96  74 - 99 mg/dL Final    BUN 12/08/2017 15  7.0 - 18 MG/DL Final    Creatinine 12/08/2017 0.92  0.6 - 1.3 MG/DL Final    BUN/Creatinine ratio 12/08/2017 16  12 - 20   Final    GFR est AA 12/08/2017 >60  >60 ml/min/1.73m2 Final    GFR est non-AA 12/08/2017 >60  >60 ml/min/1.73m2 Final    Comment: (NOTE)  Estimated GFR is calculated using the Modification of Diet in Renal   Disease (MDRD) Study equation, reported for both  Americans   (GFRAA) and non- Americans (GFRNA), and normalized to 1.73m2   body surface area. The physician must decide which value applies to   the patient. The MDRD study equation should only be used in   individuals age 25 or older. It has not been validated for the   following: pregnant women, patients with serious comorbid conditions,   or on certain medications, or persons with extremes of body size,   muscle mass, or nutritional status.       Calcium 12/08/2017 8.8  8.5 - 10.1 MG/DL Final   Anti-Coag visit on 12/05/2017   Component Date Value Ref Range Status    VALID INTERNAL CONTROL POC 12/05/2017 Yes   Final    INR POC 12/05/2017 1.8   Final   Anti-Coag visit on 11/20/2017   Component Date Value Ref Range Status    VALID INTERNAL CONTROL POC 11/20/2017 Yes   Final    INR POC 11/20/2017 1.9   Final   Anti-Coag visit on 10/23/2017   Component Date Value Ref Range Status    VALID INTERNAL CONTROL POC 10/23/2017 Yes   Final    INR POC 10/23/2017 2.6   Final   Hospital Outpatient Visit on 09/28/2017 Component Date Value Ref Range Status    WBC 09/28/2017 4.4* 4.6 - 13.2 K/uL Final    RBC 09/28/2017 3.57* 4.70 - 5.50 M/uL Final    HGB 09/28/2017 12.0* 13.0 - 16.0 g/dL Final    HCT 09/28/2017 35.4* 36.0 - 48.0 % Final    MCV 09/28/2017 99.2* 74.0 - 97.0 FL Final    MCH 09/28/2017 33.6  24.0 - 34.0 PG Final    MCHC 09/28/2017 33.9  31.0 - 37.0 g/dL Final    RDW 09/28/2017 15.2* 11.6 - 14.5 % Final    PLATELET 44/08/5157 164  135 - 420 K/uL Final    MPV 09/28/2017 9.8  9.2 - 11.8 FL Final    NEUTROPHILS 09/28/2017 68  40 - 73 % Final    LYMPHOCYTES 09/28/2017 24  21 - 52 % Final    MONOCYTES 09/28/2017 6  3 - 10 % Final    EOSINOPHILS 09/28/2017 1  0 - 5 % Final    BASOPHILS 09/28/2017 1  0 - 2 % Final    ABS. NEUTROPHILS 09/28/2017 3.0  1.8 - 8.0 K/UL Final    ABS. LYMPHOCYTES 09/28/2017 1.0  0.9 - 3.6 K/UL Final    ABS. MONOCYTES 09/28/2017 0.3  0.05 - 1.2 K/UL Final    ABS. EOSINOPHILS 09/28/2017 0.0  0.0 - 0.4 K/UL Final    ABS. BASOPHILS 09/28/2017 0.0  0.0 - 0.06 K/UL Final    DF 09/28/2017 AUTOMATED    Final    Sodium 09/28/2017 133* 136 - 145 mmol/L Final    Potassium 09/28/2017 4.4  3.5 - 5.5 mmol/L Final    Chloride 09/28/2017 98* 100 - 108 mmol/L Final    CO2 09/28/2017 28  21 - 32 mmol/L Final    Anion gap 09/28/2017 7  3.0 - 18 mmol/L Final    Glucose 09/28/2017 66* 74 - 99 mg/dL Final    BUN 09/28/2017 12  7.0 - 18 MG/DL Final    Creatinine 09/28/2017 0.85  0.6 - 1.3 MG/DL Final    BUN/Creatinine ratio 09/28/2017 14  12 - 20   Final    GFR est AA 09/28/2017 >60  >60 ml/min/1.73m2 Final    GFR est non-AA 09/28/2017 >60  >60 ml/min/1.73m2 Final    Comment: (NOTE)  Estimated GFR is calculated using the Modification of Diet in Renal   Disease (MDRD) Study equation, reported for both  Americans   (GFRAA) and non- Americans (GFRNA), and normalized to 1.73m2   body surface area. The physician must decide which value applies to   the patient.  The MDRD study equation should only be used in   individuals age 25 or older. It has not been validated for the   following: pregnant women, patients with serious comorbid conditions,   or on certain medications, or persons with extremes of body size,   muscle mass, or nutritional status.  Calcium 09/28/2017 8.9  8.5 - 10.1 MG/DL Final    Bilirubin, total 09/28/2017 0.8  0.2 - 1.0 MG/DL Final    ALT (SGPT) 09/28/2017 15* 16 - 61 U/L Final    AST (SGOT) 09/28/2017 25  15 - 37 U/L Final    Alk. phosphatase 09/28/2017 193* 45 - 117 U/L Final    Protein, total 09/28/2017 7.8  6.4 - 8.2 g/dL Final    Albumin 09/28/2017 3.6  3.4 - 5.0 g/dL Final    Globulin 09/28/2017 4.2* 2.0 - 4.0 g/dL Final    A-G Ratio 09/28/2017 0.9  0.8 - 1.7   Final    TSH 09/28/2017 1.80  0.36 - 3.74 uIU/mL Final    Prothrombin time 09/28/2017 20.7* 11.5 - 15.2 sec Final    INR 09/28/2017 1.9* 0.8 - 1.2   Final    Comment:            INR Therapeutic Ranges         (on stable oral anticoagulant):     INDICATION                INR  DVT/PE/Atrial Fib          2.0-3.0  MI/Mechanical Heart Valve  2.5-3.5         . No results found for any visits on 12/12/17. Assessment / Plan      ICD-10-CM ICD-9-CM    1. Dizziness R42 780.4 MRI BRAIN W CONT   2. Chronic atrial fibrillation (HCC) I48.2 427.31    3. Other dysphagia R13.19 787.29        MRI Head  Proceed with endoscopy  he was advised to continue his maintenance medications      Follow-up Disposition:  Return in about 2 months (around 2/12/2018). I asked Thanh Cates if he has any questions and I answered the questions. Thanh Brown states that he understands the treatment plan and agrees with the treatment plan [Normal] : Bimanual Exam: Normal [Fully active, able to carry on all pre-disease performance without restriction] : Status 0 - Fully active, able to carry on all pre-disease performance without restriction [de-identified] : well healed LSC scars

## 2019-06-19 ENCOUNTER — APPOINTMENT (OUTPATIENT)
Dept: GENERAL RADIOLOGY | Age: 79
End: 2019-06-19
Attending: PHYSICIAN ASSISTANT
Payer: MEDICARE

## 2019-06-19 ENCOUNTER — HOSPITAL ENCOUNTER (OUTPATIENT)
Dept: ULTRASOUND IMAGING | Age: 79
Discharge: HOME | End: 2019-06-19
Attending: RADIOLOGY | Admitting: RADIOLOGY
Payer: MEDICARE

## 2019-06-19 ENCOUNTER — TELEPHONE (OUTPATIENT)
Dept: PULMONOLOGY | Age: 79
End: 2019-06-19

## 2019-06-19 VITALS
TEMPERATURE: 97.5 F | RESPIRATION RATE: 20 BRPM | WEIGHT: 154.5 LBS | DIASTOLIC BLOOD PRESSURE: 82 MMHG | HEIGHT: 66 IN | SYSTOLIC BLOOD PRESSURE: 143 MMHG | OXYGEN SATURATION: 99 % | HEART RATE: 74 BPM | BODY MASS INDEX: 24.83 KG/M2

## 2019-06-19 DIAGNOSIS — R06.09 DYSPNEA ON EXERTION: ICD-10-CM

## 2019-06-19 DIAGNOSIS — I42.8 OTHER CARDIOMYOPATHY (HCC): ICD-10-CM

## 2019-06-19 DIAGNOSIS — R06.09 DYSPNEA ON EXERTION: Primary | ICD-10-CM

## 2019-06-19 DIAGNOSIS — Z87.891 PERSONAL HISTORY OF TOBACCO USE, PRESENTING HAZARDS TO HEALTH: ICD-10-CM

## 2019-06-19 DIAGNOSIS — K76.9 LIVER LESION: ICD-10-CM

## 2019-06-19 DIAGNOSIS — J90 PLEURAL EFFUSION ON RIGHT: ICD-10-CM

## 2019-06-19 LAB
ALBUMIN FLD-MCNC: <0.6 G/DL
ANION GAP SERPL CALC-SCNC: 5 MMOL/L (ref 3–18)
APPEARANCE FLD: CLEAR
APTT PPP: 31.3 SEC (ref 23–36.4)
BUN SERPL-MCNC: 13 MG/DL (ref 7–18)
BUN/CREAT SERPL: 16 (ref 12–20)
CALCIUM SERPL-MCNC: 9.2 MG/DL (ref 8.5–10.1)
CHLORIDE SERPL-SCNC: 95 MMOL/L (ref 100–108)
CO2 SERPL-SCNC: 31 MMOL/L (ref 21–32)
COLOR FLD: YELLOW
CREAT SERPL-MCNC: 0.83 MG/DL (ref 0.6–1.3)
EOSINOPHIL NFR FLD MANUAL: 0 %
ERYTHROCYTE [DISTWIDTH] IN BLOOD BY AUTOMATED COUNT: 15.2 % (ref 11.6–14.5)
GLUCOSE SERPL-MCNC: 95 MG/DL (ref 74–99)
HCT VFR BLD AUTO: 33.7 % (ref 36–48)
HGB BLD-MCNC: 11.9 G/DL (ref 13–16)
INR PPP: 1.3 (ref 0.8–1.2)
LDH FLD L TO P-CCNC: 8 U/L
LYMPHOCYTES NFR FLD: 49 %
MACROPHAGES NFR FLD: 42 %
MCH RBC QN AUTO: 33.1 PG (ref 24–34)
MCHC RBC AUTO-ENTMCNC: 35.3 G/DL (ref 31–37)
MCV RBC AUTO: 93.6 FL (ref 74–97)
MONOCYTES NFR FLD: 8 %
NEUTROPHILS NFR FLD: 1 %
NEUTS BAND # FLD: 0 %
NUC CELL # FLD: 516 /CU MM
PATH REV BLD -IMP: ABNORMAL
PLATELET # BLD AUTO: 122 K/UL (ref 135–420)
PMV BLD AUTO: 8.9 FL (ref 9.2–11.8)
POTASSIUM SERPL-SCNC: 4 MMOL/L (ref 3.5–5.5)
PROT FLD-MCNC: <2 G/DL
PROTHROMBIN TIME: 15.7 SEC (ref 11.5–15.2)
RBC # BLD AUTO: 3.6 M/UL (ref 4.7–5.5)
RBC # FLD: 189 /CU MM
SODIUM SERPL-SCNC: 131 MMOL/L (ref 136–145)
SPECIMEN SOURCE FLD: ABNORMAL
SPECIMEN SOURCE FLD: NORMAL
WBC # BLD AUTO: 3.2 K/UL (ref 4.6–13.2)

## 2019-06-19 PROCEDURE — 88112 CYTOPATH CELL ENHANCE TECH: CPT

## 2019-06-19 PROCEDURE — 83615 LACTATE (LD) (LDH) ENZYME: CPT

## 2019-06-19 PROCEDURE — 82042 OTHER SOURCE ALBUMIN QUAN EA: CPT

## 2019-06-19 PROCEDURE — 87070 CULTURE OTHR SPECIMN AEROBIC: CPT

## 2019-06-19 PROCEDURE — 71045 X-RAY EXAM CHEST 1 VIEW: CPT

## 2019-06-19 PROCEDURE — 88305 TISSUE EXAM BY PATHOLOGIST: CPT

## 2019-06-19 PROCEDURE — 85610 PROTHROMBIN TIME: CPT

## 2019-06-19 PROCEDURE — 32555 ASPIRATE PLEURA W/ IMAGING: CPT

## 2019-06-19 PROCEDURE — 85730 THROMBOPLASTIN TIME PARTIAL: CPT

## 2019-06-19 PROCEDURE — 74011250636 HC RX REV CODE- 250/636: Performed by: INTERNAL MEDICINE

## 2019-06-19 PROCEDURE — 84157 ASSAY OF PROTEIN OTHER: CPT

## 2019-06-19 PROCEDURE — 85027 COMPLETE CBC AUTOMATED: CPT

## 2019-06-19 PROCEDURE — 84311 SPECTROPHOTOMETRY: CPT

## 2019-06-19 PROCEDURE — 80048 BASIC METABOLIC PNL TOTAL CA: CPT

## 2019-06-19 PROCEDURE — 89051 BODY FLUID CELL COUNT: CPT

## 2019-06-19 RX ORDER — SODIUM CHLORIDE 9 MG/ML
20 INJECTION, SOLUTION INTRAVENOUS CONTINUOUS
Status: DISCONTINUED | OUTPATIENT
Start: 2019-06-19 | End: 2019-06-19 | Stop reason: HOSPADM

## 2019-06-19 RX ADMIN — SODIUM CHLORIDE 20 ML/HR: 900 INJECTION, SOLUTION INTRAVENOUS at 10:22

## 2019-06-19 NOTE — DISCHARGE INSTRUCTIONS
Patient Education        Thoracentesis: What to Expect at Home  Your Recovery  Thoracentesis (say \"clez-lj-vgq-SUSANA-sis\") is a procedure to remove fluid from the space between the lungs and the chest wall (pleural space). This procedure may also be called a \"chest tap. \" It is normal to have a small amount of fluid in the pleural space. But too much fluid can build up because of problems such as infection, heart failure, or lung cancer. The procedure may have been done to help with shortness of breath and pain caused by the fluid buildup. Or you may have had this procedure so the doctor could test the fluid to find the cause of the buildup. Your chest may be sore where the doctor put the needle or catheter into your skin (the puncture site). This usually gets better after a day or two. You can go back to work or your normal activities as soon as you feel up to it. If the doctor sent the fluid to a lab for testing, it may take several days to get the results. The doctor or nurse will discuss the results with you. This care sheet gives you a general idea about how long it will take for you to recover. But each person recovers at a different pace. Follow the steps below to feel better as quickly as possible. How can you care for yourself at home? Activity    · Rest when you feel tired. Getting enough sleep will help you recover.     · Avoid strenuous activities, such as bicycle riding, jogging, weight lifting, or aerobic exercise, until your doctor says it is okay.     · You may shower. Do not take a bath until the puncture site has healed, or until your doctor tells you it is okay.     · Ask your doctor when you can drive again.     · You may need to take 1 or 2 days off from work. It depends on the type of work you do and how you feel. Diet    · You can eat your normal diet.     · Drink plenty of fluids (unless your doctor tells you not to).    Medicines    · Your doctor will tell you if and when you can restart your medicines. He or she will also give you instructions about taking any new medicines.     · If you take blood thinners, such as warfarin (Coumadin), clopidogrel (Plavix), or aspirin, be sure to talk to your doctor. He or she will tell you if and when to start taking those medicines again. Make sure that you understand exactly what your doctor wants you to do.     · Be safe with medicines. Take pain medicines exactly as directed. ? If the doctor gave you a prescription medicine for pain, take it as prescribed. ? If you are not taking a prescription pain medicine, ask your doctor if you can take an over-the-counter medicine. ? Do not take two or more pain medicines at the same time unless the doctor told you to. Many pain medicines have acetaminophen, which is Tylenol. Too much acetaminophen (Tylenol) can be harmful.     · If you think your pain medicine is making you sick to your stomach:  ? Take your medicine after meals (unless your doctor has told you not to). ? Ask your doctor for a different pain medicine.     · If your doctor prescribed antibiotics, take them as directed. Do not stop taking them just because you feel better. You need to take the full course of antibiotics.    Care of the puncture site    · Wash the area daily with warm, soapy water, and pat it dry. Don't use hydrogen peroxide or alcohol, which may delay healing. You may cover the area with a gauze bandage if it weeps or rubs against clothing. Change the bandage every day.     · Keep the area clean and dry. Follow-up care is a key part of your treatment and safety. Be sure to make and go to all appointments, and call your doctor if you are having problems. It's also a good idea to know your test results and keep a list of the medicines you take. When should you call for help? Call 911 anytime you think you may need emergency care. For example, call if:    · You passed out (lost consciousness).     · You have severe trouble breathing.   · You have sudden chest pain and shortness of breath, or you cough up blood.    Call your doctor now or seek immediate medical care if:    · You have shortness of breath that is new or getting worse.     · You have new or worse pain in your chest, especially when you take a deep breath.     · You are sick to your stomach or cannot keep fluids down.     · You have a fever over 100°F.     · Bright red blood has soaked through the bandage over your puncture site.     · You have signs of infection, such as:  ? Increased pain, swelling, warmth, or redness. ? Red streaks leading from the puncture site. ? Pus draining from the puncture site. ? Swollen lymph nodes in your neck, armpits, or groin. ? A fever.     · You cough up a lot more mucus than normal, or your mucus changes color.    Watch closely for changes in your health, and be sure to contact your doctor if you have any problems. Where can you learn more? Go to http://bird-vito.info/. Enter N508 in the search box to learn more about \"Thoracentesis: What to Expect at Home. \"  Current as of: September 5, 2018  Content Version: 11.9  © 0145-2053 [employer] Care instructions adapted under license by [employer] (which disclaims liability or warranty for this information). If you have questions about a medical condition or this instruction, always ask your healthcare professional. [place] [address] any warranty or liability for your use of this information. DISCHARGE SUMMARY from Nurse    PATIENT INSTRUCTIONS:    After general anesthesia or intravenous sedation, for 24 hours or while taking prescription Narcotics:  · Limit your activities  · Do not drive and operate hazardous machinery  · Do not make important personal or business decisions  · Do  not drink alcoholic beverages  · If you have not urinated within 8 hours after discharge, please contact your surgeon on call.     Report the following to your surgeon:  · Excessive pain, swelling, redness or odor of or around the surgical area  · Temperature over 100.5  · Nausea and vomiting lasting longer than 4 hours or if unable to take medications  · Any signs of decreased circulation or nerve impairment to extremity: change in color, persistent  numbness, tingling, coldness or increase pain  · Any questions    What to do at Home:  Recommended activity: Activity as tolerated. *  Please give a list of your current medications to your Primary Care Provider. *  Please update this list whenever your medications are discontinued, doses are      changed, or new medications (including over-the-counter products) are added. *  Please carry medication information at all times in case of emergency situations. These are general instructions for a healthy lifestyle:    No smoking/ No tobacco products/ Avoid exposure to second hand smoke  Surgeon General's Warning:  Quitting smoking now greatly reduces serious risk to your health. Obesity, smoking, and sedentary lifestyle greatly increases your risk for illness    A healthy diet, regular physical exercise & weight monitoring are important for maintaining a healthy lifestyle    You may be retaining fluid if you have a history of heart failure or if you experience any of the following symptoms:  Weight gain of 3 pounds or more overnight or 5 pounds in a week, increased swelling in our hands or feet or shortness of breath while lying flat in bed. Please call your doctor as soon as you notice any of these symptoms; do not wait until your next office visit. The discharge information has been reviewed with the patient.   The patient verbalized understanding.    ___________________________________________________________________________________________________________________________________

## 2019-06-19 NOTE — H&P
OUTPATIENT HISTORY AND PHYSICAL      Today 6/19/2019     Indication/Symptoms:   Dick Mitchell is a [de-identified] y.o. male with a right pleural effusion who presents for an image-guided right thoracentesis. Patient has held Coumadin for 5 days. Current Meds:    Prior to Admission medications    Medication Sig Start Date End Date Taking? Authorizing Provider   omeprazole (PRILOSEC) 20 mg capsule TAKE ONE CAPSULE BY MOUTH EVERY MORNING 6/6/19  Yes Burnice Carrel, MD   ondansetron (ZOFRAN ODT) 4 mg disintegrating tablet 1 tab twice per day as needed for nausea 5/16/19  Yes Burnice Carrel, MD   thiamine HCL (VITAMIN B-1) 100 mg tablet Take 100 mg by mouth daily. Yes Provider, Historical   ipratropium (ATROVENT) 0.03 % nasal spray 2 Sprays by Both Nostrils route two (2) times a day. 2/9/19  Yes Burnice Carrel, MD   allopurinol (ZYLOPRIM) 300 mg tablet TAKE ONE TABLET BY MOUTH ONCE DAILY 1/23/19  Yes Burnice Carrel, MD   metoprolol tartrate (LOPRESSOR) 50 mg tablet Take 50 mg by mouth two (2) times a day. Yes Provider, Historical   magnesium 250 mg tab Take  by mouth. Yes Provider, Historical   cyanocobalamin (VITAMIN B12) 500 mcg tablet Take 500 mcg by mouth daily. Yes Provider, Historical   cholecalciferol, vitamin D3, 2,000 unit tab Take 1 Tab by mouth daily. Yes Provider, Historical   docusate sodium (COLACE) 100 mg capsule Take 100 mg by mouth daily. Provider, Historical   promethazine (PHENERGAN) 25 mg tablet TAKE 1/2 TO 1 TABLET BY MOUTH EVERY 8 HOURS AS NEEDED FOR NAUSEA 5/1/19   Burnice Carrel, MD   warfarin (COUMADIN) 5 mg tablet TAKE ONE AND ONE-HALF TABLET BY MOUTH ONCE DAILY 2/28/19   Burnice Carrel, MD   raNITIdine (ZANTAC) 150 mg tablet TAKE ONE TABLET BY MOUTH TWICE DAILY 11/15/18   Burnice Carrel, MD   azelastine (ASTELIN) 137 mcg (0.1 %) nasal spray USE 1 SPRAY INTO EACH NOSTRIL TWICE DAILY 3/12/18   Burnice Carrel, MD   CALCIUM POLYCARBOPHIL (FIBERCON PO) Take 1 Tab by mouth daily. Provider, Historical       Allergies: Allergies   Allergen Reactions    Parafon Forte Dsc [Chlorzoxazone] Anaphylaxis    Aspirin Other (comments)     Cannot take due to on coumadin       Comorbid Conditions:    Past Medical History:   Diagnosis Date    Abdominal pain, unspecified site     Acute upper respiratory infections of unspecified site     Atrial fibrillation ([address]     Cancer of esophagus ([address] 2001    RT and adjuvant chemotherapy, surgery    Esophageal reflux     Generalized osteoarthrosis, involving multiple sites     Gout, unspecified     Nausea alone     Sinoatrial node dysfunction ([address]     Thromboembolus ([address] 2003 (approx)    DVT in leg (IVC filter placed)    Unspecified cataract     Unspecified constipation           Past Surgical History:   Procedure Laterality Date    COLONOSCOPY N/A 12/13/2017    COLONOSCOPY with hot polypectomies performed by Sonja Valladares MD at [address] [facility]      esophagectomy    HX HEENT  2001    Esophageal Cancer Surgery    HX HIP REPLACEMENT Left 2005 (approx)    HX OTHER SURGICAL  2003    ivc filter placed    HX PACEMAKER  2012    [employer]     Data:    Visit Vitals  /79 (BP 1 Location: Left arm, BP Patient Position: At rest)   Pulse 73   Temp 97.6 °F (36.4 °C)   Resp 20   Ht 5' 6\" (1.676 m)   Wt 70.1 kg (154 lb 8 oz)   SpO2 97%   BMI 24.94 kg/m²   :  Recent Labs     06/19/19  1015   *     Recent Labs     06/19/19  1015   INR 1.3*   APTT 31.3       The H & P and/or progress notes and any available imaging were reviewed. The risks, indications and possible alternatives to the procedure, including doing nothing, were discussed and informed consent was obtained. Physical Exam:      Mental status:   Alert and oriented. Examination specific to the procedure proposed to be performed and any co morbid conditions:      Heart:   Regular rate. Lungs:   Normal respiratory effort.  Symmetrical rise and fall of chest    The patient is an appropriate candidate to undergo the planned procedure.     Peggy Nava

## 2019-06-19 NOTE — PROCEDURES
RADIOLOGY POST THORACENTESIS NOTE     June 19, 2019       12:29 PM     :  Cullen Cotto PA-C    Assistant:  None. Procedure:  US-guided right thoracentesis     Pre-operative Diagnosis: Right pleural effusion    Post-operative Diagnosis: same    Procedure Details: Informed consent obtained. Standard aseptic technique. Ultrasound guidance. Right posterior intercostal approach. 1% lidocaine for local anesthesia. 5 [place] sheathed needle connected to vacuum bottle. 660mL of fluid removed. Please see final dictated report for full details. Complications:  No immediate. Estimated blood Loss:  Minimal               Condition: Stable. Impression:  Successful thoracentesis. Plan: Post procedure chest xray pending.       Peggy Louis

## 2019-06-19 NOTE — PROGRESS NOTES
Order placed for Full PFT, per Verbal Order from Dr. Caroline Becker on 6/19/2019. Last office visit: 6/6/19  Follow up Visit: Due July 2019    Provider is aware of last office visit and follow up. No further action requested from provider.

## 2019-06-19 NOTE — TELEPHONE ENCOUNTER
Per pt, Dr Osmani Pham prescribed him lasix and he wants to know if he should go ahead and start it or wait until after he sees Dr. Jodie Cardona on 7/3. Please call [phone].

## 2019-06-20 LAB
CHOLEST FLD-MCNC: <50 MG/DL
SPECIMEN SOURCE FLD: NORMAL

## 2019-06-24 LAB
BACTERIA SPEC CULT: NORMAL
GRAM STN SPEC: NORMAL
GRAM STN SPEC: NORMAL
SERVICE CMNT-IMP: NORMAL

## 2019-06-25 ENCOUNTER — CLINICAL SUPPORT (OUTPATIENT)
Dept: FAMILY MEDICINE CLINIC | Facility: CLINIC | Age: 79
End: 2019-06-25

## 2019-06-25 DIAGNOSIS — I48.20 CHRONIC ATRIAL FIBRILLATION (HCC): Primary | ICD-10-CM

## 2019-06-25 LAB
INR BLD: 1.5
PT POC: NORMAL SECONDS
VALID INTERNAL CONTROL?: YES

## 2019-06-25 RX ORDER — WARFARIN SODIUM 5 MG/1
TABLET ORAL
Qty: 45 TAB | Refills: 0 | Status: SHIPPED | OUTPATIENT
Start: 2019-06-25 | End: 2019-10-07 | Stop reason: SDUPTHER

## 2019-06-25 NOTE — PROGRESS NOTES
Mr. Joan Kapoor is here today for anticoagulation monitoring for the diagnosis of Atrial Fibrillation. His INR goal is 2.0-3.0 and his current Coumadin dose is 5mg 4 days a week, 2.5 mg 3 days a week. Today's findings include an INR of 1.5 (normal INR range 0.8-1.2) . Considering Mr. Zamzam Rubio past history, todays findings, and per the coumadin policy/protocol, Mr. Cesilia Pretty was instructed to take Coumadin as follows,  5 mg x 5 days a week, 2.5 mg x 2 days a week. He was also instructed to schedule an appointment in 1 1/2 weeks prior to leaving for an INR check. A full discussion of the nature of anticoagulants has been carried out. A full discussion of the need for frequent and regular monitoring, precise dosage adjustment and compliance was stressed. Side effects of potential bleeding were discussed and Mr. Cesilia Pretty was instructed to call [phone] if there are any signs of abnormal bleeding. Mr. Cesilia Pretty was instructed to avoid any OTC items containing aspirin or ibuprofen and prior to starting any new OTC products to consult with his physician or pharmacist to ensure no drug interactions are present. Mr. Cesilia Pretty was instructed to avoid any major changes in his general diet and to avoid alcohol consumption. .  Mr. Cesilia Pretty verbalized his understanding of all instructions and will call the office with any questions, concerns, or signs of abnormal bleeding or blood clot.

## 2019-07-09 ENCOUNTER — HOSPITAL ENCOUNTER (OUTPATIENT)
Dept: RESPIRATORY THERAPY | Age: 79
Discharge: HOME | End: 2019-07-09
Attending: INTERNAL MEDICINE
Payer: MEDICARE

## 2019-07-09 ENCOUNTER — CLINICAL SUPPORT (OUTPATIENT)
Dept: FAMILY MEDICINE CLINIC | Facility: CLINIC | Age: 79
End: 2019-07-09

## 2019-07-09 DIAGNOSIS — R06.09 DYSPNEA ON EXERTION: ICD-10-CM

## 2019-07-09 DIAGNOSIS — I48.20 CHRONIC ATRIAL FIBRILLATION (HCC): Primary | ICD-10-CM

## 2019-07-09 LAB
INR BLD: 2.2
PT POC: NORMAL SECONDS
VALID INTERNAL CONTROL?: YES

## 2019-07-09 PROCEDURE — 94729 DIFFUSING CAPACITY: CPT

## 2019-07-09 PROCEDURE — 94060 EVALUATION OF WHEEZING: CPT

## 2019-07-09 PROCEDURE — 94726 PLETHYSMOGRAPHY LUNG VOLUMES: CPT

## 2019-07-09 NOTE — PROGRESS NOTES
Mr. Jorden Holguin is here today for anticoagulation monitoring for the diagnosis of Atrial Fibrillation. His INR goal is 2.0-3.0 and his current Coumadin dose is 5 mg x 5 days a week, 2.5 mg all other days. Today's findings include an INR of 2.2 (normal INR range 0.8-1.2) . Considering Mr. Hyun Varner past history, todays findings, and per the coumadin policy/protocol, Mr. SAVAGE [facility] was instructed to take Coumadin as follows,  Same dose. He was also instructed to schedule an appointment in 3 weeks prior to leaving for an INR check. A full discussion of the nature of anticoagulants has been carried out. A full discussion of the need for frequent and regular monitoring, precise dosage adjustment and compliance was stressed. Side effects of potential bleeding were discussed and Mr. SAVAGE [facility] was instructed to call [phone] if there are any signs of abnormal bleeding. Mr. SAVAGE [facility] was instructed to avoid any OTC items containing aspirin or ibuprofen and prior to starting any new OTC products to consult with his physician or pharmacist to ensure no drug interactions are present. Mr. SAVAGE [facility] was instructed to avoid any major changes in his general diet and to avoid alcohol consumption. .      Mr. SAVAGE [facility] verbalized his understanding of all instructions and will call the office with any questions, concerns, or signs of abnormal bleeding or blood clot.

## 2019-07-18 ENCOUNTER — HOSPITAL ENCOUNTER (OUTPATIENT)
Dept: ULTRASOUND IMAGING | Age: 79
Discharge: HOME | End: 2019-07-18
Attending: INTERNAL MEDICINE
Payer: MEDICARE

## 2019-07-18 DIAGNOSIS — R93.89 ABNORMAL CT SCAN: ICD-10-CM

## 2019-07-18 DIAGNOSIS — R19.4 CHANGE IN BOWEL HABITS: ICD-10-CM

## 2019-07-18 DIAGNOSIS — K59.00 CONSTIPATION: ICD-10-CM

## 2019-07-18 PROCEDURE — 76705 ECHO EXAM OF ABDOMEN: CPT

## 2019-07-19 RX ORDER — OMEPRAZOLE 20 MG/1
CAPSULE, DELAYED RELEASE ORAL
Qty: 30 CAP | Refills: 0 | Status: SHIPPED | OUTPATIENT
Start: 2019-07-19

## 2019-07-24 ENCOUNTER — OFFICE VISIT (OUTPATIENT)
Dept: PULMONOLOGY | Age: 79
End: 2019-07-24

## 2019-07-24 VITALS
SYSTOLIC BLOOD PRESSURE: 134 MMHG | WEIGHT: 145 LBS | RESPIRATION RATE: 16 BRPM | OXYGEN SATURATION: 99 % | DIASTOLIC BLOOD PRESSURE: 72 MMHG | TEMPERATURE: 97.4 F | HEART RATE: 80 BPM | BODY MASS INDEX: 23.3 KG/M2 | HEIGHT: 66 IN

## 2019-07-24 DIAGNOSIS — J98.4 RESTRICTIVE LUNG DISEASE: ICD-10-CM

## 2019-07-24 DIAGNOSIS — I50.32 CHRONIC DIASTOLIC CONGESTIVE HEART FAILURE (HCC): ICD-10-CM

## 2019-07-24 DIAGNOSIS — J90 PLEURAL EFFUSION ON RIGHT: Primary | ICD-10-CM

## 2019-07-24 DIAGNOSIS — K76.9 LIVER DISEASE: ICD-10-CM

## 2019-07-24 DIAGNOSIS — R94.2 DECREASED DIFFUSION CAPACITY OF LUNG: ICD-10-CM

## 2019-07-24 DIAGNOSIS — R06.09 DYSPNEA ON EXERTION: ICD-10-CM

## 2019-07-24 RX ORDER — FUROSEMIDE 20 MG/1
20 TABLET ORAL DAILY
COMMUNITY
Start: 2019-06-07 | End: 2021-08-04

## 2019-07-24 RX ORDER — METOPROLOL TARTRATE 50 MG/1
TABLET ORAL
COMMUNITY
Start: 2019-03-29 | End: 2019-07-24

## 2019-07-24 NOTE — LETTER
7/27/19 Patient: Rahat Villafana  
YOB: 1940 Date of Visit: 7/24/2019 Kera Contreras MD 
[address] [facility] [address] [facility] [address] VIA In Basket Britta Jimenez [facility] [address] [address] [address] [address] VIA Facsimile: [phone] Karmen Multani MD 
[address] [facility] [address] [facility] [id number] [address] [address] VIA In Basket Dear MD Miranda Howell MD Renetta Poll, MD, Thank you for referring Mr. Shine to [facility] [place] for evaluation. My notes for this consultation are attached. If you have questions, please do not hesitate to call me. I look forward to following your patient along with you. Sincerely, Justin Soto MD/[facility] Pulmonary, Critical Care [facility]
unknown-confused/no

## 2019-07-24 NOTE — PROGRESS NOTES
[address] [facility], [address] [facility]  Pulmonary, Critical Care, and Sleep Medicine    Pulmonary Office F/U  Name: Lianna Mcnamara [de-identified] y.o. male  MRN: [id number]  : 1940  Service Date: 19  Chief Complaint:   Chief Complaint   Patient presents with    Pleural Effusion     followup form 2019    Breathing Problem     CAMPO    Other     history of tobacco use    Results     thoracentesis 2019, CXR 2019, PFT 2019     History of Present Illness:  Lianna Mcnamara is a [de-identified] y.o. male, who presents to Pulmonary clinic for follow-up of pleural effusion. Patient was last seen in our clinic on 2019  In the interval, patient underwent diagnostic thoracentesis on 2019. In the interval, patient reports that he contacted his cardiologist and was started on Lasix 20 mg once daily. Patient reports he has had increased urine output with this medication. Patient reports some issues with tingling in the legs. Patient reports no changes to dyspnea, present with mild to moderate exertion, exacerbated when carrying things. Patient reports he is able to use a stationary bike without issue. Patient does report issues walking carrying things, as well as climbing up steps. Symptoms are alleviated with rest, no other modifying factors. Patient reports some continued dysphasia with intermittent aspiration. Otherwise he is doing well. Patient denies angina, fevers, chills, night sweats, nausea, vomiting, diarrhea, sputum, hemoptysis, voice hoarseness, LE swelling.       Past Medical History:   Diagnosis Date    Abdominal pain, unspecified site     Acute upper respiratory infections of unspecified site     Atrial fibrillation (HCC)     Cancer of esophagus ([facility] [address]     RT and adjuvant chemotherapy, surgery    Esophageal reflux     Generalized osteoarthrosis, involving multiple sites     Gout, unspecified     Nausea alone     Sinoatrial node dysfunction ([facility] [address]     Status post thoracentesis 06/2019    Thromboembolus ([facility] [address] 2003 (approx)    DVT in leg (IVC filter placed)    Unspecified cataract     Unspecified constipation        Past Surgical History:   Procedure Laterality Date    COLONOSCOPY N/A 12/13/2017    COLONOSCOPY with hot polypectomies performed by Mariza Stauffer MD at [facility]    HX GI      esophagectomy    HX HEENT  2001    Esophageal Cancer Surgery    HX HIP REPLACEMENT Left 2005 (approx)    HX OTHER SURGICAL  2003    ivc filter placed    HX PACEMAKER  2012    [employer]     Mother - metastatic ca, unknown primary  Family History   Problem Relation Age of Onset    Heart Disease Father     Alzheimer Father      Social History     Socioeconomic History    Marital status: SINGLE     Spouse name: Not on file    Number of children: Not on file    Years of education: Not on file    Highest education level: Not on file   Occupational History    Not on file   Social Needs    Financial resource strain: Not on file    Food insecurity:     Worry: Not on file     Inability: Not on file    Transportation needs:     Medical: Not on file     Non-medical: Not on file   Tobacco Use    Smoking status: Former Smoker     Packs/day: 0.50     Years: 34.00     Pack years: 17.00    Smokeless tobacco: Never Used   Substance and Sexual Activity    Alcohol use: Yes     Comment: 2 drinks per day    Drug use: No    Sexual activity: Never   Lifestyle    Physical activity:     Days per week: Not on file     Minutes per session: Not on file    Stress: Not on file   Relationships    Social connections:     Talks on phone: Not on file     Gets together: Not on file     Attends [employer] service: Not on file     Active member of club or organization: Not on file     Attends meetings of clubs or organizations: Not on file     Relationship status: Not on file    Intimate partner violence:     Fear of current or ex partner: Not on file Emotionally abused: Not on file     Physically abused: Not on file     Forced sexual activity: Not on file   Other Topics Concern    Not on file   Social History Narrative    Not on file     Allergies: I have reviewed the allergy hx  Allergies   Allergen Reactions    Parafon Forte Dsc [Chlorzoxazone] Anaphylaxis    Aspirin Other (comments)     Cannot take due to on coumadin       Medications:  I have reviewed the patient's medications  Prior to Admission medications    Medication Sig Start Date End Date Taking? Authorizing Provider   furosemide (LASIX) 20 mg tablet Take 20 mg by mouth daily. 6/7/19  Yes Provider, Historical   omeprazole (PRILOSEC) 20 mg capsule TAKE ONE CAPSULE BY MOUTH EVERY MORNING 7/19/19  Yes Canelo Choi MD   warfarin (COUMADIN) 5 mg tablet TAKE ONE AND ONE-HALF TABLET BY MOUTH ONCE DAILY 6/25/19  Yes Canelo Choi MD   docusate sodium (COLACE) 100 mg capsule Take 100 mg by mouth daily. Yes Provider, Historical   ondansetron (ZOFRAN ODT) 4 mg disintegrating tablet 1 tab twice per day as needed for nausea 5/16/19  Yes Canelo Choi MD   thiamine HCL (VITAMIN B-1) 100 mg tablet Take 100 mg by mouth daily. Yes Provider, Historical   ipratropium (ATROVENT) 0.03 % nasal spray 2 Sprays by Both Nostrils route two (2) times a day. 2/9/19  Yes Canelo Choi MD   allopurinol (ZYLOPRIM) 300 mg tablet TAKE ONE TABLET BY MOUTH ONCE DAILY 1/23/19  Yes Canelo Choi MD   raNITIdine (ZANTAC) 150 mg tablet TAKE ONE TABLET BY MOUTH TWICE DAILY  Patient taking differently: Take 150 mg by mouth daily. TAKE ONE TABLET BY MOUTH TWICE DAILY 11/15/18  Yes Canelo Choi MD   azelastine (ASTELIN) 137 mcg (0.1 %) nasal spray USE 1 SPRAY INTO EACH NOSTRIL TWICE DAILY 3/12/18  Yes Canelo Choi MD   metoprolol tartrate (LOPRESSOR) 50 mg tablet Take 50 mg by mouth two (2) times a day. Yes Provider, Historical   CALCIUM POLYCARBOPHIL (FIBERCON PO) Take 1 Tab by mouth daily.    Yes Provider, Historical   magnesium 250 mg tab Take 1 Tab by mouth daily. Yes Provider, Historical   cyanocobalamin (VITAMIN B12) 500 mcg tablet Take 500 mcg by mouth daily. Yes Provider, Historical   cholecalciferol, vitamin D3, 2,000 unit tab Take 1 Tab by mouth daily. Yes Provider, Historical   metoprolol tartrate (LOPRESSOR) 50 mg tablet TAKE ONE TABLET BY MOUTH TWICE DAILY 3/29/19 7/24/19  Provider, Historical   promethazine (PHENERGAN) 25 mg tablet TAKE 1/2 TO 1 TABLET BY MOUTH EVERY 8 HOURS AS NEEDED FOR NAUSEA 5/1/19   Jill Jeronimo MD       Immunizations:  I have reviewed the patient's immunizations  Immunization History   Administered Date(s) Administered    Influenza High Dose Vaccine PF 09/13/2016, 09/19/2017    Influenza Vaccine (Quad) PF 10/13/2015    Pneumococcal Conjugate (PCV-13) 07/10/2018    Pneumococcal Vaccine (Unspecified Type) 11/17/2005    Td 11/20/2009    Zoster Vaccine, Live 01/09/2014       Review of Systems:  A complete review of systems was performed as stated in the HPI, all others are negative.       Objective:    Physical Exam:  /72 (BP 1 Location: Left arm, BP Patient Position: Sitting)   Pulse 80   Temp 97.4 °F (36.3 °C) (Oral)   Resp 16   Ht 5' 6\" (1.676 m)   Wt 65.8 kg (145 lb)   SpO2 99%   BMI 23.40 kg/m²   Vitals were personally reviewed  Gen: no acute distress, pleasant and cooperative, sitting up in chair, able to climb to exam table w/o difficulty  HEENT: normocephalic/atraumatic, PERRLA, EOMI, no scleral icterus, no oral lesions, poor dentition, Mallampati III  Neck: supple, trachea midline, no JVD, no cervical and supraclavicular adenopathy  Chest: no lesions, with even rise and fall  CVS: regular rate rhythm, S1/S2, no murmurs/rubs/gallops  Lungs: good air entry B/L, dullness to percussion over right lower lobe, decreased breath sounds over right lower lobe on auscultation, no wheezes/rales/rhonchi throughout all other lung fields  Abdomen: soft, nontender, bowel sounds present, no hepatosplenomegaly  Ext: no pitting edema B/L, no peripheral cyanosis or clubbing  Neuro: grossly normal, AAOx3, normal strength and coordination grossly, no focal deficits  Skin: no rashes, erythema, lesions  Psych: normal memory, thought content, and processing    Labs: I have reviewed the patient's available labs  Lab Results   Component Value Date/Time    WBC 3.2 (L) 06/19/2019 10:15 AM    HGB 11.9 (L) 06/19/2019 10:15 AM    HCT 33.7 (L) 06/19/2019 10:15 AM    PLATELET 686 (L) 30/39/2732 10:15 AM    MCV 93.6 06/19/2019 10:15 AM     Lab Results   Component Value Date/Time    Sodium 131 (L) 06/19/2019 10:15 AM    Potassium 4.0 06/19/2019 10:15 AM    Chloride 95 (L) 06/19/2019 10:15 AM    CO2 31 06/19/2019 10:15 AM    Anion gap 5 06/19/2019 10:15 AM    Glucose 95 06/19/2019 10:15 AM    BUN 13 06/19/2019 10:15 AM    Creatinine 0.83 06/19/2019 10:15 AM    BUN/Creatinine ratio 16 06/19/2019 10:15 AM    GFR est AA >60 06/19/2019 10:15 AM    GFR est non-AA >60 06/19/2019 10:15 AM    Calcium 9.2 06/19/2019 10:15 AM    Bilirubin, total 1.0 02/05/2019 03:13 PM    AST (SGOT) 27 02/05/2019 03:13 PM    Alk. phosphatase 162 (H) 02/05/2019 03:13 PM    Protein, total 7.8 02/05/2019 03:13 PM    Albumin 3.7 02/05/2019 03:13 PM    Globulin 4.1 (H) 02/05/2019 03:13 PM    A-G Ratio 0.9 02/05/2019 03:13 PM    ALT (SGPT) 14 (L) 02/05/2019 03:13 PM     Lab Results   Component Value Date/Time    INR 1.3 (H) 06/19/2019 10:15 AM    INR 2.2 (H) 12/11/2018 03:36 PM    INR 3.0 (H) 07/10/2018 11:06 AM    INR POC 2.2 07/09/2019 12:21 PM    INR POC 1.5 06/25/2019 12:28 PM    INR POC 3.3 05/09/2019 10:25 AM    Prothrombin time 15.7 (H) 06/19/2019 10:15 AM    Prothrombin time 24.3 (H) 12/11/2018 03:36 PM    Prothrombin time 30.6 (H) 07/10/2018 11:06 AM     Pleural fluid studies reviewed, pleural fluid protein less than 2, albumin less than 6, LDH 8, cholesterol less than 50. Consistent with transudate      Imaging:   I have personally reviewed patient's imaging --chest x-ray from 6/19/2019 shows clear lung fields bilaterally, no evidence of pneumothorax  Official report per radiology:  XR Results (most recent):  Results from [place] encounter on 06/19/19   XR CHEST PORT    Narrative EXAM:  XR CHEST PORT    INDICATION:   eval for right PTX    COMPARISON: 6/19/2019 at 12:01 PM    FINDINGS:     Semiupright exam obtained with resolution of the previously seen skin folds on  the right. No pneumothorax on either side. Lungs remain underinflated with  persistent atelectasis in both bases. Stable heart size. Impression IMPRESSION:    No pneumothorax following right thoracentesis. PFTs: Personally interpreted from 7/9/2019, spirometry suggestive of a restrictive defect, lung volumes show a moderate restrictive defect, diffusion capacity is moderately reduced. No BD response. TTE:  I have reviewed the patient's TTE results --last was done on 4/16/2012, shows LVEF of 55 to 60%, with no regional wall motion abnormalities, wall thickness was moderately increased. Right ventricular systolic pressure was mildly increased with an estimated peak pressure of 35 mmHg.   Transthoracic echo from 6/4/2019 done at [address] [place] [address] which shows multiple pathologies including grade 2 diastolic dysfunction, dilated left atrium and right atrium, moderate mitral regurg, moderate tricuspid regurg, RVSP of 40 to 45 mmHg  EF Echo 50     Result Impression   :   ATRIAL FIBRILLATION   NORMAL LEFT VENTRICULAR CAVITY SIZE AND WALL THICKNESS   REDUCED LEFT VENTRICULAR SYSTOLIC FUNCTION   VISUALLY ESTIMATED EJECTION FRACTION 45-50%   BIPLANE EJECTION FRACTION 37%   AT LEAST GRADE II DIASTOLIC DYSFUNCTION (LAE, TR V-MAX)   NORMAL RIGHT VENTRICULAR SIZE WITH REDUCED FUNCTION   DILATED LEFT ATRIUM   DILATED RIGHT ATRIUM   MILD TO MODERATE MITRAL REGURGITATION   MODERATE TRICUSPID REGURGITATION   RVSP 40-45 MMHG     DISCREPANCY IN EJECTION FRACTION BETWEEN BIPLANE EF ANF VISUAL EF LIKELY RELATED TO SAMPLING   ERROR WITH THE BIPLANE EF RELYING ON AN ISOLATED BEAT.  ATRIAL FIBRILLATION, WITH ITS VARIABLE R-   R INTERVAL CAN HAVE A VARIABLE QUANTITATIVE EF BASED ON SHORT VS. LONG R-R INTERVALS,   DIASTOLIC FILLING TIME AND THE ASSOCIATED CHANGES IN THE [employer]AR[employer]ING MECHANICS     NO PREVIOUS REPORT FOR AVAILABLE FOR COMPARISON     Clinical Indications: Chronic atrial fibrillation ([address]   ICD Codes: I48.2 Technical Quality: Fair     MEASUREMENTS:   2D ECHO    LV Diastolic Diameter Base LX     3.8 PB                6.4-4.5   LV Systolic Diameter Base FERNANDEZ      6.2 RM                8.0-6.0   IVS Diastolic Thickness           1.1 cm                0.6-1.1 cm   LVPW Diastolic Thickness          1                   9.6-1.9 cm   LA Systolic Diameter LX           4.6 cm                2.1-3.7 cm   Aortic Root Diameter              3.5 PG                0.9-5.9 cm   LA Systolic [id number]              04.8 mmHg   LA Area 4C View                   26 cm²   LA Area 2C View                   28.2 cm²   LA Length 4C                      6.1 cm   LA Length 2C                      6.2 cm   LA Volume                         83.3 cm³   PV Peak Gradient                  1 mmHg       DOPPLER    AV Peak Velocity                  107 cm/s   AV Peak Gradient                  4.6 mmHg   LVOT Peak Velocity                47.1 cm/s   LVOT Peak Gradient                0.89 mmHg   Mitral E Point Velocity           93.1 cm/s   Mitral  A Point Velocity          23.3 cm/s   Mitral A Duration                 106 ms   Mitral E to A Ratio               4                     1.0 to 1.5   Mitral E to LV E' Septal Ratio    19.4   Mitral E to LV E' Lateral Ratio   14.3   MV Deceleration Time              174 ms                160-240 ms   Isovolumic Relaxation Time        106 ms                70-90 ms   E Prime Velocity                  6.5 cm/s   PV Peak Velocity                  51.6 cm/s   PV Peak Gradient                  1.1 mmHg   RA Pressure (Entered Value)       3 mmHg   TR Peak Velocity                  3.1 m/s   TR Peak PAAGOKIA                  71.7 mmHg   RV Systolic DMJDEAUS              05.3 mmHg       FINDINGS:     Left Ventricle: Normal cavity size. Normal wall thickness. Left Ventricle reduced systolic function. Biplane EF 37%. Visually estimated EF 45-50%. At least grade II   Function: diastolic dysfunction     LVEF: 50 %       Left Atrium: Dilated left atrium. Estimated LA index is 52 mL/m2. Right Ventricle: Normal right ventricular size. Reduced right ventricular global systolic function. Tricuspid Annular   Plane Systolic Excursion (TAPSE) is 0.78 cm. Tricuspid Annular Plane Systolic velocity (TAPSV) is   4.90 cm/s. RVSP 40-45 mmHg   Right Atrium: Dilated right atrium. Mitral Valve: Structurally normal mitral valve. Mild to moderate mitral regurgitation. Aortic Valve: Sclerotic trileaflet aortic valve without stenosis. No evidence of aortic regurgitation. Tricuspid Valve: Normally structured tricuspid valve. Moderate tricuspid regurgitation. Tricuspid regurgitation is   2.6 m/sec   Pulmonic Valve: Structurally normal pulmonic valve. Trace pulmonic regurgitation. Aorta: Normal aortic root diameter. Pericardium: No pericardial effusion. Masses / Shunts: No masses, shunts or thrombi seen. Louis Felipe MD   (Electronically Signed)   Final Date: 2019 13:13   Other Result Information   This result has an attachment that is not available. Result Narrative        ECHOCARDIOGRAPHIC REPORT     Exam Date: 2019 09:31 Gender:  M Referring Physician: Jaylyn Tim   Name: Serina Roper :  Sonographer: Shakeel Carrasco   CPI: [id number] BP: 146 / 81 Ht: 66 Wt: 153 BSA: 1.8     Type of Exam: ECHO CARDIOGRAM COMPLETE   Procedure: 2-D echocardiogram,Color flow analysis, Spectral Doppler analysis ____________________________________________________________________________________________________   __   Other Result Information   Interface, [employer] - 2019  1:13 PM EDT       ECHOCARDIOGRAPHIC REPORT     Exam Date: 2019 09:31 Gender: M Referring Physician: Tao Arthur   Name: Deandre Meredith :  Sonographer: Carlos Coffman   CPI: [id number] BP: 146 / 81 Ht: 77 Wt: 153 BSA: 1.8     Type of Exam: ECHO CARDIOGRAM COMPLETE   Procedure: 2-D echocardiogram,Color flow analysis, Spectral Doppler analysis  ____________________________________________________________________________________________________   __   IMPRESSION:   ATRIAL FIBRILLATION   NORMAL LEFT VENTRICULAR CAVITY SIZE AND WALL THICKNESS   REDUCED LEFT VENTRICULAR SYSTOLIC FUNCTION   VISUALLY ESTIMATED EJECTION FRACTION 45-50%   BIPLANE EJECTION FRACTION 37%   AT LEAST GRADE II DIASTOLIC DYSFUNCTION (LAE, TR V-MAX)   NORMAL RIGHT VENTRICULAR SIZE WITH REDUCED FUNCTION   DILATED LEFT ATRIUM   DILATED RIGHT ATRIUM   MILD TO MODERATE MITRAL REGURGITATION   MODERATE TRICUSPID REGURGITATION   RVSP 40-45 MMHG     DISCREPANCY IN EJECTION FRACTION BETWEEN BIPLANE EF ANF VISUAL EF LIKELY RELATED TO SAMPLING   ERROR WITH THE BIPLANE EF RELYING ON AN ISOLATED BEAT.  ATRIAL FIBRILLATION, WITH ITS VARIABLE R-   R INTERVAL CAN HAVE A VARIABLE QUANTITATIVE EF BASED ON SHORT VS. LONG R-R INTERVALS,   DIASTOLIC FILLING TIME AND THE ASSOCIATED CHANGES IN THE [employer] MECHANICS     NO PREVIOUS REPORT FOR AVAILABLE FOR COMPARISON     Clinical Indications: Chronic atrial fibrillation ([address]   ICD Codes: I48.2 Technical Quality: Fair     MEASUREMENTS:   2D ECHO    LV Diastolic Diameter Base LX     3.8 cm                5.3-3.6   LV Systolic Diameter Base LX      2.9 cm                4.2-0.0   IVS Diastolic Thickness           1.1 cm                0.6-1.1 cm   LVPW Diastolic Thickness          1 cm                  0.6-1.1 cm LA Systolic Diameter LX           4.6 cm                2.1-3.7 cm   Aortic Root Diameter              3.5 cm                3.1-8.6 cm   LA Systolic Pressure              19.7 mmHg   LA Area 4C View                   26 cm²   LA Area 2C View                   28.2 cm²   LA Length 4C                      6.1 cm   LA Length 2C                      6.2 cm   LA Volume                         83.3 cm³   PV Peak Gradient                  1 mmHg       DOPPLER    AV Peak Velocity                  107 cm/s   AV Peak Gradient                  4.6 mmHg   LVOT Peak Velocity                47.1 cm/s   LVOT Peak Gradient                0.89 mmHg   Mitral E Point Velocity           93.1 cm/s   Mitral  A Point Velocity          23.3 cm/s   Mitral A Duration                 106 ms   Mitral E to A Ratio               4                     1.0 to 1.5   Mitral E to LV E' Septal Ratio    19.4   Mitral E to LV E' Lateral Ratio   14.3   MV Deceleration Time              174 ms                160-240 ms   Isovolumic Relaxation Time        106 ms                70-90 ms   E Prime Velocity                  6.5 cm/s   PV Peak Velocity                  51.6 cm/s   PV Peak Gradient                  1.1 mmHg   RA Pressure (Entered Value)       3 mmHg   TR Peak Velocity                  3.1 m/s   TR Peak Gradient                  93.2 mmHg   RV Systolic Pressure              40.7 mmHg       FINDINGS:     Left Ventricle: Normal cavity size. Normal wall thickness. Left Ventricle reduced systolic function. Biplane EF 37%. Visually estimated EF 45-50%. At least grade II   Function: diastolic dysfunction     LVEF: 50 %       Left Atrium: Dilated left atrium. Estimated LA index is 52 mL/m2. Right Ventricle: Normal right ventricular size. Reduced right ventricular global systolic function. Tricuspid Annular   Plane Systolic Excursion (TAPSE) is 0.78 cm. Tricuspid Annular Plane Systolic velocity (TAPSV) is   4.90 cm/s.  RVSP 40-45 mmHg   Right Atrium: Dilated right atrium. Mitral Valve: Structurally normal mitral valve. Mild to moderate mitral regurgitation. Aortic Valve: Sclerotic trileaflet aortic valve without stenosis. No evidence of aortic regurgitation. Tricuspid Valve: Normally structured tricuspid valve. Moderate tricuspid regurgitation. Tricuspid regurgitation is   2.6 m/sec   Pulmonic Valve: Structurally normal pulmonic valve. Trace pulmonic regurgitation. Aorta: Normal aortic root diameter. Pericardium: No pericardial effusion. Masses / Shunts: No masses, shunts or thrombi seen. Naun Coulter MD   (Electronically Signed)   Final Date: 04 June 2019 13:13           Assessment and Plan:  [de-identified] y.o. male with:    Impression:  1. Right-sided pleural effusion: Incidentally found in February on CT abdomen pelvis performed for weight loss. Patient underwent diagnostic thoracentesis on 6/19/2019. Pleural fluid studies are consistent with a transudate. This is secondary to patient's CHF/cardiomyopathy, as well as liver disease  2. Dyspnea on exertion: Etiology likely cardiac in nature given abnormal echo, not likely due to pleural effusion as the effusion is small and the patient has no underlying intraparenchymal pulmonary pathology. 3.  Diastolic CHF/cardiomyopathy/valvular heart disease: Seen on transthoracic echo from 6/4/2019 showing biatrial dilation with moderate mitral regurg and grade 2 diastolic dysfunction  4. Liver lesion: Seen on CT abdomen pelvis with IV contrast from 2/7/2019. Report shows heterogeneous hypoattenuation of the liver with a hypodense lesion 5 mm in size in the inferior right hepatic lobe. Radiology notes that the patient likely has hepatic steatosis but they cannot exclude metastatic disease or early cirrhotic changes. Hepatic steatosis redemonstrated on ultrasound from 7/18/2019.  5.  History of DVT: Diagnosed in 2003. Patient on warfarin therapy for chronic A. Fib.   6.  History of tobacco use: Quit 20 years ago, prior 1PPD smoker at max  7. Mild dysphagia: Etiology due to prior esophagectomy. 8.  Restrictive lung disease: Secondary to body habitus, no evidence of ILD on CT scan    Plan:  -Reviewed pleural fluid studies with the patient, reviewed most likely etiologies causing transudate. No indication for more invasive testing or procedures with regards to effusion. Advised patient that ongoing treatment will involve diuretic administration.  -Continue diuretics and management of CHFpEF/cardiomyopathy/valvular heart disease per cardiology. -Management of liver disease per GI  -Counseled patient regarding aspiration lifestyle precautions. Advised patient that if he continues to have worsening dysphagia, he may pursue pharyngeal strengthening therapy with speech-language pathology  -Advised patient remain active  -Immunizations reviewed, pneumococcal and influenza vaccinations up-to-date. Follow-up and Dispositions    · Return in about 6 months (around 1/24/2020).           Susanne Rodriguez MD/MPH     Pulmonary, Critical Care Medicine  [facility]

## 2019-07-24 NOTE — PROGRESS NOTES
Ginger Maciel presents today for   Chief Complaint   Patient presents with    Pleural Effusion     followup form 6/6/2019    Breathing Problem     CAMPO    Other     history of tobacco use    Results     thoracentesis 6/19/2019, CXR 6/19/2019, PFT 7/9/2019       Is someone accompanying this pt? No    Is the patient using any DME equipment during OV? No    -DME Company N/A    Depression Screening:  3 most recent PHQ Screens 7/24/2019   Little interest or pleasure in doing things Not at all   Feeling down, depressed, irritable, or hopeless Not at all   Total Score PHQ 2 0       Learning Assessment:  Learning Assessment 1/19/2016   PRIMARY LEARNER Patient   HIGHEST LEVEL OF EDUCATION - PRIMARY LEARNER  SOME COLLEGE   BARRIERS PRIMARY LEARNER NONE   CO-LEARNER CAREGIVER No   PRIMARY LANGUAGE ENGLISH    NEED No   LEARNER PREFERENCE PRIMARY DEMONSTRATION   ANSWERED BY patient   RELATIONSHIP SELF       Abuse Screening:  Abuse Screening Questionnaire 6/6/2019   Do you ever feel afraid of your partner? N   Are you in a relationship with someone who physically or mentally threatens you? N   Is it safe for you to go home? Y       Fall Risk  Fall Risk Assessment, last 12 mths 7/24/2019   Able to walk? Yes   Fall in past 12 months? No         Coordination of Care:  1. Have you been to the ER, urgent care clinic since your last visit? Hospitalized since your last visit? Yes; Where: [facility], When: 6/19/2019-thoracentesis d/t pleural effusion    2. Have you seen or consulted any other health care providers outside of the [address] [place] [address] since your last visit? Include any pap smears or colon screening. Yes.  Dr. Ed Walsh, cardiologist

## 2019-08-08 ENCOUNTER — CLINICAL SUPPORT (OUTPATIENT)
Dept: FAMILY MEDICINE CLINIC | Facility: CLINIC | Age: 79
End: 2019-08-08

## 2019-08-08 DIAGNOSIS — I48.20 CHRONIC ATRIAL FIBRILLATION (HCC): Primary | ICD-10-CM

## 2019-08-08 LAB
INR BLD: 1.8
PT POC: NORMAL
VALID INTERNAL CONTROL?: YES

## 2019-08-08 NOTE — PROGRESS NOTES
Mr. Camron Howe is here today for anticoagulation monitoring for the diagnosis of AVR. His INR goal is 2.0-3.0 and his current Coumadin dose is 2.5 mg Mon/tue, 5 mg all other days. Today's findings include an INR of 1.8 (normal INR range 0.8-1.2) . Considering Mr. Renny Hampton past history, todays findings, and per the coumadin policy/protocol, Mr. Lb Ney was instructed to take Coumadin as follows,  Same dose. He was also instructed to schedule an appointment in 1 1/2 weeks prior to leaving for an INR check. A full discussion of the nature of anticoagulants has been carried out. A full discussion of the need for frequent and regular monitoring, precise dosage adjustment and compliance was stressed. Side effects of potential bleeding were discussed and Mr. Lb Nye was instructed to call [phone] if there are any signs of abnormal bleeding. Mr. Lb Nye was instructed to avoid any OTC items containing aspirin or ibuprofen and prior to starting any new OTC products to consult with his physician or pharmacist to ensure no drug interactions are present. Mr. Lb Nye was instructed to avoid any major changes in his general diet and to avoid alcohol consumption. .      Mr. Lb Nye verbalized his understanding of all instructions and will call the office with any questions, concerns, or signs of abnormal bleeding or blood clot.

## 2019-08-20 ENCOUNTER — CLINICAL SUPPORT (OUTPATIENT)
Dept: FAMILY MEDICINE CLINIC | Facility: CLINIC | Age: 79
End: 2019-08-20

## 2019-08-20 DIAGNOSIS — I48.20 CHRONIC ATRIAL FIBRILLATION (HCC): Primary | ICD-10-CM

## 2019-08-20 LAB
INR BLD: 1.9
PT POC: NORMAL
VALID INTERNAL CONTROL?: YES

## 2019-08-20 NOTE — PROGRESS NOTES
Mr. Vitaly Turner is here today for anticoagulation monitoring for the diagnosis of Atrial Fibrillation. His INR goal is 2.0-3.0 and his current Coumadin dose is 5 mg x 5 days a week, 2.5 mg x 2 days a week. Today's findings include an INR of 1.9 (normal INR range 0.8-1.2) . Considering Mr. Yasmin Bryant past history, todays findings, and per the coumadin policy/protocol, Mr. SAVAGE Alycia [facility] was instructed to take Coumadin as follows,  Same dose. He was also instructed to schedule an appointment in 2 weeks prior to leaving for an INR check. A full discussion of the nature of anticoagulants has been carried out. A full discussion of the need for frequent and regular monitoring, precise dosage adjustment and compliance was stressed. Side effects of potential bleeding were discussed and Mr. SAVAGE Alycia [facility] was instructed to call [phone] if there are any signs of abnormal bleeding. Mr. SAVAGE [facility] was instructed to avoid any OTC items containing aspirin or ibuprofen and prior to starting any new OTC products to consult with his physician or pharmacist to ensure no drug interactions are present. Mr. SAVAGE [facility] was instructed to avoid any major changes in his general diet and to avoid alcohol consumption. .      Mr. SAVAGE [facility] verbalized his understanding of all instructions and will call the office with any questions, concerns, or signs of abnormal bleeding or blood clot.

## 2019-08-21 ENCOUNTER — TELEPHONE (OUTPATIENT)
Dept: PULMONOLOGY | Age: 79
End: 2019-08-21

## 2019-08-21 NOTE — TELEPHONE ENCOUNTER
PT CAME JV([phone]). WANTS TO KNOW IF DR GUERRERO HAS GOTTEN RESULTS WHEN FLUID WAS DRAINED FROM HIS LUNGS. PLEASE CALL HIM BACK.

## 2019-09-12 ENCOUNTER — CLINICAL SUPPORT (OUTPATIENT)
Dept: FAMILY MEDICINE CLINIC | Facility: CLINIC | Age: 79
End: 2019-09-12

## 2019-09-12 DIAGNOSIS — I48.20 CHRONIC ATRIAL FIBRILLATION (HCC): Primary | ICD-10-CM

## 2019-09-12 LAB
INR BLD: 1.9
PT POC: NORMAL
VALID INTERNAL CONTROL?: YES

## 2019-09-12 NOTE — PROGRESS NOTES
Mr. Roselyn March is here today for anticoagulation monitoring for the diagnosis of Atrial Fibrillation. His INR goal is 2.0-3.0 and his current Coumadin dose is 5 mg x 5 days a week, 2.5 mg all other days. Today's findings include an INR of 1.9 (normal INR range 0.8-1.2) . Considering Mr. Monserrat Alejandro past history, todays findings, and per the coumadin policy/protocol, Mr. Donny Monson was instructed to take Coumadin as follows,  Same dose. He was also instructed to schedule an appointment in 2 1/2 weeks prior to leaving for an INR check. A full discussion of the nature of anticoagulants has been carried out. A full discussion of the need for frequent and regular monitoring, precise dosage adjustment and compliance was stressed. Side effects of potential bleeding were discussed and Mr. Donny Monson was instructed to call [phone] if there are any signs of abnormal bleeding. Mr. Donny Monson was instructed to avoid any OTC items containing aspirin or ibuprofen and prior to starting any new OTC products to consult with his physician or pharmacist to ensure no drug interactions are present. Mr. Donny Monson was instructed to avoid any major changes in his general diet and to avoid alcohol consumption. .    Mr. Donny oMnson verbalized his understanding of all instructions and will call the office with any questions, concerns, or signs of abnormal bleeding or blood clot.

## 2019-09-16 RX ORDER — ALLOPURINOL 300 MG/1
TABLET ORAL
Qty: 90 TAB | Refills: 0 | Status: SHIPPED | OUTPATIENT
Start: 2019-09-16 | End: 2019-10-17 | Stop reason: ALTCHOICE

## 2019-09-16 NOTE — TELEPHONE ENCOUNTER
Requested Prescriptions     Pending Prescriptions Disp Refills    allopurinol (ZYLOPRIM) 300 mg tablet [Pharmacy Med Name: ALLOPURINOL 300 MG TABLET] 90 Tab 0     Sig: TAKE ONE TABLET BY MOUTH ONCE DAILY

## 2019-09-17 RX ORDER — ALLOPURINOL 300 MG/1
TABLET ORAL
Qty: 90 TAB | Refills: 0 | Status: SHIPPED | OUTPATIENT
Start: 2019-09-17 | End: 2019-10-17 | Stop reason: ALTCHOICE

## 2019-09-25 RX ORDER — ONDANSETRON 4 MG/1
TABLET, ORALLY DISINTEGRATING ORAL
Qty: 50 TAB | Refills: 0 | Status: SHIPPED | OUTPATIENT
Start: 2019-09-25 | End: 2019-12-23

## 2019-09-26 ENCOUNTER — HOSPITAL ENCOUNTER (OUTPATIENT)
Dept: LAB | Age: 79
Discharge: HOME | End: 2019-09-26
Payer: MEDICARE

## 2019-09-26 ENCOUNTER — CLINICAL SUPPORT (OUTPATIENT)
Dept: FAMILY MEDICINE CLINIC | Facility: CLINIC | Age: 79
End: 2019-09-26

## 2019-09-26 DIAGNOSIS — I48.20 CHRONIC ATRIAL FIBRILLATION (HCC): ICD-10-CM

## 2019-09-26 DIAGNOSIS — I48.20 CHRONIC ATRIAL FIBRILLATION (HCC): Primary | ICD-10-CM

## 2019-09-26 LAB
INR PPP: 1.6 (ref 0.8–1.2)
PROTHROMBIN TIME: 19.2 SEC (ref 11.5–15.2)

## 2019-09-26 PROCEDURE — 85610 PROTHROMBIN TIME: CPT

## 2019-09-26 PROCEDURE — 36415 COLL VENOUS BLD VENIPUNCTURE: CPT

## 2019-10-04 ENCOUNTER — TELEPHONE (OUTPATIENT)
Dept: FAMILY MEDICINE CLINIC | Facility: CLINIC | Age: 79
End: 2019-10-04

## 2019-10-07 RX ORDER — WARFARIN SODIUM 5 MG/1
TABLET ORAL
Qty: 45 TAB | Refills: 0 | Status: SHIPPED | OUTPATIENT
Start: 2019-10-07 | End: 2019-12-09 | Stop reason: SDUPTHER

## 2019-10-10 ENCOUNTER — HOSPITAL ENCOUNTER (OUTPATIENT)
Dept: LAB | Age: 79
Discharge: HOME | End: 2019-10-10
Payer: MEDICARE

## 2019-10-10 DIAGNOSIS — I48.20 CHRONIC ATRIAL FIBRILLATION (HCC): ICD-10-CM

## 2019-10-10 LAB
INR PPP: 2.1 (ref 0.8–1.2)
PROTHROMBIN TIME: 23.5 SEC (ref 11.5–15.2)

## 2019-10-10 PROCEDURE — 85610 PROTHROMBIN TIME: CPT

## 2019-10-10 PROCEDURE — 36415 COLL VENOUS BLD VENIPUNCTURE: CPT

## 2019-10-15 ENCOUNTER — OFFICE VISIT (OUTPATIENT)
Dept: FAMILY MEDICINE CLINIC | Facility: CLINIC | Age: 79
End: 2019-10-15

## 2019-10-15 VITALS
OXYGEN SATURATION: 99 % | HEART RATE: 84 BPM | HEIGHT: 66 IN | RESPIRATION RATE: 16 BRPM | TEMPERATURE: 96.5 F | BODY MASS INDEX: 23.3 KG/M2 | DIASTOLIC BLOOD PRESSURE: 62 MMHG | SYSTOLIC BLOOD PRESSURE: 121 MMHG | WEIGHT: 145 LBS

## 2019-10-15 DIAGNOSIS — Z23 ENCOUNTER FOR IMMUNIZATION: ICD-10-CM

## 2019-10-15 DIAGNOSIS — J90 PLEURAL EFFUSION ON RIGHT: ICD-10-CM

## 2019-10-15 DIAGNOSIS — R06.09 DYSPNEA ON EXERTION: Primary | ICD-10-CM

## 2019-10-15 DIAGNOSIS — Z79.01 ANTICOAGULANT LONG-TERM USE: ICD-10-CM

## 2019-10-15 DIAGNOSIS — K21.9 GASTROESOPHAGEAL REFLUX DISEASE WITHOUT ESOPHAGITIS: ICD-10-CM

## 2019-10-15 DIAGNOSIS — I48.20 CHRONIC ATRIAL FIBRILLATION (HCC): ICD-10-CM

## 2019-10-15 DIAGNOSIS — R53.1 WEAKNESS: ICD-10-CM

## 2019-10-15 RX ORDER — SPIRONOLACTONE 25 MG/1
25 TABLET ORAL DAILY
Qty: 30 TAB | Refills: 2 | Status: SHIPPED | OUTPATIENT
Start: 2019-10-15 | End: 2019-12-19

## 2019-10-15 NOTE — PROGRESS NOTES
Chief Complaint   [facility]   [facility]

## 2019-10-18 RX ORDER — FAMOTIDINE 20 MG/1
TABLET, FILM COATED ORAL
Qty: 30 TAB | Refills: 3
Start: 2019-10-18 | End: 2019-11-15 | Stop reason: ALTCHOICE

## 2019-10-18 NOTE — PROGRESS NOTES
The patient presents to the office today with the chief complaint of shortness of breath    HPI    The patient persist with shortness of breath. This is most marked with activity. The patient denies significant shortness of breath with sitting. The patient denies orthopnea. The patient symptoms have improved with Lasix and Spironolactone but persists. The patient also feels quite weak. This is been present for months. The patient remains on metoprolol for rate control of atrial fibrillation. Patient remains on Coumadin for anticoagulation. The patient has a remote history of cancer of the esophagus. There is no clear indication of recurrence. Abdominal CT performed in June of this year was negative. The patient is currently on omeprazole and Zantac for reflux. ROS  Positive for dyspnea and weakness as above  Negative for lower extremity edema    Allergies   Allergen Reactions    Parafon Forte Dsc [Chlorzoxazone] Anaphylaxis    Aspirin Other (comments)     Cannot take due to on coumadin       Current Outpatient Medications   Medication Sig Dispense Refill    famotidine (PEPCID) 20 mg tablet 1 tab daily (Stop Zantac) 30 Tab 3    spironolactone (ALDACTONE) 25 mg tablet Take 1 Tab by mouth daily. 30 Tab 2    warfarin (COUMADIN) 5 mg tablet TAKE ONE AND ONE-HALF TABLET BY MOUTH ONCE DAILY 45 Tab 0    ondansetron (ZOFRAN ODT) 4 mg disintegrating tablet 1 tab twice per day as needed for nausea 50 Tab 0    furosemide (LASIX) 20 mg tablet Take 20 mg by mouth daily.  omeprazole (PRILOSEC) 20 mg capsule TAKE ONE CAPSULE BY MOUTH EVERY MORNING 30 Cap 0    docusate sodium (COLACE) 100 mg capsule Take 100 mg by mouth daily.  thiamine HCL (VITAMIN B-1) 100 mg tablet Take 100 mg by mouth daily.  ipratropium (ATROVENT) 0.03 % nasal spray 2 Sprays by Both Nostrils route two (2) times a day.  1 mL 2    allopurinol (ZYLOPRIM) 300 mg tablet TAKE ONE TABLET BY MOUTH ONCE DAILY 90 Tab 0    azelastine (ASTELIN) 137 mcg (0.1 %) nasal spray USE 1 SPRAY INTO EACH NOSTRIL TWICE DAILY 1 Bottle 2    metoprolol tartrate (LOPRESSOR) 50 mg tablet Take 50 mg by mouth two (2) times a day.  magnesium 250 mg tab Take 1 Tab by mouth daily.  cyanocobalamin (VITAMIN B12) 500 mcg tablet Take 500 mcg by mouth daily.  cholecalciferol, vitamin D3, 2,000 unit tab Take 1 Tab by mouth daily.          Past Medical History:   Diagnosis Date    Abdominal pain, unspecified site     Acute upper respiratory infections of unspecified site     [facility]     Cancer of esophagus ([facility] 2001    RT and adjuvant chemotherapy, surgery    Esophageal reflux     Generalized osteoarthrosis, involving multiple sites     Gout, unspecified     Nausea alone     Sinoatrial node dysfunction (HCC)     Status post thoracentesis 06/2019    Thromboembolus ([facility] 2003 (approx)    DVT in leg (IVC filter placed)    Unspecified cataract     Unspecified constipation        Past Surgical History:   Procedure Laterality Date    COLONOSCOPY N/A 12/13/2017    COLONOSCOPY with hot polypectomies performed by Nan Mallory MD at [facility] ENDOSCOPY    HX GI      esophagectomy    HX HEENT  2001    Esophageal Cancer Surgery    HX HIP REPLACEMENT Left 2005 (approx)    HX OTHER SURGICAL  2003    ivc filter placed    HX PACEMAKER  2012    [employer]       Social History     Socioeconomic History    Marital status: SINGLE     Spouse name: Not on file    Number of children: Not on file    Years of education: Not on file    Highest education level: Not on file   Occupational History    Not on file   Social Needs    Financial resource strain: Not on file    Food insecurity:     Worry: Not on file     Inability: Not on file    Transportation needs:     Medical: Not on file     Non-medical: Not on file   Tobacco Use    Smoking status: Former Smoker     Packs/day: 0.50     Years: 34.00     Pack years: 17.00    Smokeless tobacco: Never Used   Substance and Sexual Activity    Alcohol use: Yes     Comment: 2 drinks per day    Drug use: No    Sexual activity: Never   Lifestyle    Physical activity:     Days per week: Not on file     Minutes per session: Not on file    Stress: Not on file   Relationships    Social connections:     Talks on phone: Not on file     Gets together: Not on file     Attends Sikh service: Not on file     Active member of club or organization: Not on file     Attends meetings of clubs or organizations: Not on file     Relationship status: Not on file    Intimate partner violence:     Fear of current or ex partner: Not on file     Emotionally abused: Not on file     Physically abused: Not on file     Forced sexual activity: Not on file   Other Topics Concern    Not on file   Social History Narrative    Not on file       Patient does not have an advanced directive on file    Visit Vitals  /62 (BP 1 Location: Left arm, BP Patient Position: Sitting)   Pulse 84   Temp 96.5 °F (35.8 °C) (Oral)   Resp 16   Ht 5' 6\" (1.676 m)   Wt 145 lb (65.8 kg)   SpO2 99%   BMI 23.40 kg/m²       Physical Exam  Patient appears thin and chronically ill  No Cervical Lymphadenopathy  No Supraclavicular Lymphadenopathy  Thyroid is Normal  Lungs are normal to percussion. Clear to auscultation   Heart:  S1 S2 are normal, No gallops, No murmurs  No Carotid Bruits  Abdomen:  Normal Bowel Sounds. No tenderness. No masses. No Hepatomegaly or Splenomegaly  LE:  Strong Pedal Pulses.   No Edema      BMI: [facility] Outpatient Visit on 10/10/2019   Component Date Value Ref Range Status    Prothrombin time 10/10/2019 23.5* 11.5 - 15.2 sec Final    INR 10/10/2019 2.1* 0.8 - 1.2   Final    Comment:            INR Therapeutic Ranges         (on stable oral anticoagulant):     INDICATION                INR  DVT/PE/Atrial Fib          2.0-3.0  MI/Mechanical Heart Valve  2.5-3.5     Hospital Outpatient Visit on 09/26/2019 Component Date Value Ref Range Status    Prothrombin time 09/26/2019 19.2* 11.5 - 15.2 sec Final    INR 09/26/2019 1.6* 0.8 - 1.2   Final    Comment:            INR Therapeutic Ranges         (on stable oral anticoagulant):     INDICATION                INR  DVT/PE/Atrial Fib          2.0-3.0  MI/Mechanical Heart Valve  2.5-3.5     Clinical Support on 09/12/2019   Component Date Value Ref Range Status    VALID INTERNAL CONTROL POC 09/12/2019 Yes   Final    INR POC 09/12/2019 1.9   Final   Clinical Support on 08/20/2019   Component Date Value Ref Range Status    VALID INTERNAL CONTROL POC 08/20/2019 Yes   Final    INR POC 08/20/2019 1.9   Final   Clinical Support on 08/08/2019   Component Date Value Ref Range Status    VALID INTERNAL CONTROL POC 08/08/2019 Yes   Final    INR POC 08/08/2019 1.8   Final       .No results found for any visits on 10/15/19. Assessment / Plan      ICD-10-CM ICD-9-CM    1. Dyspnea on exertion R06.09 786.09 ECHO ADULT COMPLETE   2. Encounter for immunization Z23 V03.89 INFLUENZA VACCINE INACTIVATED (IIV), SUBUNIT, ADJUVANTED, IM      ADMIN INFLUENZA VIRUS VAC   3. Chronic atrial fibrillation I48.20 427.31    4. Anticoagulant long-term use Z79.01 V58.61    5. Weakness R53.1 780.79    6. Pleural effusion on right J90 511.9    7. 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 I asked Ashley Cates if he has any questions and I answered the questions. Ashley Brown states that he understands the treatment plan and agrees with the treatment plan          THIS DOCUMENT WAS CREATED WITH A VOICE ACTIVATED DICTATION SYSTEM.   IT MAY CONTAIN TRANSCRIPTION ERRORS

## 2019-11-06 ENCOUNTER — HOSPITAL ENCOUNTER (OUTPATIENT)
Dept: NON INVASIVE DIAGNOSTICS | Age: 79
Discharge: HOME | End: 2019-11-06
Attending: INTERNAL MEDICINE
Payer: MEDICARE

## 2019-11-06 VITALS
SYSTOLIC BLOOD PRESSURE: 121 MMHG | WEIGHT: 145 LBS | DIASTOLIC BLOOD PRESSURE: 62 MMHG | HEIGHT: 66 IN | BODY MASS INDEX: 23.3 KG/M2

## 2019-11-06 DIAGNOSIS — R06.09 DYSPNEA ON EXERTION: ICD-10-CM

## 2019-11-06 LAB
ECHO AO ROOT DIAM: 3.44 CM
ECHO LA AREA 4C: 26.9 CM2
ECHO LA VOL 2C: 57.29 ML (ref 18–58)
ECHO LA VOL 4C: 80.24 ML (ref 18–58)
ECHO LA VOL BP: 81.69 ML (ref 18–58)
ECHO LA VOL/BSA BIPLANE: 46.82 ML/M2 (ref 16–28)
ECHO LA VOLUME INDEX A2C: 32.84 ML/M2 (ref 16–28)
ECHO LA VOLUME INDEX A4C: 45.99 ML/M2 (ref 16–28)
ECHO LV EDV A2C: 36.4 ML
ECHO LV EDV A4C: 33.6 ML
ECHO LV EDV BP: 35.3 ML (ref 67–155)
ECHO LV EDV INDEX A4C: 19.3 ML/M2
ECHO LV EDV INDEX BP: 20.2 ML/M2
ECHO LV EDV NDEX A2C: 20.9 ML/M2
ECHO LV EDV TEICHHOLZ: 0.36 ML
ECHO LV EJECTION FRACTION A2C: 42 %
ECHO LV EJECTION FRACTION A4C: 67 %
ECHO LV EJECTION FRACTION BIPLANE: 55 % (ref 55–100)
ECHO LV ESV A2C: 21.2 ML
ECHO LV ESV A4C: 11.1 ML
ECHO LV ESV BP: 15.9 ML (ref 22–58)
ECHO LV ESV INDEX A2C: 12.2 ML/M2
ECHO LV ESV INDEX A4C: 6.4 ML/M2
ECHO LV ESV INDEX BP: 9.1 ML/M2
ECHO LV ESV TEICHHOLZ: 0.2 ML
ECHO LV INTERNAL DIMENSION DIASTOLIC: 3.77 CM (ref 4.2–5.9)
ECHO LV INTERNAL DIMENSION SYSTOLIC: 2.93 CM
ECHO LV IVSD: 1.37 CM (ref 0.6–1)
ECHO LV MASS 2D: 198.8 G (ref 88–224)
ECHO LV MASS INDEX 2D: 113.9 G/M2 (ref 49–115)
ECHO LV POSTERIOR WALL DIASTOLIC: 1.23 CM (ref 0.6–1)
ECHO LVOT DIAM: 2.13 CM
ECHO MV AREA PISA: 0.2 CM2
ECHO MV EROA PISA: 0.2 CM2
ECHO MV REGURGITANT PEAK GRADIENT: 68.9 MMHG
ECHO MV REGURGITANT PEAK VELOCITY: 414.9 CM/S
ECHO MV REGURGITANT RADIUS PISA: 0.55 CM
ECHO MV REGURGITANT VOLUME: 24.82 CC
ECHO MV REGURGITANT VTIA: 135.57 CM
ECHO TV REGURGITANT MAX VELOCITY: 249.12 CM/S
ECHO TV REGURGITANT PEAK GRADIENT: 24.8 MMHG
LVFS 2D: 22.25 %
LVSV (MOD BI): 11.09 ML
LVSV (MOD SINGLE 4C): 12.85 ML
LVSV (MOD SINGLE): 8.65 ML
LVSV (TEICH): 15.82 ML

## 2019-11-06 PROCEDURE — 93325 DOPPLER ECHO COLOR FLOW MAPG: CPT

## 2019-11-07 ENCOUNTER — OFFICE VISIT (OUTPATIENT)
Dept: FAMILY MEDICINE CLINIC | Facility: CLINIC | Age: 79
End: 2019-11-07

## 2019-11-07 VITALS
WEIGHT: 142 LBS | TEMPERATURE: 96.1 F | RESPIRATION RATE: 16 BRPM | OXYGEN SATURATION: 97 % | DIASTOLIC BLOOD PRESSURE: 72 MMHG | HEART RATE: 88 BPM | SYSTOLIC BLOOD PRESSURE: 120 MMHG | BODY MASS INDEX: 22.82 KG/M2 | HEIGHT: 66 IN

## 2019-11-07 DIAGNOSIS — R63.0 ANOREXIA: Primary | ICD-10-CM

## 2019-11-07 DIAGNOSIS — I48.91 ATRIAL FIBRILLATION, UNSPECIFIED TYPE (HCC): ICD-10-CM

## 2019-11-07 DIAGNOSIS — R06.09 DYSPNEA ON EXERTION: ICD-10-CM

## 2019-11-07 LAB
INR BLD: 2.9
PT POC: NORMAL
VALID INTERNAL CONTROL?: YES

## 2019-11-07 RX ORDER — MEGESTROL ACETATE 40 MG/1
TABLET ORAL
Qty: 30 TAB | Refills: 2 | Status: SHIPPED | OUTPATIENT
Start: 2019-11-07 | End: 2022-06-01

## 2019-11-07 NOTE — PROGRESS NOTES
Sole Potter presents today for   Chief Complaint   Patient presents with    Follow-up     [address] preferred language for health care discussion is english. Is someone accompanying this pt? no    Is the patient using any DME equipment during [address]? no    Depression Screening:  3 most recent PHQ Screens 10/15/2019   Little interest or pleasure in doing things Not at all   Feeling down, depressed, irritable, or hopeless Not at all   Total Score PHQ 2 0       Learning Assessment:  Learning Assessment 1/19/2016   PRIMARY LEARNER Patient   HIGHEST LEVEL OF EDUCATION - PRIMARY LEARNER  SOME COLLEGE   BARRIERS PRIMARY LEARNER NONE   CO-LEARNER CAREGIVER No   PRIMARY LANGUAGE ENGLISH    NEED No   LEARNER PREFERENCE PRIMARY DEMONSTRATION   ANSWERED BY patient   RELATIONSHIP SELF       Abuse Screening:  Abuse Screening Questionnaire 10/15/2019   Do you ever feel afraid of your partner? N   Are you in a relationship with someone who physically or mentally threatens you? N   Is it safe for you to go home? Y       Fall Risk  Fall Risk Assessment, last 12 mths 10/15/2019   Able to walk? Yes   Fall in past 12 months? No       Health Maintenance reviewed and discussed and ordered per Provider. Health Maintenance Due   Topic Date Due    Shingrix Vaccine Age 49> (1 of 2) 02/11/1989    GLAUCOMA SCREENING Q2Y  02/11/2004    DTaP/Tdap/Td series (1 - Tdap) 11/21/2009    MEDICARE YEARLY EXAM  07/11/2019   . Sole Potter is updated on all     Pt currently taking Antiplatelet therapy? Yes warfarin    Coordination of Care:  1. Have you been to the ER, urgent care clinic since your last visit? no Hospitalized since your last visit? no    2. Have you seen or consulted any other health care providers outside of the [address] [place] [address] since your last visit? no Include any pap smears or colon screening.  no

## 2019-11-07 NOTE — PATIENT INSTRUCTIONS
Mr. Tacho Iyer is here today for anticoagulation monitoring for the diagnosis of Atrial Fibrillation. His INR goal is 2.0-3.0 and his current Coumadin dose is 5 mg x 6 days a week, 2.5 mg x 1 day a week. Today's findings include an INR of 2.9 (normal INR range 0.8-1.2) . Considering Mr. Rajesh Garay past history, todays findings, and per the coumadin policy/protocol, Mr. Ileana Alvarez was instructed to take Coumadin as follows,  Same dose. He was also instructed to schedule an appointment in 2 weeks prior to leaving for an INR check. A full discussion of the nature of anticoagulants has been carried out. A full discussion of the need for frequent and regular monitoring, precise dosage adjustment and compliance was stressed. Side effects of potential bleeding were discussed and Mr. Ileana Alvarez was instructed to call [phone] if there are any signs of abnormal bleeding. Mr. Ileana Alvarez was instructed to avoid any OTC items containing aspirin or ibuprofen and prior to starting any new OTC products to consult with his physician or pharmacist to ensure no drug interactions are present. Mr. Ileana Alvarez was instructed to avoid any major changes in his general diet and to avoid alcohol consumption. . 
 
 
Mr. Ileana Alvarez verbalized his understanding of all instructions and will call the office with any questions, concerns, or signs of abnormal bleeding or blood clot.

## 2019-11-15 NOTE — PROGRESS NOTES
The patient presents to the office today with the chief complaint of anorexia    HPI    The patient has dyspnea on exertion but this is improving. This is now mild. The patient continues with a poor appetite. His swallowing is better but the poor appetite persists. The patient remains on Coumadin for long term anticoagulation due to atrial fibrillation. Review of Systems   Respiratory: Negative for shortness of breath. Cardiovascular: Negative for chest pain and leg swelling. Allergies   Allergen Reactions    Parafon Forte Dsc [Chlorzoxazone] Anaphylaxis    Aspirin Other (comments)     Cannot take due to on coumadin       Current Outpatient Medications   Medication Sig Dispense Refill    linaCLOtide (LINZESS) 72 mcg cap capsule Take  by mouth Daily (before breakfast).  megestrol (MEGACE) 40 mg tablet 1 tab daily 30 Tab 2    spironolactone (ALDACTONE) 25 mg tablet Take 1 Tab by mouth daily. 30 Tab 2    warfarin (COUMADIN) 5 mg tablet TAKE ONE AND ONE-HALF TABLET BY MOUTH ONCE DAILY 45 Tab 0    ondansetron (ZOFRAN ODT) 4 mg disintegrating tablet 1 tab twice per day as needed for nausea 50 Tab 0    furosemide (LASIX) 20 mg tablet Take 20 mg by mouth daily.  omeprazole (PRILOSEC) 20 mg capsule TAKE ONE CAPSULE BY MOUTH EVERY MORNING 30 Cap 0    thiamine HCL (VITAMIN B-1) 100 mg tablet Take 100 mg by mouth daily.  ipratropium (ATROVENT) 0.03 % nasal spray 2 Sprays by Both Nostrils route two (2) times a day. 1 mL 2    allopurinol (ZYLOPRIM) 300 mg tablet TAKE ONE TABLET BY MOUTH ONCE DAILY 90 Tab 0    metoprolol tartrate (LOPRESSOR) 50 mg tablet Take 50 mg by mouth two (2) times a day.  magnesium 250 mg tab Take 1 Tab by mouth daily.  cyanocobalamin (VITAMIN B12) 500 mcg tablet Take 500 mcg by mouth daily.  cholecalciferol, vitamin D3, 2,000 unit tab Take 1 Tab by mouth daily.          Past Medical History:   Diagnosis Date    Abdominal pain, unspecified site  Acute upper respiratory infections of unspecified site     Atrial fibrillation (HCC)     Cancer of esophagus ([facility] [address] 2001    RT and adjuvant chemotherapy, surgery    Esophageal reflux     Generalized osteoarthrosis, involving multiple sites     Gout, unspecified     Nausea alone     Sinoatrial node dysfunction (HCC)     Status post thoracentesis 06/2019    Thromboembolus ([facility] [address] 2003 (approx)    DVT in leg (IVC filter placed)    Unspecified cataract     Unspecified constipation        Past Surgical History:   Procedure Laterality Date    COLONOSCOPY N/A 12/13/2017    COLONOSCOPY with hot polypectomies performed by Paola Medina MD at [facility]COPY    HX GI      esophagectomy    HX HEENT  2001    Esophageal Cancer Surgery    HX HIP REPLACEMENT Left 2005 (approx)    HX OTHER SURGICAL  2003    ivc filter placed    HX PACEMAKER  2012    [employer]       Social History     Socioeconomic History    Marital status: SINGLE     Spouse name: Not on file    Number of children: Not on file    Years of education: Not on file    Highest education level: Not on file   Occupational History    Not on file   Social Needs    Financial resource strain: Not on file    Food insecurity:     Worry: Not on file     Inability: Not on file    Transportation needs:     Medical: Not on file     Non-medical: Not on file   Tobacco Use    Smoking status: Former Smoker     Packs/day: 0.50     Years: 34.00     Pack years: 17.00    Smokeless tobacco: Never Used   Substance and Sexual Activity    Alcohol use: Yes     Comment: 2 drinks per day    Drug use: No    Sexual activity: Never   Lifestyle    Physical activity:     Days per week: Not on file     Minutes per session: Not on file    Stress: Not on file   Relationships    Social connections:     Talks on phone: Not on file     Gets together: Not on file     Attends Rastafari service: Not on file     Active member of club or organization: Not on file     Attends meetings of clubs or organizations: Not on file     Relationship status: Not on file    Intimate partner violence:     Fear of current or ex partner: Not on file     Emotionally abused: Not on file     Physically abused: Not on file     Forced sexual activity: Not on file   Other Topics Concern    Not on file   Social History Narrative    Not on file       Patient does not have an advanced directive on file    Visit Vitals  /72 (BP 1 Location: Left arm, BP Patient Position: Sitting)   Pulse 88   Temp 96.1 °F (35.6 °C) (Tympanic)   Resp 16   Ht 5' 6\" (1.676 m)   Wt 142 lb (64.4 kg)   SpO2 97%   BMI 22.92 kg/m²       Physical Exam  No Cervical Lymphadenopathy  No Supraclavicular Lymphadenopathy  Thyroid is Normal  Lungs are normal to percussion. Clear to auscultation   Heart:  S1 S2 are normal, No gallops, No murmurs  No Carotid Bruits  Abdomen:  Normal Bowel Sounds. No tenderness. No masses. No Hepatomegaly or Splenomegaly  LE:  Strong Pedal Pulses.   No Edema      BMI:  OK    Office Visit on 11/07/2019   Component Date Value Ref Range Status    VALID INTERNAL CONTROL POC 11/07/2019 Yes   Final    INR POC 11/07/2019 2.9   Final   Hospital Outpatient Visit on 11/06/2019   Component Date Value Ref Range Status    LA Volume 11/06/2019 81.69  18 - 58 mL Final    Ao Root D 11/06/2019 3.44  cm Final    LVIDd 11/06/2019 3.77* 4.2 - 5.9 cm Final    LVPWd 11/06/2019 1.23* 0.6 - 1.0 cm Final    LVIDs 11/06/2019 2.93  cm Final    IVSd 11/06/2019 1.37* 0.6 - 1.0 cm Final    LV ED Vol A2C 11/06/2019 36.4  mL Final    LV ES Vol A4C 11/06/2019 11.1  mL Final    LV ES Vol BP 11/06/2019 15.9* 22 - 58 mL Final    LVOT d 11/06/2019 2.13  cm Final    Mitral Valve Area by Proximal Isov* 11/06/2019 0.2  cm2 Final    Mitral Effective Regurgitant Orifi* 11/06/2019 0.20  cm2 Final    BP EF 11/06/2019 55.0  55 - 100 % Final    LV Ejection Fraction MOD 4C 11/06/2019 67  % Final    LV Ejection Fraction MOD 2C 11/06/2019 42  % Final    LA Vol 4C 11/06/2019 80.24* 18 - 58 mL Final    LA Vol 2C 11/06/2019 57.29  18 - 58 mL Final    LA Area 4C 11/06/2019 26.9  cm2 Final    LV Mass AL 11/06/2019 198.8  88 - 224 g Final    LV Mass AL Index 11/06/2019 113.9  49 - 115 g/m2 Final    MV regurgitant volume 11/06/2019 24.82  cc Final    LV ES Vol A2C 11/06/2019 21.2  mL Final    LVES Vol Index BP 11/06/2019 9.1  mL/m2 Final    LV ED Vol A4C 11/06/2019 33.6  mL Final    LVED Vol Index BP 11/06/2019 20.2  mL/m2 Final    Mitral Regurgitant Velocity Time I* 11/06/2019 135.57  cm Final    Mitral Regurgitant Peak Velocity 11/06/2019 414.90  cm/s Final    Triscuspid Valve Regurgitation Pea* 11/06/2019 24.8  mmHg Final    LV ED Vol BP 11/06/2019 35.3* 67 - 155 ml Final    TR Max Velocity 11/06/2019 249.12  cm/s Final    PISA MR Rad 11/06/2019 0.55  cm Final    LA Vol Index 11/06/2019 46.82  16 - 28 ml/m2 Final    LA Vol Index 11/06/2019 32.84  16 - 28 ml/m2 Final    LA Vol Index 11/06/2019 45.99  16 - 28 ml/m2 Final    LVED Vol Index A4C 11/06/2019 19.3  mL/m2 Final    LVED Vol Index A2C 11/06/2019 20.9  mL/m2 Final    LVES Vol Index A4C 11/06/2019 6.4  mL/m2 Final    LVES Vol Index A2C 11/06/2019 12.2  mL/m2 Final    MR Peak Gradient 11/06/2019 68.9  mmHg Final    Left Ventricular Fractional Shorte* 11/06/2019 82.424704835  % Final    Left Ventricular End Diastolic Vol* 43/03/1268 8.44036414602  mL Final    Left Ventricular End Systolic Volu* 60/81/7868 9.22585040795  mL Final    Left Ventricular Stroke Volume by * 11/06/2019 94.16506375  mL Final    Left Ventricular Stroke Volume by * 11/06/2019 3.2793322945  mL Final    Left Ventricular Stroke Volume by * 11/06/2019 68.051367035  mL Final    Left Ventricular Stroke Volume by * 11/06/2019 49.15390497  mL Final   Hospital Outpatient Visit on 10/10/2019   Component Date Value Ref Range Status    Prothrombin time 10/10/2019 23.5* 11.5 - 15.2 sec Final    INR 10/10/2019 2.1* 0.8 - 1.2   Final    Comment:            INR Therapeutic Ranges         (on stable oral anticoagulant):     INDICATION                INR  DVT/PE/Atrial Fib          2.0-3.0  MI/Mechanical Heart Valve  2.5-3.5     Hospital Outpatient Visit on 09/26/2019   Component Date Value Ref Range Status    Prothrombin time 09/26/2019 19.2* 11.5 - 15.2 sec Final    INR 09/26/2019 1.6* 0.8 - 1.2   Final    Comment:            INR Therapeutic Ranges         (on stable oral anticoagulant):     INDICATION                INR  DVT/PE/Atrial Fib          2.0-3.0  MI/Mechanical Heart Valve  2.5-3.5     Clinical Support on 09/12/2019   Component Date Value Ref Range Status    VALID INTERNAL CONTROL POC 09/12/2019 Yes   Final    INR POC 09/12/2019 1.9   Final   Clinical Support on 08/20/2019   Component Date Value Ref Range Status    VALID INTERNAL CONTROL POC 08/20/2019 Yes   Final    INR POC 08/20/2019 1.9   Final   Clinical Support on 08/08/2019   Component Date Value Ref Range Status    VALID INTERNAL CONTROL POC 08/08/2019 Yes   Final    INR POC 08/08/2019 1.8   Final       .  Results for orders placed or performed in visit on 11/07/19   AMB POC PT/INR   Result Value Ref Range    VALID INTERNAL CONTROL POC Yes     Prothrombin time (POC)      INR POC 2.9        Assessment / Plan      ICD-10-CM ICD-9-CM    1. Anorexia R63.0 783.0    2. Atrial fibrillation, unspecified type (HCC) I48.91 427.31 AMB POC PT/INR   3. Dyspnea on exertion R06.09 786.09      Add Megace for appetite  he was advised to continue his maintenance medications      Follow-up and Dispositions    · Return in about 4 weeks (around 12/5/2019). I asked Zaria Cates if he has any questions and I answered the questions. Zaria Whitehead states that he understands the treatment plan and agrees with the treatment plan          THIS DOCUMENT WAS CREATED WITH A VOICE ACTIVATED DICTATION SYSTEM.   IT MAY CONTAIN TRANSCRIPTION ERRORS

## 2019-11-19 ENCOUNTER — HOSPITAL ENCOUNTER (OUTPATIENT)
Dept: LAB | Age: 79
Discharge: HOME | End: 2019-11-19
Payer: MEDICARE

## 2019-11-19 DIAGNOSIS — I48.91 ATRIAL FIBRILLATION, UNSPECIFIED TYPE (HCC): ICD-10-CM

## 2019-11-19 LAB
INR PPP: 2.7 (ref 0.8–1.2)
PROTHROMBIN TIME: 28.5 SEC (ref 11.5–15.2)

## 2019-11-19 PROCEDURE — 36415 COLL VENOUS BLD VENIPUNCTURE: CPT

## 2019-11-19 PROCEDURE — 85610 PROTHROMBIN TIME: CPT

## 2019-11-22 ENCOUNTER — HOSPITAL ENCOUNTER (OUTPATIENT)
Dept: CT IMAGING | Age: 79
Discharge: HOME | End: 2019-11-22
Attending: INTERNAL MEDICINE
Payer: MEDICARE

## 2019-11-22 DIAGNOSIS — Z48.815 ENCOUNTER FOR SURGICAL AFTERCARE FOLLOWING SURGERY OF DIGESTIVE SYSTEM: ICD-10-CM

## 2019-11-22 DIAGNOSIS — D12.0 BENIGN NEOPLASM OF CECUM: ICD-10-CM

## 2019-11-22 DIAGNOSIS — R11.2 NAUSEA WITH VOMITING, UNSPECIFIED: ICD-10-CM

## 2019-11-22 DIAGNOSIS — K62.1 RECTAL POLYP: ICD-10-CM

## 2019-11-22 DIAGNOSIS — R68.81 EARLY SATIETY: ICD-10-CM

## 2019-11-22 DIAGNOSIS — D12.2 BENIGN NEOPLASM OF ASCENDING COLON: ICD-10-CM

## 2019-11-22 DIAGNOSIS — R19.4 CHANGE IN BOWEL HABITS: ICD-10-CM

## 2019-11-22 DIAGNOSIS — K64.0 FIRST DEGREE HEMORRHOIDS: ICD-10-CM

## 2019-11-22 DIAGNOSIS — R93.89 ABNORMAL CT SCAN: ICD-10-CM

## 2019-11-22 DIAGNOSIS — K59.09 CONSTIPATION, CHRONIC: ICD-10-CM

## 2019-11-22 LAB — CREAT UR-MCNC: 0.7 MG/DL (ref 0.6–1.3)

## 2019-11-22 PROCEDURE — 74011636320 HC RX REV CODE- 636/320: Performed by: INTERNAL MEDICINE

## 2019-11-22 PROCEDURE — 74170 CT ABD WO CNTRST FLWD CNTRST: CPT

## 2019-11-22 PROCEDURE — 82565 ASSAY OF CREATININE: CPT

## 2019-11-22 RX ADMIN — IOPAMIDOL 100 ML: 755 INJECTION, SOLUTION INTRAVENOUS at 10:00

## 2019-11-25 ENCOUNTER — TELEPHONE (OUTPATIENT)
Dept: FAMILY MEDICINE CLINIC | Facility: CLINIC | Age: 79
End: 2019-11-25

## 2019-11-25 NOTE — TELEPHONE ENCOUNTER
Patient came to the office requesting his lab results. No nurse available at the time, so please call as soon as possible. Mobile preferred

## 2019-12-09 ENCOUNTER — OFFICE VISIT (OUTPATIENT)
Dept: FAMILY MEDICINE CLINIC | Facility: CLINIC | Age: 79
End: 2019-12-09

## 2019-12-09 VITALS
SYSTOLIC BLOOD PRESSURE: 122 MMHG | RESPIRATION RATE: 12 BRPM | HEIGHT: 66 IN | WEIGHT: 144 LBS | BODY MASS INDEX: 23.14 KG/M2 | HEART RATE: 86 BPM | TEMPERATURE: 96.2 F | DIASTOLIC BLOOD PRESSURE: 68 MMHG | OXYGEN SATURATION: 96 %

## 2019-12-09 DIAGNOSIS — R06.09 DYSPNEA ON EXERTION: ICD-10-CM

## 2019-12-09 DIAGNOSIS — R53.1 WEAKNESS: ICD-10-CM

## 2019-12-09 DIAGNOSIS — Z51.81 ENCOUNTER FOR MEDICATION MONITORING: ICD-10-CM

## 2019-12-09 DIAGNOSIS — I48.91 ATRIAL FIBRILLATION, UNSPECIFIED TYPE (HCC): Primary | ICD-10-CM

## 2019-12-09 LAB
INR BLD: 2.3
PT POC: NORMAL
VALID INTERNAL CONTROL?: YES

## 2019-12-09 RX ORDER — WARFARIN SODIUM 5 MG/1
TABLET ORAL
Qty: 45 TAB | Refills: 0 | Status: SHIPPED | OUTPATIENT
Start: 2019-12-09 | End: 2019-12-19

## 2019-12-09 RX ORDER — RANITIDINE 150 MG/1
150 CAPSULE ORAL 2 TIMES DAILY
COMMUNITY
End: 2021-08-04

## 2019-12-09 NOTE — PROGRESS NOTES
Sole Potter presents today for   Chief Complaint   Patient presents with    Well Male       Sole Potter preferred language for health care discussion is [address] Is someone accompanying this pt? no    Is the patient using any DME equipment during [address]? no    Depression Screening:  3 most recent PHQ Screens 10/15/2019   Little interest or pleasure in doing things Not at all   Feeling down, depressed, irritable, or hopeless Not at all   Total Score PHQ 2 0       Learning Assessment:  Learning Assessment 1/19/2016   PRIMARY LEARNER Patient   HIGHEST LEVEL OF EDUCATION - PRIMARY LEARNER  SOME COLLEGE   BARRIERS PRIMARY LEARNER NONE   CO-LEARNER CAREGIVER No   PRIMARY LANGUAGE ENGLISH    NEED No   LEARNER PREFERENCE PRIMARY DEMONSTRATION   ANSWERED BY patient   RELATIONSHIP SELF       Abuse Screening:  Abuse Screening Questionnaire 10/15/2019   Do you ever feel afraid of your partner? N   Are you in a relationship with someone who physically or mentally threatens you? N   Is it safe for you to go home? Y       Fall Risk  Fall Risk Assessment, last 12 mths 12/9/2019   Able to walk? Yes   Fall in past 12 months? No       Health Maintenance reviewed and discussed and ordered per Provider. Health Maintenance Due   Topic Date Due    DTaP/Tdap/Td series (1 - Tdap) 02/11/1951    Shingrix Vaccine Age 50> (1 of 2) 02/11/1989    GLAUCOMA SCREENING Q2Y  02/11/2004    MEDICARE YEARLY EXAM  07/11/2019   . Sole Potter is updated on all     Pt currently taking Antiplatelet therapy? warfarin    Coordination of Care:  1. Have you been to the ER, urgent care clinic since your last visit? no Hospitalized since your last visit? no    2. Have you seen or consulted any other health care providers outside of the [address] [place] [address] since your last visit? no Include any pap smears or colon screening.  no            Mr. Lillian Du is here today for anticoagulation monitoring for the diagnosis of Atrial Fibrillation. His INR goal is 2.0-3.0 and his current Coumadin dose is 2.5 mg on Mon, 5 mg all other days. Today's findings include an INR of 2.3 (normal INR range 0.8-1.2) . Considering Mr. Moreno Montrose past history, todays findings, and per the coumadin policy/protocol, Mr. Clarisse Sutton was instructed to take Coumadin as follows,  Same dose. He was also instructed to schedule an appointment in 3 weeks prior to leaving for an INR check. A full discussion of the nature of anticoagulants has been carried out. A full discussion of the need for frequent and regular monitoring, precise dosage adjustment and compliance was stressed. Side effects of potential bleeding were discussed and Mr. Clarisse Sutton was instructed to call [phone] if there are any signs of abnormal bleeding. Mr. Clarisse Sutton was instructed to avoid any OTC items containing aspirin or ibuprofen and prior to starting any new OTC products to consult with his physician or pharmacist to ensure no drug interactions are present. Mr. Clarisse Sutton was instructed to avoid any major changes in his general diet and to avoid alcohol consumption. .        Mr. Clarisse Sutton verbalized his understanding of all instructions and will call the office with any questions, concerns, or signs of abnormal bleeding or blood clot.

## 2019-12-17 NOTE — PROGRESS NOTES
The patient presents to the office today with the chief complaint of dyspnea on exertion    HPI    The patient has dyspnea on exertion. This has is improved. The patient notes mild dyspnea on exertion persisting. The patient has chronic atrial fibrillation. He remains on Metoprolol for rate control and Coumadin for anticoagulation. He is doing well on the medication. Review of Systems   Respiratory: Negative for shortness of breath. Cardiovascular: Negative for chest pain, palpitations and leg swelling. Allergies   Allergen Reactions    Parafon Forte Dsc [Chlorzoxazone] Anaphylaxis    Aspirin Other (comments)     Cannot take due to on coumadin       Current Outpatient Medications   Medication Sig Dispense Refill    warfarin (COUMADIN) 5 mg tablet TAKE ONE AND ONE-HALF TABLET BY MOUTH ONCE DAILY 45 Tab 0    raNITIdine hcl 150 mg capsule Take 150 mg by mouth two (2) times a day.  linaCLOtide (LINZESS) 72 mcg cap capsule Take  by mouth Daily (before breakfast).  megestrol (MEGACE) 40 mg tablet 1 tab daily 30 Tab 2    spironolactone (ALDACTONE) 25 mg tablet Take 1 Tab by mouth daily. 30 Tab 2    ondansetron (ZOFRAN ODT) 4 mg disintegrating tablet 1 tab twice per day as needed for nausea 50 Tab 0    furosemide (LASIX) 20 mg tablet Take 20 mg by mouth daily.  omeprazole (PRILOSEC) 20 mg capsule TAKE ONE CAPSULE BY MOUTH EVERY MORNING 30 Cap 0    thiamine HCL (VITAMIN B-1) 100 mg tablet Take 100 mg by mouth daily.  ipratropium (ATROVENT) 0.03 % nasal spray 2 Sprays by Both Nostrils route two (2) times a day. 1 mL 2    allopurinol (ZYLOPRIM) 300 mg tablet TAKE ONE TABLET BY MOUTH ONCE DAILY 90 Tab 0    metoprolol tartrate (LOPRESSOR) 50 mg tablet Take 50 mg by mouth two (2) times a day.  magnesium 250 mg tab Take 1 Tab by mouth daily.  cyanocobalamin (VITAMIN B12) 500 mcg tablet Take 500 mcg by mouth daily.       cholecalciferol, vitamin D3, 2,000 unit tab Take 1 Tab by mouth daily.          Past Medical History:   Diagnosis Date    Abdominal pain, unspecified site     Acute upper respiratory infections of unspecified site     Atrial fibrillation ([facility]     Cancer of esophagus ([facility] 2001    RT and adjuvant chemotherapy, surgery    Esophageal reflux     Generalized osteoarthrosis, involving multiple sites     Gout, unspecified     Nausea alone     Sinoatrial node dysfunction (HCC)     Status post thoracentesis 06/2019    Thromboembolus ([address] 2003 (approx)    DVT in leg (IVC filter placed)    Unspecified cataract     Unspecified constipation        Past Surgical History:   Procedure Laterality Date    COLONOSCOPY N/A 12/13/2017    COLONOSCOPY with hot polypectomies performed by Sabra Guadalupe MD at [facility]OSCOPY    HX GI      esophagectomy    HX HEENT  2001    Esophageal Cancer Surgery    HX HIP REPLACEMENT Left 2005 (approx)    HX OTHER SURGICAL  2003    ivc filter placed    HX PACEMAKER  2012    [employer]       Social History     Socioeconomic History    Marital status: SINGLE     Spouse name: Not on file    Number of children: Not on file    Years of education: Not on file    Highest education level: Not on file   Occupational History    Not on file   Social Needs    Financial resource strain: Not on file    Food insecurity:     Worry: Not on file     Inability: Not on file    Transportation needs:     Medical: Not on file     Non-medical: Not on file   Tobacco Use    Smoking status: Former Smoker     Packs/day: 0.50     Years: 34.00     Pack years: 17.00    Smokeless tobacco: Never Used   Substance and Sexual Activity    Alcohol use: Yes     Comment: 2 drinks per day    Drug use: No    Sexual activity: Never   Lifestyle    Physical activity:     Days per week: Not on file     Minutes per session: Not on file    Stress: Not on file   Relationships    Social connections:     Talks on phone: Not on file     Gets together: Not on file Attends [employer] service: Not on file     Active member of club or organization: Not on file     Attends meetings of clubs or organizations: Not on file     Relationship status: Not on file    Intimate partner violence:     Fear of current or ex partner: Not on file     Emotionally abused: Not on file     Physically abused: Not on file     Forced sexual activity: Not on file   Other Topics Concern    Not on file   Social History Narrative    Not on file       Patient does not have an advanced directive on file    Visit Vitals  /68 (BP 1 Location: Left arm, BP Patient Position: Sitting)   Pulse 86   Temp 96.2 °F (35.7 °C) (Tympanic)   Resp 12   Ht 5' 6\" (1.676 m)   Wt 144 lb (65.3 kg)   SpO2 96%   BMI 23.24 kg/m²       Physical Exam  No Cervical Lymphadenopathy  No Supraclavicular Lymphadenopathy  Thyroid is Normal  Lungs are normal to percussion. Clear to auscultation   Heart:  S1 S2 are normal, No gallops, No murmurs  No Carotid Bruits  Abdomen:  Normal Bowel Sounds. No tenderness. No masses. No Hepatomegaly or Splenomegaly  LE:  Strong Pedal Pulses. No Edema      BMI:  OK    Office Visit on 12/09/2019   Component Date Value Ref Range Status    VALID INTERNAL CONTROL POC 12/09/2019 Yes   Final    INR POC 12/09/2019 2.3   Final   Hospital Outpatient Visit on 11/22/2019   Component Date Value Ref Range Status    Creatinine, POC 11/22/2019 0.7  0.6 - 1.3 MG/DL Final    GFRAA, POC 11/22/2019 >60  >60 ml/min/1.73m2 Final    GFRNA, POC 11/22/2019 >60  >60 ml/min/1.73m2 Final    Comment: Estimated GFR is calculated using the IDMS-traceable Modification of Diet in Renal Disease (MDRD) Study equation, reported for both  Americans (GFRAA) and non- Americans (GFRNA), and normalized to 1.73m2 body surface area. The physician must decide which value applies to the patient. The MDRD study equation should only be used in individuals age 25 or older.  It has not been validated for the following: pregnant women, patients with serious comorbid conditions, or on certain medications, or persons with extremes of body size, muscle mass, or nutritional status.      Hospital Outpatient Visit on 11/19/2019   Component Date Value Ref Range Status    Prothrombin time 11/19/2019 28.5* 11.5 - 15.2 sec Final    INR 11/19/2019 2.7* 0.8 - 1.2   Final    Comment:            INR Therapeutic Ranges         (on stable oral anticoagulant):     INDICATION                INR  DVT/PE/Atrial Fib          2.0-3.0  MI/Mechanical Heart Valve  2.5-3.5     Office Visit on 11/07/2019   Component Date Value Ref Range Status    VALID INTERNAL CONTROL POC 11/07/2019 Yes   Final    INR POC 11/07/2019 2.9   Final   Hospital Outpatient Visit on 11/06/2019   Component Date Value Ref Range Status    LA Volume 11/06/2019 81.69  18 - 58 mL Final    Ao Root D 11/06/2019 3.44  cm Final    LVIDd 11/06/2019 3.77* 4.2 - 5.9 cm Final    LVPWd 11/06/2019 1.23* 0.6 - 1.0 cm Final    LVIDs 11/06/2019 2.93  cm Final    IVSd 11/06/2019 1.37* 0.6 - 1.0 cm Final    LV ED Vol A2C 11/06/2019 36.4  mL Final    LV ES Vol A4C 11/06/2019 11.1  mL Final    LV ES Vol BP 11/06/2019 15.9* 22 - 58 mL Final    LVOT d 11/06/2019 2.13  cm Final    Mitral Valve Area by Proximal Isov* 11/06/2019 0.2  cm2 Final    Mitral Effective Regurgitant Orifi* 11/06/2019 0.20  cm2 Final    BP EF 11/06/2019 55.0  55 - 100 % Final    LV Ejection Fraction MOD 4C 11/06/2019 67  % Final    LV Ejection Fraction MOD 2C 11/06/2019 42  % Final    LA Vol 4C 11/06/2019 80.24* 18 - 58 mL Final    LA Vol 2C 11/06/2019 57.29  18 - 58 mL Final    LA Area 4C 11/06/2019 26.9  cm2 Final    LV Mass AL 11/06/2019 198.8  88 - 224 g Final    LV Mass AL Index 11/06/2019 113.9  49 - 115 g/m2 Final    MV regurgitant volume 11/06/2019 24.82  cc Final    LV ES Vol A2C 11/06/2019 21.2  mL Final    LVES Vol Index BP 11/06/2019 9.1  mL/m2 Final    LV ED Vol A4C 11/06/2019 33.6  mL Final    LVED Vol Index BP 11/06/2019 20.2  mL/m2 Final    Mitral Regurgitant Velocity Time I* 11/06/2019 135.57  cm Final    Mitral Regurgitant Peak Velocity 11/06/2019 414.90  cm/s Final    Triscuspid Valve Regurgitation Pea* 11/06/2019 24.8  mmHg Final    LV ED Vol BP 11/06/2019 35.3* 67 - 155 ml Final    TR Max Velocity 11/06/2019 249.12  cm/s Final    PISA MR Rad 11/06/2019 0.55  cm Final    LA Vol Index 11/06/2019 46.82  16 - 28 ml/m2 Final    LA Vol Index 11/06/2019 32.84  16 - 28 ml/m2 Final    LA Vol Index 11/06/2019 45.99  16 - 28 ml/m2 Final    LVED Vol Index A4C 11/06/2019 19.3  mL/m2 Final    LVED Vol Index A2C 11/06/2019 20.9  mL/m2 Final    LVES Vol Index A4C 11/06/2019 6.4  mL/m2 Final    LVES Vol Index A2C 11/06/2019 12.2  mL/m2 Final    MR Peak Gradient 11/06/2019 68.9  mmHg Final    Left Ventricular Fractional Shorte* 11/06/2019 38.077876352  % Final    Left Ventricular End Diastolic Vol* 76/01/9737 1.67632828449  mL Final    Left Ventricular End Systolic Volu* 78/23/1665 2.34421579818  mL Final    Left Ventricular Stroke Volume by * 11/06/2019 21.76670205  mL Final    Left Ventricular Stroke Volume by * 11/06/2019 0.7999612975  mL Final    Left Ventricular Stroke Volume by * 11/06/2019 04.172467339  mL Final    Left Ventricular Stroke Volume by * 11/06/2019 14.38950906  mL Final   Hospital Outpatient Visit on 10/10/2019   Component Date Value Ref Range Status    Prothrombin time 10/10/2019 23.5* 11.5 - 15.2 sec Final    INR 10/10/2019 2.1* 0.8 - 1.2   Final    Comment:            INR Therapeutic Ranges         (on stable oral anticoagulant):     INDICATION                INR  DVT/PE/Atrial Fib          2.0-3.0  MI/Mechanical Heart Valve  2.5-3.5     Hospital Outpatient Visit on 09/26/2019   Component Date Value Ref Range Status    Prothrombin time 09/26/2019 19.2* 11.5 - 15.2 sec Final    INR 09/26/2019 1.6* 0.8 - 1.2   Final    Comment:            INR Therapeutic Ranges (on stable oral anticoagulant):     INDICATION                INR  DVT/PE/Atrial Fib          2.0-3.0  MI/Mechanical Heart Valve  2.5-3.5     Clinical Support on 09/12/2019   Component Date Value Ref Range Status    VALID INTERNAL CONTROL POC 09/12/2019 Yes   Final    INR POC 09/12/2019 1.9   Final       .  Results for orders placed or performed in visit on 12/09/19   AMB POC PT/INR   Result Value Ref Range    VALID INTERNAL CONTROL POC Yes     Prothrombin time (POC)      INR POC 2.3        Assessment / Plan      ICD-10-CM ICD-9-CM    1. Atrial fibrillation, unspecified type ([facility] I48.91 427.31    2. Encounter for medication monitoring Z51.81 V58.83 AMB POC PT/INR   3. Dyspnea on exertion R06.09 786.09    4. Weakness R53.1 780.79        POC protime  he was advised to continue his maintenance medications      Follow-up and Dispositions    · Return in about 3 months (around 3/24/2020). I asked Dean Cates if he has any questions and I answered the questions. Dean Brown states that he understands the treatment plan and agrees with the treatment plan          THIS DOCUMENT WAS CREATED WITH A VOICE ACTIVATED DICTATION SYSTEM.   IT MAY CONTAIN TRANSCRIPTION ERRORS

## 2019-12-19 RX ORDER — WARFARIN SODIUM 5 MG/1
TABLET ORAL
Qty: 45 TAB | Refills: 0 | Status: SHIPPED | OUTPATIENT
Start: 2019-12-19 | End: 2021-08-04

## 2019-12-19 RX ORDER — IPRATROPIUM BROMIDE 21 UG/1
SPRAY, METERED NASAL
Qty: 30 ML | Refills: 2 | Status: SHIPPED | OUTPATIENT
Start: 2019-12-19

## 2019-12-19 RX ORDER — SPIRONOLACTONE 25 MG/1
25 TABLET ORAL DAILY
Qty: 30 TAB | Refills: 1 | Status: SHIPPED | OUTPATIENT
Start: 2019-12-19 | End: 2021-08-04

## 2019-12-23 RX ORDER — ONDANSETRON 4 MG/1
TABLET, ORALLY DISINTEGRATING ORAL
Qty: 50 TAB | Refills: 0 | Status: SHIPPED | OUTPATIENT
Start: 2019-12-23

## 2020-07-31 NOTE — PROGRESS NOTES
Mr. Prabhakar Bashir is here today for anticoagulation monitoring for the diagnosis of Atrial Fibrillation. His INR goal is 2.0-3.0 and his current Coumadin dose is 2.5 mg Mon and Fri then 5 mg all other days. Today's findings include an INR of 2.0 (normal INR range 0.8-1.2). Considering Mr. Kvng Hopkins past history, todays findings, and per the coumadin policy/protocol, Mr. Neil Henderson was instructed to take Coumadin as follows,  2.5 mg Mon and Fri then 5 mg all other days. He was also instructed to schedule an appointment in 2 weeks prior to leaving for an INR check. A full discussion of the nature of anticoagulants has been carried out. A full discussion of the need for frequent and regular monitoring, precise dosage adjustment and compliance was stressed. Side effects of potential bleeding were discussed and Mr. Neil Henderson was instructed to call [phone] if there are any signs of abnormal bleeding. Mr. Neil Henderson was instructed to avoid any OTC items containing aspirin or ibuprofen and prior to starting any new OTC products to consult with his physician or pharmacist to ensure no drug interactions are present. Mr. Neil Henderson was instructed to avoid any major changes in his general diet and to avoid alcohol consumption. .    Mr. Neil Henderson verbalized his understanding of all instructions and will call the office with any questions, concerns, or signs of abnormal bleeding or blood clot. 100.2

## 2021-07-27 ENCOUNTER — HOSPITAL ENCOUNTER (INPATIENT)
Age: 81
LOS: 8 days | Discharge: NURSING, REHAB OR LONG-TERM CARE | DRG: 644 | End: 2021-08-04
Attending: EMERGENCY MEDICINE | Admitting: INTERNAL MEDICINE
Payer: MEDICARE

## 2021-07-27 ENCOUNTER — APPOINTMENT (OUTPATIENT)
Dept: GENERAL RADIOLOGY | Age: 81
DRG: 644 | End: 2021-07-27
Attending: EMERGENCY MEDICINE
Payer: MEDICARE

## 2021-07-27 DIAGNOSIS — R42 DIZZINESS: ICD-10-CM

## 2021-07-27 DIAGNOSIS — R53.1 WEAKNESS GENERALIZED: ICD-10-CM

## 2021-07-27 DIAGNOSIS — R53.81 PHYSICAL DECONDITIONING: ICD-10-CM

## 2021-07-27 DIAGNOSIS — I48.0 PAROXYSMAL ATRIAL FIBRILLATION (HCC): ICD-10-CM

## 2021-07-27 DIAGNOSIS — J90 CHRONIC PLEURAL EFFUSION: ICD-10-CM

## 2021-07-27 DIAGNOSIS — E87.1 HYPONATREMIA: Primary | ICD-10-CM

## 2021-07-27 LAB
ALBUMIN SERPL-MCNC: 3.4 G/DL (ref 3.4–5)
ALBUMIN/GLOB SERPL: 0.8 {RATIO} (ref 0.8–1.7)
ALP SERPL-CCNC: 172 U/L (ref 45–117)
ALT SERPL-CCNC: 20 U/L (ref 16–61)
ANION GAP SERPL CALC-SCNC: 4 MMOL/L (ref 3–18)
AST SERPL-CCNC: 56 U/L (ref 10–38)
BASOPHILS # BLD: 0 K/UL (ref 0–0.1)
BASOPHILS NFR BLD: 0 % (ref 0–2)
BILIRUB SERPL-MCNC: 0.8 MG/DL (ref 0.2–1)
BNP SERPL-MCNC: 896 PG/ML (ref 0–1800)
BUN SERPL-MCNC: 13 MG/DL (ref 7–18)
BUN/CREAT SERPL: 19 (ref 12–20)
CALCIUM SERPL-MCNC: 8.6 MG/DL (ref 8.5–10.1)
CHLORIDE SERPL-SCNC: 82 MMOL/L (ref 100–111)
CO2 SERPL-SCNC: 28 MMOL/L (ref 21–32)
CREAT SERPL-MCNC: 0.67 MG/DL (ref 0.6–1.3)
DIFFERENTIAL METHOD BLD: ABNORMAL
EOSINOPHIL # BLD: 0 K/UL (ref 0–0.4)
EOSINOPHIL NFR BLD: 0 % (ref 0–5)
ERYTHROCYTE [DISTWIDTH] IN BLOOD BY AUTOMATED COUNT: 14 % (ref 11.6–14.5)
GLOBULIN SER CALC-MCNC: 4.4 G/DL (ref 2–4)
GLUCOSE SERPL-MCNC: 93 MG/DL (ref 74–99)
HCT VFR BLD AUTO: 26.7 % (ref 36–48)
HGB BLD-MCNC: 9.6 G/DL (ref 13–16)
LYMPHOCYTES # BLD: 0.5 K/UL (ref 0.9–3.6)
LYMPHOCYTES NFR BLD: 15 % (ref 21–52)
MAGNESIUM SERPL-MCNC: 1.8 MG/DL (ref 1.6–2.6)
MCH RBC QN AUTO: 32.9 PG (ref 24–34)
MCHC RBC AUTO-ENTMCNC: 36 G/DL (ref 31–37)
MCV RBC AUTO: 91.4 FL (ref 74–97)
MONOCYTES # BLD: 0.3 K/UL (ref 0.05–1.2)
MONOCYTES NFR BLD: 9 % (ref 3–10)
NEUTS SEG # BLD: 2.3 K/UL (ref 1.8–8)
NEUTS SEG NFR BLD: 75 % (ref 40–73)
PLATELET # BLD AUTO: 154 K/UL (ref 135–420)
PMV BLD AUTO: 8.8 FL (ref 9.2–11.8)
POTASSIUM SERPL-SCNC: 4.7 MMOL/L (ref 3.5–5.5)
PROT SERPL-MCNC: 7.8 G/DL (ref 6.4–8.2)
RBC # BLD AUTO: 2.92 M/UL (ref 4.35–5.65)
SODIUM SERPL-SCNC: 114 MMOL/L (ref 136–145)
WBC # BLD AUTO: 3.1 K/UL (ref 4.6–13.2)

## 2021-07-27 PROCEDURE — 83935 ASSAY OF URINE OSMOLALITY: CPT

## 2021-07-27 PROCEDURE — 85610 PROTHROMBIN TIME: CPT

## 2021-07-27 PROCEDURE — 85025 COMPLETE CBC W/AUTO DIFF WBC: CPT

## 2021-07-27 PROCEDURE — 83930 ASSAY OF BLOOD OSMOLALITY: CPT

## 2021-07-27 PROCEDURE — 80053 COMPREHEN METABOLIC PANEL: CPT

## 2021-07-27 PROCEDURE — 74011250636 HC RX REV CODE- 250/636: Performed by: EMERGENCY MEDICINE

## 2021-07-27 PROCEDURE — 71045 X-RAY EXAM CHEST 1 VIEW: CPT

## 2021-07-27 PROCEDURE — 93005 ELECTROCARDIOGRAM TRACING: CPT

## 2021-07-27 PROCEDURE — 99285 EMERGENCY DEPT VISIT HI MDM: CPT

## 2021-07-27 PROCEDURE — 83880 ASSAY OF NATRIURETIC PEPTIDE: CPT

## 2021-07-27 PROCEDURE — 65270000029 HC RM PRIVATE

## 2021-07-27 PROCEDURE — 83735 ASSAY OF MAGNESIUM: CPT

## 2021-07-27 RX ADMIN — SODIUM CHLORIDE 500 ML: 900 INJECTION, SOLUTION INTRAVENOUS at 23:25

## 2021-07-27 NOTE — ED NOTES
I performed a brief history of the patient here in triage and I have determined that pt will need further treatment and evaluation from the main side ER physician or [facility]. I have placed initial orders based on the history to help in expediting patients care. Pt with sodium of 119, sent by PCP. Was in doctor's office, o2 noted to be 70 by his finger. Daniele Miranda RN placed pulse ox on patient's ear it is 100%. Pt is in no respiratory distress and is not having any SOB.      TROY Alicea 7:21 PM

## 2021-07-27 NOTE — ED TRIAGE NOTES
Pt arrived from [facility] office for abnormal lab results of 119 Na and low SPO2 in the high 70s. Upon arrival to [facility], pt hands cold to touch and SPO2 device placed on pt ear and SPO2 results 100% room air. Pt sitting in wheelchair in no acute distress with unlabored breathing.       Hx of AFIB, DVT, Gout

## 2021-07-28 ENCOUNTER — APPOINTMENT (OUTPATIENT)
Dept: NON INVASIVE DIAGNOSTICS | Age: 81
DRG: 644 | End: 2021-07-28
Attending: PHYSICIAN ASSISTANT
Payer: MEDICARE

## 2021-07-28 ENCOUNTER — APPOINTMENT (OUTPATIENT)
Dept: CT IMAGING | Age: 81
DRG: 644 | End: 2021-07-28
Attending: INTERNAL MEDICINE
Payer: MEDICARE

## 2021-07-28 LAB
ANION GAP SERPL CALC-SCNC: 6 MMOL/L (ref 3–18)
ANION GAP SERPL CALC-SCNC: 7 MMOL/L (ref 3–18)
APPEARANCE UR: CLEAR
BACTERIA URNS QL MICRO: NEGATIVE /HPF
BASOPHILS # BLD: 0 K/UL (ref 0–0.1)
BASOPHILS NFR BLD: 1 % (ref 0–2)
BILIRUB UR QL: NEGATIVE
BUN SERPL-MCNC: 11 MG/DL (ref 7–18)
BUN SERPL-MCNC: 9 MG/DL (ref 7–18)
BUN/CREAT SERPL: 15 (ref 12–20)
BUN/CREAT SERPL: 22 (ref 12–20)
CALCIUM SERPL-MCNC: 7.9 MG/DL (ref 8.5–10.1)
CALCIUM SERPL-MCNC: 8.2 MG/DL (ref 8.5–10.1)
CHLORIDE SERPL-SCNC: 82 MMOL/L (ref 100–111)
CHLORIDE SERPL-SCNC: 83 MMOL/L (ref 100–111)
CK MB CFR SERPL CALC: 2.6 % (ref 0–4)
CK MB CFR SERPL CALC: 3.7 % (ref 0–4)
CK MB SERPL-MCNC: 6 NG/ML (ref 5–25)
CK MB SERPL-MCNC: 9.6 NG/ML (ref 5–25)
CK SERPL-CCNC: 232 U/L (ref 39–308)
CK SERPL-CCNC: 263 U/L (ref 39–308)
CO2 SERPL-SCNC: 25 MMOL/L (ref 21–32)
CO2 SERPL-SCNC: 26 MMOL/L (ref 21–32)
COLOR UR: YELLOW
CREAT SERPL-MCNC: 0.51 MG/DL (ref 0.6–1.3)
CREAT SERPL-MCNC: 0.6 MG/DL (ref 0.6–1.3)
DIFFERENTIAL METHOD BLD: ABNORMAL
ECHO AO ASC DIAM: 3.07 CM
ECHO AO ROOT DIAM: 3.36 CM
ECHO AV AREA PEAK VELOCITY: 1.47 CM2
ECHO AV AREA VTI: 1.45 CM2
ECHO AV AREA/BSA PEAK VELOCITY: 0.9 CM2/M2
ECHO AV AREA/BSA VTI: 0.9 CM2/M2
ECHO AV MEAN GRADIENT: 5.99 MMHG
ECHO AV PEAK GRADIENT: 11.24 MMHG
ECHO AV PEAK VELOCITY: 166.72 CM/S
ECHO AV VTI: 30.55 CM
ECHO LA AREA 4C: 33.46 CM2
ECHO LA VOL 2C: 136.48 ML (ref 18–58)
ECHO LA VOL 4C: 141 ML (ref 18–58)
ECHO LA VOL BP: 150.37 ML (ref 18–58)
ECHO LA VOL/BSA BIPLANE: 90.04 ML/M2 (ref 16–28)
ECHO LA VOLUME INDEX A2C: 81.72 ML/M2 (ref 16–28)
ECHO LA VOLUME INDEX A4C: 84.43 ML/M2 (ref 16–28)
ECHO LV INTERNAL DIMENSION DIASTOLIC: 3.2 CM (ref 4.2–5.9)
ECHO LV INTERNAL DIMENSION SYSTOLIC: 1.97 CM
ECHO LV IVSD: 1.47 CM (ref 0.6–1)
ECHO LV MASS 2D: 180.4 G (ref 88–224)
ECHO LV MASS INDEX 2D: 108 G/M2 (ref 49–115)
ECHO LV POSTERIOR WALL DIASTOLIC: 1.62 CM (ref 0.6–1)
ECHO LVOT DIAM: 1.95 CM
ECHO LVOT PEAK GRADIENT: 2.68 MMHG
ECHO LVOT PEAK VELOCITY: 81.84 CM/S
ECHO LVOT SV: 44.4 ML
ECHO LVOT VTI: 14.83 CM
ECHO MV A VELOCITY: 26.26 CM/S
ECHO MV E DECELERATION TIME (DT): 134.78 MS
ECHO MV E VELOCITY: 112 CM/S
ECHO MV E/A RATIO: 4.27
ECHO TV REGURGITANT MAX VELOCITY: 286.15 CM/S
ECHO TV REGURGITANT PEAK GRADIENT: 32.75 MMHG
EOSINOPHIL # BLD: 0 K/UL (ref 0–0.4)
EOSINOPHIL NFR BLD: 0 % (ref 0–5)
EPITH CASTS URNS QL MICRO: ABNORMAL /LPF (ref 0–5)
ERYTHROCYTE [DISTWIDTH] IN BLOOD BY AUTOMATED COUNT: 14 % (ref 11.6–14.5)
GLUCOSE SERPL-MCNC: 105 MG/DL (ref 74–99)
GLUCOSE SERPL-MCNC: 79 MG/DL (ref 74–99)
GLUCOSE UR STRIP.AUTO-MCNC: NEGATIVE MG/DL
HCT VFR BLD AUTO: 23.9 % (ref 36–48)
HGB BLD-MCNC: 8.6 G/DL (ref 13–16)
HGB UR QL STRIP: NEGATIVE
INR PPP: 2.6 (ref 0.8–1.2)
INR PPP: 3.1 (ref 0.8–1.2)
KETONES UR QL STRIP.AUTO: ABNORMAL MG/DL
LEUKOCYTE ESTERASE UR QL STRIP.AUTO: NEGATIVE
LVOT MG: 1.24 MMHG
LYMPHOCYTES # BLD: 0.4 K/UL (ref 0.9–3.6)
LYMPHOCYTES NFR BLD: 18 % (ref 21–52)
MAGNESIUM SERPL-MCNC: 2.1 MG/DL (ref 1.6–2.6)
MCH RBC QN AUTO: 33 PG (ref 24–34)
MCHC RBC AUTO-ENTMCNC: 36 G/DL (ref 31–37)
MCV RBC AUTO: 91.6 FL (ref 74–97)
MONOCYTES # BLD: 0.2 K/UL (ref 0.05–1.2)
MONOCYTES NFR BLD: 8 % (ref 3–10)
NEUTS SEG # BLD: 1.7 K/UL (ref 1.8–8)
NEUTS SEG NFR BLD: 73 % (ref 40–73)
NITRITE UR QL STRIP.AUTO: NEGATIVE
PH UR STRIP: 7 [PH] (ref 5–8)
PHOSPHATE SERPL-MCNC: 2.8 MG/DL (ref 2.5–4.9)
PLATELET # BLD AUTO: 103 K/UL (ref 135–420)
PMV BLD AUTO: 8.8 FL (ref 9.2–11.8)
POTASSIUM SERPL-SCNC: 4.6 MMOL/L (ref 3.5–5.5)
POTASSIUM SERPL-SCNC: 4.7 MMOL/L (ref 3.5–5.5)
PROT UR STRIP-MCNC: NEGATIVE MG/DL
PROTHROMBIN TIME: 27.5 SEC (ref 11.5–15.2)
PROTHROMBIN TIME: 31.3 SEC (ref 11.5–15.2)
RBC # BLD AUTO: 2.61 M/UL (ref 4.35–5.65)
RBC #/AREA URNS HPF: NEGATIVE /HPF (ref 0–5)
SODIUM SERPL-SCNC: 114 MMOL/L (ref 136–145)
SODIUM SERPL-SCNC: 115 MMOL/L (ref 136–145)
SODIUM UR-SCNC: 83 MMOL/L (ref 20–110)
SP GR UR REFRACTOMETRY: 1.03 (ref 1–1.03)
TROPONIN I SERPL-MCNC: 0.03 NG/ML (ref 0–0.04)
TROPONIN I SERPL-MCNC: <0.02 NG/ML (ref 0–0.04)
TSH SERPL DL<=0.05 MIU/L-ACNC: 3.34 UIU/ML (ref 0.36–3.74)
UROBILINOGEN UR QL STRIP.AUTO: 1 EU/DL (ref 0.2–1)
WBC # BLD AUTO: 2.4 K/UL (ref 4.6–13.2)
WBC URNS QL MICRO: NEGATIVE /HPF (ref 0–5)

## 2021-07-28 PROCEDURE — 84100 ASSAY OF PHOSPHORUS: CPT

## 2021-07-28 PROCEDURE — 93306 TTE W/DOPPLER COMPLETE: CPT

## 2021-07-28 PROCEDURE — 99233 SBSQ HOSP IP/OBS HIGH 50: CPT | Performed by: EMERGENCY MEDICINE

## 2021-07-28 PROCEDURE — 74011250637 HC RX REV CODE- 250/637: Performed by: INTERNAL MEDICINE

## 2021-07-28 PROCEDURE — 83735 ASSAY OF MAGNESIUM: CPT

## 2021-07-28 PROCEDURE — 74011250636 HC RX REV CODE- 250/636: Performed by: INTERNAL MEDICINE

## 2021-07-28 PROCEDURE — 65660000004 HC RM CVT STEPDOWN

## 2021-07-28 PROCEDURE — P9047 ALBUMIN (HUMAN), 25%, 50ML: HCPCS | Performed by: INTERNAL MEDICINE

## 2021-07-28 PROCEDURE — 81001 URINALYSIS AUTO W/SCOPE: CPT

## 2021-07-28 PROCEDURE — 84443 ASSAY THYROID STIM HORMONE: CPT

## 2021-07-28 PROCEDURE — 84295 ASSAY OF SERUM SODIUM: CPT

## 2021-07-28 PROCEDURE — 84156 ASSAY OF PROTEIN URINE: CPT

## 2021-07-28 PROCEDURE — 84300 ASSAY OF URINE SODIUM: CPT

## 2021-07-28 PROCEDURE — 82553 CREATINE MB FRACTION: CPT

## 2021-07-28 PROCEDURE — 71260 CT THORAX DX C+: CPT

## 2021-07-28 PROCEDURE — 83935 ASSAY OF URINE OSMOLALITY: CPT

## 2021-07-28 PROCEDURE — 80048 BASIC METABOLIC PNL TOTAL CA: CPT

## 2021-07-28 PROCEDURE — 36415 COLL VENOUS BLD VENIPUNCTURE: CPT

## 2021-07-28 PROCEDURE — 99291 CRITICAL CARE FIRST HOUR: CPT | Performed by: INTERNAL MEDICINE

## 2021-07-28 PROCEDURE — 74011000258 HC RX REV CODE- 258: Performed by: INTERNAL MEDICINE

## 2021-07-28 PROCEDURE — 74011250637 HC RX REV CODE- 250/637: Performed by: EMERGENCY MEDICINE

## 2021-07-28 PROCEDURE — 74011250637 HC RX REV CODE- 250/637: Performed by: PHYSICIAN ASSISTANT

## 2021-07-28 PROCEDURE — 74011250636 HC RX REV CODE- 250/636: Performed by: PHYSICIAN ASSISTANT

## 2021-07-28 PROCEDURE — 85025 COMPLETE CBC W/AUTO DIFF WBC: CPT

## 2021-07-28 PROCEDURE — 74011000636 HC RX REV CODE- 636: Performed by: INTERNAL MEDICINE

## 2021-07-28 PROCEDURE — 82570 ASSAY OF URINE CREATININE: CPT

## 2021-07-28 PROCEDURE — 99292 CRITICAL CARE ADDL 30 MIN: CPT | Performed by: INTERNAL MEDICINE

## 2021-07-28 PROCEDURE — APPNB30 APP NON BILLABLE TIME 0-30 MINS: Performed by: PHYSICIAN ASSISTANT

## 2021-07-28 RX ORDER — 3% SODIUM CHLORIDE 3 G/100ML
20 INJECTION, SOLUTION INTRAVENOUS CONTINUOUS
Status: DISCONTINUED | OUTPATIENT
Start: 2021-07-28 | End: 2021-07-29 | Stop reason: SDUPTHER

## 2021-07-28 RX ORDER — ACETAMINOPHEN 650 MG/1
650 SUPPOSITORY RECTAL
Status: DISCONTINUED | OUTPATIENT
Start: 2021-07-28 | End: 2021-08-04 | Stop reason: HOSPADM

## 2021-07-28 RX ORDER — SODIUM CHLORIDE 9 MG/ML
50 INJECTION, SOLUTION INTRAVENOUS CONTINUOUS
Status: DISCONTINUED | OUTPATIENT
Start: 2021-07-28 | End: 2021-07-28

## 2021-07-28 RX ORDER — LANOLIN ALCOHOL/MO/W.PET/CERES
500 CREAM (GRAM) TOPICAL DAILY
Status: DISCONTINUED | OUTPATIENT
Start: 2021-07-29 | End: 2021-08-04 | Stop reason: HOSPADM

## 2021-07-28 RX ORDER — ACETAMINOPHEN 325 MG/1
650 TABLET ORAL
Status: DISCONTINUED | OUTPATIENT
Start: 2021-07-28 | End: 2021-08-04 | Stop reason: HOSPADM

## 2021-07-28 RX ORDER — ONDANSETRON 2 MG/ML
4 INJECTION INTRAMUSCULAR; INTRAVENOUS
Status: DISCONTINUED | OUTPATIENT
Start: 2021-07-28 | End: 2021-07-30

## 2021-07-28 RX ORDER — WARFARIN SODIUM 5 MG/1
5 TABLET ORAL ONCE
Status: COMPLETED | OUTPATIENT
Start: 2021-07-28 | End: 2021-07-28

## 2021-07-28 RX ORDER — SODIUM CHLORIDE 0.9 % (FLUSH) 0.9 %
5-40 SYRINGE (ML) INJECTION AS NEEDED
Status: DISCONTINUED | OUTPATIENT
Start: 2021-07-28 | End: 2021-08-04 | Stop reason: HOSPADM

## 2021-07-28 RX ORDER — DEXTROSE MONOHYDRATE AND SODIUM CHLORIDE 5; .9 G/100ML; G/100ML
50 INJECTION, SOLUTION INTRAVENOUS CONTINUOUS
Status: DISCONTINUED | OUTPATIENT
Start: 2021-07-28 | End: 2021-07-29

## 2021-07-28 RX ORDER — HEPARIN SODIUM 5000 [USP'U]/ML
5000 INJECTION, SOLUTION INTRAVENOUS; SUBCUTANEOUS EVERY 8 HOURS
Status: DISCONTINUED | OUTPATIENT
Start: 2021-07-29 | End: 2021-07-28

## 2021-07-28 RX ORDER — IPRATROPIUM BROMIDE 21 UG/1
1 SPRAY, METERED NASAL 2 TIMES DAILY
Status: DISCONTINUED | OUTPATIENT
Start: 2021-07-29 | End: 2021-08-04 | Stop reason: HOSPADM

## 2021-07-28 RX ORDER — PANTOPRAZOLE SODIUM 40 MG/1
40 TABLET, DELAYED RELEASE ORAL
Status: DISCONTINUED | OUTPATIENT
Start: 2021-07-29 | End: 2021-08-04 | Stop reason: HOSPADM

## 2021-07-28 RX ORDER — MIDODRINE HYDROCHLORIDE 5 MG/1
5 TABLET ORAL
Status: DISCONTINUED | OUTPATIENT
Start: 2021-07-28 | End: 2021-07-29

## 2021-07-28 RX ORDER — DIPHENHYDRAMINE HYDROCHLORIDE 50 MG/ML
25 INJECTION, SOLUTION INTRAMUSCULAR; INTRAVENOUS ONCE
Status: COMPLETED | OUTPATIENT
Start: 2021-07-28 | End: 2021-07-28

## 2021-07-28 RX ORDER — ALBUMIN HUMAN 250 G/1000ML
25 SOLUTION INTRAVENOUS EVERY 6 HOURS
Status: DISCONTINUED | OUTPATIENT
Start: 2021-07-28 | End: 2021-07-28

## 2021-07-28 RX ORDER — SODIUM CHLORIDE 0.9 % (FLUSH) 0.9 %
5-40 SYRINGE (ML) INJECTION EVERY 8 HOURS
Status: DISCONTINUED | OUTPATIENT
Start: 2021-07-28 | End: 2021-08-04 | Stop reason: HOSPADM

## 2021-07-28 RX ORDER — ACETAMINOPHEN 500 MG
1000 TABLET ORAL
Status: COMPLETED | OUTPATIENT
Start: 2021-07-28 | End: 2021-07-28

## 2021-07-28 RX ORDER — MAGNESIUM SULFATE HEPTAHYDRATE 40 MG/ML
2 INJECTION, SOLUTION INTRAVENOUS ONCE
Status: COMPLETED | OUTPATIENT
Start: 2021-07-28 | End: 2021-07-28

## 2021-07-28 RX ORDER — MELATONIN
2000 DAILY
Status: DISCONTINUED | OUTPATIENT
Start: 2021-07-29 | End: 2021-08-04 | Stop reason: HOSPADM

## 2021-07-28 RX ORDER — ONDANSETRON 4 MG/1
4 TABLET, ORALLY DISINTEGRATING ORAL
Status: DISCONTINUED | OUTPATIENT
Start: 2021-07-28 | End: 2021-07-30

## 2021-07-28 RX ADMIN — MAGNESIUM SULFATE HEPTAHYDRATE 2 G: 40 INJECTION, SOLUTION INTRAVENOUS at 01:52

## 2021-07-28 RX ADMIN — ONDANSETRON 4 MG: 2 INJECTION INTRAMUSCULAR; INTRAVENOUS at 16:36

## 2021-07-28 RX ADMIN — ACETAMINOPHEN 650 MG: 325 TABLET ORAL at 16:22

## 2021-07-28 RX ADMIN — WARFARIN SODIUM 5 MG: 5 TABLET ORAL at 01:52

## 2021-07-28 RX ADMIN — MIDODRINE HYDROCHLORIDE 5 MG: 5 TABLET ORAL at 16:22

## 2021-07-28 RX ADMIN — ACETAMINOPHEN 1000 MG: 500 TABLET ORAL at 01:20

## 2021-07-28 RX ADMIN — IOPAMIDOL 100 ML: 612 INJECTION, SOLUTION INTRAVENOUS at 12:46

## 2021-07-28 RX ADMIN — SODIUM CHLORIDE 100 ML/HR: 900 INJECTION, SOLUTION INTRAVENOUS at 01:20

## 2021-07-28 RX ADMIN — DESMOPRESSIN ACETATE 2 MCG: 4 SOLUTION INTRAVENOUS at 22:38

## 2021-07-28 RX ADMIN — SODIUM CHLORIDE 20 ML/HR: 3 INJECTION, SOLUTION INTRAVENOUS at 23:22

## 2021-07-28 RX ADMIN — ALBUMIN (HUMAN) 25 G: 0.25 INJECTION, SOLUTION INTRAVENOUS at 15:56

## 2021-07-28 RX ADMIN — DEXTROSE MONOHYDRATE AND SODIUM CHLORIDE 50 ML/HR: 5; .9 INJECTION, SOLUTION INTRAVENOUS at 16:28

## 2021-07-28 RX ADMIN — Medication 10 ML: at 22:05

## 2021-07-28 RX ADMIN — DIPHENHYDRAMINE HYDROCHLORIDE 25 MG: 50 INJECTION, SOLUTION INTRAMUSCULAR; INTRAVENOUS at 01:52

## 2021-07-28 NOTE — ED NOTES
Assumed care of pt. Pt awake, alert and in NAD. Dr Rachael Serna at bedside. Vitals stable.   Awaiting provider eval

## 2021-07-28 NOTE — H&P
[facility]  Pulmonary, Critical Care, and Sleep Medicine    Name: Jessica Napier MRN: [id number]   : 1940 Hospital: [address] [place] [address] Dr   Date: 2021        Critical Care History and Physical      IMPRESSION:   · Hyponatremia: evolumic vs hypervolemic - SIADH vs psychogenic polydipsia vs hypothyroidsm vs diuretic use  · Congestive Heart Failure  · Atrial fibrillation on warfarin  · Generalized weakness  · Hx of esophageal cancer s/p esophagectomy with gastirc pull through  · Chronic pleural effusion  · Confusion:  Noted at home, unsure if secondary to metabolic cause above vs chronic encephalopathy such as dementia     Patient Active Problem List   Diagnosis Code    Nausea alone R11.0    Esophageal reflux K21.9    Gout, unspecified M10.9    Generalized osteoarthrosis, involving multiple sites M15.9    Atrial fibrillation (HCC) I48.91    Pacemaker Z95.0    Dizziness R42    Personal history of esophageal cancer Z85.01    Weight loss R63.4    Hyponatremia E87.1        RECOMMENDATIONS:   Neuro: Monitor for confusion, anorexia. Pulm: Titrate FiO2 for goal SPO2> 90%,VAP prevention bundle, head of the bed at 30' all times. Daily sedation holiday and assessment for weaning with SBT as tolerated. PRN duonebs. Keep HOB > 30' at all times. Aspiration Precautions. Supplemental O2 via NC, titrate flow for goal SPO2> 90% Bronchodilators, steroids, pulmonary hygiene care, Aspiration precautions, Keep HOB >30 degrees  [employer] Monitor for signs of heart failure, Check cardiac panel, ECHO results. Fluids: 0.9 Na Cl infusion  GI: SUP, Trend LFTs, Zofran PRN for N/V, Diet/NPO  Renal:  Trend Renal indices, Monitor serum and urine osmolality. Strict Is/Os, Closely follow Urine output through Dugan catheter. Hem/Onc: Daily CBC. Monitor for s/o active bleeding. I/D:Sepsis bundle per hospital protocol, Blood, Sputum, and Urine cultures drawn and will be followed.  Lactic acid ordered- initial and repeat Q4hrs till normalized. Trend WBCs and temperature curve. Endocrine: Q6 glucoses, SSI. Check TSH level  Metabolic:  Daily BMP, mag, phos. Trend lytes, replace as needed. Musc/Skin: no acute issues, wound care  Full Code  Discussed w/ attending physician      Best practice :  Glycemic control  IHI ICU bundles: Borrero Bundle Followed    Stress ulcer prophylaxis. NA   DVT prophylaxis. SCD  Need for Lines, borrero assessed. Subjective/History: This patient has been seen and evaluated at the request of Dr. Perry Monroe for Hyponatremia.    07/28/21    Patient is a 79 y.o. male with past medical history of surgically treated esophageal cancer, heart failure, Atrial fibrillation, who presented to the ED at the request of his primary care physician. Patient major complain beside the hyponatremia is feeling fatigued lately with anorexia. Patient on Coumadin for long term anticoagulation due to Afib  Patient denies any fever, altered mental status, agitation, headache, frequency, polydipsia  Denied chest pain, SOB, Abdominal pain. Admits to nausea but no vomiting, denied any history of seizure. No JVD but bilateral lower limb edema.      Past Medical History:   Diagnosis Date    Abdominal pain, unspecified site     Acute upper respiratory infections of unspecified site     Atrial fibrillation ([address]     Cancer of esophagus ([address] 2001    RT and adjuvant chemotherapy, surgery    Esophageal reflux     Generalized osteoarthrosis, involving multiple sites     Gout, unspecified     Nausea alone     Sinoatrial node dysfunction ([address]     Status post thoracentesis 06/2019    Thromboembolus ([address] 2003 (approx)    DVT in leg (IVC filter placed)    Unspecified cataract     Unspecified constipation         Past Surgical History:   Procedure Laterality Date    COLONOSCOPY N/A 12/13/2017    COLONOSCOPY with hot polypectomies performed by Reg Quintana MD at [address] HX GI esophagectomy    HX HEENT  2001    Esophageal Cancer Surgery    HX HIP REPLACEMENT Left 2005 (approx)    HX OTHER SURGICAL  2003    ivc filter placed    HX PACEMAKER  2012    [employer]        Prior to Admission medications    Medication Sig Start Date End Date Taking? Authorizing Provider   ondansetron (ZOFRAN ODT) 4 mg disintegrating tablet DISSOLVE 1 TABLET ON TONGUE TWO TIMES A DAY AS NEEDED FOR NAUSEA 12/23/19   Dilcia Black MD   ipratropium (ATROVENT) 0.03 % nasal spray USE 2 SPRAYS EACH NOSTRIL TWO TIMES A DAY 12/19/19   Dilcia Black MD   spironolactone (ALDACTONE) 25 mg tablet Take 1 Tab by mouth daily. 12/19/19   Dilcia Black MD   warfarin (COUMADIN) 5 mg tablet TAKE ONE AND ONE-HALF TABLET BY MOUTH ONCE DAILY 12/19/19   Dilcia Black MD   raNITIdine hcl 150 mg capsule Take 150 mg by mouth two (2) times a day. Provider, Historical   linaCLOtide (LINZESS) 72 mcg cap capsule Take  by mouth Daily (before breakfast). Provider, Historical   megestrol (MEGACE) 40 mg tablet 1 tab daily 11/7/19   Dilcia Black MD   furosemide (LASIX) 20 mg tablet Take 20 mg by mouth daily. 6/7/19   Provider, Historical   omeprazole (PRILOSEC) 20 mg capsule TAKE ONE CAPSULE BY MOUTH EVERY MORNING 7/19/19   Dilcia Black MD   thiamine HCL (VITAMIN B-1) 100 mg tablet Take 100 mg by mouth daily. Provider, Historical   allopurinol (ZYLOPRIM) 300 mg tablet TAKE ONE TABLET BY MOUTH ONCE DAILY 1/23/19   Dilcia Black MD   metoprolol tartrate (LOPRESSOR) 50 mg tablet Take 50 mg by mouth two (2) times a day. Provider, Historical   magnesium 250 mg tab Take 1 Tab by mouth daily. Provider, Historical   cyanocobalamin (VITAMIN B12) 500 mcg tablet Take 500 mcg by mouth daily. Provider, Historical   cholecalciferol, vitamin D3, 2,000 unit tab Take 1 Tab by mouth daily.     Provider, Historical       Current Facility-Administered Medications   Medication Dose Route Frequency    0.9% sodium chloride infusion  100 mL/hr IntraVENous CONTINUOUS    sodium chloride (NS) flush 5-40 mL  5-40 mL IntraVENous Q8H    WARFARIN INFORMATION NOTE (COUMADIN): PHYSICIAN TO DOSE   Other Q24H    . Please enter patient's Height and Weight for drug/monitoring purposes. Thank you. 1 Each Other ONCE       Allergies   Allergen Reactions    Parafon Forte Dsc [Chlorzoxazone] Anaphylaxis    Aspirin Other (comments)     Cannot take due to on coumadin        Social History     Tobacco Use    Smoking status: Former Smoker     Packs/day: 0.50     Years: 34.00     Pack years: 17.00    Smokeless tobacco: Never Used   Substance Use Topics    Alcohol use: Yes     Comment: 2 drinks per day        Family History   Problem Relation Age of Onset    Heart Disease Father     Alzheimer Father           Review of Systems:  Review of Systems   Constitutional: Positive for malaise/fatigue. Negative for chills, diaphoresis and fever. HENT: Negative for ear discharge, ear pain, sore throat and tinnitus. Eyes: Negative for blurred vision, double vision and photophobia. Respiratory: Negative for cough, hemoptysis, sputum production, shortness of breath and wheezing. Cardiovascular: Positive for leg swelling. Negative for chest pain, palpitations and orthopnea. Gastrointestinal: Positive for nausea. Negative for abdominal pain, blood in stool, diarrhea, heartburn and vomiting. Genitourinary: Negative for dysuria, flank pain and hematuria. Musculoskeletal: Negative for falls, myalgias and neck pain. Skin: Negative for itching and rash. Neurological: Negative for dizziness, tremors, focal weakness, seizures, loss of consciousness and headaches. Psychiatric/Behavioral: Negative for depression, hallucinations and substance abuse.            Objective:   Vital Signs:    Visit Vitals  BP (!) 115/58   Pulse 70   Temp 98.9 °F (37.2 °C)   Resp 11   SpO2 99%       O2 Device: None (Room air)       Temp (24hrs), Av.9 °F (37.2 °C), Min:98.9 °F (37.2 °C), Max:98.9 °F (37.2 °C)       Intake/Output:   Last shift:      No intake/output data recorded. Last 3 shifts: 07/26 1901 - 07/28 0700  In: 750 [I.V.:750]  Out: 50 [Urine:50]    Intake/Output Summary (Last 24 hours) at 7/28/2021 1111  Last data filed at 7/28/2021 0320  Gross per 24 hour   Intake 750 ml   Output 50 ml   Net 700 ml       ARDS network Guidelines:   Lung protective strategy and Plateau  Pressure goal < 30 cm H2O goals  Oxygenation Goals PaO2 55-80 mm Hg or SaO2 88-95%  PH goal 7.30-7.45    VAP bundle:  Reviewed. Leeann tube to suction at 20-30 cm Hg. Maintain [place] tube with 5-10ml air every 4 hours  Routine oral care every 4 hours  Elevation of head > 45 degree  Daily sedation holiday and SBT evaluation starting at 6.00am.    Physical Exam:     General:  Alert, cooperative, distressed by the noise in the Emergency room. Head:  Normocephalic, without obvious abnormality, atraumatic. Eyes:  Conjunctivae/corneas clear. PERRL,   Nose: Nares normal. Septum midline. Mucosa normal. No drainage or sinus tenderness. Throat: Lips, mucosa, and tongue normal.   Neck: Supple, symmetrical, trachea midline, no adenopathy, thyroid: no enlargment/tenderness/nodules, no carotid bruit and no JVD. Lungs:   Limited breath sounds on the right due to pleural effusion. Chest wall:  No tenderness or deformity. Heart:  Regular rate irregularly irregular rhythm, S1, S2 normal,midsystolic ejection murmur, no click, rub or gallop. Abdomen:   Soft, non-tender. Bowel sounds normal. No masses,  No organomegaly. Extremities: Extremities are noted for bilateral pitting edema, atraumatic, no cyanosis    Pulses: 2+ and symmetric all extremities. Skin: Skin color, texture, turgor normal. No rashes or lesions   Lymph nodes:  Cervical, supraclavicular, and axillary nodes normal.   Neurologic: Grossly nonfocal     Devices:  · Dugan: no urine noted in the bag.      Data:     Recent Results (from the past 24 hour(s))   CBC WITH AUTOMATED DIFF    Collection Time: 07/27/21  7:55 PM   Result Value Ref Range    WBC 3.1 (L) 4.6 - 13.2 K/uL    RBC 2.92 (L) 4.35 - 5.65 M/uL    HGB 9.6 (L) 13.0 - 16.0 g/dL    HCT 26.7 (L) 36.0 - 48.0 %    MCV 91.4 74.0 - 97.0 FL    MCH 32.9 24.0 - 34.0 PG    MCHC 36.0 31.0 - 37.0 g/dL    RDW 14.0 11.6 - 14.5 %    PLATELET 495 475 - 227 K/uL    MPV 8.8 (L) 9.2 - 11.8 FL    NEUTROPHILS 75 (H) 40 - 73 %    LYMPHOCYTES 15 (L) 21 - 52 %    MONOCYTES 9 3 - 10 %    EOSINOPHILS 0 0 - 5 %    BASOPHILS 0 0 - 2 %    ABS. NEUTROPHILS 2.3 1.8 - 8.0 K/UL    ABS. LYMPHOCYTES 0.5 (L) 0.9 - 3.6 K/UL    ABS. MONOCYTES 0.3 0.05 - 1.2 K/UL    ABS. EOSINOPHILS 0.0 0.0 - 0.4 K/UL    ABS. BASOPHILS 0.0 0.0 - 0.1 K/UL    DF AUTOMATED     METABOLIC PANEL, COMPREHENSIVE    Collection Time: 07/27/21  7:55 PM   Result Value Ref Range    Sodium 114 (LL) 136 - 145 mmol/L    Potassium 4.7 3.5 - 5.5 mmol/L    Chloride 82 (L) 100 - 111 mmol/L    CO2 28 21 - 32 mmol/L    Anion gap 4 3.0 - 18 mmol/L    Glucose 93 74 - 99 mg/dL    BUN 13 7.0 - 18 MG/DL    Creatinine 0.67 0.6 - 1.3 MG/DL    BUN/Creatinine ratio 19 12 - 20      GFR est AA >60 >60 ml/min/1.73m2    GFR est non-AA >60 >60 ml/min/1.73m2    Calcium 8.6 8.5 - 10.1 MG/DL    Bilirubin, total 0.8 0.2 - 1.0 MG/DL    ALT (SGPT) 20 16 - 61 U/L    AST (SGOT) 56 (H) 10 - 38 U/L    Alk.  phosphatase 172 (H) 45 - 117 U/L    Protein, total 7.8 6.4 - 8.2 g/dL    Albumin 3.4 3.4 - 5.0 g/dL    Globulin 4.4 (H) 2.0 - 4.0 g/dL    A-G Ratio 0.8 0.8 - 1.7     MAGNESIUM    Collection Time: 07/27/21  7:55 PM   Result Value Ref Range    Magnesium 1.8 1.6 - 2.6 mg/dL   NT-PRO BNP    Collection Time: 07/27/21  7:55 PM   Result Value Ref Range    NT pro- 0 - 1,800 PG/ML   PROTHROMBIN TIME + INR    Collection Time: 07/27/21  7:55 PM   Result Value Ref Range    Prothrombin time 27.5 (H) 11.5 - 15.2 sec    INR 2.6 (H) 0.8 - 1.2     EKG, 12 LEAD, INITIAL    Collection Time: 07/27/21 7:59 PM   Result Value Ref Range    Ventricular Rate 78 BPM    Atrial Rate 70 BPM    QRS Duration 158 ms    Q-T Interval 430 ms    QTC Calculation (Bezet) 490 ms    Calculated R Axis -63 degrees    Calculated T Axis 108 degrees    Diagnosis       Ventricular-paced rhythm  Abnormal ECG  When compared with ECG of 15-APR-2012 07:57,  Electronic ventricular pacemaker has replaced Sinus rhythm     CBC WITH AUTOMATED DIFF    Collection Time: 07/28/21  5:00 AM   Result Value Ref Range    WBC 2.4 (L) 4.6 - 13.2 K/uL    RBC 2.61 (L) 4.35 - 5.65 M/uL    HGB 8.6 (L) 13.0 - 16.0 g/dL    HCT 23.9 (L) 36.0 - 48.0 %    MCV 91.6 74.0 - 97.0 FL    MCH 33.0 24.0 - 34.0 PG    MCHC 36.0 31.0 - 37.0 g/dL    RDW 14.0 11.6 - 14.5 %    PLATELET 138 (L) 097 - 420 K/uL    MPV 8.8 (L) 9.2 - 11.8 FL    NEUTROPHILS 73 40 - 73 %    LYMPHOCYTES 18 (L) 21 - 52 %    MONOCYTES 8 3 - 10 %    EOSINOPHILS 0 0 - 5 %    BASOPHILS 1 0 - 2 %    ABS. NEUTROPHILS 1.7 (L) 1.8 - 8.0 K/UL    ABS. LYMPHOCYTES 0.4 (L) 0.9 - 3.6 K/UL    ABS. MONOCYTES 0.2 0.05 - 1.2 K/UL    ABS. EOSINOPHILS 0.0 0.0 - 0.4 K/UL    ABS.  BASOPHILS 0.0 0.0 - 0.1 K/UL    DF AUTOMATED     METABOLIC PANEL, BASIC    Collection Time: 07/28/21  5:00 AM   Result Value Ref Range    Sodium 115 (LL) 136 - 145 mmol/L    Potassium 4.7 3.5 - 5.5 mmol/L    Chloride 83 (L) 100 - 111 mmol/L    CO2 25 21 - 32 mmol/L    Anion gap 7 3.0 - 18 mmol/L    Glucose 79 74 - 99 mg/dL    BUN 11 7.0 - 18 MG/DL    Creatinine 0.51 (L) 0.6 - 1.3 MG/DL    BUN/Creatinine ratio 22 (H) 12 - 20      GFR est AA >60 >60 ml/min/1.73m2    GFR est non-AA >60 >60 ml/min/1.73m2    Calcium 7.9 (L) 8.5 - 10.1 MG/DL   MAGNESIUM    Collection Time: 07/28/21  5:00 AM   Result Value Ref Range    Magnesium 2.1 1.6 - 2.6 mg/dL   PHOSPHORUS    Collection Time: 07/28/21  5:00 AM   Result Value Ref Range    Phosphorus 2.8 2.5 - 4.9 MG/DL   TSH 3RD GENERATION    Collection Time: 07/28/21  5:00 AM   Result Value Ref Range    TSH 3.34 0.36 - 3. 74 uIU/mL   CARDIAC PANEL,(CK, CKMB & TROPONIN)    Collection Time: 07/28/21  5:00 AM   Result Value Ref Range    CK - MB 9.6 (H) <3.6 ng/ml    CK-MB Index 3.7 0.0 - 4.0 %     39 - 308 U/L    Troponin-I, QT 0.03 0.0 - 0.045 NG/ML   PROTHROMBIN TIME + INR    Collection Time: 07/28/21 10:17 AM   Result Value Ref Range    Prothrombin time 31.3 (H) 11.5 - 15.2 sec    INR 3.1 (H) 0.8 - 1.2             No results for input(s): FIO2I, IFO2, HCO3I, IHCO3, HCOPOC, PCO2I, PCOPOC, IPHI, PHI, PHPOC, PO2I, PO2POC in the last 72 hours. No lab exists for component: IPOC2    Telemetry:normal sinus rhythm, AFIB    Imaging:  I have personally reviewed the patients radiographs and have reviewed the reports:    XR Results (most recent):  Results from Hospital Encounter encounter on 07/27/21    XR CHEST PORT    Narrative  EXAM: Chest Radiograph    INDICATION:  chest pain, sob, and/or arrhythmia    TECHNIQUE: AP view of the chest    COMPARISON: 6/19/2019, 6/6/2019    FINDINGS: No pneumothorax identified. Interstitial opacities are noted. Small  bilateral pleural effusions are present. Pacemaker is unchanged with right  atrial and right ventricular leads. The cardiac silhouette is enlarged. The  pulmonary vasculature is prominent. Degenerative changes shoulders and spine are  noted. Impression  1. Mild pulmonary edema and small bilateral pleural effusions. CT Results (most recent):  Results from Hospital Encounter encounter on 11/22/19    CT ABD W WO CONT    Narrative  CT ABDOMEN UNENHANCED AND ENHANCED    CPT CODE: 73002    INDICATION: Benign neoplasm of the ascending colon. Benign neoplasm of the  cecum. Encounter for surgical aftercare following surgery of digestive system. Abnormal CT scan. Change in bowel habits. Constipation, chronic. Early satiety,  Nausea and vomiting unspecified, first degree hemorrhoids.     TECHNIQUE: Unenhanced axial images obtained from lung base to iliac crest  without intravenous contrast. This was followed by axial images during the  arterial and portal venous phases of enhancement after injection of 100 cc  Isovue-370 cc's non-ionic intravenous contrast. Liver protocol. Coronal and  sagittal reformatted images were obtained. All CT scans are performed using  dose optimization techniques as appropriate to the performed exam including the  following: Automated exposure control, adjustment of mA and/or kV according to  patient size, and use of iterative reconstructive technique. COMPARISON: Ultrasound abdomen limited 7/18/2019, CT chest 2/26/2019, CT abdomen  and pelvis 2/7/2019. ABDOMEN FINDINGS:    Lung Base: Small right pleural effusion. Mild atelectasis. Coronary artery  disease. Pacing device. Liver: Heterogeneous liver. 2 hypodense foci in the inferior right lobe segment  5 measure 9 mm and 6 mm, compatible with small cysts without enhancement. There  are patchy small hypodense foci in the superior right hepatic lobe and  laterally, limited. No arterially enhancing lesions. Biliary: No biliary ductal dilatation. Gallbladder unremarkable. Spleen: No splenomegaly. Pancreas: No focal mass. No significant ductal dilatation. Adrenal Glands: No focal nodule. Kidneys: Subcentimeter hypodensity at each kidney, size limited but likely small  cysts. 2 mm bilateral intrarenal calculi. No hydronephrosis. Peritoneum/ Retroperitoneum: No free fluid or free air. Lymph Nodes: No lymphadenopathy. Vessels: Diffuse arterial wall calcifications. IVC filter. Bowel: Esophagectomy with gastric pull-through. Redemonstration of hiatal hernia  occupying the basilar left hemithorax containing multiple loops of splenic  flexure. No evidence of bowel obstruction. Bones: No suspicious bone lesions. Multilevel vertebral osteophytes and mild  degenerative disc disease. Impression  IMPRESSION:    1. Inferior right hepatic hypodensities custom prior CT are consistent with  cysts.   2. Mid and superior right hepatic small hypodensities are indeterminate, limited  by hepatic heterogeneity. A least one of these sites is likely a small cyst. Any  further assessment of the liver may require MRI. No clearly enhancing mass. 3. Subcentimeter renal cysts and nephrolithiasis. 4. Stable left hiatal hernia containing colon. Esophagectomy with gastric  pull-through. 5. Small right pleural effusion. 6. Atherosclerotic disease. 7. IVC filter. Patience Busing  PGY-1  [facility] ED      I saw and evaluated the patient, performing the key elements of the service. I discussed the findings, assessment and plan with the resident and agree with the resident's findings and plan as documented in the resident's note. Total of 78 min critical care time spent at bedside (personally) during the course of care providing evaluation,management and care decisions and ordering appropriate treatment related to critical care problems exclusive of procedures. The reason for providing this level of medical care for this critically ill patient was due a critical illness that impaired one or more vital organ systems such that there was a high probability of imminent or life threatening deterioration in the patients condition. This care involved high complexity decision making to assess, manipulate, and support vital system functions, to treat this degree vital organ system failure and to prevent further life threatening deterioration of the patients condition. This time was independent of other practitioners. 54-year-old male with a past medical history of esophageal cancer status post gastric pull-through, chronic pleural effusion, CHFpEF, valvular heart disease with mitral regurg, history of DVT, atrial fibrillation on warfarin therapy, history of remote tobacco use, SSS (s/p pacer), presented to [facility] ER referred by his PCP for hyponatremia.   Pt and daughter at bedside report that patient had noted to have mild hyponatremia as an outpatient, follows with doctors of. Patient was reported to take increased salt dietary, such as potato chips. Daughter reports the patient had increased symptoms such as weakness, frequent falls, confusion over the last few weeks to months. She reports that about 3 weeks ago during a vacation to the [place], patient suffered fall which did not result in loss of consciousness but required significant help. They deny any seizures, thiazide intake. They report that the patient is also not had any recurrence of cancer. Patient denies any respiratory symptoms. Daughter reports that they would like a new PCP and cardiologist because they feel that they have not been responded to well since patient's previous PCP Dr. Shaggy Guillermo retired. In the ER, patient was found to have a sodium of 115. Nephrology was consulted, patient was admitted to the ICU. Patient was started on normal saline at 100 cc/h, sodium is dropped down to 114. Patient continues to note weakness, but is lucid and able to provide history and speak coherently. Patient notes that he did not follow-up with me with regards to his transudate of effusion from 2019. Patient reports he has been compliant with his Lasix and spironolactone. I discussed the case with Dr. 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 I discussed the case with nursing supervisor as well as hospitalist service, Dr. Marychuy Clark who was agreeable to take the patient. I have recommended PT/OT when possible. Patient will also need follow-up in pulmonary clinic, nonurgently with regards to pleural effusion, although this was transudate of in the past and likely just requires additional diuresis when possible. Patient also has moderate protein calorie malnutrition, advise dietary consultation. Continue supportive care.       Hector Dawkins MD/MPH     Pulmonary, Critical Care Medicine  [facility]

## 2021-07-28 NOTE — ED NOTES
Patient laying in bed alert and oriented x3, no signs of distress noted at this time. Patient has a borrero in place that is draining well. It was in place when this nurse took over from [employer].

## 2021-07-28 NOTE — ED PROVIDER NOTES
EMERGENCY DEPARTMENT HISTORY AND PHYSICAL EXAM      Date: 7/27/2021  Patient Name: Jessica Napier    History of Presenting Illness     Chief Complaint   Patient presents with    Abnormal Lab Results       History (Context): Jessica Napier is a 79 y.o. gentleman with mild heart failure, A. fib, chronic pedal edema, sick sinus syndrome (status post pacemaker), and chronic mild hyponatremia who presents at the direction with his PCP for hyponatremia. Patient has been feeling somewhat off and confused intermittently. On review of systems, the patient denies fever, chills, rashes, frequent urination, anuria. The patient endorses reduced p.o. intake    PCP: Devendra Nathan MD    Current Facility-Administered Medications   Medication Dose Route Frequency Provider Last Rate Last Admin    0.9% sodium chloride infusion  100 mL/hr IntraVENous CONTINUOUS Yanet Olivera PA-C 100 mL/hr at 07/28/21 0120 100 mL/hr at 07/28/21 0120    sodium chloride (NS) flush 5-40 mL  5-40 mL IntraVENous Q8H Yanet Olivera PA-C        sodium chloride (NS) flush 5-40 mL  5-40 mL IntraVENous PRN Yanet Olivera PA-C        acetaminophen (TYLENOL) tablet 650 mg  650 mg Oral Q6H PRN Linda LafeCÉSAR iyer        Or    acetaminophen (TYLENOL) suppository 650 mg  650 mg Rectal Q6H PRN Linda Mac PA-C        ondansetron (ZOFRAN ODT) tablet 4 mg  4 mg Oral Q8H PRN Yanet Olivera PA-C        Or    ondansetron (ZOFRAN) injection 4 mg  4 mg IntraVENous Q6H PRN Linda Mac PA-C         Current Outpatient Medications   Medication Sig Dispense Refill    ondansetron (ZOFRAN ODT) 4 mg disintegrating tablet DISSOLVE 1 TABLET ON TONGUE TWO TIMES A DAY AS NEEDED FOR NAUSEA 50 Tab 0    ipratropium (ATROVENT) 0.03 % nasal spray USE 2 SPRAYS EACH NOSTRIL TWO TIMES A DAY 30 mL 2    spironolactone (ALDACTONE) 25 mg tablet Take 1 Tab by mouth daily.  30 Tab 1    warfarin (COUMADIN) 5 mg tablet TAKE ONE AND ONE-HALF TABLET BY MOUTH ONCE DAILY 45 Tab 0    raNITIdine hcl 150 mg capsule Take 150 mg by mouth two (2) times a day.  linaCLOtide (LINZESS) 72 mcg cap capsule Take  by mouth Daily (before breakfast).  megestrol (MEGACE) 40 mg tablet 1 tab daily 30 Tab 2    furosemide (LASIX) 20 mg tablet Take 20 mg by mouth daily.  omeprazole (PRILOSEC) 20 mg capsule TAKE ONE CAPSULE BY MOUTH EVERY MORNING 30 Cap 0    thiamine HCL (VITAMIN B-1) 100 mg tablet Take 100 mg by mouth daily.  allopurinol (ZYLOPRIM) 300 mg tablet TAKE ONE TABLET BY MOUTH ONCE DAILY 90 Tab 0    metoprolol tartrate (LOPRESSOR) 50 mg tablet Take 50 mg by mouth two (2) times a day.  magnesium 250 mg tab Take 1 Tab by mouth daily.  cyanocobalamin (VITAMIN B12) 500 mcg tablet Take 500 mcg by mouth daily.  cholecalciferol, vitamin D3, 2,000 unit tab Take 1 Tab by mouth daily.          Past History     Past Medical History:  Past Medical History:   Diagnosis Date    Abdominal pain, unspecified site     Acute upper respiratory infections of unspecified site     Atrial fibrillation ([address]     Cancer of esophagus ([address] 2001    RT and adjuvant chemotherapy, surgery    Esophageal reflux     Generalized osteoarthrosis, involving multiple sites     Gout, unspecified     Nausea alone     Sinoatrial node dysfunction ([address]     Status post thoracentesis 06/2019    Thromboembolus ([address] 2003 (approx)    DVT in leg (IVC filter placed)    Unspecified cataract     Unspecified constipation        Past Surgical History:  Past Surgical History:   Procedure Laterality Date    COLONOSCOPY N/A 12/13/2017    COLONOSCOPY with hot polypectomies performed by Layton Bennett MD at [facility]      esophagectomy    HX HEENT  2001    Esophageal Cancer Surgery    HX HIP REPLACEMENT Left 2005 (approx)    HX OTHER SURGICAL  2003    ivc filter placed    HX PACEMAKER  2012    [employer]       Family History:  Family History   Problem Relation Age of Onset    Heart Disease Father     Alzheimer Father        Social History:  Social History     Tobacco Use    Smoking status: Former Smoker     Packs/day: 0.50     Years: 34.00     Pack years: 17.00    Smokeless tobacco: Never Used   Substance Use Topics    Alcohol use: Yes     Comment: 2 drinks per day    Drug use: No       Allergies: Allergies   Allergen Reactions    Parafon Forte Dsc [Chlorzoxazone] Anaphylaxis    Aspirin Other (comments)     Cannot take due to on coumadin       PMH, PSH, family history, social history, allergies reviewed with the patient with significant items noted above. Review of Systems   As per HPI, otherwise reviewed and negative. Physical Exam     Vitals:    07/28/21 0515 07/28/21 0530 07/28/21 0545 07/28/21 0600   BP: (!) 102/54 109/67 (!) 104/55 107/65   Pulse: 70 76 70 70   Resp: 15 16 11 12   Temp:       SpO2: 99% 98% 97% 99%       Gen: Well-appearing, in no acute distress   HEENT: Normocephalic, sclera anicteric  Cardiovascular: Normal rate, regular rhythm, no murmurs, rubs, gallops. Pulses intact and equal distally. Pulmonary: No respiratory distress. No stridor. Clear lungs. ABD: Soft, nontender, nondistended. Neuro: Alert. Normal speech. Normal mentation. Psych: Normal thought content and thought processes. : No CVA tenderness  EXT: Bilateral pedal edema. Moves all extremities well. No cyanosis or clubbing. Skin: Warm and well-perfused.   Other:        Diagnostic Study Results     Labs -     Recent Results (from the past 12 hour(s))   CBC WITH AUTOMATED DIFF    Collection Time: 07/27/21  7:55 PM   Result Value Ref Range    WBC 3.1 (L) 4.6 - 13.2 K/uL    RBC 2.92 (L) 4.35 - 5.65 M/uL    HGB 9.6 (L) 13.0 - 16.0 g/dL    HCT 26.7 (L) 36.0 - 48.0 %    MCV 91.4 74.0 - 97.0 FL    MCH 32.9 24.0 - 34.0 PG    MCHC 36.0 31.0 - 37.0 g/dL    RDW 14.0 11.6 - 14.5 %    PLATELET 862 923 - 774 K/uL    MPV 8.8 (L) 9.2 - 11.8 FL    NEUTROPHILS 75 (H) 40 - 73 %    LYMPHOCYTES 15 (L) 21 - 52 %    MONOCYTES 9 3 - 10 %    EOSINOPHILS 0 0 - 5 %    BASOPHILS 0 0 - 2 %    ABS. NEUTROPHILS 2.3 1.8 - 8.0 K/UL    ABS. LYMPHOCYTES 0.5 (L) 0.9 - 3.6 K/UL    ABS. MONOCYTES 0.3 0.05 - 1.2 K/UL    ABS. EOSINOPHILS 0.0 0.0 - 0.4 K/UL    ABS. BASOPHILS 0.0 0.0 - 0.1 K/UL    DF AUTOMATED     METABOLIC PANEL, COMPREHENSIVE    Collection Time: 07/27/21  7:55 PM   Result Value Ref Range    Sodium 114 (LL) 136 - 145 mmol/L    Potassium 4.7 3.5 - 5.5 mmol/L    Chloride 82 (L) 100 - 111 mmol/L    CO2 28 21 - 32 mmol/L    Anion gap 4 3.0 - 18 mmol/L    Glucose 93 74 - 99 mg/dL    BUN 13 7.0 - 18 MG/DL    Creatinine 0.67 0.6 - 1.3 MG/DL    BUN/Creatinine ratio 19 12 - 20      GFR est AA >60 >60 ml/min/1.73m2    GFR est non-AA >60 >60 ml/min/1.73m2    Calcium 8.6 8.5 - 10.1 MG/DL    Bilirubin, total 0.8 0.2 - 1.0 MG/DL    ALT (SGPT) 20 16 - 61 U/L    AST (SGOT) 56 (H) 10 - 38 U/L    Alk.  phosphatase 172 (H) 45 - 117 U/L    Protein, total 7.8 6.4 - 8.2 g/dL    Albumin 3.4 3.4 - 5.0 g/dL    Globulin 4.4 (H) 2.0 - 4.0 g/dL    A-G Ratio 0.8 0.8 - 1.7     MAGNESIUM    Collection Time: 07/27/21  7:55 PM   Result Value Ref Range    Magnesium 1.8 1.6 - 2.6 mg/dL   NT-PRO BNP    Collection Time: 07/27/21  7:55 PM   Result Value Ref Range    NT pro- 0 - 1,800 PG/ML   PROTHROMBIN TIME + INR    Collection Time: 07/27/21  7:55 PM   Result Value Ref Range    Prothrombin time 27.5 (H) 11.5 - 15.2 sec    INR 2.6 (H) 0.8 - 1.2     EKG, 12 LEAD, INITIAL    Collection Time: 07/27/21  7:59 PM   Result Value Ref Range    Ventricular Rate 78 BPM    Atrial Rate 70 BPM    QRS Duration 158 ms    Q-T Interval 430 ms    QTC Calculation (Bezet) 490 ms    Calculated R Axis -63 degrees    Calculated T Axis 108 degrees    Diagnosis       Ventricular-paced rhythm  Abnormal ECG  When compared with ECG of 15-APR-2012 07:57,  Electronic ventricular pacemaker has replaced Sinus rhythm     CBC WITH AUTOMATED DIFF    Collection Time: 07/28/21  5:00 AM Result Value Ref Range    WBC 2.4 (L) 4.6 - 13.2 K/uL    RBC 2.61 (L) 4.35 - 5.65 M/uL    HGB 8.6 (L) 13.0 - 16.0 g/dL    HCT 23.9 (L) 36.0 - 48.0 %    MCV 91.6 74.0 - 97.0 FL    MCH 33.0 24.0 - 34.0 PG    MCHC 36.0 31.0 - 37.0 g/dL    RDW 14.0 11.6 - 14.5 %    PLATELET 045 (L) 595 - 420 K/uL    MPV 8.8 (L) 9.2 - 11.8 FL    NEUTROPHILS 73 40 - 73 %    LYMPHOCYTES 18 (L) 21 - 52 %    MONOCYTES 8 3 - 10 %    EOSINOPHILS 0 0 - 5 %    BASOPHILS 1 0 - 2 %    ABS. NEUTROPHILS 1.7 (L) 1.8 - 8.0 K/UL    ABS. LYMPHOCYTES 0.4 (L) 0.9 - 3.6 K/UL    ABS. MONOCYTES 0.2 0.05 - 1.2 K/UL    ABS. EOSINOPHILS 0.0 0.0 - 0.4 K/UL    ABS.  BASOPHILS 0.0 0.0 - 0.1 K/UL    DF AUTOMATED     METABOLIC PANEL, BASIC    Collection Time: 07/28/21  5:00 AM   Result Value Ref Range    Sodium 115 (LL) 136 - 145 mmol/L    Potassium 4.7 3.5 - 5.5 mmol/L    Chloride 83 (L) 100 - 111 mmol/L    CO2 25 21 - 32 mmol/L    Anion gap 7 3.0 - 18 mmol/L    Glucose 79 74 - 99 mg/dL    BUN 11 7.0 - 18 MG/DL    Creatinine 0.51 (L) 0.6 - 1.3 MG/DL    BUN/Creatinine ratio 22 (H) 12 - 20      GFR est AA >60 >60 ml/min/1.73m2    GFR est non-AA >60 >60 ml/min/1.73m2    Calcium 7.9 (L) 8.5 - 10.1 MG/DL   MAGNESIUM    Collection Time: 07/28/21  5:00 AM   Result Value Ref Range    Magnesium 2.1 1.6 - 2.6 mg/dL   PHOSPHORUS    Collection Time: 07/28/21  5:00 AM   Result Value Ref Range    Phosphorus 2.8 2.5 - 4.9 MG/DL   TSH 3RD GENERATION    Collection Time: 07/28/21  5:00 AM   Result Value Ref Range    TSH 3.34 0.36 - 3.74 uIU/mL   CARDIAC PANEL,(CK, CKMB & TROPONIN)    Collection Time: 07/28/21  5:00 AM   Result Value Ref Range    CK - MB 9.6 (H) <3.6 ng/ml    CK-MB Index 3.7 0.0 - 4.0 %     39 - 308 U/L    Troponin-I, QT 0.03 0.0 - 0.045 NG/ML       Radiologic Studies -   XR CHEST PORT    (Results Pending)     CT Results  (Last 48 hours)    None        CXR Results  (Last 48 hours)    None            Medical Decision Making   I am the first provider for this patient. I reviewed the vital signs, available nursing notes, past medical history, past surgical history, family history and social history. Vital Signs-Reviewed the patient's vital signs. EKG: Interpreted by myself. Records Reviewed: Personally, on initial evaluation    MDM:   Patient presents with hyponatremia. Exam significant for bilateral pedal edema, mild hypotension.    DDX considered: Hyponatremia secondary to SIADH, renal artery stenosis, worsened heart failure, mitral regurg, severe dehydration which could be tertiary to multiple medications including spironolactone, Lasix, and metoprolol    Patient condition on initial evaluation: Seriously ill    Plan:   Close observation**    Orders as below:  Orders Placed This Encounter    XR CHEST PORT    CBC WITH AUTOMATED DIFF    METABOLIC PANEL, COMPREHENSIVE    MAGNESIUM    NT-PRO BNP    OSMOLALITY, UR    OSMOLALITY, UR    OSMOLALITY, SERUM/PLASMA    PROTHROMBIN TIME + INR    CBC WITH AUTOMATED DIFF    METABOLIC PANEL, BASIC    SODIUM    MAGNESIUM    PHOSPHORUS    TSH 3RD GENERATION    CARDIAC PANEL,(CK, CKMB & TROPONIN)    CARDIAC PANEL,(CK, CKMB & TROPONIN)    URINALYSIS W/MICROSCOPIC    DIET NPO    NURSING-MISCELLANEOUS: Stop fluids if sodium corrects >4-6 meqs within 24 hours CONTINUOUS    VITAL SIGNS    CARDIAC MONITORING    WEIGH PATIENT    INTAKE AND OUTPUT    APPLY/MAINTAIN SEQUENTIAL COMPRESSION DEVICE    BEDREST, COMPLETE    FULL CODE    POC CHEM8    EKG, 12 LEAD, INITIAL    TRANSFER PATIENT    sodium chloride 0.9 % bolus infusion 500 mL    acetaminophen (TYLENOL) tablet 1,000 mg    warfarin (COUMADIN) tablet 5 mg    0.9% sodium chloride infusion    sodium chloride (NS) flush 5-40 mL    sodium chloride (NS) flush 5-40 mL    OR Linked Order Group     acetaminophen (TYLENOL) tablet 650 mg     acetaminophen (TYLENOL) suppository 650 mg    OR Linked Order Group     ondansetron (ZOFRAN ODT) tablet 4 mg  ondansetron (ZOFRAN) injection 4 mg    DISCONTD: heparin (porcine) injection 5,000 Units    magnesium sulfate 2 g/50 ml IVPB (premix or compounded)    diphenhydrAMINE (BENADRYL) injection 25 mg    INITIAL PHYSICIAN ORDER: INPATIENT Intensive Care; 4. Patient requires ICU level of care interventions (further clarification in H&P documentation)        ED Course:      Patient given small bolus of fluids. Consulted the intensivist for admission. Patient admitted      Disposition: Admit    Diagnosis     Clinical Impression:   1. Hyponatremia        Signed,  Amy Farmer MD  Emergency Physician  [facility]    As a voice dictation software was utilized to dictate this note, minor word transpositions can occur. I apologize for confusing wording and typographic errors. Please feel free to contact me for clarification.

## 2021-07-28 NOTE — ED NOTES
Bedside shift change report given to Saud Barakat (oncoming nurse) by Linda Alfredo (offgoing nurse). Report included the following information SBAR, ED Summary, Procedure Summary, Intake/Output, MAR, Recent Results, Alarm Parameters  and Quality Measures. Pt asleep in bed in no acute distress with unlabored breathing.

## 2021-07-28 NOTE — INTERDISCIPLINARY ROUNDS
[address] [place] [address] [facility]  Pulmonary, Critical Care, and Sleep Medicine  Interdisciplinary and Ventilator Weaning Rounds    Patient discussed in morning walking rounds and interdisciplinary rounds. ICU day: 1      Overnight events:    Patient doing well   C/o ED noises   No mental deficits     Assessments and best practice:   Ventilator  - n/a   Sedation  o na   Other pertinent drips  o NS   Lines noted  o na   Critical labs assessed  o Yes   Antibiotics  o na   Medications reviewed  o Yes   Pending imaging  o none   Pending send out labs  o No   Pending Procedures  o None   Glycemic control   Stress ulcer prophylaxis. o not indicated   DVT prophylaxis.   o warfarin   Need for Lines, borrero assessed.  o Yes   Restraint Reevaluation   o na  Family contact/MPOA:  Patient with capcacity  Family updated updated at bedside      Daily Plans:   Stable to downgrade to tele   nephro to aid with Na    KARLO time 15 minutes        Toño Morales PA-C  07/28/21  Pulmonary, [address] [place] [address],[address] [facility]

## 2021-07-28 NOTE — PROGRESS NOTES
PCCM Update:    79year old male with past medical history of esophageal cancer s/p prior esophagectomy, DVT 2003 on warfarin, diastolic CHF w/ mitral regurg, presented to the ED with complaints of fatigue and nausea for the last few days. While at the PCP today, he was told to go to the ED 2/2 hyponatremia on labs. Upon arrival to the ED, pt is AO x3, hemodynamically stable, O2 sats 98 on RA. Further work up revealed electrolyte abnormalities - sodium 114, mag 1.8. Upon my initial evaluaton, pt answers all questions appropriately. Pt states he takes lasix 20mg daily. Dr. Eli Melendrez was consulted. Impression:  1. Hyponatremia - asymptomatic, likely 2/2 diuresis. 2. Hypomagnesemia, mild  3. Elevated Alk phos - likely 2/2 liver lesion as seen on CT abd/pelv 2019 (likely hepatic steatosis but cannot exclude metastatic disease or early cirrhotic changes). Plan:  - Monitor neuro status closely  - Supplemental O2 via NC PRN, aspiration precautions, HOB >30 degrees  - Check cardiac panel and ECHO. Hold lasix. - NPO, SUP  - Coags. DVT prophylaxis. - Start NS @100cc/hr w/ goal increase in serum sodium 4-6 mEq/L in a 24 hour period.   - Check serum sodium q4h. - Check TSH.  - Family was updated at bedside. Pt is full code. Full consult note to follow.     Lisa iDaz PA-C  07/28/21  Pulmonary, Critical Care Medicine  [address] [place] [address] [facility]

## 2021-07-28 NOTE — ED NOTES
Condom cath placed on pt as pt is insisting on standing up alone to urinate. Pt has been medicated for sleep per his request.  Pt placed in safe position.   Side rails up

## 2021-07-28 NOTE — PROGRESS NOTES
Patient seen and examined   Discussed with patient and daughter  Severe acute on chronic hyponatremia   Pending urine studies but volume status appears euvolumic   Suspect SIADH   Getting CT chest abd pelvis with/without contrast, evaluate causes of SIADH   Imp to correct sodium slowly  Free water restrict 1000 ml   Continue gentle NS @ 75 cdc/hrs   Correct by 4-6 meq in 24 hrs   Hold diuretics, Neurontin     Please call with questions    Terrell Hopson MD FASN  Cell [phone]  Pager: [phone] call with questions    Terrell Hopson MD FASN  Cell [phone]  Pager: [phone]

## 2021-07-28 NOTE — CONSULTS
Consult Note      Consult requested by: Adilene Cooper MD    ADMIT DATE: 7/27/2021    CONSULT DATE: July 28, 2021           Admission diagnosis: fatigue , balance problem   Reason for Nephrology Consultation: hyponatremia     Assessment and plan:    #1 severe AC on chronic hyponatremia, volume status ,  intravascularly depleted in the setting of edematous state and third spacing,  Hypoalbuminemia as well as CHF ef 45% contributing. Potentially could have a liver pathology per CT as well. Etiology of hyponatremia appears to be appropriate ADH action in the setting of edematous state. Patient does drink a lot of fluids, psychogenic polydipsia with poor solute intake is also in differential. Thyroid functions ok, Urine studies are pending which will help differentiate. Sodium is 114 on admission, needs to improve slowly to a goal of 120 in 24 hours. #2 history of CHF with 45% EF, appears to be compensated  #3 history of atrial fibrillation  #4 history of hypothyroidism  #5 previous history of DVT on anticoagulation    Plan:    #1 follow-up on urine studies including urine sodium and urine osmolarity  #2 check sodium level every 4 hours, goal for 24 hours is 120  #3 add albumin 25 g 25% every 6 hours x 4 doses  #4 add midodrine 5 mg 3 times a day with meals  #5 hold diuretics for now  #6 fluid restrict 1000 mL daily    Discussed in detail with patient and his daughter  Updated patient's nurse in the ER    Please call with questions    Gauri Fernandez MD [facility]  Cell [phone]  Pager: [phone]    HPI:   Patient is a 79-year-old with history of esophageal cancer with esophageal surgery in the past, history of cardiomyopathy with EF of 45%, atrial fibrillation, chronic hyponatremia with sodiums in the 130 range, history of DVT, gout who presented with worsening fatigue, unsteadiness and near falls.     Apparently patient lives by himself, per his daughter mentions that she has noticed that patient has lost a lot of weight recently. Is also been very tired, difficulty with his balance and has had near falls. He has had his sodium in the 130 range, recently his Lasix was increased from 10 mg daily to 20 mg daily. He is also on Aldactone. He was started on gabapentin recently for his ear pain and itching. He tries to drink plenty of fluids, has not had any problems urinating. His blood pressure usually runs low 13S 365 systolic    On presentation to the emergency room, patient was afebrile normal heart rate, mildly hypotensive, saturating 100% on room air. Labs wise patient was leukopenic with white count of 2.4, hemoglobin of 8.6, platelet count of 594A, sodium of 114, calcium of 4.7, CO2 of 28, BUN of 13 creatinine of 0.67, albumin 3.4 BNP was 896. Patient had a CT chest abdomen pelvis which showed ovoid masslike focus in the left lobe of the liver, possibly artifact versus intrinsic hepatic mass. Patient also has hepatic cysts. Mild to moderate ascites and bilateral pleural effusions.     Nephrology was consulted here acute on chronic hyponatremia       Past Medical History:   Diagnosis Date    Abdominal pain, unspecified site     Acute upper respiratory infections of unspecified site     Atrial fibrillation ([address]     Cancer of esophagus ([address] 2001    RT and adjuvant chemotherapy, surgery    Esophageal reflux     Generalized osteoarthrosis, involving multiple sites     Gout, unspecified     Nausea alone     Sinoatrial node dysfunction ([address]     Status post thoracentesis 06/2019    Thromboembolus ([address] 2003 (approx)    DVT in leg (IVC filter placed)    Unspecified cataract     Unspecified constipation       Past Surgical History:   Procedure Laterality Date    COLONOSCOPY N/A 12/13/2017    COLONOSCOPY with hot polypectomies performed by Isaias Brown MD at [address] [facility]      esophagectomy    HX HEENT  2001    Esophageal Cancer Surgery    HX HIP REPLACEMENT Left 2005 (approx)    HX OTHER SURGICAL  2003    ivc filter placed    HX PACEMAKER  2012    [employer]       Social History     Socioeconomic History    Marital status: SINGLE     Spouse name: Not on file    Number of children: Not on file    Years of education: Not on file    Highest education level: Not on file   Occupational History    Not on file   Tobacco Use    Smoking status: Former Smoker     Packs/day: 0.50     Years: 34.00     Pack years: 17.00    Smokeless tobacco: Never Used   Substance and Sexual Activity    Alcohol use: Yes     Comment: 2 drinks per day    Drug use: No    Sexual activity: Never   Other Topics Concern    Not on file   Social History Narrative    Not on file     Social Determinants of Health     Financial Resource Strain:     Difficulty of Paying Living Expenses:    Food Insecurity:     Worried About Running Out of Food in the Last Year:     [address] in the Last Year:    Transportation Needs:     Lack of Transportation (Medical):      Lack of Transportation (Non-Medical):    Physical Activity:     Days of Exercise per Week:     Minutes of Exercise per Session:    Stress:     Feeling of Stress :    Social Connections:     Frequency of Communication with Friends and Family:     Frequency of Social Gatherings with Friends and Family:     Attends Faith Services:     Active Member of Clubs or Organizations:     Attends Club or Organization Meetings:     Marital Status:    Intimate Partner Violence:     Fear of Current or Ex-Partner:     Emotionally Abused:     Physically Abused:     Sexually Abused:        Family History   Problem Relation Age of Onset    Heart Disease Father     Alzheimer Father      Allergies   Allergen Reactions    Parafon Forte Dsc [Chlorzoxazone] Anaphylaxis    Aspirin Other (comments)     Cannot take due to on coumadin        Home Medications:   (Not in a hospital admission)      Current Inpatient Medications:     Current Facility-Administered Medications Medication Dose Route Frequency    0.9% sodium chloride infusion  100 mL/hr IntraVENous CONTINUOUS    sodium chloride (NS) flush 5-40 mL  5-40 mL IntraVENous Q8H    sodium chloride (NS) flush 5-40 mL  5-40 mL IntraVENous PRN    acetaminophen (TYLENOL) tablet 650 mg  650 mg Oral Q6H PRN    Or    acetaminophen (TYLENOL) suppository 650 mg  650 mg Rectal Q6H PRN    ondansetron (ZOFRAN ODT) tablet 4 mg  4 mg Oral Q8H PRN    Or    ondansetron (ZOFRAN) injection 4 mg  4 mg IntraVENous Q6H PRN    WARFARIN INFORMATION NOTE (COUMADIN): PHYSICIAN TO DOSE   Other Q24H    . Please enter patient's Height and Weight for drug/monitoring purposes. Thank you. 1 Each Other ONCE     Current Outpatient Medications   Medication Sig    ondansetron (ZOFRAN ODT) 4 mg disintegrating tablet DISSOLVE 1 TABLET ON TONGUE TWO TIMES A DAY AS NEEDED FOR NAUSEA    ipratropium (ATROVENT) 0.03 % nasal spray USE 2 SPRAYS EACH NOSTRIL TWO TIMES A DAY    spironolactone (ALDACTONE) 25 mg tablet Take 1 Tab by mouth daily.  warfarin (COUMADIN) 5 mg tablet TAKE ONE AND ONE-HALF TABLET BY MOUTH ONCE DAILY    raNITIdine hcl 150 mg capsule Take 150 mg by mouth two (2) times a day.  linaCLOtide (LINZESS) 72 mcg cap capsule Take  by mouth Daily (before breakfast).  megestrol (MEGACE) 40 mg tablet 1 tab daily    furosemide (LASIX) 20 mg tablet Take 20 mg by mouth daily.  omeprazole (PRILOSEC) 20 mg capsule TAKE ONE CAPSULE BY MOUTH EVERY MORNING    thiamine HCL (VITAMIN B-1) 100 mg tablet Take 100 mg by mouth daily.  allopurinol (ZYLOPRIM) 300 mg tablet TAKE ONE TABLET BY MOUTH ONCE DAILY    metoprolol tartrate (LOPRESSOR) 50 mg tablet Take 50 mg by mouth two (2) times a day.  magnesium 250 mg tab Take 1 Tab by mouth daily.  cyanocobalamin (VITAMIN B12) 500 mcg tablet Take 500 mcg by mouth daily.  cholecalciferol, vitamin D3, 2,000 unit tab Take 1 Tab by mouth daily. Review of Systems:   No fever or chills.  No sore throat. No cough or hemoptysis. No shortness of breath or chest pain. No orthopnea or paroxysmal nocturnal dyspnea. Good appetite. No , vomiting, abdominal pain, melena or hematochezia. No constipation or diarrhea. No dysuria, no gross hematuria of voiding difficulties. No ankle swelling, no joint paints. No muscle aches. No skin changes. No dizziness or lightheadedness. No headaches. Physical Assessment:     Vitals:    07/28/21 0545 07/28/21 0600 07/28/21 0730 07/28/21 0745   BP: (!) 104/55 107/65 (!) 106/59 (!) 115/58   Pulse: 70 70 70 70   Resp: 11 12 13 11   Temp:       SpO2: 97% 99% 99% 99%     There were no vitals filed for this visit. Admission weight:        Intake/Output Summary (Last 24 hours) at 7/28/2021 1338  Last data filed at 7/28/2021 0320  Gross per 24 hour   Intake 750 ml   Output 50 ml   Net 700 ml       Patient is in no apparent distress. HEENT: mmm  Neck: no cervical lymphadenopathy or thyromegaly. Lungs: good air entry, clear to auscultation bilaterally. Cardiovascular system: S1, S2, regular rate and rhythm. Abdomen: soft,distended   Extremities: trace edema   Integumentary: skin is grossly intact. Neurologic: Alert, oriented time three. Data Review:    Labs: Results:       Chemistry Recent Labs     07/28/21  0500 07/27/21 1955   GLU 79 93   * 114*   K 4.7 4.7   CL 83* 82*   CO2 25 28   BUN 11 13   CREA 0.51* 0.67   CA 7.9* 8.6   AGAP 7 4   BUCR 22* 19   AP  --  172*   TP  --  7.8   ALB  --  3.4   GLOB  --  4.4*   AGRAT  --  0.8   PHOS 2.8  --          CBC w/Diff Recent Labs     07/28/21  0500 07/27/21 1955   WBC 2.4* 3.1*   RBC 2.61* 2.92*   HGB 8.6* 9.6*   HCT 23.9* 26.7*   * 154   GRANS 73 75*   LYMPH 18* 15*   EOS 0 0         Iron/Ferritin No results for input(s): IRON in the last 72 hours. No lab exists for component: TIBCCALC   PTH/VIT D No results for input(s): PTH in the last 72 hours.     No lab exists for component: VITD           Rubio WYATT MD Genevieve  7/28/2021  1:38 PM      July 28, 2021

## 2021-07-28 NOTE — ED NOTES
Bedside and Verbal shift change report given to  (oncoming nurse) by Olivier Kumari (offgoing nurse). Report included the following information SBAR, Kardex, MAR and Recent Results.     SITUATION:    Code Status: Full Code   Reason for Admission: Hyponatremia [E87.1]    [facility] day: 1   Problem List:       Hospital Problems  Date Reviewed: 7/27/2019        Codes Class Noted POA    Hyponatremia ICD-10-CM: E87.1  ICD-9-CM: 276.1  7/27/2021 Unknown              BACKGROUND:    Past Medical History:   Past Medical History:   Diagnosis Date    Abdominal pain, unspecified site     Acute upper respiratory infections of unspecified site     Atrial fibrillation ([address]     Cancer of es[address] ([facility] 2001    RT and adjuvant chemotherapy, surgery    Esophageal reflux     Generalized osteoarthrosis, involving multiple sites     Gout, unspecified     Nausea alone     Sinoatrial node dysfunction ([facility]     Status post thoracentesis 06/2019    Thromboembolus ([facility] 2003 (approx)    DVT in leg (IVC filter placed)    Unspecified cataract     Unspecified constipation          Patient taking anticoagulants no     ASSESSMENT:    Changes in Assessment Throughout Shift: NONE     Patient has Central Line: no Reasons if yes: N/A   Patient has Dugan Cath: no Reasons if yes: N/A      Last Vitals:     Vitals:    07/28/21 1630 07/28/21 1645 07/28/21 1700 07/28/21 1715   BP: (!) 120/58 (!) 105/59 107/62 (!) 112/52   Pulse: 71 70 72 70   Resp: 17 20 16 17   Temp:    98.6 °F (37 °C)   SpO2: 100% 100% 98% 100%   Weight:       Height:            IV and DRAINS (will only show if present)   Peripheral IV 07/27/21 Left Antecubital-Site Assessment: Clean, dry, & intact     WOUND (if present)   Wound Type:  none   Dressing present     Wound Concerns/Notes:  none     PAIN    Pain Assessment                      o Interventions for Pain:  none, Tylenol  o Intervention effective: yes  o Time of last intervention: 1630 o Reassessment Completed: yes      Last 3 Weights:  Last 3 Recorded Weights in this Encounter    07/28/21 1440   Weight: 59 kg (130 lb)     Weight change:      INTAKE/OUPUT    Current Shift: No intake/output data recorded. Last three shifts: 07/26 1901 - 07/28 0700  In: 750 [I.V.:750]  Out: 50 [Urine:50]     LAB RESULTS     Recent Labs     07/28/21  0500 07/27/21 1955   WBC 2.4* 3.1*   HGB 8.6* 9.6*   HCT 23.9* 26.7*   * 154        Recent Labs     07/28/21  1645 07/28/21  1017 07/28/21  0500 07/27/21 1955   *  --  115* 114*   K  --   --  4.7 4.7   GLU  --   --  79 93   BUN  --   --  11 13   CREA  --   --  0.51* 0.67   CA  --   --  7.9* 8.6   MG  --   --  2.1 1.8   INR  --  3.1*  --  2.6*       RECOMMENDATIONS AND DISCHARGE PLANNING     1. Pending tests/procedures/ Plan of Care or Other Needs: NONE     2. Discharge plan for patient and Needs/Barriers: N/A    3. Estimated Discharge Date: N/A Posted on Whiteboard in Patients Room: no and N/A      4. The patient's care plan was reviewed with the oncoming nurse. \"HEALS\" SAFETY CHECK      Fall Risk    Total Score: 3    Safety Measures:      A safety check occurred in the patient's room between off going nurse and oncoming nurse listed above.     The safety check included the below items  Area Items   H  High Alert Medications - Verify all high alert medication drips (heparin, PCA, etc.)   E  Equipment - Suction is set up for ALL patients (with yanker)  - Red plugs utilized for all equipment (IV pumps, etc.)  - WOWs wiped down at end of shift.  - Room stocked with oxygen, suction, and other unit-specific supplies   A  Alarms - Bed alarm is set for fall risk patients  - Ensure chair alarm is in place and activated if patient is up in a chair   L  Lines - Check IV for any infiltration  - Dugan bag is empty if patient has a Dugan   - Tubing and IV bags are labeled   S  Safety   - Room is clean, patient is clean, and equipment is clean.  - Hallways are clear from equipment besides carts. - Fall bracelet on for fall risk patients  - Ensure room is clear and free of clutter  - Suction is set up for ALL patients (with alok)  - Hallways are clear from equipment besides carts.    - Isolation precautions followed, supplies available outside room, sign posted     Melissa Osei

## 2021-07-28 NOTE — PROGRESS NOTES
completed the initial Spiritual Assessment of the patient in bed 8 of the emergency room, and offered Pastoral Care support to the patient. Patient does not have an advance directive at this time. Patient does not have any Sikh/cultural needs that will affect patients preferences in health care. Chaplains will continue to follow and will provide pastoral care on an as needed/requested basis.     Elis Mora  [facility] Department  [phone]

## 2021-07-29 LAB
ANION GAP SERPL CALC-SCNC: 5 MMOL/L (ref 3–18)
ATRIAL RATE: 70 BPM
BASOPHILS # BLD: 0 K/UL (ref 0–0.1)
BASOPHILS NFR BLD: 1 % (ref 0–2)
BUN SERPL-MCNC: 9 MG/DL (ref 7–18)
BUN/CREAT SERPL: 16 (ref 12–20)
CALCIUM SERPL-MCNC: 8.2 MG/DL (ref 8.5–10.1)
CALCULATED R AXIS, ECG10: -63 DEGREES
CALCULATED T AXIS, ECG11: 108 DEGREES
CHLORIDE SERPL-SCNC: 85 MMOL/L (ref 100–111)
CO2 SERPL-SCNC: 25 MMOL/L (ref 21–32)
CREAT SERPL-MCNC: 0.55 MG/DL (ref 0.6–1.3)
CREAT UR-MCNC: 96 MG/DL (ref 30–125)
DIAGNOSIS, 93000: NORMAL
DIFFERENTIAL METHOD BLD: ABNORMAL
EOSINOPHIL # BLD: 0 K/UL (ref 0–0.4)
EOSINOPHIL NFR BLD: 1 % (ref 0–5)
ERYTHROCYTE [DISTWIDTH] IN BLOOD BY AUTOMATED COUNT: 14.3 % (ref 11.6–14.5)
GLUCOSE SERPL-MCNC: 80 MG/DL (ref 74–99)
HCT VFR BLD AUTO: 25.2 % (ref 36–48)
HGB BLD-MCNC: 8.9 G/DL (ref 13–16)
INR PPP: 3.2 (ref 0.8–1.2)
LYMPHOCYTES # BLD: 0.5 K/UL (ref 0.9–3.6)
LYMPHOCYTES NFR BLD: 22 % (ref 21–52)
MAGNESIUM SERPL-MCNC: 1.7 MG/DL (ref 1.6–2.6)
MCH RBC QN AUTO: 32.6 PG (ref 24–34)
MCHC RBC AUTO-ENTMCNC: 35.3 G/DL (ref 31–37)
MCV RBC AUTO: 92.3 FL (ref 74–97)
MONOCYTES # BLD: 0.2 K/UL (ref 0.05–1.2)
MONOCYTES NFR BLD: 9 % (ref 3–10)
NEUTS SEG # BLD: 1.5 K/UL (ref 1.8–8)
NEUTS SEG NFR BLD: 67 % (ref 40–73)
OSMOLALITY SERPL: 233 MOSMOL/KG (ref 280–301)
OSMOLALITY UR: 567 MOSMOL/KG
PHOSPHATE SERPL-MCNC: 2.6 MG/DL (ref 2.5–4.9)
PLATELET # BLD AUTO: 103 K/UL (ref 135–420)
PMV BLD AUTO: 8.7 FL (ref 9.2–11.8)
POTASSIUM SERPL-SCNC: 4.6 MMOL/L (ref 3.5–5.5)
PROT UR-MCNC: 13 MG/DL
PROTHROMBIN TIME: 32.1 SEC (ref 11.5–15.2)
Q-T INTERVAL, ECG07: 430 MS
QRS DURATION, ECG06: 158 MS
QTC CALCULATION (BEZET), ECG08: 490 MS
RBC # BLD AUTO: 2.73 M/UL (ref 4.35–5.65)
SODIUM SERPL-SCNC: 115 MMOL/L (ref 136–145)
SODIUM SERPL-SCNC: 117 MMOL/L (ref 136–145)
SODIUM SERPL-SCNC: 118 MMOL/L (ref 136–145)
SODIUM SERPL-SCNC: 118 MMOL/L (ref 136–145)
SODIUM SERPL-SCNC: 119 MMOL/L (ref 136–145)
VENTRICULAR RATE, ECG03: 78 BPM
WBC # BLD AUTO: 2.2 K/UL (ref 4.6–13.2)

## 2021-07-29 PROCEDURE — 36415 COLL VENOUS BLD VENIPUNCTURE: CPT

## 2021-07-29 PROCEDURE — 65660000004 HC RM CVT STEPDOWN

## 2021-07-29 PROCEDURE — 74011250637 HC RX REV CODE- 250/637: Performed by: PHYSICIAN ASSISTANT

## 2021-07-29 PROCEDURE — 99223 1ST HOSP IP/OBS HIGH 75: CPT | Performed by: NURSE PRACTITIONER

## 2021-07-29 PROCEDURE — 74011000258 HC RX REV CODE- 258: Performed by: INTERNAL MEDICINE

## 2021-07-29 PROCEDURE — 83735 ASSAY OF MAGNESIUM: CPT

## 2021-07-29 PROCEDURE — 74011250636 HC RX REV CODE- 250/636: Performed by: INTERNAL MEDICINE

## 2021-07-29 PROCEDURE — 76450000000

## 2021-07-29 PROCEDURE — 85025 COMPLETE CBC W/AUTO DIFF WBC: CPT

## 2021-07-29 PROCEDURE — 84100 ASSAY OF PHOSPHORUS: CPT

## 2021-07-29 PROCEDURE — 74011250637 HC RX REV CODE- 250/637: Performed by: FAMILY MEDICINE

## 2021-07-29 PROCEDURE — 99232 SBSQ HOSP IP/OBS MODERATE 35: CPT | Performed by: EMERGENCY MEDICINE

## 2021-07-29 PROCEDURE — 80048 BASIC METABOLIC PNL TOTAL CA: CPT

## 2021-07-29 PROCEDURE — 74011250637 HC RX REV CODE- 250/637: Performed by: EMERGENCY MEDICINE

## 2021-07-29 PROCEDURE — 2709999900 HC NON-CHARGEABLE SUPPLY

## 2021-07-29 PROCEDURE — 84295 ASSAY OF SERUM SODIUM: CPT

## 2021-07-29 PROCEDURE — 85610 PROTHROMBIN TIME: CPT

## 2021-07-29 RX ORDER — 3% SODIUM CHLORIDE 3 G/100ML
30 INJECTION, SOLUTION INTRAVENOUS CONTINUOUS
Status: DISPENSED | OUTPATIENT
Start: 2021-07-29 | End: 2021-07-30

## 2021-07-29 RX ORDER — 3% SODIUM CHLORIDE 3 G/100ML
30 INJECTION, SOLUTION INTRAVENOUS CONTINUOUS
Status: DISPENSED | OUTPATIENT
Start: 2021-07-29 | End: 2021-07-29

## 2021-07-29 RX ORDER — 3% SODIUM CHLORIDE 3 G/100ML
30 INJECTION, SOLUTION INTRAVENOUS CONTINUOUS
Status: DISCONTINUED | OUTPATIENT
Start: 2021-07-29 | End: 2021-07-29

## 2021-07-29 RX ORDER — DIPHENHYDRAMINE HCL 25 MG
25 CAPSULE ORAL ONCE
Status: COMPLETED | OUTPATIENT
Start: 2021-07-29 | End: 2021-07-29

## 2021-07-29 RX ADMIN — Medication 10 ML: at 06:02

## 2021-07-29 RX ADMIN — Medication 10 ML: at 22:56

## 2021-07-29 RX ADMIN — Medication 10 ML: at 13:10

## 2021-07-29 RX ADMIN — ONDANSETRON 4 MG: 4 TABLET, ORALLY DISINTEGRATING ORAL at 17:35

## 2021-07-29 RX ADMIN — DIPHENHYDRAMINE HYDROCHLORIDE 25 MG: 25 CAPSULE ORAL at 22:55

## 2021-07-29 RX ADMIN — CYANOCOBALAMIN TAB 500 MCG 500 MCG: 500 TAB at 08:52

## 2021-07-29 RX ADMIN — PANTOPRAZOLE SODIUM 40 MG: 40 TABLET, DELAYED RELEASE ORAL at 08:52

## 2021-07-29 RX ADMIN — SODIUM CHLORIDE 30 ML/HR: 3 INJECTION, SOLUTION INTRAVENOUS at 04:43

## 2021-07-29 RX ADMIN — IPRATROPIUM BROMIDE 1 SPRAY: 21 SPRAY NASAL at 08:53

## 2021-07-29 RX ADMIN — DESMOPRESSIN ACETATE 2 MCG: 4 INJECTION INTRAVENOUS; SUBCUTANEOUS at 22:39

## 2021-07-29 RX ADMIN — IPRATROPIUM BROMIDE 1 SPRAY: 21 SPRAY NASAL at 17:36

## 2021-07-29 RX ADMIN — SODIUM CHLORIDE 30 ML/HR: 3 INJECTION, SOLUTION INTRAVENOUS at 11:17

## 2021-07-29 RX ADMIN — SODIUM CHLORIDE 30 ML/HR: 3 INJECTION, SOLUTION INTRAVENOUS at 22:56

## 2021-07-29 RX ADMIN — CHOLECALCIFEROL TAB 25 MCG (1000 UNIT) 2000 UNITS: 25 TAB at 08:52

## 2021-07-29 RX ADMIN — DESMOPRESSIN ACETATE 2 MCG: 4 INJECTION INTRAVENOUS; SUBCUTANEOUS at 06:02

## 2021-07-29 RX ADMIN — LINACLOTIDE 145 MCG: 145 CAPSULE, GELATIN COATED ORAL at 08:52

## 2021-07-29 NOTE — PROGRESS NOTES
Followed a patients labs, sodium 114 this evening. No significant changes in his symptoms. Urine analysis done should I specific gravity of 1.028. Urine osm pending. Patient appears to be euvolumic , Likely diagnosis siadh. With severe chronic hyponatremia, recommendations are to start 3% normal saline At 15 to 30 mL an hour with DDAVP climb down to prevent rapid correction. Goal is to correct by six requirements in 4-6 hours an d then stop. instructions to nursing. 3% normal saline drip once sodium is 120 or more. Continue to check sodium every four hours. Discussed instructions and orders with pharmacy.     Please call with questions,    Verlon Holter MDFSN

## 2021-07-29 NOTE — PROGRESS NOTES
[facility]  Progress Note    Patient: Jaret Ann Age: 79 y.o. : 1940 MR#: [id number] SSN: [id number]  Date/Time: 2021    Subjective:     Patient is laying in bed in no apparent distress, somnolent. Family member at bedside    Assessment/Plan:     Hyponatremia, multifactorial in the setting of CHF and diuretic use and possible SIADH  Chronic systolic congestive heart failure-EF of 45%  History of atrial fibrillation-on Coumadin  History of sick sinus syndrome-has a pacemaker  Remote history of esophagectomy  Possible liver mass on CT scan    Plan  Continue IV fluids per nephrology, monitor sodium, I's and O's  Hold Coumadin today for INR of 3.2  Monitor blood pressures  Dedicated CT scan of the liver to evaluate for liver mass once more stable  Monitor on telemetry  Request records from PCP  PT and OT  Palliative care input noted  Discussed with patient and family.   Discussed with nephrologist    Case discussed with:  [x]Patient  [x]Family  []Nursing  []Case Management  DVT Prophylaxis:  []Lovenox  []Hep SQ  []SCDs  [x]Coumadin   []On Heparin gtt    Objective:   VS:   Visit Vitals  BP (!) 105/56   Pulse 68   Temp 98.6 °F (37 °C)   Resp 18   Ht 5' 6\" (1.676 m)   Wt 76.3 kg (168 lb 4.8 oz)   SpO2 96%   BMI 27.16 kg/m²      Tmax/24hrs: Temp (24hrs), Av °F (36.7 °C), Min:97.6 °F (36.4 °C), Max:98.6 °F (37 °C)    Input/Output:     Intake/Output Summary (Last 24 hours) at 2021 1629  Last data filed at 2021 1513  Gross per 24 hour   Intake 473.5 ml   Output 1425 ml   Net -951.5 ml       General:  Awake, alert  Cardiovascular:  S1S2+, RRR  Pulmonary:  CTA b/l  GI:  Soft, BS+, NT, ND  Extremities:  trace edema        Labs:    Recent Results (from the past 24 hour(s))   CARDIAC PANEL,(CK, CKMB & TROPONIN)    Collection Time: 21  4:45 PM   Result Value Ref Range    CK - MB 6.0 (H) <3.6 ng/ml    CK-MB Index 2.6 0.0 - 4.0 %     39 - 308 U/L Troponin-I, QT <0.02 0.0 - 0.045 NG/ML   SODIUM    Collection Time: 07/28/21  4:45 PM   Result Value Ref Range    Sodium 114 (LL) 136 - 145 mmol/L   URINALYSIS W/MICROSCOPIC    Collection Time: 07/28/21  6:25 PM   Result Value Ref Range    Color YELLOW      Appearance CLEAR      Specific gravity 1.028 1.005 - 1.030      pH (UA) 7.0 5.0 - 8.0      Protein Negative NEG mg/dL    Glucose Negative NEG mg/dL    Ketone TRACE (A) NEG mg/dL    Bilirubin Negative NEG      Blood Negative NEG      Urobilinogen 1.0 0.2 - 1.0 EU/dL    Nitrites Negative NEG      Leukocyte Esterase Negative NEG      WBC Negative 0 - 5 /hpf    RBC Negative 0 - 5 /hpf    Epithelial cells FEW 0 - 5 /lpf    Bacteria Negative NEG /hpf   SODIUM, UR, RANDOM    Collection Time: 07/28/21  6:25 PM   Result Value Ref Range    Sodium,urine random 83 20 - 121 MMOL/L   METABOLIC PANEL, BASIC    Collection Time: 07/28/21  6:42 PM   Result Value Ref Range    Sodium 114 (LL) 136 - 145 mmol/L    Potassium 4.6 3.5 - 5.5 mmol/L    Chloride 82 (L) 100 - 111 mmol/L    CO2 26 21 - 32 mmol/L    Anion gap 6 3.0 - 18 mmol/L    Glucose 105 (H) 74 - 99 mg/dL    BUN 9 7.0 - 18 MG/DL    Creatinine 0.60 0.6 - 1.3 MG/DL    BUN/Creatinine ratio 15 12 - 20      GFR est AA >60 >60 ml/min/1.73m2    GFR est non-AA >60 >60 ml/min/1.73m2    Calcium 8.2 (L) 8.5 - 10.1 MG/DL   SODIUM    Collection Time: 07/28/21 10:13 PM   Result Value Ref Range    Sodium 114 (LL) 136 - 145 mmol/L   PROTEIN URINE, RANDOM    Collection Time: 07/28/21 11:20 PM   Result Value Ref Range    Protein, urine random 13 (H) <11.9 mg/dL   CREATININE, UR, RANDOM    Collection Time: 07/28/21 11:20 PM   Result Value Ref Range    Creatinine, urine 96.00 30 - 125 mg/dL   CBC WITH AUTOMATED DIFF    Collection Time: 07/29/21  2:35 AM   Result Value Ref Range    WBC 2.2 (L) 4.6 - 13.2 K/uL    RBC 2.73 (L) 4.35 - 5.65 M/uL    HGB 8.9 (L) 13.0 - 16.0 g/dL    HCT 25.2 (L) 36.0 - 48.0 %    MCV 92.3 74.0 - 97.0 FL    MCH 32.6 24.0 - 34.0 PG    MCHC 35.3 31.0 - 37.0 g/dL    RDW 14.3 11.6 - 14.5 %    PLATELET 242 (L) 292 - 420 K/uL    MPV 8.7 (L) 9.2 - 11.8 FL    NEUTROPHILS 67 40 - 73 %    LYMPHOCYTES 22 21 - 52 %    MONOCYTES 9 3 - 10 %    EOSINOPHILS 1 0 - 5 %    BASOPHILS 1 0 - 2 %    ABS. NEUTROPHILS 1.5 (L) 1.8 - 8.0 K/UL    ABS. LYMPHOCYTES 0.5 (L) 0.9 - 3.6 K/UL    ABS. MONOCYTES 0.2 0.05 - 1.2 K/UL    ABS. EOSINOPHILS 0.0 0.0 - 0.4 K/UL    ABS.  BASOPHILS 0.0 0.0 - 0.1 K/UL    DF AUTOMATED     METABOLIC PANEL, BASIC    Collection Time: 07/29/21  2:35 AM   Result Value Ref Range    Sodium 115 (LL) 136 - 145 mmol/L    Potassium 4.6 3.5 - 5.5 mmol/L    Chloride 85 (L) 100 - 111 mmol/L    CO2 25 21 - 32 mmol/L    Anion gap 5 3.0 - 18 mmol/L    Glucose 80 74 - 99 mg/dL    BUN 9 7.0 - 18 MG/DL    Creatinine 0.55 (L) 0.6 - 1.3 MG/DL    BUN/Creatinine ratio 16 12 - 20      GFR est AA >60 >60 ml/min/1.73m2    GFR est non-AA >60 >60 ml/min/1.73m2    Calcium 8.2 (L) 8.5 - 10.1 MG/DL   MAGNESIUM    Collection Time: 07/29/21  2:35 AM   Result Value Ref Range    Magnesium 1.7 1.6 - 2.6 mg/dL   PHOSPHORUS    Collection Time: 07/29/21  2:35 AM   Result Value Ref Range    Phosphorus 2.6 2.5 - 4.9 MG/DL   PROTHROMBIN TIME + INR    Collection Time: 07/29/21  2:35 AM   Result Value Ref Range    Prothrombin time 32.1 (H) 11.5 - 15.2 sec    INR 3.2 (H) 0.8 - 1.2     SODIUM    Collection Time: 07/29/21  5:30 AM   Result Value Ref Range    Sodium 118 (LL) 136 - 145 mmol/L   SODIUM    Collection Time: 07/29/21  8:20 AM   Result Value Ref Range    Sodium 117 (LL) 136 - 145 mmol/L   SODIUM    Collection Time: 07/29/21  1:38 PM   Result Value Ref Range    Sodium 119 (LL) 136 - 145 mmol/L         Signed By: Luis Donaldson MD     July 29, 2021

## 2021-07-29 NOTE — PROGRESS NOTES
In Patient Progress note      Admit Date: 7/27/2021    Impression:     #1 severe euvolumic AC on chronic hyponatremia, etiology d/t SIADH , urine studies pending to confirm , urine Na 80s   On 3%NS + DDAVP clamp down . Sodium 114--> 118 this AM , goal 120 , continuing 3%NS   #2 history of CHF with 45% EF, appears to be compensated  #3 history of atrial fibrillation  #4 history of hypothyroidism  #5 previous history of DVT on anticoagulation  #6 possibility of liver mass     Improving symptoms  Na 118 this am  ORIENTED x 3     Plan:  #1 CONTINUE 3% ns WITH ddavp , goal Na 120 for today   #2 check sodium level every 4 hours  #3 hold albumin , use if hypotensive   #4 hold diuretics for now  #5 fluid restrict 1000 mL daily     Discussed in detail with patient and his daughter  Updated patient's nurse in the ER     Please call with questions     Gauri Fernandez MD FASN  Cell [phone]  Pager: [phone]    Subjective:     - No acute over night events. - respiratory - stable  - hemodynamics - stable, no pressrs  - UOP-ok  - Nutrition -poor     Objective:     Visit Vitals  BP (!) 104/53   Pulse 73   Temp 97.8 °F (36.6 °C)   Resp 18   Ht 5' 6\" (1.676 m)   Wt 76.3 kg (168 lb 4.8 oz)   SpO2 97%   BMI 27.16 kg/m²         Intake/Output Summary (Last 24 hours) at 7/29/2021 1246  Last data filed at 7/29/2021 1108  Gross per 24 hour   Intake 235.5 ml   Output 1375 ml   Net -1139.5 ml       Physical Exam:        Patient is in no apparent distress. HEENT: mmm  Neck: no cervical lymphadenopathy or thyromegaly. Lungs: good air entry, clear to auscultation bilaterally. Cardiovascular system: S1, S2, regular rate and rhythm. Abdomen: soft,distended   Extremities: trace edema   Integumentary: skin is grossly intact. Neurologic: Alert, oriented time three.        Data Review:    Recent Labs     07/29/21  0235   WBC 2.2*   RBC 2.73*   HCT 25.2*   MCV 92.3   MCH 32.6   MCHC 35.3   RDW 14.3     Recent Labs     07/29/21  0820 07/29/21  0530 07/29/21  0235 07/28/21  2213 07/28/21  1842 07/28/21  1645 07/28/21  0500 07/27/21 1955   BUN  --   --  9  --  9  --  11 13   CREA  --   --  0.55*  --  0.60  --  0.51* 0.67   CA  --   --  8.2*  --  8.2*  --  7.9* 8.6   ALB  --   --   --   --   --   --   --  3.4   K  --   --  4.6  --  4.6  --  4.7 4.7   * 118* 115* 114* 114* 114* 115* 114*   CL  --   --  85*  --  82*  --  83* 82*   CO2  --   --  25  --  26  --  25 28   PHOS  --   --  2.6  --   --   --  2.8  --    GLU  --   --  80  --  105*  --  79 93       Sebastien Slaughter MD

## 2021-07-29 NOTE — PROGRESS NOTES
[facility] Hospitalist Group  Progress Note    Patient: Nikita Vizcaino Age: 79 y.o. : 1940 MR#: [id number] SSN: [id number]  Date/Time: 2021    Subjective: This is a 77-year-old male with a past medical history of atrial fibrillation and chronic systolic congestive heart failure and sick sinus syndrome (has a pacemaker) and chronic mild hyponatremia who presented to the ED for hyponatremia yesterday. Patient gave history of fatigue. Patient was initially admitted to ICU. I received a phone call from the intensivist earlier today stating that they were downgrading the patient to a telemetry bed. When I saw the patient he was sitting in bed in no apparent distress. Patient denies any chest pain or shortness of breath. Patient complains of generalized fatigue and tiredness. Patient gives history of occasionally feeling a little queasy. No history of any vomiting or abdominal pain. No history of any rectal bleeding or urinary discomfort. Patient does complain of intermittent constipation. No history of any fever chills or any sick contacts    Assessment/Plan:     Hyponatremia, multifactorial in the setting of CHF and diuretic use and possible SIADH  Chronic systolic congestive heart failure-EF of 45%  History of atrial fibrillation-on Coumadin  History of sick sinus syndrome-has a pacemaker  Remote history of esophagectomy  Possible liver mass on CT scan    Plan  Monitor sodium closely. Upgrade to stepdown-discussed with nephrologist.  IV fluids per nephrology. Follow urine studies  Hold Coumadin today for INR of 3.1  Monitor blood pressures  Consider dedicated CT scan of the liver to evaluate the possible mass noted   Monitor on telemetry  Request records from PCP  Discussed with intensivist.  Discussed with patient and daughter at bedside.   Discussed with nephrologist.    Case discussed with:  [x]Patient  [x]Family  []Nursing  []Case Management  DVT Prophylaxis: []Lovenox  []Hep SQ  []SCDs  [x]Coumadin   []On Heparin gtt    Objective:   VS:   Visit Vitals  BP (!) 112/52   Pulse 70   Temp 98.6 °F (37 °C)   Resp 17   Ht 5' 6\" (1.676 m)   Wt 59 kg (130 lb)   SpO2 100%   BMI 20.98 kg/m²      Tmax/24hrs: Temp (24hrs), Av.6 °F (37 °C), Min:98.6 °F (37 °C), Max:98.6 °F (37 °C)    Input/Output:     Intake/Output Summary (Last 24 hours) at 2021  Last data filed at 2021 1828  Gross per 24 hour   Intake 750 ml   Output 675 ml   Net 75 ml       General:  Awake, alert  Cardiovascular:  S1S2+, RRR  Pulmonary:  CTA b/l  GI:  Soft, BS+, NT, ND  Extremities:  trace edema      Labs:    Recent Results (from the past 24 hour(s))   CBC WITH AUTOMATED DIFF    Collection Time: 21  5:00 AM   Result Value Ref Range    WBC 2.4 (L) 4.6 - 13.2 K/uL    RBC 2.61 (L) 4.35 - 5.65 M/uL    HGB 8.6 (L) 13.0 - 16.0 g/dL    HCT 23.9 (L) 36.0 - 48.0 %    MCV 91.6 74.0 - 97.0 FL    MCH 33.0 24.0 - 34.0 PG    MCHC 36.0 31.0 - 37.0 g/dL    RDW 14.0 11.6 - 14.5 %    PLATELET 287 (L) 210 - 420 K/uL    MPV 8.8 (L) 9.2 - 11.8 FL    NEUTROPHILS 73 40 - 73 %    LYMPHOCYTES 18 (L) 21 - 52 %    MONOCYTES 8 3 - 10 %    EOSINOPHILS 0 0 - 5 %    BASOPHILS 1 0 - 2 %    ABS. NEUTROPHILS 1.7 (L) 1.8 - 8.0 K/UL    ABS. LYMPHOCYTES 0.4 (L) 0.9 - 3.6 K/UL    ABS. MONOCYTES 0.2 0.05 - 1.2 K/UL    ABS. EOSINOPHILS 0.0 0.0 - 0.4 K/UL    ABS.  BASOPHILS 0.0 0.0 - 0.1 K/UL    DF AUTOMATED     METABOLIC PANEL, BASIC    Collection Time: 21  5:00 AM   Result Value Ref Range    Sodium 115 (LL) 136 - 145 mmol/L    Potassium 4.7 3.5 - 5.5 mmol/L    Chloride 83 (L) 100 - 111 mmol/L    CO2 25 21 - 32 mmol/L    Anion gap 7 3.0 - 18 mmol/L    Glucose 79 74 - 99 mg/dL    BUN 11 7.0 - 18 MG/DL    Creatinine 0.51 (L) 0.6 - 1.3 MG/DL    BUN/Creatinine ratio 22 (H) 12 - 20      GFR est AA >60 >60 ml/min/1.73m2    GFR est non-AA >60 >60 ml/min/1.73m2    Calcium 7.9 (L) 8.5 - 10.1 MG/DL   MAGNESIUM    Collection Time: 07/28/21  5:00 AM   Result Value Ref Range    Magnesium 2.1 1.6 - 2.6 mg/dL   PHOSPHORUS    Collection Time: 07/28/21  5:00 AM   Result Value Ref Range    Phosphorus 2.8 2.5 - 4.9 MG/DL   TSH 3RD GENERATION    Collection Time: 07/28/21  5:00 AM   Result Value Ref Range    TSH 3.34 0.36 - 3.74 uIU/mL   CARDIAC PANEL,(CK, CKMB & TROPONIN)    Collection Time: 07/28/21  5:00 AM   Result Value Ref Range    CK - MB 9.6 (H) <3.6 ng/ml    CK-MB Index 3.7 0.0 - 4.0 %     39 - 308 U/L    Troponin-I, QT 0.03 0.0 - 0.045 NG/ML   PROTHROMBIN TIME + INR    Collection Time: 07/28/21 10:17 AM   Result Value Ref Range    Prothrombin time 31.3 (H) 11.5 - 15.2 sec    INR 3.1 (H) 0.8 - 1.2     ECHO ADULT COMPLETE    Collection Time: 07/28/21  3:33 PM   Result Value Ref Range    IVSd 1.47 (A) 0.60 - 1.00 cm    LVIDd 3.20 (A) 4.20 - 5.90 cm    LVIDs 1.97 cm    LVOT d 1.95 cm    LVPWd 1.62 (A) 0.60 - 1.00 cm    LVOT Peak Gradient 2.68 mmHg    Left Ventricular Outflow Tract Mean Gradient 1.24 mmHg    LVOT SV 44.4 mL    LVOT Peak Velocity 81.84 cm/s    LVOT VTI 14.83 cm    LA Volume 150.37 18.0 - 58.0 mL    LA Area 4C 33.46 cm2    LA Vol 2C 136.48 (A) 18.00 - 58.00 mL    LA Vol 4C 141.00 (A) 18.00 - 58.00 mL    Aortic Valve Area by Continuity of Peak Velocity 1.47 cm2    Aortic Valve Area by Continuity of VTI 1.45 cm2    AoV PG 11.24 mmHg    Aortic Valve Systolic Mean Gradient 9.00 mmHg    Aortic Valve Systolic Peak Velocity 880.68 cm/s    AoV VTI 30.55 cm    MV A Onur 26.26 cm/s    Mitral Valve E Wave Deceleration Time 134.78 ms    MV E Onur 112.00 cm/s    Triscuspid Valve Regurgitation Peak Gradient 32.75 mmHg    TR Max Velocity 286.15 cm/s    AO ASC D 3.07 cm    Ao Root D 3.36 cm    MV E/A 4.27     LV Mass .4 88.0 - 224.0 g    LV Mass AL Index 108.0 49.0 - 115.0 g/m2    LA Vol Index 90.04 16.00 - 28.00 ml/m2    LA Vol Index 81.72 16.00 - 28.00 ml/m2    LA Vol Index 84.43 16.00 - 28.00 ml/m2    SADE/BSA Pk Onur 0.9 cm2/m2 SADE/BSA VTI 0.9 cm2/m2   CARDIAC PANEL,(CK, CKMB & TROPONIN)    Collection Time: 07/28/21  4:45 PM   Result Value Ref Range    CK - MB 6.0 (H) <3.6 ng/ml    CK-MB Index 2.6 0.0 - 4.0 %     39 - 308 U/L    Troponin-I, QT <0.02 0.0 - 0.045 NG/ML   SODIUM    Collection Time: 07/28/21  4:45 PM   Result Value Ref Range    Sodium 114 (LL) 136 - 145 mmol/L   URINALYSIS W/MICROSCOPIC    Collection Time: 07/28/21  6:25 PM   Result Value Ref Range    Color YELLOW      Appearance CLEAR      Specific gravity 1.028 1.005 - 1.030      pH (UA) 7.0 5.0 - 8.0      Protein Negative NEG mg/dL    Glucose Negative NEG mg/dL    Ketone TRACE (A) NEG mg/dL    Bilirubin Negative NEG      Blood Negative NEG      Urobilinogen 1.0 0.2 - 1.0 EU/dL    Nitrites Negative NEG      Leukocyte Esterase Negative NEG      WBC Negative 0 - 5 /hpf    RBC Negative 0 - 5 /hpf    Epithelial cells FEW 0 - 5 /lpf    Bacteria Negative NEG /hpf   METABOLIC PANEL, BASIC    Collection Time: 07/28/21  6:42 PM   Result Value Ref Range    Sodium 114 (LL) 136 - 145 mmol/L    Potassium 4.6 3.5 - 5.5 mmol/L    Chloride 82 (L) 100 - 111 mmol/L    CO2 26 21 - 32 mmol/L    Anion gap 6 3.0 - 18 mmol/L    Glucose 105 (H) 74 - 99 mg/dL    BUN 9 7.0 - 18 MG/DL    Creatinine 0.60 0.6 - 1.3 MG/DL    BUN/Creatinine ratio 15 12 - 20      GFR est AA >60 >60 ml/min/1.73m2    GFR est non-AA >60 >60 ml/min/1.73m2    Calcium 8.2 (L) 8.5 - 10.1 MG/DL     I spent 35 minutes with the patient in face-to-face consultation, of which greater than 50% was spent in counseling and coordination of care as described above      Signed By: Richa Roberts MD     July 28, 2021

## 2021-07-29 NOTE — PROGRESS NOTES
Bedside turnover given Alicia Parr. SBAR,MAR,ED summary given with updates R/T the night, a chance to ask questions was given. PT is on the cardiac tele monitor in stable condition. Bed is in the lowest position with the wheels locked. Call bell and personal effects  are on the bedside table within reach. Double checked with RN coming on the IV drips.

## 2021-07-29 NOTE — PROGRESS NOTES
Problem: Pressure Injury - Risk of  Goal: *Prevention of pressure injury  Description: Document Angelito Scale and appropriate interventions in the flowsheet. Outcome: Progressing Towards Goal  Note: Pressure Injury Interventions:  Sensory Interventions: Turn and reposition approx.  every two hours (pillows and wedges if needed)    Moisture Interventions: Internal/External urinary devices, Check for incontinence Q2 hours and as needed    Activity Interventions: Pressure redistribution bed/mattress(bed type)    Mobility Interventions: Pressure redistribution bed/mattress (bed type)    Nutrition Interventions: Offer support with meals,snacks and hydration                     Problem: Patient Education: Go to Patient Education Activity  Goal: Patient/Family Education  Outcome: Progressing Towards Goal

## 2021-07-29 NOTE — ACP (ADVANCE CARE PLANNING)
[facility] [address] POST (Physician Orders for Scope of Treatment)       Date of conversation: 7/29/2021   [address]   Length (minutes): 30     Participants:   [x] Patient    [x] Healthcare agent (already designated in existing ACP document)    Name: Lawence Cushing     Relationship to Patient:daughter    Phone number: [phone]     [facility] ACP Facilitator: Shima Wood RN, Laquita Brower NP and Jayme Shields MSW. Conversation Topics     Florentino Eid is a 79year old with a h/o of Afib, cancer of the esophagus status post esophagectomy with gastric pull-through in 2001, chronic pleural effusion, CHF, sick sinus syndrome with a pacemaker, and chronic mild hyponatremia. Pt was admitted from PCP office on 7/27/2021 with a diagnosis of hyponatremia. Mr. Brown  has a good recall of current health history. The role of Palliative Medicine was introduced and  discussed goals of care. Discussed the benefits and burdens of intubation and CPR in the event of respiratory decline or cardiopulmonary arrest. Discussed the benefits and burdens of artificial nutrition via PEG tube in the event of inability to take food/fluids in a safe manner orally. Mr Brown is very clear that he would not want resuscitation or intubation/mechanical ventilation for any reason. He also would not want a feeding tube for any reason since he saw how the feeding tube effected the quality of life of his ex-wife who had one for years. He requested that we review the POST form with his Daughter, Lawence Cushing.   Palliative team returned to room when daughter was present. POST form was reviewed, Ms Brown is in agreement with her father's wishes for DNR/DNI, Limited Medical interventions. Ms. Brown did question the use of a Feeding Tube/PEG Tube. Palliative team was able to provide information/facts on the use of a feeding tubes, all questions were answered.  Ms. Bronw expressed understanding. POST form was signed by Mr Hazel Russell and provider, Luis Felipe Veras NP. Attending MD and bedside RN informed in change of code status to DNR/DNI       Lesson Fay from Experiences Related to Serious Illness: Mr. Rogelio Arnold shared with team \" My ex-wife had one for years and it was not good, I had one after my surgery but only needed it for a few days\". Identifies the following as important for living well: being able to be at home. Hopes: That they find out why my Na level is low     Worries/Fears about Medical Condition: None expressed. Sources of support/comfort described as: My daughter and friends. Needs to discuss with spiritual/Scientologist advisor: [] Yes  [x] No    Needs more information about illness and complications:  [x] Yes  [] No      Cardiopulmonary Resuscitation      \"What do you understand about CPR? \" Response: \" I don't want any machines\"     Order Elected for CPR:  []  Attempt Resuscitation [x]  Do Not Attempt Resuscitation    When NOT in Cardiopulmonary Arrest, Order Elected:      [] Comfort Measures  [x] Limited Additional Interventions  [] Full Interventions    Artificially Administered Nutrition, Order Elected:    [x] No Feeding Tube   [] Feeding Tube for a defined trial period  [] Feeding Tube long-term if indicated    The following was provided (check all that apply):      Healthcare Decision Information:   [x] Help with Breathing Facts   [x] Tube Feeding Facts   [x] CPR Facts      [x] Review of existing Advance Directive  [] Assistance with Completion of New Advance Directive   [x] Review of [place] POST Form       Meeting Outcomes:   [x] ACP discussion completed   [x] Annie form completed  [x] Annie prepared for Provider review and signature   [x] Original placed on Chart, if in facility (form to be sent with patient at discharge)  [x] Copy given to healthcare agent    [x] Copy scanned to electronic medical record    If ACP discussion not completed, last interview topic discussed:     Follow-up plan:     [] Schedule follow-up conversation to continue planning   [] Referred individual to Provider for additional questions/concerns   [x] Advised patient/agent/surrogate to review completed POST form and update if needed with changes in condition, patient preferences or care setting     [] This note routed to one or more involved healthcare providers    Jamil MEDEROSN, RN, [facility]  Palliative Medicine Inpatient RN  Palliative COPE Line: [phone]

## 2021-07-29 NOTE — CONSULTS
Palliative Medicine Consult    Patient Name: Mariah Boyce  YOB: 1940    Date of Initial Consult: 7/29/2021  Reason for Consult: Goals of care discussions  Requesting Provider: Dr. Theresa Holm  Primary Care Physician: Joie Simmonds, MD      SUMMARY:   Mariah Boyce is a 79 y.o. 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 Current medical issues leading to Palliative Medicine involvement include: support and goals of care discussions. PALLIATIVE DIAGNOSES:   1. Goals of care discussions  2. Hyponatremia  3. CHF  4. Advanced age/debility       PLAN:   1. Goals of care discussions: Palliative medicine team including Alexus Matt RN, Lacie Kiser MSW, and I met with patient at patient's bedside. Patient awake, alert, oriented x4. Patient has a good recall of current health history. Introduced our role as palliative medicine and discussed goals of care. Discussed the benefits and burdens of CPR in the setting of cardiopulmonary arrest in the setting of advanced age and chronic comorbidities. Discussed the benefits and burdens of intubation in the event of respiratory decline pre-arrest.  Discussed the benefits and burdens of feeding tubes in the event of dysphagia or nutritional decline. Patient is very clear that he would not want resuscitation or intubation/mechanical ventilation for any reason. He also would not want a feeding tube for any reason, shares that his ex-wife had a feeding tube for many years and that is not a quality of life that he would find acceptable. Patient signed POST form with the following measures: DNR/DNI, limited interventions, no feeding tubes.   Patient's daughter/legal next of kin is Dena Cunha and she was present at bedside during POST form completion, and she is supportive of patient's wishes. Patient declines AMD completion as his daughter Karol Hodgkins is legal next of kin, and he would trust her to be his medical decision-maker in the event he loses capacity. We will continue to follow for support. 2. Hyponatremia: Nephrology following, performing work-up for SIADH. Treatment per nephrology recommendations. No confusion noted at this time, patient is alert and oriented x4. Also with CHF and chronic diuretic use  3. CHF: Echo 7/28/2021 with EF of 55 to 60%. Also with known sick sinus syndrome with a pacemaker. Also with history of atrial fibrillation on Coumadin. 4. Advanced age/debility: 79year-old pleasant gentleman, lives alone, and reportedly still drives and functions independently, with intermittent use of a cane/walker. He does admit activities of daily living are becoming more difficult, though he can still perform them. His daughter lives in [place], but he does have extended family locally who according to Karol Hodgkins are willing to step up and help as needed. 1. Initial consult note routed to primary continuity provider  2.  Communicated plan of care with: Palliative IDT       GOALS OF CARE / TREATMENT PREFERENCES:   [====Goals of Care====]  GOALS OF CARE: DNR/DNI, limited interventions, No feeding tubes  Patient/Health Care Proxy Stated Goals: Prolong life      TREATMENT PREFERENCES:   Code Status: DNR    Advance Care Planning:  Advance Care Planning 7/28/2021   Confirm Advance Directive None   Patient Would Like to Complete Advance Directive No       Medical Interventions: Limited additional interventions    Artificially Administered Nutrition: No feeding tube      The palliative care team has discussed with patient / health care proxy about goals of care / treatment preferences for patient.  [====Goals of Care====]         HISTORY:     History obtained from: patient, chart    CHIEF COMPLAINT: generalized weakness, poor balance, low sodium levels    HPI/SUBJECTIVE:    The patient is:   [x] Verbal and participatory  [] Non-participatory due to:   Oriented x 4     Clinical Pain Assessment (nonverbal scale for severity on nonverbal patients):   Clinical Pain Assessment  Severity: 0            FUNCTIONAL ASSESSMENT:     Palliative Performance Scale (PPS):  PPS: 60       PSYCHOSOCIAL/SPIRITUAL SCREENING:     Advance Care Planning:  Advance Care Planning 7/28/2021   Confirm Advance Directive None   Patient Would Like to Complete Advance Directive No        Any spiritual / Mormon concerns:  [] Yes /  [x] No    Caregiver Burnout:  [] Yes /  [] No /  [x] No Caregiver Present      Anticipatory grief assessment:   [x] Normal  / [] Maladaptive          REVIEW OF SYSTEMS:     Positive and pertinent negative findings in ROS are noted above in HPI. The following systems were [x] reviewed / [] unable to be reviewed as noted in HPI  Other findings are noted below. Systems: constitutional, ears/nose/mouth/throat, respiratory, gastrointestinal, genitourinary, musculoskeletal, integumentary, neurologic, psychiatric, endocrine. Positive findings noted below. Modified ESAS Completed by: provider   Fatigue: 4       Pain: 0   Anxiety: 0 Nausea: 0     Dyspnea: 0     Constipation: No              PHYSICAL EXAM:     From RN flowsheet:  Wt Readings from Last 3 Encounters:   07/29/21 76.3 kg (168 lb 4.8 oz)   12/09/19 65.3 kg (144 lb)   11/07/19 64.4 kg (142 lb)     Blood pressure (!) 104/53, pulse 73, temperature 97.8 °F (36.6 °C), resp. rate 18, height 5' 6\" (1.676 m), weight 76.3 kg (168 lb 4.8 oz), SpO2 97 %.     Pain Scale 1: Numeric (0 - 10)  Pain Intensity 1: 0                 Last bowel movement, if known: not recorded    Constitutional: Awake, alert, appears stated age, in NAD  Eyes: pupils equal, anicteric  ENMT: no nasal discharge, moist mucous membranes  Cardiovascular: regular rhythm, distal pulses intact, BLE edema  Respiratory: breathing not labored, symmetric  Gastrointestinal: soft non-tender, +bowel sounds  Musculoskeletal: no deformity, no tenderness to palpation  Skin: warm, dry, BLE hyperpigmentation   Neurologic: following commands, moving all extremities, oriented x 4  Psychiatric: full affect, no hallucinations         HISTORY:     Active Problems:    Hyponatremia (7/27/2021)      Past Medical History:   Diagnosis Date    Abdominal pain, unspecified site     Acute upper respiratory infections of unspecified site     Atrial fibrillation (HCC)     Cancer of esophagus ([facility] 2001    RT and adjuvant chemotherapy, surgery    Esophageal reflux     Generalized osteoarthrosis, involving multiple sites     Gout, unspecified     Nausea alone     Sinoatrial node dysfunction (HCC)     Status post thoracentesis 06/2019    Thromboembolus ([facility] [address] 2003 (approx)    DVT in leg (IVC filter placed)    Unspecified cataract     Unspecified constipation       Past Surgical History:   Procedure Laterality Date    COLONOSCOPY N/A 12/13/2017    COLONOSCOPY with hot polypectomies performed by Bishop Carlene MD at [facility]COPY    HX GI      esophagectomy    HX HEENT  2001    Esophageal Cancer Surgery    HX HIP REPLACEMENT Left 2005 (approx)    HX OTHER SURGICAL  2003    ivc filter placed    HX PACEMAKER  2012    [employer]      Family History   Problem Relation Age of Onset    Heart Disease Father     Alzheimer Father       History reviewed, no pertinent family history.   Social History     Tobacco Use    Smoking status: Former Smoker     Packs/day: 0.50     Years: 34.00     Pack years: 17.00    Smokeless tobacco: Never Used   Substance Use Topics    Alcohol use: Yes     Comment: 2 drinks per day     Allergies   Allergen Reactions    Parafon Forte Dsc [Chlorzoxazone] Anaphylaxis    Aspirin Other (comments)     Cannot take due to on coumadin      Current Facility-Administered Medications   Medication Dose Route Frequency    3% sodium chloride (*HIGH ALERT*CONCENTRATED IV*) infusion  30 mL/hr IntraVENous CONTINUOUS    sodium chloride (NS) flush 5-40 mL  5-40 mL IntraVENous Q8H    sodium chloride (NS) flush 5-40 mL  5-40 mL IntraVENous PRN    acetaminophen (TYLENOL) tablet 650 mg  650 mg Oral Q6H PRN    Or    acetaminophen (TYLENOL) suppository 650 mg  650 mg Rectal Q6H PRN    ondansetron (ZOFRAN ODT) tablet 4 mg  4 mg Oral Q8H PRN    Or    ondansetron (ZOFRAN) injection 4 mg  4 mg IntraVENous Q6H PRN    Warfarin - Physician To Dose   Other Q24H    cyanocobalamin tablet 500 mcg  500 mcg Oral DAILY    cholecalciferol (VITAMIN D3) (1000 Units /25 mcg) tablet 2,000 Units  2,000 Units Oral DAILY    ipratropium (ATROVENT) 21 mcg (0.03 %) nasal spray 1 Spray  1 Spray Both Nostrils BID    linaCLOtide (LINZESS) capsule 145 mcg  145 mcg Oral ACB    pantoprazole (PROTONIX) tablet 40 mg  40 mg Oral ACB          LAB AND IMAGING FINDINGS:     Lab Results   Component Value Date/Time    WBC 2.2 (L) 07/29/2021 02:35 AM    HGB 8.9 (L) 07/29/2021 02:35 AM    PLATELET 484 (L) 00/25/8114 02:35 AM     Lab Results   Component Value Date/Time    Sodium 117 (LL) 07/29/2021 08:20 AM    Potassium 4.6 07/29/2021 02:35 AM    Chloride 85 (L) 07/29/2021 02:35 AM    CO2 25 07/29/2021 02:35 AM    BUN 9 07/29/2021 02:35 AM    Creatinine 0.55 (L) 07/29/2021 02:35 AM    Calcium 8.2 (L) 07/29/2021 02:35 AM    Magnesium 1.7 07/29/2021 02:35 AM    Phosphorus 2.6 07/29/2021 02:35 AM      Lab Results   Component Value Date/Time    Alk.  phosphatase 172 (H) 07/27/2021 07:55 PM    Protein, total 7.8 07/27/2021 07:55 PM    Albumin 3.4 07/27/2021 07:55 PM    Globulin 4.4 (H) 07/27/2021 07:55 PM     Lab Results   Component Value Date/Time    INR 3.2 (H) 07/29/2021 02:35 AM    Prothrombin time 32.1 (H) 07/29/2021 02:35 AM    aPTT 31.3 06/19/2019 10:15 AM      No results found for: IRON, FE, TIBC, IBCT, PSAT, FERR   No results found for: PH, PCO2, PO2  No components found for: [place]   Lab Results   Component Value Date/Time     07/28/2021 04:45 PM    CK - MB 6.0 (H) 07/28/2021 04:45 PM                Total time: 70 minutes  Counseling / coordination time, spent as noted above: 65 minutes  > 50% counseling / coordination?: yes, patient, family, RN, and MD    Prolonged service was provided for  []30 min   []75 min in face to face time in the presence of the patient, spent as noted above. Time Start:   Time End:   Note: this can only be billed with 33342 (initial) or 95633 (follow up). If multiple start / stop times, list each separately.

## 2021-07-29 NOTE — PROGRESS NOTES
TRANSFER - IN REPORT:    Verbal report received from [email or web address] (name) on [address]  being received from ED(unit) for routine progression of care      Report consisted of patients Situation, Background, Assessment and   Recommendations(SBAR). Information from the following report(s) SBAR, Kardex, ED Summary, Procedure Summary, Intake/Output and MAR was reviewed with the receiving nurse. Opportunity for questions and clarification was provided. Assessment completed upon patients arrival to unit and care assumed.

## 2021-07-29 NOTE — PROGRESS NOTES
Reason for Admission:  Hyponatremia [E87.1]                 RUR Score:    17%            Plan for utilizing home health:    TBD                      Likelihood of Readmission:   LOW                         Transition of Care Plan:              Initial assessment completed with patient and daughter. Cognitive status of patient: oriented to time, place, person and situation. Face sheet information confirmed:  yes. The patient designates his daughter BODØ to participate in his discharge plan and to receive any needed information. This patient lives in a single family home alone. Pt's daughter lives nearby. Patient is able to navigate steps as needed. Prior to hospitalization, patient was considered to be independent with ADLs/IADLS : yes . Patient has a current ACP document on file: no      Healthcare Decision Maker:     Click here to complete [address] including selection of the Healthcare Decision Maker Relationship (ie \"Primary\")    The daughter will be available to transport patient home upon discharge. The patient already has a cane and rolling walker medical equipment available in the home. Patient is not currently active with home health. Patient has not stayed in a skilled nursing facility or rehab. This patient is on dialysis :no    List of available Home Health agencies were provided and reviewed with the patient prior to discharge. Freedom of choice signed: no, for CM provided BODØ the daughter with a list of home health agencies for review. Currently, the discharge plan is home with home health. CM also provided the daughter with a list of [facility] Primary care providers that are accepting new patients. The patient states that he can obtain his medications from the pharmacy, and take his medications as directed. Patient's current insurance is Medicare and [employer]ARP.        Care Management Interventions  PCP Verified by CM: No (pt and daughter are requesting a new PCP)  Mode of Transport at Discharge:  Other (see comment) (daughter)  Transition of Care Consult (CM Consult): Discharge Planning  Physical Therapy Consult: No  Occupational Therapy Consult: No  Current Support Network: Lives Alone, Family Lives Nearby  Confirm Follow Up Transport: Family  Discharge Location  Discharge Placement: Home with home health        [address]  [phone]

## 2021-07-30 LAB
ANION GAP SERPL CALC-SCNC: 6 MMOL/L (ref 3–18)
APPEARANCE UR: ABNORMAL
BACTERIA URNS QL MICRO: ABNORMAL /HPF
BASOPHILS # BLD: 0 K/UL (ref 0–0.1)
BASOPHILS NFR BLD: 2 % (ref 0–2)
BILIRUB UR QL: NEGATIVE
BUN SERPL-MCNC: 11 MG/DL (ref 7–18)
BUN/CREAT SERPL: 24 (ref 12–20)
CALCIUM SERPL-MCNC: 8.1 MG/DL (ref 8.5–10.1)
CHLORIDE SERPL-SCNC: 88 MMOL/L (ref 100–111)
CO2 SERPL-SCNC: 25 MMOL/L (ref 21–32)
COLOR UR: YELLOW
CREAT SERPL-MCNC: 0.46 MG/DL (ref 0.6–1.3)
DIFFERENTIAL METHOD BLD: ABNORMAL
EOSINOPHIL # BLD: 0 K/UL (ref 0–0.4)
EOSINOPHIL NFR BLD: 1 % (ref 0–5)
EPITH CASTS URNS QL MICRO: ABNORMAL /LPF (ref 0–5)
ERYTHROCYTE [DISTWIDTH] IN BLOOD BY AUTOMATED COUNT: 14.3 % (ref 11.6–14.5)
GLUCOSE BLD STRIP.AUTO-MCNC: 73 MG/DL (ref 70–110)
GLUCOSE SERPL-MCNC: 65 MG/DL (ref 74–99)
GLUCOSE UR STRIP.AUTO-MCNC: NEGATIVE MG/DL
HCT VFR BLD AUTO: 24.8 % (ref 36–48)
HGB BLD-MCNC: 8.9 G/DL (ref 13–16)
HGB UR QL STRIP: NEGATIVE
INR PPP: 2.6 (ref 0.8–1.2)
KETONES UR QL STRIP.AUTO: ABNORMAL MG/DL
LEUKOCYTE ESTERASE UR QL STRIP.AUTO: ABNORMAL
LYMPHOCYTES # BLD: 0.5 K/UL (ref 0.9–3.6)
LYMPHOCYTES NFR BLD: 26 % (ref 21–52)
MAGNESIUM SERPL-MCNC: 1.7 MG/DL (ref 1.6–2.6)
MCH RBC QN AUTO: 33.1 PG (ref 24–34)
MCHC RBC AUTO-ENTMCNC: 35.9 G/DL (ref 31–37)
MCV RBC AUTO: 92.2 FL (ref 74–97)
MONOCYTES # BLD: 0.2 K/UL (ref 0.05–1.2)
MONOCYTES NFR BLD: 10 % (ref 3–10)
NEUTS SEG # BLD: 1.3 K/UL (ref 1.8–8)
NEUTS SEG NFR BLD: 61 % (ref 40–73)
NITRITE UR QL STRIP.AUTO: POSITIVE
OSMOLALITY UR: 504 MOSMOL/KG
PH UR STRIP: 7 [PH] (ref 5–8)
PHOSPHATE SERPL-MCNC: 2.6 MG/DL (ref 2.5–4.9)
PLATELET # BLD AUTO: 111 K/UL (ref 135–420)
PMV BLD AUTO: 9.2 FL (ref 9.2–11.8)
POTASSIUM SERPL-SCNC: 4.5 MMOL/L (ref 3.5–5.5)
PROT UR STRIP-MCNC: NEGATIVE MG/DL
PROTHROMBIN TIME: 26.9 SEC (ref 11.5–15.2)
RBC # BLD AUTO: 2.69 M/UL (ref 4.35–5.65)
RBC #/AREA URNS HPF: NEGATIVE /HPF (ref 0–5)
SODIUM SERPL-SCNC: 118 MMOL/L (ref 136–145)
SODIUM SERPL-SCNC: 119 MMOL/L (ref 136–145)
SODIUM SERPL-SCNC: 120 MMOL/L (ref 136–145)
SODIUM SERPL-SCNC: 120 MMOL/L (ref 136–145)
SODIUM SERPL-SCNC: 121 MMOL/L (ref 136–145)
SODIUM SERPL-SCNC: 121 MMOL/L (ref 136–145)
SODIUM UR-SCNC: 61 MMOL/L (ref 20–110)
SP GR UR REFRACTOMETRY: 1.02 (ref 1–1.03)
UROBILINOGEN UR QL STRIP.AUTO: 1 EU/DL (ref 0.2–1)
WBC # BLD AUTO: 2.1 K/UL (ref 4.6–13.2)
WBC URNS QL MICRO: ABNORMAL /HPF (ref 0–5)

## 2021-07-30 PROCEDURE — 81001 URINALYSIS AUTO W/SCOPE: CPT

## 2021-07-30 PROCEDURE — 84295 ASSAY OF SERUM SODIUM: CPT

## 2021-07-30 PROCEDURE — 82962 GLUCOSE BLOOD TEST: CPT

## 2021-07-30 PROCEDURE — 65660000004 HC RM CVT STEPDOWN

## 2021-07-30 PROCEDURE — 80048 BASIC METABOLIC PNL TOTAL CA: CPT

## 2021-07-30 PROCEDURE — 74011250636 HC RX REV CODE- 250/636: Performed by: INTERNAL MEDICINE

## 2021-07-30 PROCEDURE — 85025 COMPLETE CBC W/AUTO DIFF WBC: CPT

## 2021-07-30 PROCEDURE — 2709999900 HC NON-CHARGEABLE SUPPLY

## 2021-07-30 PROCEDURE — 97161 PT EVAL LOW COMPLEX 20 MIN: CPT

## 2021-07-30 PROCEDURE — 74011000258 HC RX REV CODE- 258: Performed by: EMERGENCY MEDICINE

## 2021-07-30 PROCEDURE — 84100 ASSAY OF PHOSPHORUS: CPT

## 2021-07-30 PROCEDURE — 83735 ASSAY OF MAGNESIUM: CPT

## 2021-07-30 PROCEDURE — 36415 COLL VENOUS BLD VENIPUNCTURE: CPT

## 2021-07-30 PROCEDURE — 74011250636 HC RX REV CODE- 250/636: Performed by: PHYSICIAN ASSISTANT

## 2021-07-30 PROCEDURE — 97530 THERAPEUTIC ACTIVITIES: CPT

## 2021-07-30 PROCEDURE — 99232 SBSQ HOSP IP/OBS MODERATE 35: CPT | Performed by: EMERGENCY MEDICINE

## 2021-07-30 PROCEDURE — 74011250637 HC RX REV CODE- 250/637: Performed by: INTERNAL MEDICINE

## 2021-07-30 PROCEDURE — 74011250636 HC RX REV CODE- 250/636: Performed by: EMERGENCY MEDICINE

## 2021-07-30 PROCEDURE — 97165 OT EVAL LOW COMPLEX 30 MIN: CPT

## 2021-07-30 PROCEDURE — 97535 SELF CARE MNGMENT TRAINING: CPT

## 2021-07-30 PROCEDURE — 74011250637 HC RX REV CODE- 250/637: Performed by: EMERGENCY MEDICINE

## 2021-07-30 PROCEDURE — 77030040392 HC DRSG OPTIFOAM MDII -A

## 2021-07-30 PROCEDURE — 84300 ASSAY OF URINE SODIUM: CPT

## 2021-07-30 PROCEDURE — 85610 PROTHROMBIN TIME: CPT

## 2021-07-30 RX ORDER — 3% SODIUM CHLORIDE 3 G/100ML
30 INJECTION, SOLUTION INTRAVENOUS CONTINUOUS
Status: DISCONTINUED | OUTPATIENT
Start: 2021-07-30 | End: 2021-07-30

## 2021-07-30 RX ORDER — WARFARIN 2.5 MG/1
2.5 TABLET ORAL ONCE
Status: COMPLETED | OUTPATIENT
Start: 2021-07-30 | End: 2021-07-30

## 2021-07-30 RX ORDER — DOCUSATE SODIUM 100 MG/1
100 CAPSULE, LIQUID FILLED ORAL 2 TIMES DAILY
Status: DISCONTINUED | OUTPATIENT
Start: 2021-07-30 | End: 2021-08-04 | Stop reason: HOSPADM

## 2021-07-30 RX ADMIN — Medication 10 ML: at 21:24

## 2021-07-30 RX ADMIN — Medication 1 PACKET: at 11:33

## 2021-07-30 RX ADMIN — Medication 10 ML: at 08:03

## 2021-07-30 RX ADMIN — SODIUM CHLORIDE 12.5 MG: 900 INJECTION, SOLUTION INTRAVENOUS at 16:19

## 2021-07-30 RX ADMIN — CYANOCOBALAMIN TAB 500 MCG 500 MCG: 500 TAB at 09:08

## 2021-07-30 RX ADMIN — IPRATROPIUM BROMIDE 1 SPRAY: 21 SPRAY NASAL at 18:33

## 2021-07-30 RX ADMIN — Medication 1 PACKET: at 18:28

## 2021-07-30 RX ADMIN — ONDANSETRON 4 MG: 2 INJECTION INTRAMUSCULAR; INTRAVENOUS at 11:33

## 2021-07-30 RX ADMIN — Medication 10 ML: at 16:21

## 2021-07-30 RX ADMIN — WARFARIN SODIUM 2.5 MG: 2.5 TABLET ORAL at 18:28

## 2021-07-30 RX ADMIN — DOCUSATE SODIUM 100 MG: 100 CAPSULE, LIQUID FILLED ORAL at 18:28

## 2021-07-30 RX ADMIN — CHOLECALCIFEROL TAB 25 MCG (1000 UNIT) 2000 UNITS: 25 TAB at 09:09

## 2021-07-30 RX ADMIN — SODIUM CHLORIDE 30 ML/HR: 3 INJECTION, SOLUTION INTRAVENOUS at 05:24

## 2021-07-30 RX ADMIN — LINACLOTIDE 145 MCG: 145 CAPSULE, GELATIN COATED ORAL at 08:02

## 2021-07-30 RX ADMIN — PANTOPRAZOLE SODIUM 40 MG: 40 TABLET, DELAYED RELEASE ORAL at 09:08

## 2021-07-30 RX ADMIN — IPRATROPIUM BROMIDE 1 SPRAY: 21 SPRAY NASAL at 11:42

## 2021-07-30 NOTE — PROGRESS NOTES
Problem: Mobility Impaired (Adult and Pediatric)  Goal: *Acute Goals and Plan of Care (Insert Text)  Description: Physical Therapy Goals  Initiated 7/30/2021 and to be accomplished within 7 day(s)  1. Patient will move from supine to sit and sit to supine  in bed with modified independence. 2.  Patient will transfer from bed to chair and chair to bed with modified independence using the least restrictive device. 3.  Patient will perform sit to stand with modified independence. 4.  Patient will ambulate with modified independence for 150 feet with the least restrictive device. 5.  Patient will ascend/descend 3 stairs with B handrail(s) with supervision/set-up. PLOF: pt lives alone in a [address] [place] [address] with 3 JONES. He was Dinorah with a SPC but was recently using a RW. Outcome: Progressing Towards Goal     PHYSICAL THERAPY EVALUATION/TREATMENT    Patient: Pablo Hanna (45 y.o. male)  Date: 7/30/2021  Primary Diagnosis: Hyponatremia [E87.1]        Precautions:  Fall, Skin    ASSESSMENT :  Based on the objective data described below, the patient presents with generalized weakness, decreased functional mobility tolerance, decreased endurance, and decreased AROM. RN cleared for mobility. Pt found semi-reclined in bed, in NAD, willing to work with PT. Sup-sit with modAx1. On Objective assessment, pt present with grossly 3+/5 L LE, 4/5 R LE. He reports he had a left EDITA in the past and it has been weak ever since. STS with Roma and RW, pt amb 5 ft to sit in recliner. Once sitting, pt performing exercises per flow sheet and was left with B Le's elevated, call bell nearby, all needs met. , edu on using call bell before getting up. Patient will benefit from skilled intervention to address the above impairments.   Patient's rehabilitation potential is considered to be Fair  Factors which may influence rehabilitation potential include:   []         None noted  []         Mental ability/status  [x]         Medical condition  [x]         Home/family situation and support systems  [x]         Safety awareness  []         Pain tolerance/management  []         Other:      PLAN :  Recommendations and Planned Interventions:   [x]           Bed Mobility Training             [x]    Neuromuscular Re-Education  [x]           Transfer Training                   []    Orthotic/Prosthetic Training  [x]           Gait Training                          []    Modalities  [x]           Therapeutic Exercises           []    Edema Management/Control  [x]           Therapeutic Activities            [x]    Family Training/Education  [x]           Patient Education  []           Other (comment):    Frequency/Duration: Patient will be followed by physical therapy 1-2 times per day/4-7 days per week to address goals. Discharge Recommendations: Rehab  Further Equipment Recommendations for Discharge: N/A     SUBJECTIVE:   Patient stated I don't know if I can do this.     OBJECTIVE DATA SUMMARY:     Past Medical History:   Diagnosis Date    Abdominal pain, unspecified site     Acute upper respiratory infections of unspecified site     Atrial [facility] [address]     Cancer of esophagus ([facility] 2001    RT and adjuvant chemotherapy, surgery    Esophageal reflux     Generalized osteoarthrosis, involving multiple sites     Gout, unspecified     Nausea alone     Sinoatrial node dysfunction ([address]     Status post thoracentesis 06/2019    Thromboembolus ([address] 2003 (approx)    DVT in leg (IVC filter placed)    Unspecified cataract     Unspecified constipation      Past Surgical History:   Procedure Laterality Date    COLONOSCOPY N/A 12/13/2017    COLONOSCOPY with hot polypectomies performed by Tenzin Vásquez MD at [facility] ENDOSCOPY    HX GI      esophagectomy    HX HEENT  2001    Esophageal Cancer Surgery    HX HIP REPLACEMENT Left 2005 (approx)    HX OTHER SURGICAL  2003    ivc filter placed    HX PACEMAKER  2012    [employer]     Barriers to Learning/Limitations: None  Compensate with: N/A  Home Situation:  Home Situation  Home Environment: Private residence  # Steps to Enter: 3  One/Two Story Residence: One story  Living Alone: Yes  Support Systems: Family member(s)  Patient Expects to be Discharged to[de-identified] [employer]  Current DME Used/Available at Home: Walker, rolling, Cane, straight  Critical Behavior:  Neurologic State: Alert  Orientation Level: Oriented X4  Cognition: Follows commands  Safety/Judgement: Fall prevention  Psychosocial  Patient Behaviors: Calm; Cooperative  Purposeful Interaction: Yes  Pt Identified Daily Priority: Clinical issues (comment)  Caritas Process: Establish trust;Teaching/learning; Attend basic human needs  Caring Interventions: Reassure  Reassure: Therapeutic listening; Informing  Skin Condition/Temp: Dry;Warm     Skin Integrity: Intact  Skin Integumentary  Skin Color: Appropriate for ethnicity; Ecchymosis (comment)  Skin Condition/Temp: Dry;Warm  Skin Integrity: Intact  Turgor: Epidermis thin w/ loss of subcut tissue  Hair Growth: Sparce  Varicosities: (P) Absent  Nails: Exceptions to WDL  Exceptions to WDL: (P) Thick     Strength:    Strength: Generally decreased, functional     Tone & Sensation:   Tone: Normal    Sensation: Intact    Range Of Motion:  AROM: Generally decreased, functional    Functional Mobility:  Bed Mobility:     Supine to Sit: Moderate assistance; Additional time;Assist x1     Scooting: Contact guard assistance  Transfers:  Sit to Stand: Minimum assistance; Additional time  Stand to Sit: Minimum assistance    Balance:   Sitting: Intact; With support  Standing: Impaired; With support  Standing - Static: Fair  Standing - Dynamic : Fair    Ambulation/Gait Training:  Distance (ft): 5 Feet (ft)  Assistive Device: Walker, rolling  Ambulation - Level of Assistance: Contact guard assistance        Gait Abnormalities: Decreased step clearance        Base of Support: Center of gravity altered     Speed/Donna: Slow  Step Length: Right shortened;Left shortened    Therapeutic Exercises:   Pt performed exercises per flow sheet sitting      EXERCISE   Sets   Reps   Active Active Assist   Passive Self ROM   Comments   Ankle Pumps 1 15  [x] [] [] []    Quad Sets/Glut Sets   [] [] [] []    Hamstring Sets   [] [] [] []    Short Arc Quads   [] [] [] []    Heel Slides   [] [] [] []    Straight Leg Raises   [] [] [] []    Hip Abd/Add   [] [] [] []    Long Arc Quads 1 15 [x] [] [] []    Seated Marching 1 20 [x] [] [] []    Standing Marching   [] [] [] []       [] [] [] []       Pain:  Pain level pre-treatment: 0/10   Pain level post-treatment: 0/10   Pain Intervention(s) : Medication (see MAR); Rest, Ice, Repositioning  Response to intervention: Nurse notified, See doc flow    Activity Tolerance:   Pt tolerated mobility fair  Please refer to the flowsheet for vital signs taken during this treatment. After treatment:   [x]         Patient left in no apparent distress sitting up in chair  []         Patient left in no apparent distress in bed  [x]         Call bell left within reach  [x]         Nursing notified  []         Caregiver present  []         Bed alarm activated  []         SCDs applied    COMMUNICATION/EDUCATION:   [x]         Role of Physical Therapy in the acute care setting. [x]         Fall prevention education was provided and the patient/caregiver indicated understanding. [x]         Patient/family have participated as able in goal setting and plan of care. []         Patient/family agree to work toward stated goals and plan of care. []         Patient understands intent and goals of therapy, but is neutral about his/her participation. []         Patient is unable to participate in goal setting/plan of care: ongoing with therapy staff.  []         Other:     Thank you for this referral.  Cheyanne Peña   Time Calculation: 21 mins      Eval Complexity: History: LOW Complexity : Zero comorbidities / personal factors that will impact the outcome / POCExam:LOW Complexity : 1-2 Standardized tests and measures addressing body structure, function, activity limitation and / or participation in recreation  Presentation: LOW Complexity : Stable, uncomplicated  Clinical Decision Making:Low Complexity    Overall Complexity:LOW

## 2021-07-30 NOTE — PROGRESS NOTES
In Patient Progress note      Admit Date: 7/27/2021    Impression:     #1 severe euvolumic AC on chronic hyponatremia, etiology d/t SIADH , urine osm was 570 , urine Na 80s   Etiology of SIADH unclear , getting CT to evaluate for possible hepatic mass. #2 history of CHF with 45% EF, appears to be compensated  #3 history of atrial fibrillation  #4 history of hypothyroidism  #5 previous history of DVT on anticoagulation  #6 possibility of liver mass     Improving symptoms  Na 121 this am  ORIENTED x 3   HTN stable     Plan:  #1 add urea 15 gm BID ,fluid restrict 1000 mL daily  #2 check sodium level every 6 hours, goal for next 24 hrs correction of 6-8 meq   #3 hold albumin , use if hypotensive   #4 hold diuretics for now     Discussed in detail with patient and his daughter  Updated patient's nurse in the ER     Please call with questions,     Souleymane Patricio MD [facility]  Cell [phone]  Pager: [phone]    Subjective:     - No acute over night events. - respiratory - stable  - hemodynamics - stable, no pressrs  - UOP-ok  - Nutrition -poor     Objective:     Visit Vitals  BP (!) 110/54 (BP 1 Location: Left upper arm, BP Patient Position: At rest)   Pulse 75   Temp 97.3 °F (36.3 °C)   Resp 16   Ht 5' 6\" (1.676 m)   Wt 76.3 kg (168 lb 4.8 oz)   SpO2 98%   BMI 27.16 kg/m²         Intake/Output Summary (Last 24 hours) at 7/30/2021 1509  Last data filed at 7/30/2021 1200  Gross per 24 hour   Intake 510 ml   Output 1250 ml   Net -740 ml       Physical Exam:        Patient is in no apparent distress. HEENT: mmm  Neck: no cervical lymphadenopathy or thyromegaly. Lungs: good air entry, clear to auscultation bilaterally. Cardiovascular system: S1, S2, regular rate and rhythm. Abdomen: soft,distended   Extremities: trace edema   Integumentary: skin is grossly intact. Neurologic: Alert, oriented time three.        Data Review:    Recent Labs     07/30/21  0446   WBC 2.1*   RBC 2.69*   HCT 24.8*   MCV 92.2   MCH 33.1 MCHC 35.9   RDW 14.3     Recent Labs     07/30/21  1200 07/30/21  0800 07/30/21  0446 07/30/21  0003 07/29/21  1823 07/29/21  1338 07/29/21  0820 07/29/21  0530 07/29/21  0235 07/28/21  2213 07/28/21  1842 07/28/21  1645 07/28/21  0500 07/27/21 1955   BUN  --   --  11  --   --   --   --   --  9  --  9  --  11 13   CREA  --   --  0.46*  --   --   --   --   --  0.55*  --  0.60  --  0.51* 0.67   CA  --   --  8.1*  --   --   --   --   --  8.2*  --  8.2*  --  7.9* 8.6   ALB  --   --   --   --   --   --   --   --   --   --   --   --   --  3.4   K  --   --  4.5  --   --   --   --   --  4.6  --  4.6  --  4.7 4.7   * 121* 120*  119* 118* 118* 119* 117* 118* 115* 114* 114* 114* 115* 114*   CL  --   --  88*  --   --   --   --   --  85*  --  82*  --  83* 82*   CO2  --   --  25  --   --   --   --   --  25  --  26  --  25 28   PHOS  --   --  2.6  --   --   --   --   --  2.6  --   --   --  2.8  --    GLU  --   --  65*  --   --   --   --   --  80  --  105*  --  79 93       Sarahi Byers MD

## 2021-07-30 NOTE — PROGRESS NOTES
Bedside turnover given to Landy. JERMAINEAR,MAR,ED summary given with updates R/T the night, a chance to ask questions was given. PT is on the cardiac tele monitor in stable condition. Bed is in the lowest position with the wheels locked. Call bell and personal effects  are on the bedside table within reach. Double checked with RN coming on the IV drips.

## 2021-07-30 NOTE — PROGRESS NOTES
Problem: Self Care Deficits Care Plan (Adult)  Goal: *Acute Goals and Plan of Care (Insert Text)  Description: Occupational Therapy Goals  Initiated 7/30/2021 within 7 day(s). 1.  Patient will perform grooming at sink with modified independence. 2.  Patient will perform lower body dressing with modified independence. 3.  Patient will perform all aspects of toileting with modified independence. 4.  Patient will utilize energy conservation techniques during functional activities with minimum verbal cues. Prior Level of Function: Patient lived alone PTA and was Modified Independent in self care tasks using rolling walker, sock aide, and grab bars in shower. Outcome: Progressing Towards Goal     Problem: Patient Education: Go to Patient Education Activity  Goal: Patient/Family Education  Outcome: Progressing Towards Goal   OCCUPATIONAL THERAPY EVALUATION    Patient: Malik Boyd (24 y.o. male)  Date: 7/30/2021  Primary Diagnosis: Hyponatremia [E87.1]  Precautions:  Fall, Skin    ASSESSMENT :  Based on the objective data described below, the patient presents with decreased functional endurance and decreased standing balance limiting independence and safety in standing ADL tasks. Patient sat EOB with Min A and additional time. Patient demonstrated good sitting balance at EOB. Patient politely refused standing at this time due to just getting back into bed with nursing prior to OT evaluation. Patient would benefit from occupational therapy services to increase strength, standing balance, and endurance and to educate on ECON strategies to increase independence with ADLs. Patient will benefit from skilled intervention to address the above impairments.   Patient's rehabilitation potential is considered to be Good  Factors which may influence rehabilitation potential include:   []             None noted  []             Mental ability/status  [x]             Medical condition  [x]             Home/family situation and support systems  [x]             Safety awareness  []             Pain tolerance/management  []             Other:      PLAN :  Recommendations and Planned Interventions:   [x]               Self Care Training                  [x]      Therapeutic Activities  [x]               Functional Mobility Training   []      Cognitive Retraining  [x]               Therapeutic Exercises           [x]      Endurance Activities  [x]               Balance Training                    []      Neuromuscular Re-Education  []               Visual/Perceptual Training     [x]      Home Safety Training  [x]               Patient Education                   []      Family Training/Education  []               Other (comment):    Frequency/Duration: Patient will be followed by occupational therapy 1-2 times per day/4-7 days per week to address goals. Discharge Recommendations: Home Health with 24/7 assistance vs Rehab pending progress  Further Equipment Recommendations for Discharge: Shower chair     SUBJECTIVE:   Patient stated I am not very strong anymore.     OBJECTIVE DATA SUMMARY:     Past Medical History:   Diagnosis Date    Abdominal pain, unspecified site     Acute upper respiratory infections of unspecified site     Atrial fibrillation ([address]     Cancer of esophagus ([address] 2001    RT and adjuvant chemotherapy, surgery    Esophageal reflux     Generalized osteoarthrosis, involving multiple sites     Gout, unspecified     Nausea alone     Sinoatrial node dysfunction ([address]     Status post thoracentesis 06/2019    Thromboembolus ([address] 2003 (approx)    DVT in leg (IVC filter placed)    Unspecified cataract     Unspecified constipation      Past Surgical History:   Procedure Laterality Date    COLONOSCOPY N/A 12/13/2017    COLONOSCOPY with hot polypectomies performed by Cynthia Mcnamara MD at [address] HX GI      esophagectomy    HX HEENT  2001    Esophageal Cancer Surgery    HX HIP REPLACEMENT Left 2005 (approx)    HX OTHER SURGICAL  2003    ivc filter placed    HX PACEMAKER  2012    [employer]     Barriers to Learning/Limitations: None    Home Situation:   Home Situation  Home Environment: Private residence  # Steps to Enter: 3  One/Two Story Residence: One story  Living Alone: Yes  Support Systems: Family member(s)  Patient Expects to be Discharged to[de-identified] [employer]  Current DME Used/Available at Home: Christopher Winslow aides, Cane, straight, Walker, rolling, Grab bars  Tub or Shower Type: Shower  [x]  Right hand dominant   []  Left hand dominant    Cognitive/Behavioral Status:  Neurologic State: Alert  Orientation Level: Oriented X4  Cognition: Follows commands  Safety/Judgement: Fall prevention;Home safety    Skin: Grossly intact. Bruising at IV sites. Edema: No edema noted in BUEs    Vision/Perceptual:   Corrective Lenses: Glasses    Coordination: BUE  Coordination: Generally decreased, functional      Balance:  Sitting: Intact    Per PT Note: Standing: Impaired; With support  Standing - Static: Fair  Standing - Dynamic : Fair    Strength: BUE  Strength: Generally decreased, functional    Tone & Sensation: BUE  Tone: Normal  Sensation: Intact    Range of Motion: BUE  AROM: Generally decreased, functional    Functional Mobility and Transfers for ADLs:  Bed Mobility:  Supine to Sit: Minimum assistance; Additional time  Sit to Supine: Stand-by assistance  Scooting: Contact guard assistance  Transfers:  Not assessed at this time    ADL Assessment:   Feeding: Setup;Modified independent    Oral Facial Hygiene/Grooming: Minimum assistance (at sink)    Bathing: Moderate assistance    Upper Body Dressing: Modified independent    Lower Body Dressing: Minimum assistance    Toileting: Minimum assistance    ADL Intervention:  Educated on home safety and shower chair recommendation. Patient not receptive at this time. Educated on role of OT in acute care.      Cognitive Retraining  Safety/Judgement: Fall prevention;Home safety    Pain:  Pain level pre-treatment: 0/10   Pain level post-treatment: 0/10   Pain Intervention(s): N/A    Activity Tolerance:   Fair  Please refer to the flowsheet for vital signs taken during this treatment. After treatment:   [] Patient left in no apparent distress sitting up in chair  [x] Patient left in no apparent distress in bed  [x] Call bell left within reach  [] Nursing notified  [x] Caregiver present  [] Bed alarm activated    COMMUNICATION/EDUCATION:   [x] Role of Occupational Therapy in the acute care setting  [x] Home safety education was provided and the patient/caregiver indicated understanding. [x] Patient/family have participated as able in goal setting and plan of care. [x] Patient/family agree to work toward stated goals and plan of care. [] Patient understands intent and goals of therapy, but is neutral about his/her participation. [] Patient is unable to participate in goal setting and plan of care. Thank you for this referral.  [facility], OTR/L  Time Calculation: 23 mins    Eval Complexity: History: LOW Complexity : Brief history review ; Examination: LOW Complexity : 1-3 performance deficits relating to physical, cognitive , or psychosocial skils that result in activity limitations and / or participation restrictions ;    Decision Making:LOW Complexity : No comorbidities that affect functional and no verbal or physical assistance needed to complete eval tasks

## 2021-07-30 NOTE — PROGRESS NOTES
[facility]  [facility]    Patient: Angelique Sanchez Age: 79 y.o. : 1940 MR#: [id number] SSN: [id number]  Date/Time: 2021    Subjective:     Patient is sitting in bed in no apparent distress, awake and alert. Complains of fatigue. Family member at bedside    Assessment/Plan:     Hyponatremia, multifactorial in the setting of CHF and diuretic use and possible SIADH  Chronic systolic congestive heart failure-EF of 45%  History of atrial fibrillation-on Coumadin  History of sick sinus syndrome-has a pacemaker  Remote history of esophagectomy  Possible liver mass on CT scan. Patient states that he follows up with gastroenterology Dr. Melissa Pagan regarding his liver issues. I called and discussed with the gastroenterology APC and she will schedule a close outpatient follow-up with Dr. Melissa Pagan to follow-up on the possible liver mass noted on CT head. Pancytopenia of unclear etiology. Close outpatient follow-up with hematology. Plan  Monitor sodium, continue IV fluids per nephrology  Coumadin 2.5 mg today for INR of 2.6  Monitor blood pressures  Close outpatient follow-up with gastroenterology regarding the possible liver mass on the CT scan here. Continue PT and OT  Palliative care input noted. Patient is DNR and DNI  Discussed with patient and family member at bedside. Discussed with nephrologist.  Discussed with gastroenterology APC.     Case discussed with:  [x]Patient  [x]Family  []Nursing  []Case Management  DVT Prophylaxis:  []Lovenox  []Hep SQ  []SCDs  [x]Coumadin   []On Heparin gtt    Objective:   VS:   Visit Vitals  BP (!) 110/54 (BP 1 Location: Left upper arm, BP Patient Position: At rest)   Pulse 75   Temp 97.3 °F (36.3 °C)   Resp 16   Ht 5' 6\" (1.676 m)   Wt 76.3 kg (168 lb 4.8 oz)   SpO2 98%   BMI 27.16 kg/m²      Tmax/24hrs: Temp (24hrs), Av.9 °F (36.6 °C), Min:97.3 °F (36.3 °C), Max:98.6 °F (37 °C)    Input/Output:     Intake/Output Summary (Last 24 hours) at 7/30/2021 1423  Last data filed at 7/30/2021 1200  Gross per 24 hour   Intake 510 ml   Output 1250 ml   Net -740 ml       General:  Awake, alert  Cardiovascular:  S1S2+, RRR  Pulmonary:  CTA b/l  GI:  Soft, BS+, NT, ND  Extremities:  trace edema      Labs:    Recent Results (from the past 24 hour(s))   SODIUM    Collection Time: 07/29/21  6:23 PM   Result Value Ref Range    Sodium 118 (LL) 136 - 145 mmol/L   SODIUM    Collection Time: 07/30/21 12:03 AM   Result Value Ref Range    Sodium 118 (LL) 136 - 145 mmol/L   CBC WITH AUTOMATED DIFF    Collection Time: 07/30/21  4:46 AM   Result Value Ref Range    WBC 2.1 (L) 4.6 - 13.2 K/uL    RBC 2.69 (L) 4.35 - 5.65 M/uL    HGB 8.9 (L) 13.0 - 16.0 g/dL    HCT 24.8 (L) 36.0 - 48.0 %    MCV 92.2 74.0 - 97.0 FL    MCH 33.1 24.0 - 34.0 PG    MCHC 35.9 31.0 - 37.0 g/dL    RDW 14.3 11.6 - 14.5 %    PLATELET 845 (L) 356 - 420 K/uL    MPV 9.2 9.2 - 11.8 FL    NEUTROPHILS 61 40 - 73 %    LYMPHOCYTES 26 21 - 52 %    MONOCYTES 10 3 - 10 %    EOSINOPHILS 1 0 - 5 %    BASOPHILS 2 0 - 2 %    ABS. NEUTROPHILS 1.3 (L) 1.8 - 8.0 K/UL    ABS. LYMPHOCYTES 0.5 (L) 0.9 - 3.6 K/UL    ABS. MONOCYTES 0.2 0.05 - 1.2 K/UL    ABS. EOSINOPHILS 0.0 0.0 - 0.4 K/UL    ABS.  BASOPHILS 0.0 0.0 - 0.1 K/UL    DF AUTOMATED     METABOLIC PANEL, BASIC    Collection Time: 07/30/21  4:46 AM   Result Value Ref Range    Sodium 119 (LL) 136 - 145 mmol/L    Potassium 4.5 3.5 - 5.5 mmol/L    Chloride 88 (L) 100 - 111 mmol/L    CO2 25 21 - 32 mmol/L    Anion gap 6 3.0 - 18 mmol/L    Glucose 65 (L) 74 - 99 mg/dL    BUN 11 7.0 - 18 MG/DL    Creatinine 0.46 (L) 0.6 - 1.3 MG/DL    BUN/Creatinine ratio 24 (H) 12 - 20      GFR est AA >60 >60 ml/min/1.73m2    GFR est non-AA >60 >60 ml/min/1.73m2    Calcium 8.1 (L) 8.5 - 10.1 MG/DL   SODIUM    Collection Time: 07/30/21  4:46 AM   Result Value Ref Range    Sodium 120 (L) 136 - 145 mmol/L   MAGNESIUM    Collection Time: 07/30/21  4:46 AM   Result Value Ref Range Magnesium 1.7 1.6 - 2.6 mg/dL   PHOSPHORUS    Collection Time: 07/30/21  4:46 AM   Result Value Ref Range    Phosphorus 2.6 2.5 - 4.9 MG/DL   PROTHROMBIN TIME + INR    Collection Time: 07/30/21  4:46 AM   Result Value Ref Range    Prothrombin time 26.9 (H) 11.5 - 15.2 sec    INR 2.6 (H) 0.8 - 1.2     GLUCOSE, POC    Collection Time: 07/30/21  7:58 AM   Result Value Ref Range    Glucose (POC) 73 70 - 110 mg/dL   SODIUM    Collection Time: 07/30/21  8:00 AM   Result Value Ref Range    Sodium 121 (L) 136 - 145 mmol/L   SODIUM    Collection Time: 07/30/21 12:00 PM   Result Value Ref Range    Sodium 120 (L) 136 - 145 mmol/L         Signed By: Hillary Santo MD     July 30, 2021

## 2021-07-30 NOTE — PROGRESS NOTES
Palliative Medicine     Goals of care defined. POST on file and denotes DDNR status, limited medical interventions (NO intubation under any circumstances, ok with CPAP/BiPAP, avoid ICU if possible) and NO feeding tubes. Will sign off.   Please reconsult team as needed/if appropriate.      Pt remains DNR/DNI.      Thank you for the Palliative Medicine consult and allowing us to participate in the care of  Taylor Sparrow RN, BSN  Palliative Medicine Inpatient [address]: [phone])

## 2021-07-30 NOTE — PROGRESS NOTES
Physician Progress Note      Madhav Galaviz  [facility] #:                  [id number]  :                       1940  ADMIT DATE:       2021 7:19 PM  [address] [place] DATE:  RESPONDING  PROVIDER #:        Donnie Laurent MD          QUERY TEXT:    Patient admitted with hyponatremia, noted to have low RBCs, WBC count or neutrophils and low platelet count). If possible, please document in progress notes and discharge summary if you are evaluating and/or treating any of the following: The medical record reflects the following:  Risk Factors: 79 y.o. 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 with gastric pull-through in , chronic pleural effusion, CHF, sick sinus syndrome with a pacemaker, and chronic mild hyponatremia  Clinical Indicators: WBC 2.1  H&H 8.9/24.8    Treatment: Serial CBC's    Thank you,  Paulina Man RN, [facility]  [phone]  Options provided:  -- Pancytopenia due to, please specify cause  -- Pancytopenia due to unknown etiology  -- Other - I will add my own diagnosis  -- Disagree - Not applicable / Not valid  -- Disagree - Clinically unable to determine / Unknown  -- Refer to Clinical Documentation Reviewer    PROVIDER RESPONSE TEXT:    Patient has pancytopenia due to unknown etiology.     Query created by: Maggie Mcburney on 2021 11:41 AM      Electronically signed by:  Donnie Laurent MD 2021 3:02 PM

## 2021-07-30 NOTE — PROGRESS NOTES
1900:  Bedside and Verbal shift change report given to DanielRN (oncoming nurse) by An Mcghee RN(offgoing nurse). Report included the following information SBAR, Kardex, Intake/Output, MAR, Accordion, Recent Results, Cardiac Rhythm Paced. and Alarm Parameters .

## 2021-07-31 LAB
ANION GAP SERPL CALC-SCNC: 6 MMOL/L (ref 3–18)
BASOPHILS # BLD: 0 K/UL (ref 0–0.1)
BASOPHILS NFR BLD: 1 % (ref 0–2)
BUN SERPL-MCNC: 30 MG/DL (ref 7–18)
BUN/CREAT SERPL: 52 (ref 12–20)
CALCIUM SERPL-MCNC: 8.5 MG/DL (ref 8.5–10.1)
CHLORIDE SERPL-SCNC: 89 MMOL/L (ref 100–111)
CO2 SERPL-SCNC: 25 MMOL/L (ref 21–32)
CREAT SERPL-MCNC: 0.58 MG/DL (ref 0.6–1.3)
DIFFERENTIAL METHOD BLD: ABNORMAL
EOSINOPHIL # BLD: 0 K/UL (ref 0–0.4)
EOSINOPHIL NFR BLD: 0 % (ref 0–5)
ERYTHROCYTE [DISTWIDTH] IN BLOOD BY AUTOMATED COUNT: 14.5 % (ref 11.6–14.5)
GLUCOSE BLD STRIP.AUTO-MCNC: 80 MG/DL (ref 70–110)
GLUCOSE BLD STRIP.AUTO-MCNC: 92 MG/DL (ref 70–110)
GLUCOSE BLD STRIP.AUTO-MCNC: 94 MG/DL (ref 70–110)
GLUCOSE SERPL-MCNC: 75 MG/DL (ref 74–99)
HCT VFR BLD AUTO: 27.7 % (ref 36–48)
HGB BLD-MCNC: 9.8 G/DL (ref 13–16)
INR PPP: 2.3 (ref 0.8–1.2)
LYMPHOCYTES # BLD: 0.6 K/UL (ref 0.9–3.6)
LYMPHOCYTES NFR BLD: 21 % (ref 21–52)
MAGNESIUM SERPL-MCNC: 1.7 MG/DL (ref 1.6–2.6)
MCH RBC QN AUTO: 32.7 PG (ref 24–34)
MCHC RBC AUTO-ENTMCNC: 35.4 G/DL (ref 31–37)
MCV RBC AUTO: 92.3 FL (ref 74–97)
MONOCYTES # BLD: 0.3 K/UL (ref 0.05–1.2)
MONOCYTES NFR BLD: 10 % (ref 3–10)
NEUTS SEG # BLD: 1.9 K/UL (ref 1.8–8)
NEUTS SEG NFR BLD: 67 % (ref 40–73)
PHOSPHATE SERPL-MCNC: 2.6 MG/DL (ref 2.5–4.9)
PLATELET # BLD AUTO: 118 K/UL (ref 135–420)
PMV BLD AUTO: 8.9 FL (ref 9.2–11.8)
POTASSIUM SERPL-SCNC: 4.5 MMOL/L (ref 3.5–5.5)
PROTHROMBIN TIME: 24.4 SEC (ref 11.5–15.2)
RBC # BLD AUTO: 3 M/UL (ref 4.35–5.65)
SODIUM SERPL-SCNC: 120 MMOL/L (ref 136–145)
SODIUM SERPL-SCNC: 121 MMOL/L (ref 136–145)
SODIUM SERPL-SCNC: 125 MMOL/L (ref 136–145)
WBC # BLD AUTO: 2.8 K/UL (ref 4.6–13.2)

## 2021-07-31 PROCEDURE — 85610 PROTHROMBIN TIME: CPT

## 2021-07-31 PROCEDURE — 74011250636 HC RX REV CODE- 250/636: Performed by: EMERGENCY MEDICINE

## 2021-07-31 PROCEDURE — 74011000258 HC RX REV CODE- 258: Performed by: EMERGENCY MEDICINE

## 2021-07-31 PROCEDURE — 74011250637 HC RX REV CODE- 250/637: Performed by: INTERNAL MEDICINE

## 2021-07-31 PROCEDURE — 99232 SBSQ HOSP IP/OBS MODERATE 35: CPT | Performed by: EMERGENCY MEDICINE

## 2021-07-31 PROCEDURE — 65660000004 HC RM CVT STEPDOWN

## 2021-07-31 PROCEDURE — 2709999900 HC NON-CHARGEABLE SUPPLY

## 2021-07-31 PROCEDURE — 84295 ASSAY OF SERUM SODIUM: CPT

## 2021-07-31 PROCEDURE — 82962 GLUCOSE BLOOD TEST: CPT

## 2021-07-31 PROCEDURE — 74011250637 HC RX REV CODE- 250/637: Performed by: EMERGENCY MEDICINE

## 2021-07-31 RX ORDER — SODIUM CHLORIDE 900 MG/100ML
INJECTION INTRAVENOUS
Status: DISPENSED
Start: 2021-07-31 | End: 2021-08-01

## 2021-07-31 RX ORDER — WARFARIN 6 MG/1
6 TABLET ORAL ONCE
Status: DISPENSED | OUTPATIENT
Start: 2021-07-31 | End: 2021-08-01

## 2021-07-31 RX ADMIN — Medication 10 ML: at 05:16

## 2021-07-31 RX ADMIN — CYANOCOBALAMIN TAB 500 MCG 500 MCG: 500 TAB at 09:50

## 2021-07-31 RX ADMIN — IPRATROPIUM BROMIDE 1 SPRAY: 21 SPRAY NASAL at 09:51

## 2021-07-31 RX ADMIN — SODIUM CHLORIDE 12.5 MG: 900 INJECTION, SOLUTION INTRAVENOUS at 18:15

## 2021-07-31 RX ADMIN — TOLVAPTAN 15 MG: 30 TABLET ORAL at 07:05

## 2021-07-31 RX ADMIN — CHOLECALCIFEROL TAB 25 MCG (1000 UNIT) 2000 UNITS: 25 TAB at 09:51

## 2021-07-31 RX ADMIN — PANTOPRAZOLE SODIUM 40 MG: 40 TABLET, DELAYED RELEASE ORAL at 07:05

## 2021-07-31 RX ADMIN — LINACLOTIDE 145 MCG: 145 CAPSULE, GELATIN COATED ORAL at 07:05

## 2021-07-31 RX ADMIN — DOCUSATE SODIUM 100 MG: 100 CAPSULE, LIQUID FILLED ORAL at 09:50

## 2021-07-31 RX ADMIN — DOCUSATE SODIUM 100 MG: 100 CAPSULE, LIQUID FILLED ORAL at 17:08

## 2021-07-31 RX ADMIN — IPRATROPIUM BROMIDE 1 SPRAY: 21 SPRAY NASAL at 17:09

## 2021-07-31 RX ADMIN — Medication 10 ML: at 13:08

## 2021-07-31 NOTE — PROGRESS NOTES
[facility]  Progress Note    Patient: Rich Lynn Age: 79 y.o. : 1940 MR#: [id number] SSN: [id number]  Date/Time: 2021    Subjective:     Patient is sitting in bed in no apparent distress, awake and alert. Patient states that nausea is better. Patient continues to complain of generalized weakness and fatigue    Assessment/Plan:     Hyponatremia, multifactorial in the setting of CHF and diuretic use and possible SIADH  Chronic systolic congestive heart failure-EF of 45%  History of atrial fibrillation-on Coumadin  History of sick sinus syndrome-has a pacemaker  Remote history of esophagectomy  Possible liver mass on CT scan. Patient states that he follows up with gastroenterology Dr. Michele Christine regarding his liver issues. I called and discussed with the gastroenterology APC and she will schedule a close outpatient follow-up with Dr. Michele Christine to follow-up on the possible liver mass noted on CT head. Pancytopenia of unclear etiology. Close outpatient follow-up with hematology. Plan  Tolvaptan per nephrology, monitor sodium  Coumadin 6 mg today for INR of 2.3  Monitor blood pressures  Close outpatient follow-up with gastroenterology regarding the possible liver mass on the CT scan here. PT and OT are recommending rehab. Discussed with patient, he is considering  Palliative care input noted.   Patient is DNR and DNI  I called patient's daughter and updated her regarding the patient's care    Case discussed with:  [x]Patient  [x]Family  []Nursing  []Case Management  DVT Prophylaxis:  []Lovenox  []Hep SQ  []SCDs  [x]Coumadin   []On Heparin gtt    Objective:   VS:   Visit Vitals  BP (!) 106/57   Pulse 73   Temp 97.6 °F (36.4 °C)   Resp 16   Ht 5' 6\" (1.676 m)   Wt 76.3 kg (168 lb 4.8 oz)   SpO2 95%   BMI 27.16 kg/m²      Tmax/24hrs: Temp (24hrs), Av.2 °F (36.8 °C), Min:97.6 °F (36.4 °C), Max:98.6 °F (37 °C)    Input/Output:     Intake/Output Summary (Last 24 hours) at 7/31/2021 1446  Last data filed at 7/31/2021 1302  Gross per 24 hour   Intake 1720 ml   Output 2030 ml   Net -310 ml       General:  Awake, alert  Cardiovascular:  S1S2+, RRR  Pulmonary:  CTA b/l  GI:  Soft, BS+, NT, ND  Extremities:  trace edema      Labs:    Recent Results (from the past 24 hour(s))   SODIUM    Collection Time: 07/30/21  3:57 PM   Result Value Ref Range    Sodium 121 (L) 136 - 145 mmol/L   URINALYSIS W/ RFLX MICROSCOPIC    Collection Time: 07/30/21  6:30 PM   Result Value Ref Range    Color YELLOW      Appearance CLOUDY      Specific gravity 1.020 1.005 - 1.030      pH (UA) 7.0 5.0 - 8.0      Protein Negative NEG mg/dL    Glucose Negative NEG mg/dL    Ketone TRACE (A) NEG mg/dL    Bilirubin Negative NEG      Blood Negative NEG      Urobilinogen 1.0 0.2 - 1.0 EU/dL    Nitrites Positive (A) NEG      Leukocyte Esterase SMALL (A) NEG     SODIUM, UR, RANDOM    Collection Time: 07/30/21  6:30 PM   Result Value Ref Range    Sodium,urine random 61 20 - 110 MMOL/L   URINE MICROSCOPIC ONLY    Collection Time: 07/30/21  6:30 PM   Result Value Ref Range    WBC 11 to 20 0 - 5 /hpf    RBC Negative 0 - 5 /hpf    Epithelial cells FEW 0 - 5 /lpf    Bacteria 4+ (A) NEG /hpf   CBC WITH AUTOMATED DIFF    Collection Time: 07/30/21 11:30 PM   Result Value Ref Range    WBC 2.8 (L) 4.6 - 13.2 K/uL    RBC 3.00 (L) 4.35 - 5.65 M/uL    HGB 9.8 (L) 13.0 - 16.0 g/dL    HCT 27.7 (L) 36.0 - 48.0 %    MCV 92.3 74.0 - 97.0 FL    MCH 32.7 24.0 - 34.0 PG    MCHC 35.4 31.0 - 37.0 g/dL    RDW 14.5 11.6 - 14.5 %    PLATELET 151 (L) 938 - 420 K/uL    MPV 8.9 (L) 9.2 - 11.8 FL    NEUTROPHILS 67 40 - 73 %    LYMPHOCYTES 21 21 - 52 %    MONOCYTES 10 3 - 10 %    EOSINOPHILS 0 0 - 5 %    BASOPHILS 1 0 - 2 %    ABS. NEUTROPHILS 1.9 1.8 - 8.0 K/UL    ABS. LYMPHOCYTES 0.6 (L) 0.9 - 3.6 K/UL    ABS. MONOCYTES 0.3 0.05 - 1.2 K/UL    ABS. EOSINOPHILS 0.0 0.0 - 0.4 K/UL    ABS.  BASOPHILS 0.0 0.0 - 0.1 K/UL    DF AUTOMATED     METABOLIC PANEL, BASIC    Collection Time: 07/30/21 11:30 PM   Result Value Ref Range    Sodium 120 (L) 136 - 145 mmol/L    Potassium 4.5 3.5 - 5.5 mmol/L    Chloride 89 (L) 100 - 111 mmol/L    CO2 25 21 - 32 mmol/L    Anion gap 6 3.0 - 18 mmol/L    Glucose 75 74 - 99 mg/dL    BUN 30 (H) 7.0 - 18 MG/DL    Creatinine 0.58 (L) 0.6 - 1.3 MG/DL    BUN/Creatinine ratio 52 (H) 12 - 20      GFR est AA >60 >60 ml/min/1.73m2    GFR est non-AA >60 >60 ml/min/1.73m2    Calcium 8.5 8.5 - 10.1 MG/DL   MAGNESIUM    Collection Time: 07/30/21 11:30 PM   Result Value Ref Range    Magnesium 1.7 1.6 - 2.6 mg/dL   PHOSPHORUS    Collection Time: 07/30/21 11:30 PM   Result Value Ref Range    Phosphorus 2.6 2.5 - 4.9 MG/DL   PROTHROMBIN TIME + INR    Collection Time: 07/31/21  5:20 AM   Result Value Ref Range    Prothrombin time 24.4 (H) 11.5 - 15.2 sec    INR 2.3 (H) 0.8 - 1.2     SODIUM    Collection Time: 07/31/21  5:20 AM   Result Value Ref Range    Sodium 121 (L) 136 - 145 mmol/L   GLUCOSE, POC    Collection Time: 07/31/21  7:41 AM   Result Value Ref Range    Glucose (POC) 80 70 - 110 mg/dL   GLUCOSE, POC    Collection Time: 07/31/21 11:15 AM   Result Value Ref Range    Glucose (POC) 94 70 - 110 mg/dL         Signed By: Donny Goodwin MD     July 31, 2021

## 2021-07-31 NOTE — PROGRESS NOTES
Sodium level 120  Add tolvapton 15 mg PO X 1  Check sodium level q6hrs     Dr Jennifer Olson covering for me over the weekend and will be rounding on this patient    Please call with questions,    Parish Roth MD [facility]  Cell [phone]  Pager: [phone]

## 2021-07-31 NOTE — PROGRESS NOTES
2000 Stage 1 Pressure ulcer noted to patient's sacrum. Mepilex placed, patient given instructions that we are going to make sure he is turned every 2hrs. Patient stated he did not think he could stay positioned to his side and removed the pillow from behind his back. He refused to reposition to make himself more comfortable. 2200 Patient refused his soap suds enema stated he wanted to have it done tomorrow. He just wanted to be left alone so he could sleep and to close his door. 1100 Called Phlebotomy to come and draw scheduled lab work. 0000 Placed new order for Na Q6 d/t lab not being able to see order. 0100 Na 120    0400 Patient compliant with turning. Placed on his side. Stated he just wanted to be comfortable. 0700 Updated Dr. Germán Rodriguez on patient's status. A.M. Labs not resulted. Will forward to day nurse to update doc. 4574 Bedside and Verbal shift change report given to Beka Carey RN (oncoming nurse) by Jose Kam RN   (offgoing nurse). Report included the following information SBAR, Kardex, MAR and Recent Results.

## 2021-07-31 NOTE — PROGRESS NOTES
Problem: Pressure Injury - Risk of  Goal: *Prevention of pressure injury  Description: Document Angelito Scale and appropriate interventions in the flowsheet. Outcome: Progressing Towards Goal  Note: Pressure Injury Interventions:  Sensory Interventions: Assess changes in LOC, Check visual cues for pain, Discuss PT/OT consult with provider, Float heels, Keep linens dry and wrinkle-free, Maintain/enhance activity level, Minimize linen layers, Monitor skin under medical devices, Pressure redistribution bed/mattress (bed type), Turn and reposition approx. every two hours (pillows and wedges if needed)    Moisture Interventions: Absorbent underpads, Check for incontinence Q2 hours and as needed, Internal/External urinary devices, Maintain skin hydration (lotion/cream), Minimize layers, Offer toileting Q_hr    Activity Interventions: PT/OT evaluation, Pressure redistribution bed/mattress(bed type), Increase time out of bed    Mobility Interventions: Float heels, HOB 30 degrees or less, Pressure redistribution bed/mattress (bed type), Turn and reposition approx.  every two hours(pillow and wedges), PT/OT evaluation    Nutrition Interventions: Document food/fluid/supplement intake, Discuss nutritional consult with provider, Offer support with meals,snacks and hydration    Friction and Shear Interventions: Minimize layers, Lift sheet, HOB 30 degrees or less, Foam dressings/transparent film/skin sealants, Feet elevated on foot rest                Problem: Patient Education: Go to Patient Education Activity  Goal: Patient/Family Education  Outcome: Progressing Towards Goal     Problem: Patient Education: Go to Patient Education Activity  Goal: Patient/Family Education  Outcome: Progressing Towards Goal     Problem: Patient Education: Go to Patient Education Activity  Goal: Patient/Family Education  Outcome: Progressing Towards Goal     Problem: Falls - Risk of  Goal: *Absence of Falls  Description: Document Abdiel Fall Risk and appropriate interventions in the flowsheet.   Outcome: Progressing Towards Goal  Note: Fall Risk Interventions:  Mobility Interventions: Bed/chair exit alarm, Communicate number of staff needed for ambulation/transfer, Patient to call before getting OOB, PT Consult for mobility concerns, Utilize walker, cane, or other assistive device         Medication Interventions: Assess postural VS orthostatic hypotension, Bed/chair exit alarm, Evaluate medications/consider consulting pharmacy, Patient to call before getting OOB, Teach patient to arise slowly    Elimination Interventions: Bed/chair exit alarm, Call light in reach, Patient to call for help with toileting needs, Toileting schedule/hourly rounds              Problem: Patient Education: Go to Patient Education Activity  Goal: Patient/Family Education  Outcome: Progressing Towards Goal

## 2021-07-31 NOTE — PROGRESS NOTES
0700: Bedside and Verbal shift change report given to MARVIN Ellsworth (oncoming nurse) by Kaylyn Lee RN (offgoing nurse). Report included the following information SBAR, Kardex, [facility] and Cardiac Rhythm Paced Rhythm. 1640: Patient states that he had a bowel movement and no longer needs soap suds enema. 1900: Bedside and Verbal shift change report given to Andreas Valdovinos RN (oncoming nurse) by Phuc Olson RN (offgoing nurse). Report included the following information SBAR, Kardex, MAR and Cardiac Rhythm Pacemaker/ AV paced.

## 2021-07-31 NOTE — PROGRESS NOTES
RENAL PROGRESS NOTE        Sneha Berg         Assessment  - Hyponatremia , could be related to SIADH based on urine studies , in regard to his volume status he looks euvolemic clinically but CXR shows pleural effusion / Pulmonary edema at time of admission and he has intermittent SOB   - HFrEF / CMO with EF of 45%     Plan   - Agree with Samsca , follow Na level q 6 h for correction , target 6-8 meq in the first 24 h   - Will D/C urea powder since patient doesn't want to take it secondary to bad taste   - TSH WNL , Check cortisol level   - Holding diuresis for now , volume status is ok     ·                                                                                                                                Subjective:  Patient complaints off: No complaints. No SOB/CP/N/V.       Patient Active Problem List   Diagnosis Code    Nausea alone R11.0    Esophageal reflux K21.9    Gout, unspecified M10.9    Generalized osteoarthrosis, involving multiple sites M15.9    Atrial fibrillation (HCC) I48.91    Pacemaker Z95.0    Dizziness R42    Personal history of esophageal cancer Z85.01    Weight loss R63.4    Hyponatremia E87.1       Current Facility-Administered Medications   Medication Dose Route Frequency Provider Last Rate Last Admin    urea (URE-NA) 15 gram packet 1 Packet  1 Packet Oral BID Palmira Harrison MD   1 Packet at 07/30/21 1828    docusate sodium (COLACE) capsule 100 mg  100 mg Oral BID Xu Garcia MD   100 mg at 07/30/21 1828    promethazine (PHENERGAN) 12.5 mg in 0.9% sodium chloride 50 mL IVPB  12.5 mg IntraVENous Q6H PRN Xu Garcia  mL/hr at 07/30/21 1619 12.5 mg at 07/30/21 1619    sodium chloride (NS) flush 5-40 mL  5-40 mL IntraVENous Q8H Yanet Olivera PA-C   10 mL at 07/31/21 0516    sodium chloride (NS) flush 5-40 mL  5-40 mL IntraVENous PRN Jaycob Zamora PA-C        acetaminophen (TYLENOL) tablet 650 mg  650 mg Oral Q6H PRN Jaycob Zamora PA-C 650 mg at 07/28/21 1622    Or    acetaminophen (TYLENOL) suppository 650 mg  650 mg Rectal Q6H PRN Starr Gustafson PA-C        Warfarin - Physician To Dose   Other Q24H Iesha Mason MD   Given at 07/30/21 1600    cyanocobalamin tablet 500 mcg  500 mcg Oral DAILY Arron Boyce MD   500 mcg at 07/30/21 0908    cholecalciferol (VITAMIN D3) (1000 Units /25 mcg) tablet 2,000 Units  2,000 Units Oral DAILY Arron Boyce MD   2,000 Units at 07/30/21 0909    ipratropium (ATROVENT) 21 mcg (0.03 %) nasal spray 1 Spray  1 Spray Both Nostrils BID Arron Boyce MD   1 Spray at 07/30/21 1833    linaCLOtide (LINZESS) capsule 145 mcg  145 mcg Oral ACB Arron Boyce MD   145 mcg at 07/31/21 0705    pantoprazole (PROTONIX) tablet 40 mg  40 mg Oral ACB Arron Boyce MD   40 mg at 07/31/21 0705       Objective  Vitals:    07/30/21 2005 07/31/21 0000 07/31/21 0416 07/31/21 0735   BP: 112/67 101/62 (!) 107/53 122/65   Pulse:   70 71   Resp: 16 18  16   Temp: 98.2 °F (36.8 °C) 98.4 °F (36.9 °C) 98.6 °F (37 °C) 98 °F (36.7 °C)   SpO2: 99% 100% (!) 18% 94%   Weight:       Height:             Intake/Output Summary (Last 24 hours) at 7/31/2021 0917  Last data filed at 7/31/2021 0916  Gross per 24 hour   Intake 1010 ml   Output 550 ml   Net 460 ml           Admission weight: Weight: 59 kg (130 lb) (07/28/21 1440)  Last Weight Metrics:  Weight Loss Metrics 7/29/2021 12/9/2019 11/7/2019 11/6/2019 10/15/2019 7/24/2019 6/19/2019   Today's Wt 168 lb 4.8 oz 144 lb 142 lb 145 lb 145 lb 145 lb 154 lb 8 oz   BMI 27.16 kg/m2 23.24 kg/m2 22.92 kg/m2 23.4 kg/m2 23.4 kg/m2 23.4 kg/m2 24.94 kg/m2             Physical Assessment:     General: NAD, alert and oriented. Neck: No jvd. LUNGS: Clear to Auscultation, No rales, rhonchi or wheezes. CVS EXM: S1, S2  RRR, no murmurs/gallops/rubs. Abdomen: soft, non tender. Lower Extremities:  no edema.        Lab    CBC w/Diff Recent Labs     07/30/21  2330 07/30/21  0446 07/29/21  0235   WBC 2. 8* 2.1* 2.2*   RBC 3.00* 2.69* 2.73*   HGB 9.8* 8.9* 8.9*   HCT 27.7* 24.8* 25.2*   * 111* 103*   GRANS 67 61 67   LYMPH 21 26 22   EOS 0 1 1        Chemistry Recent Labs     07/31/21  0520 07/30/21  2330 07/30/21  1557 07/30/21  0800 07/30/21  0446 07/30/21  0446 07/29/21  0530 07/29/21  0235   GLU  --  75  --   --   --  65*  --  80   * 120* 121*   < >  --  120*  119*   < > 115*   K  --  4.5  --   --   --  4.5  --  4.6   CL  --  89*  --   --   --  88*  --  85*   CO2  --  25  --   --   --  25  --  25   BUN  --  30*  --   --   --  11  --  9   CREA  --  0.58*  --   --   --  0.46*  --  0.55*   CA  --  8.5  --   --   --  8.1*  --  8.2*   AGAP  --  6  --   --   --  6  --  5   BUCR  --  52*  --   --   --  24*  --  16   PHOS  --  2.6  --   --    < > 2.6   < > 2.6    < > = values in this interval not displayed.          No results found for: IRON, FE, TIBC, IBCT, PSAT, FERR   Lab Results   Component Value Date/Time    Calcium 8.5 07/30/2021 11:30 PM    Phosphorus 2.6 07/30/2021 11:30 PM          Durga Hampton MD  7/31/2021  9:17 AM

## 2021-08-01 LAB
ANION GAP SERPL CALC-SCNC: 5 MMOL/L (ref 3–18)
BASOPHILS # BLD: 0 K/UL (ref 0–0.1)
BASOPHILS NFR BLD: 1 % (ref 0–2)
BUN SERPL-MCNC: 15 MG/DL (ref 7–18)
BUN/CREAT SERPL: 26 (ref 12–20)
CALCIUM SERPL-MCNC: 8.5 MG/DL (ref 8.5–10.1)
CHLORIDE SERPL-SCNC: 96 MMOL/L (ref 100–111)
CO2 SERPL-SCNC: 27 MMOL/L (ref 21–32)
CORTIS SERPL-MCNC: 3 UG/DL
CREAT SERPL-MCNC: 0.58 MG/DL (ref 0.6–1.3)
DIFFERENTIAL METHOD BLD: ABNORMAL
EOSINOPHIL # BLD: 0 K/UL (ref 0–0.4)
EOSINOPHIL NFR BLD: 0 % (ref 0–5)
ERYTHROCYTE [DISTWIDTH] IN BLOOD BY AUTOMATED COUNT: 14.6 % (ref 11.6–14.5)
GLUCOSE BLD STRIP.AUTO-MCNC: 112 MG/DL (ref 70–110)
GLUCOSE BLD STRIP.AUTO-MCNC: 115 MG/DL (ref 70–110)
GLUCOSE BLD STRIP.AUTO-MCNC: 74 MG/DL (ref 70–110)
GLUCOSE BLD STRIP.AUTO-MCNC: 84 MG/DL (ref 70–110)
GLUCOSE SERPL-MCNC: 74 MG/DL (ref 74–99)
HCT VFR BLD AUTO: 25.7 % (ref 36–48)
HGB BLD-MCNC: 9.2 G/DL (ref 13–16)
INR PPP: 2.1 (ref 0.8–1.2)
LYMPHOCYTES # BLD: 0.5 K/UL (ref 0.9–3.6)
LYMPHOCYTES NFR BLD: 18 % (ref 21–52)
MAGNESIUM SERPL-MCNC: 1.8 MG/DL (ref 1.6–2.6)
MCH RBC QN AUTO: 33.3 PG (ref 24–34)
MCHC RBC AUTO-ENTMCNC: 35.8 G/DL (ref 31–37)
MCV RBC AUTO: 93.1 FL (ref 74–97)
MONOCYTES # BLD: 0.3 K/UL (ref 0.05–1.2)
MONOCYTES NFR BLD: 12 % (ref 3–10)
NEUTS SEG # BLD: 2 K/UL (ref 1.8–8)
NEUTS SEG NFR BLD: 68 % (ref 40–73)
PHOSPHATE SERPL-MCNC: 3.2 MG/DL (ref 2.5–4.9)
PLATELET # BLD AUTO: 114 K/UL (ref 135–420)
PMV BLD AUTO: 9.3 FL (ref 9.2–11.8)
POTASSIUM SERPL-SCNC: 4.3 MMOL/L (ref 3.5–5.5)
PROTHROMBIN TIME: 22.9 SEC (ref 11.5–15.2)
RBC # BLD AUTO: 2.76 M/UL (ref 4.35–5.65)
SODIUM SERPL-SCNC: 127 MMOL/L (ref 136–145)
SODIUM SERPL-SCNC: 128 MMOL/L (ref 136–145)
SODIUM SERPL-SCNC: 129 MMOL/L (ref 136–145)
SODIUM SERPL-SCNC: 130 MMOL/L (ref 136–145)
WBC # BLD AUTO: 2.9 K/UL (ref 4.6–13.2)

## 2021-08-01 PROCEDURE — 65660000004 HC RM CVT STEPDOWN

## 2021-08-01 PROCEDURE — 74011250637 HC RX REV CODE- 250/637: Performed by: STUDENT IN AN ORGANIZED HEALTH CARE EDUCATION/TRAINING PROGRAM

## 2021-08-01 PROCEDURE — 99232 SBSQ HOSP IP/OBS MODERATE 35: CPT | Performed by: EMERGENCY MEDICINE

## 2021-08-01 PROCEDURE — 82533 TOTAL CORTISOL: CPT

## 2021-08-01 PROCEDURE — 80048 BASIC METABOLIC PNL TOTAL CA: CPT

## 2021-08-01 PROCEDURE — 2709999900 HC NON-CHARGEABLE SUPPLY

## 2021-08-01 PROCEDURE — 74011250637 HC RX REV CODE- 250/637: Performed by: EMERGENCY MEDICINE

## 2021-08-01 PROCEDURE — 85025 COMPLETE CBC W/AUTO DIFF WBC: CPT

## 2021-08-01 PROCEDURE — 85610 PROTHROMBIN TIME: CPT

## 2021-08-01 PROCEDURE — 74011250637 HC RX REV CODE- 250/637: Performed by: SPECIALIST

## 2021-08-01 PROCEDURE — 84295 ASSAY OF SERUM SODIUM: CPT

## 2021-08-01 PROCEDURE — 82962 GLUCOSE BLOOD TEST: CPT

## 2021-08-01 PROCEDURE — 83735 ASSAY OF MAGNESIUM: CPT

## 2021-08-01 PROCEDURE — 36415 COLL VENOUS BLD VENIPUNCTURE: CPT

## 2021-08-01 PROCEDURE — 74011000258 HC RX REV CODE- 258: Performed by: EMERGENCY MEDICINE

## 2021-08-01 PROCEDURE — 74011250636 HC RX REV CODE- 250/636: Performed by: EMERGENCY MEDICINE

## 2021-08-01 PROCEDURE — 84100 ASSAY OF PHOSPHORUS: CPT

## 2021-08-01 PROCEDURE — 74011000258 HC RX REV CODE- 258

## 2021-08-01 RX ORDER — DIPHENHYDRAMINE HCL 25 MG
25 CAPSULE ORAL
Status: DISCONTINUED | OUTPATIENT
Start: 2021-08-01 | End: 2021-08-01

## 2021-08-01 RX ORDER — SODIUM CHLORIDE 900 MG/100ML
INJECTION INTRAVENOUS
Status: COMPLETED
Start: 2021-08-01 | End: 2021-08-01

## 2021-08-01 RX ORDER — WARFARIN 7.5 MG/1
7.5 TABLET ORAL ONCE
Status: COMPLETED | OUTPATIENT
Start: 2021-08-01 | End: 2021-08-01

## 2021-08-01 RX ORDER — DIPHENHYDRAMINE HCL 25 MG
25 CAPSULE ORAL
Status: DISCONTINUED | OUTPATIENT
Start: 2021-08-01 | End: 2021-08-04 | Stop reason: HOSPADM

## 2021-08-01 RX ADMIN — CYANOCOBALAMIN TAB 500 MCG 500 MCG: 500 TAB at 08:15

## 2021-08-01 RX ADMIN — SODIUM CHLORIDE: 900 INJECTION INTRAVENOUS at 14:00

## 2021-08-01 RX ADMIN — Medication 10 ML: at 13:59

## 2021-08-01 RX ADMIN — TOLVAPTAN 15 MG: 30 TABLET ORAL at 11:37

## 2021-08-01 RX ADMIN — DOCUSATE SODIUM 100 MG: 100 CAPSULE, LIQUID FILLED ORAL at 17:08

## 2021-08-01 RX ADMIN — WARFARIN SODIUM 7.5 MG: 7.5 TABLET ORAL at 17:08

## 2021-08-01 RX ADMIN — IPRATROPIUM BROMIDE 1 SPRAY: 21 SPRAY NASAL at 17:08

## 2021-08-01 RX ADMIN — PANTOPRAZOLE SODIUM 40 MG: 40 TABLET, DELAYED RELEASE ORAL at 08:05

## 2021-08-01 RX ADMIN — LINACLOTIDE 145 MCG: 145 CAPSULE, GELATIN COATED ORAL at 08:05

## 2021-08-01 RX ADMIN — SODIUM CHLORIDE 12.5 MG: 900 INJECTION, SOLUTION INTRAVENOUS at 13:17

## 2021-08-01 RX ADMIN — DIPHENHYDRAMINE HYDROCHLORIDE 25 MG: 25 CAPSULE ORAL at 21:14

## 2021-08-01 RX ADMIN — DOCUSATE SODIUM 100 MG: 100 CAPSULE, LIQUID FILLED ORAL at 08:05

## 2021-08-01 RX ADMIN — CHOLECALCIFEROL TAB 25 MCG (1000 UNIT) 2000 UNITS: 25 TAB at 08:05

## 2021-08-01 RX ADMIN — IPRATROPIUM BROMIDE 1 SPRAY: 21 SPRAY NASAL at 08:07

## 2021-08-01 NOTE — PROGRESS NOTES
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 based on urine studies , in regard to his volume status he looks euvolemic clinically but CXR shows pleural effusion / Pulmonary edema at time of admission and he has intermittent SOB   - HFrEF / CMO with EF of 45%     Plan   - Na level has improved S/P Samsca , today is 128 , correction is appropriate , will give another dose today   - Will D/C urea powder since patient doesn't want to take it secondary to bad taste   - TSH WNL , Check cortisol level   - Holding diuresis for now , volume status is ok , avoid HCTZ in the future     ·                                                                                                                                Subjective:  Patient complaints off: No complaints. No SOB/CP/N/V.       Patient Active Problem List   Diagnosis Code    Nausea alone R11.0    Esophageal reflux K21.9    Gout, unspecified M10.9    Generalized osteoarthrosis, involving multiple sites M15.9    Atrial fibrillation (HCC) I48.91    Pacemaker Z95.0    Dizziness R42    Personal history of esophageal cancer Z85.01    Weight loss R63.4    Hyponatremia E87.1       Current Facility-Administered Medications   Medication Dose Route Frequency Provider Last Rate Last Admin    diphenhydrAMINE (BENADRYL) capsule 25 mg  25 mg Oral QHS PRN Kym Pulido N,         docusate sodium (COLACE) capsule 100 mg  100 mg Oral BID Beti Newton MD   100 mg at 08/01/21 0805    promethazine (PHENERGAN) 12.5 mg in 0.9% sodium chloride 50 mL IVPB  12.5 mg IntraVENous Q6H PRN Beti Newton  mL/hr at 07/31/21 1815 12.5 mg at 07/31/21 1815    sodium chloride (NS) flush 5-40 mL  5-40 mL IntraVENous Q8H Yanet Olivera PA-C   10 mL at 07/31/21 1308    sodium chloride (NS) flush 5-40 mL  5-40 mL IntraVENous PRN Norman Goldmann, PA-C        acetaminophen (TYLENOL) tablet 650 mg  650 mg Oral Q6H PRN Lacy Shepherd PA-C   650 mg at 07/28/21 1622    Or    acetaminophen (TYLENOL) suppository 650 mg  650 mg Rectal Q6H PRN Lacy Shepherd PA-C        Warfarin - Physician To Dose   Other Q24H Mary Hartman MD   Given at 07/31/21 1600    cyanocobalamin tablet 500 mcg  500 mcg Oral DAILY Pamela Del Cid MD   500 mcg at 08/01/21 0815    cholecalciferol (VITAMIN D3) (1000 Units /25 mcg) tablet 2,000 Units  2,000 Units Oral DAILY Pamela Del Cid MD   2,000 Units at 08/01/21 0805    ipratropium (ATROVENT) 21 mcg (0.03 %) nasal spray 1 Spray  1 Spray Both Nostrils BID Pamela Del Cid MD   1 Spray at 08/01/21 0807    linaCLOtide (LINZESS) capsule 145 mcg  145 mcg Oral ACB Pamela Del Cid MD   145 mcg at 08/01/21 0805    pantoprazole (PROTONIX) tablet 40 mg  40 mg Oral ACB Pamela Del Cid MD   40 mg at 08/01/21 0805       Objective  Vitals:    07/31/21 2327 08/01/21 0044 08/01/21 0409 08/01/21 0731   BP: (!) 98/51 (!) 110/57 107/63 (!) 108/49   Pulse: 82  83 72   Resp: 18  18 18   Temp: 99.1 °F (37.3 °C)  99.2 °F (37.3 °C) 99 °F (37.2 °C)   SpO2: 93%  97% 94%   Weight:   69.4 kg (153 lb)    Height:             Intake/Output Summary (Last 24 hours) at 8/1/2021 0937  Last data filed at 8/1/2021 3978  Gross per 24 hour   Intake 1663 ml   Output 3880 ml   Net -2217 ml           Admission weight: Weight: 59 kg (130 lb) (07/28/21 1440)  Last Weight Metrics:  Weight Loss Metrics 8/1/2021 12/9/2019 11/7/2019 11/6/2019 10/15/2019 7/24/2019 6/19/2019   Today's Wt 153 lb 144 lb 142 lb 145 lb 145 lb 145 lb 154 lb 8 oz   BMI 24.69 kg/m2 23.24 kg/m2 22.92 kg/m2 23.4 kg/m2 23.4 kg/m2 23.4 kg/m2 24.94 kg/m2             Physical Assessment:     General: NAD, alert and oriented. Neck: No jvd. LUNGS: Clear to Auscultation, No rales, rhonchi or wheezes. CVS EXM: S1, S2  RRR, no murmurs/gallops/rubs. Abdomen: soft, non tender. Lower Extremities:  no edema.        Lab    CBC w/Diff Recent Labs     08/01/21  0536 07/30/21  2330 07/30/21 0446   WBC 2.9* 2.8* 2.1*   RBC 2.76* 3.00* 2.69*   HGB 9.2* 9.8* 8.9*   HCT 25.7* 27.7* 24.8*   * 118* 111*   GRANS 68 67 61   LYMPH 18* 21 26   EOS 0 0 1        Chemistry Recent Labs     08/01/21  0536 07/31/21  1430 07/31/21  0520 07/30/21  2330 07/30/21  2330 07/30/21  0800 07/30/21  0446   GLU 74  --   --   --  75  --  65*   * 125* 121*   < > 120*   < > 120*  119*   K 4.3  --   --   --  4.5  --  4.5   CL 96*  --   --   --  89*  --  88*   CO2 27  --   --   --  25  --  25   BUN 15  --   --   --  30*  --  11   CREA 0.58*  --   --   --  0.58*  --  0.46*   CA 8.5  --   --   --  8.5  --  8.1*   AGAP 5  --   --   --  6  --  6   BUCR 26*  --   --   --  52*  --  24*   PHOS 3.2  --   --    < > 2.6   < > 2.6    < > = values in this interval not displayed.          No results found for: IRON, FE, TIBC, IBCT, PSAT, FERR   Lab Results   Component Value Date/Time    Calcium 8.5 08/01/2021 05:36 AM    Phosphorus 3.2 08/01/2021 05:36 AM          Ijoema Aldana MD  8/1/2021  9:17 AM

## 2021-08-01 NOTE — PROGRESS NOTES
[facility]ist [facility]  Progress Note    Patient: Kasey Viera Age: 79 y.o. : 1940 MR#: [id number] SSN: [id number]  Date/Time: 2021    Subjective:     Patient is sitting in a chair in no apparent distress, awake and alert. Family member at bedside. Patient states that he ate his full breakfast.  Patient did have some nausea earlier today which is resolved. Assessment/Plan:     Hyponatremia, multifactorial in the setting of CHF and diuretic use and possible SIADH  Chronic systolic congestive heart failure-EF of 45%  History of atrial fibrillation-on Coumadin  History of sick sinus syndrome-has a pacemaker  Remote history of esophagectomy  Possible liver mass on CT scan. Patient states that he follows up with gastroenterology Dr. Alexys Moe regarding his liver issues. I called and discussed with the gastroenterology APC and she will schedule a close outpatient follow-up with Dr. Alexys Moe to follow-up on the possible liver mass noted on CT head. Pancytopenia of unclear etiology. Close outpatient follow-up with hematology. Plan  Tolvaptan per nephrology, monitor  Coumadin 7.5 mg today for INR of 2.1  Close outpatient follow-up with gastroenterology regarding the possible liver mass on the CT scan here. Continue PT and OT. Patient is agreeable to rehab/SNF  Case management has been consulted  Palliative care input noted.   Patient is DNR and DNI  Discussed with patient and family member at bedside    Case discussed with:  [x]Patient  [x]Family  []Nursing  []Case Management  DVT Prophylaxis:  []Lovenox  []Hep SQ  []SCDs  [x]Coumadin   []On Heparin gtt    Objective:   VS:   Visit Vitals  /66   Pulse 72   Temp 98.9 °F (37.2 °C)   Resp 16   Ht 5' 6\" (1.676 m)   Wt 69.4 kg (153 lb)   SpO2 92%   BMI 24.69 kg/m²      Tmax/24hrs: Temp (24hrs), Av °F (37.2 °C), Min:98.9 °F (37.2 °C), Max:99.2 °F (37.3 °C)    Input/Output:     Intake/Output Summary (Last 24 hours) at 8/1/2021 1648  Last data filed at 8/1/2021 1301  Gross per 24 hour   Intake 833 ml   Output 1940 ml   Net -1107 ml       General:  Awake, alert  Cardiovascular:  S1S2+, RRR  Pulmonary:  CTA b/l  GI:  Soft, BS+, NT, ND  Extremities:  trace edema        Labs:    Recent Results (from the past 24 hour(s))   CBC WITH AUTOMATED DIFF    Collection Time: 08/01/21  5:36 AM   Result Value Ref Range    WBC 2.9 (L) 4.6 - 13.2 K/uL    RBC 2.76 (L) 4.35 - 5.65 M/uL    HGB 9.2 (L) 13.0 - 16.0 g/dL    HCT 25.7 (L) 36.0 - 48.0 %    MCV 93.1 74.0 - 97.0 FL    MCH 33.3 24.0 - 34.0 PG    MCHC 35.8 31.0 - 37.0 g/dL    RDW 14.6 (H) 11.6 - 14.5 %    PLATELET 844 (L) 912 - 420 K/uL    MPV 9.3 9.2 - 11.8 FL    NEUTROPHILS 68 40 - 73 %    LYMPHOCYTES 18 (L) 21 - 52 %    MONOCYTES 12 (H) 3 - 10 %    EOSINOPHILS 0 0 - 5 %    BASOPHILS 1 0 - 2 %    ABS. NEUTROPHILS 2.0 1.8 - 8.0 K/UL    ABS. LYMPHOCYTES 0.5 (L) 0.9 - 3.6 K/UL    ABS. MONOCYTES 0.3 0.05 - 1.2 K/UL    ABS. EOSINOPHILS 0.0 0.0 - 0.4 K/UL    ABS.  BASOPHILS 0.0 0.0 - 0.1 K/UL    DF AUTOMATED     METABOLIC PANEL, BASIC    Collection Time: 08/01/21  5:36 AM   Result Value Ref Range    Sodium 128 (L) 136 - 145 mmol/L    Potassium 4.3 3.5 - 5.5 mmol/L    Chloride 96 (L) 100 - 111 mmol/L    CO2 27 21 - 32 mmol/L    Anion gap 5 3.0 - 18 mmol/L    Glucose 74 74 - 99 mg/dL    BUN 15 7.0 - 18 MG/DL    Creatinine 0.58 (L) 0.6 - 1.3 MG/DL    BUN/Creatinine ratio 26 (H) 12 - 20      GFR est AA >60 >60 ml/min/1.73m2    GFR est non-AA >60 >60 ml/min/1.73m2    Calcium 8.5 8.5 - 10.1 MG/DL   MAGNESIUM    Collection Time: 08/01/21  5:36 AM   Result Value Ref Range    Magnesium 1.8 1.6 - 2.6 mg/dL   PHOSPHORUS    Collection Time: 08/01/21  5:36 AM   Result Value Ref Range    Phosphorus 3.2 2.5 - 4.9 MG/DL   PROTHROMBIN TIME + INR    Collection Time: 08/01/21  5:36 AM   Result Value Ref Range    Prothrombin time 22.9 (H) 11.5 - 15.2 sec    INR 2.1 (H) 0.8 - 1.2     CORTISOL    Collection Time: 08/01/21  5:36 AM   Result Value Ref Range    Cortisol, random 3.0 ug/dL   GLUCOSE, POC    Collection Time: 08/01/21  7:28 AM   Result Value Ref Range    Glucose (POC) 84 70 - 110 mg/dL   SODIUM    Collection Time: 08/01/21  8:43 AM   Result Value Ref Range    Sodium 129 (L) 136 - 145 mmol/L   GLUCOSE, POC    Collection Time: 08/01/21 10:44 AM   Result Value Ref Range    Glucose (POC) 112 (H) 70 - 110 mg/dL   GLUCOSE, POC    Collection Time: 08/01/21  3:38 PM   Result Value Ref Range    Glucose (POC) 74 70 - 110 mg/dL         Signed By: Desi Granado MD     August 1, 2021

## 2021-08-01 NOTE — PROGRESS NOTES
0700: Bedside and Verbal shift change report given to MARVIN Ellsworth (oncoming nurse) by Levi Slaughter RN (offgoing nurse). Report included the following information SBAR, Kardex, MAR and Cardiac Rhythm PACEMAKER/ AV Paced. 8976: Assessed patient. Pt stable with no complaints of pain. Will continue to monitor. 1130: Realized while pulling patient's Samsca medication from patient specific bin that his warfarin was still in the bin that he was supposed to receive last night (6 mg dose). It was not on the unit by the end of my shift last night so I could not give it. Will leave in patient specific bin and administer appropriate dose tonight when placed in Pyxis and pass along to night shift if it is not there and to look out for it. 1232: Messaged pharmacy to please ensure delivery of warfarin before shift change, they notified me it was already delivered to [facility]. 1300: Pt ambulated to chair. Bed sheets/ pads changed. 1317: Pt experiencing nausea, medication administered. 1418: Spoke with Dr Kelli Ruiz who states that the patient's sodium is coming up nicely and that he recommends that the patient go to rehab downstairs and patient is agreeable. Will await rehab's response to placement. 1708: Rounded on patient. Family at bedside. Medications administered including warfarin 7.5 mg for INR 2.1. No complaints of pain. 1900: Bedside and Verbal shift change report given to Levi Slaughter RN (oncoming nurse) by Remigio Locke RN (offgoing nurse). Report included the following information SBAR, Kardex, MAR and Cardiac Rhythm Pacemaker/ AV paced.

## 2021-08-01 NOTE — PROGRESS NOTES
Problem: Pressure Injury - Risk of  Goal: *Prevention of pressure injury  Description: Document Angelito Scale and appropriate interventions in the flowsheet. Outcome: Progressing Towards Goal  Note: Pressure Injury Interventions:  Sensory Interventions: Assess changes in LOC, Check visual cues for pain, Discuss PT/OT consult with provider, Keep linens dry and wrinkle-free, Maintain/enhance activity level, Minimize linen layers, Monitor skin under medical devices, Pressure redistribution bed/mattress (bed type), Turn and reposition approx. every two hours (pillows and wedges if needed)    Moisture Interventions: Absorbent underpads, Internal/External urinary devices, Check for incontinence Q2 hours and as needed, Minimize layers, Offer toileting Q_hr    Activity Interventions: Increase time out of bed, Pressure redistribution bed/mattress(bed type), PT/OT evaluation    Mobility Interventions: HOB 30 degrees or less, Pressure redistribution bed/mattress (bed type), PT/OT evaluation, Turn and reposition approx. every two hours(pillow and wedges)    Nutrition Interventions: Document food/fluid/supplement intake    Friction and Shear Interventions: HOB 30 degrees or less, Foam dressings/transparent film/skin sealants, Minimize layers                Problem: Patient Education: Go to Patient Education Activity  Goal: Patient/Family Education  Outcome: Progressing Towards Goal     Problem: Patient Education: Go to Patient Education Activity  Goal: Patient/Family Education  Outcome: Progressing Towards Goal     Problem: Patient Education: Go to Patient Education Activity  Goal: Patient/Family Education  Outcome: Progressing Towards Goal     Problem: Falls - Risk of  Goal: *Absence of Falls  Description: Document Abdiel Fall Risk and appropriate interventions in the flowsheet.   Outcome: Progressing Towards Goal  Note: Fall Risk Interventions:  Mobility Interventions: Bed/chair exit alarm, Communicate number of staff needed for ambulation/transfer, Patient to call before getting OOB, PT Consult for mobility concerns, Utilize walker, cane, or other assistive device         Medication Interventions: Assess postural VS orthostatic hypotension, Bed/chair exit alarm, Evaluate medications/consider consulting pharmacy, Patient to call before getting OOB, Teach patient to arise slowly    Elimination Interventions: Bed/chair exit alarm, Call light in reach, Patient to call for help with toileting needs, Urinal in reach, Toileting schedule/hourly rounds              Problem: Patient Education: Go to Patient Education Activity  Goal: Patient/Family Education  Outcome: Progressing Towards Goal

## 2021-08-02 LAB
ANION GAP SERPL CALC-SCNC: 4 MMOL/L (ref 3–18)
BASOPHILS # BLD: 0 K/UL (ref 0–0.1)
BASOPHILS NFR BLD: 1 % (ref 0–2)
BUN SERPL-MCNC: 14 MG/DL (ref 7–18)
BUN/CREAT SERPL: 23 (ref 12–20)
CALCIUM SERPL-MCNC: 8 MG/DL (ref 8.5–10.1)
CHLORIDE SERPL-SCNC: 97 MMOL/L (ref 100–111)
CO2 SERPL-SCNC: 28 MMOL/L (ref 21–32)
CREAT SERPL-MCNC: 0.6 MG/DL (ref 0.6–1.3)
DIFFERENTIAL METHOD BLD: ABNORMAL
EOSINOPHIL # BLD: 0 K/UL (ref 0–0.4)
EOSINOPHIL NFR BLD: 0 % (ref 0–5)
ERYTHROCYTE [DISTWIDTH] IN BLOOD BY AUTOMATED COUNT: 14.7 % (ref 11.6–14.5)
GLUCOSE BLD STRIP.AUTO-MCNC: 102 MG/DL (ref 70–110)
GLUCOSE BLD STRIP.AUTO-MCNC: 112 MG/DL (ref 70–110)
GLUCOSE BLD STRIP.AUTO-MCNC: 93 MG/DL (ref 70–110)
GLUCOSE SERPL-MCNC: 80 MG/DL (ref 74–99)
HCT VFR BLD AUTO: 24.2 % (ref 36–48)
HGB BLD-MCNC: 8.6 G/DL (ref 13–16)
INR PPP: 1.8 (ref 0.8–1.2)
LYMPHOCYTES # BLD: 0.6 K/UL (ref 0.9–3.6)
LYMPHOCYTES NFR BLD: 18 % (ref 21–52)
MAGNESIUM SERPL-MCNC: 1.7 MG/DL (ref 1.6–2.6)
MCH RBC QN AUTO: 33.9 PG (ref 24–34)
MCHC RBC AUTO-ENTMCNC: 35.5 G/DL (ref 31–37)
MCV RBC AUTO: 95.3 FL (ref 74–97)
MONOCYTES # BLD: 0.4 K/UL (ref 0.05–1.2)
MONOCYTES NFR BLD: 13 % (ref 3–10)
NEUTS SEG # BLD: 2.1 K/UL (ref 1.8–8)
NEUTS SEG NFR BLD: 67 % (ref 40–73)
PHOSPHATE SERPL-MCNC: 3.5 MG/DL (ref 2.5–4.9)
PLATELET # BLD AUTO: 102 K/UL (ref 135–420)
PMV BLD AUTO: 9 FL (ref 9.2–11.8)
POTASSIUM SERPL-SCNC: 4.5 MMOL/L (ref 3.5–5.5)
PROTHROMBIN TIME: 20.4 SEC (ref 11.5–15.2)
RBC # BLD AUTO: 2.54 M/UL (ref 4.35–5.65)
SODIUM SERPL-SCNC: 128 MMOL/L (ref 136–145)
SODIUM SERPL-SCNC: 129 MMOL/L (ref 136–145)
SODIUM SERPL-SCNC: 130 MMOL/L (ref 136–145)
WBC # BLD AUTO: 3.1 K/UL (ref 4.6–13.2)

## 2021-08-02 PROCEDURE — 99232 SBSQ HOSP IP/OBS MODERATE 35: CPT | Performed by: EMERGENCY MEDICINE

## 2021-08-02 PROCEDURE — 84100 ASSAY OF PHOSPHORUS: CPT

## 2021-08-02 PROCEDURE — 65660000004 HC RM CVT STEPDOWN

## 2021-08-02 PROCEDURE — 74011250637 HC RX REV CODE- 250/637: Performed by: EMERGENCY MEDICINE

## 2021-08-02 PROCEDURE — 74011250637 HC RX REV CODE- 250/637: Performed by: INTERNAL MEDICINE

## 2021-08-02 PROCEDURE — 74011250636 HC RX REV CODE- 250/636: Performed by: EMERGENCY MEDICINE

## 2021-08-02 PROCEDURE — 36415 COLL VENOUS BLD VENIPUNCTURE: CPT

## 2021-08-02 PROCEDURE — 85025 COMPLETE CBC W/AUTO DIFF WBC: CPT

## 2021-08-02 PROCEDURE — 74011000258 HC RX REV CODE- 258: Performed by: EMERGENCY MEDICINE

## 2021-08-02 PROCEDURE — 80048 BASIC METABOLIC PNL TOTAL CA: CPT

## 2021-08-02 PROCEDURE — 82962 GLUCOSE BLOOD TEST: CPT

## 2021-08-02 PROCEDURE — 84295 ASSAY OF SERUM SODIUM: CPT

## 2021-08-02 PROCEDURE — 85610 PROTHROMBIN TIME: CPT

## 2021-08-02 PROCEDURE — 74011250637 HC RX REV CODE- 250/637: Performed by: PHYSICIAN ASSISTANT

## 2021-08-02 PROCEDURE — 83735 ASSAY OF MAGNESIUM: CPT

## 2021-08-02 RX ORDER — WARFARIN SODIUM 5 MG/1
10 TABLET ORAL
Status: COMPLETED | OUTPATIENT
Start: 2021-08-02 | End: 2021-08-02

## 2021-08-02 RX ADMIN — SODIUM CHLORIDE 12.5 MG: 900 INJECTION, SOLUTION INTRAVENOUS at 11:54

## 2021-08-02 RX ADMIN — IPRATROPIUM BROMIDE 1 SPRAY: 21 SPRAY NASAL at 08:02

## 2021-08-02 RX ADMIN — DOCUSATE SODIUM 100 MG: 100 CAPSULE, LIQUID FILLED ORAL at 08:00

## 2021-08-02 RX ADMIN — CHOLECALCIFEROL TAB 25 MCG (1000 UNIT) 2000 UNITS: 25 TAB at 08:00

## 2021-08-02 RX ADMIN — WARFARIN SODIUM 10 MG: 5 TABLET ORAL at 21:29

## 2021-08-02 RX ADMIN — IPRATROPIUM BROMIDE 1 SPRAY: 21 SPRAY NASAL at 17:51

## 2021-08-02 RX ADMIN — CYANOCOBALAMIN TAB 500 MCG 500 MCG: 500 TAB at 08:00

## 2021-08-02 RX ADMIN — Medication 10 ML: at 22:10

## 2021-08-02 RX ADMIN — SODIUM CHLORIDE 12.5 MG: 900 INJECTION, SOLUTION INTRAVENOUS at 17:57

## 2021-08-02 RX ADMIN — LINACLOTIDE 145 MCG: 145 CAPSULE, GELATIN COATED ORAL at 08:00

## 2021-08-02 RX ADMIN — DOCUSATE SODIUM 100 MG: 100 CAPSULE, LIQUID FILLED ORAL at 17:47

## 2021-08-02 RX ADMIN — TOLVAPTAN 15 MG: 15 TABLET ORAL at 11:42

## 2021-08-02 RX ADMIN — Medication 10 ML: at 14:38

## 2021-08-02 RX ADMIN — ACETAMINOPHEN 650 MG: 325 TABLET ORAL at 16:51

## 2021-08-02 RX ADMIN — PANTOPRAZOLE SODIUM 40 MG: 40 TABLET, DELAYED RELEASE ORAL at 08:00

## 2021-08-02 NOTE — PROGRESS NOTES
Bedside turnover given from [facility] RN. CHRIS,MAR,ED summary given with updates R/T the night, a chance to ask questions was given. PT is on the cardiac tele monitor in stable condition. Bed is in the lowest position with the wheels locked. Call bell and personal effects  are on the bedside table within reach. Patient resting comfortably. Whiteboard updated.

## 2021-08-02 NOTE — PROGRESS NOTES
In Patient Progress note      Admit Date: 7/27/2021    Impression:     #1 severe euvolumic AC on chronic hyponatremia, etiology d/t SIADH , urine osm was 570 , urine Na 80s   Etiology of SIADH unclear , but workup in process, GI consulted for liver mass? Workup   #2 history of CHF with 45% EF, appears to be compensated  #3 history of atrial fibrillation  #4 history of hypothyroidism  #5 previous history of DVT on anticoagulation  #6 possibility of liver mass     Improving symptoms  Na 130 this am  ORIENTED x 3   HTN stable     Plan:  #1 Tolvapton 15 gm daily   #2 daily sodium  Checks   #3 hold albumin , use if hypotensive   #4 hold diuretics for now     Discussed in detail with patient and his daughter  Updated patient's nurse in the ER     Please call with questions,     Mohini Grant MD [facility]  Cell [phone]  Pager: [phone]    Subjective:     - No acute over night events. - respiratory - stable  - hemodynamics - stable, no pressrs  - UOP-ok  - Nutrition -poor     Objective:     Visit Vitals  BP (!) 122/58   Pulse 70   Temp 99.1 °F (37.3 °C)   Resp 16   Ht 5' 6\" (1.676 m)   Wt 65.5 kg (144 lb 8 oz)   SpO2 93%   BMI 23.32 kg/m²         Intake/Output Summary (Last 24 hours) at 8/2/2021 1643  Last data filed at 8/2/2021 1123  Gross per 24 hour   Intake 460 ml   Output 1000 ml   Net -540 ml       Physical Exam:        Patient is in no apparent distress. HEENT: mmm  Neck: no cervical lymphadenopathy or thyromegaly. Lungs: good air entry, clear to auscultation bilaterally. Cardiovascular system: S1, S2, regular rate and rhythm. Abdomen: soft,distended   Extremities: trace edema   Integumentary: skin is grossly intact. Neurologic: Alert, oriented time three.        Data Review:    Recent Labs     08/02/21  0440   WBC 3.1*   RBC 2.54*   HCT 24.2*   MCV 95.3   MCH 33.9   MCHC 35.5   RDW 14.7*     Recent Labs     08/02/21  1400 08/02/21  1014 08/02/21  0440 08/01/21  2140 08/01/21  1712 08/01/21  7564 08/01/21  0536 07/31/21  1430 07/31/21  0520 07/30/21  2330   BUN  --   --  14  --   --   --  15  --   --  30*   CREA  --   --  0.60  --   --   --  0.58*  --   --  0.58*   CA  --   --  8.0*  --   --   --  8.5  --   --  8.5   K  --   --  4.5  --   --   --  4.3  --   --  4.5   * 130* 129*  129* 130* 127* 129* 128* 125* 121* 120*   CL  --   --  97*  --   --   --  96*  --   --  89*   CO2  --   --  28  --   --   --  27  --   --  25   PHOS  --   --  3.5  --   --   --  3.2  --   --  2.6   GLU  --   --  80  --   --   --  74  --   --  75       Dheeraj Trujillo MD

## 2021-08-02 NOTE — MED STUDENT NOTES
Progress Note    Patient: Jackie Cano MRN: [id number]  SSN: [id number]    YOB: 1940  Age: 79 y.o. Sex: male      Admit Date: 7/27/2021    LOS: 6 days     Subjective:     Patient described episodes of near syncope. He reports a slight decrease in appetite as well as some nausea. Objective:     Vitals:    08/02/21 0444 08/02/21 0616 08/02/21 0735 08/02/21 1117   BP: 113/60  118/63 114/67   Pulse: 68  77 72   Resp: (!) 6  18 18   Temp: 98.7 °F (37.1 °C)  98 °F (36.7 °C) 98.5 °F (36.9 °C)   SpO2: 96%  93% 96%   Weight:  65.5 kg (144 lb 8 oz)     Height:            Intake and Output:  Current Shift: 08/02 0701 - 08/02 1900  In: 240 [P.O.:240]  Out: 350 [Urine:350]  Last three shifts: 07/31 1901 - 08/02 0700  In: 933 [P.O.:833; I.V.:100]  Out: 3590 [Urine:3590]    Physical Exam:   GENERAL: alert, cooperative, no distress, appears stated age    Lab/Data Review: All lab results for the last 24 hours reviewed. Sodium is still low. Attending checked for lung sounds, abdominal tenderness and LE edema    Assessment:     Active Problems:    Hyponatremia (7/27/2021)        Plan:     Keep at least another day, maybe more. Attending arranged for endoscopy to be performed in the patient's room so he doesn't have to move. GI consulted for possible liver mass. Signed By: Wilton Biswas     August 2, 2021          *ATTENTION:  This note has been created by a medical student for educational purposes only. Please do not refer to the content of this note for clinical decision-making, billing, or other purposes. Please see attending physicians note to obtain clinical information on this patient. *

## 2021-08-02 NOTE — PROGRESS NOTES
Received a call from Dr Mariama Almeida that pt is medically ready for d/c. [facility] consult in, Dr Cormier Most spoke with them and they do not have a bed available. CM met with pt, Caron Carrasco and spoke on the phone with daughter Lyudmila Arthur regarding SNF choices. Lyudmila Sandhya would like to see if [address] [place] [address], [facility] pt, [facility] and [facility] have any bed availability. CM sent referrals to those facilities in [place] and [place]. CM spoke with [address] from [facility]. They have not accepted pt yet but did state that no bed is available at the earliest until tomorrow. CM called and spoke to [place] [facility]. [place] aware that pt ready for d/c and will review clinicals. Noreen to contact CM after clinical review. The earliest they have a bed available is Thursday. Pt's daughter Lyudmila Arthur would like for pt to go to [facility]. CM informed her that [facility] does not have any beds available at this time but they are still following along.      Julian Hunter RN BSN  Care Manager  [phone]

## 2021-08-02 NOTE — PROGRESS NOTES
[facility] Hospitalist Group  Progress Note    Patient: Kaylyn Yip Age: 79 y.o. : 1940 MR#: [id number] SSN: [id number]  Date/Time: 2021    Subjective:     Patient is sitting in bed in no apparent distress, awake, follows commands. Had an episode of dizziness earlier today. Gives history of previous similar episodes of dizziness which are self-limiting. nephew at bedside. Assessment/Plan:     Hyponatremia, multifactorial in the setting of CHF and diuretic use and possible SIADH  Chronic systolic congestive heart failure-EF of 45%  History of atrial fibrillation-on Coumadin  History of sick sinus syndrome-has a pacemaker  Remote history of esophagectomy  Possible liver mass on CT scan. Patient states that he follows up with gastroenterology Dr. Anant Rojas regarding his liver issues. I called and discussed with the gastroenterology APC and she will schedule a close outpatient follow-up with Dr. Anant Rojas to follow-up on the possible liver mass noted on CT head. Pancytopenia of unclear etiology. Close outpatient follow-up with hematology. Plan  Tolvaptan per nephrology, monitor  Coumadin 10 mg today  Given recurrent nausea I have consulted GI  Monitor orthostatics  Continue PT and OT. Patient is agreeable to rehab/SNF  Case management has been consulted  Palliative care input noted. Patient is DNR and DNI  Discussed with patient and family member at bedside. Subsequently daughter came to visit and I discussed over the phone with the daughter at length. Discussed with .   Discussed with nephrologist.    Case discussed with:  [x]Patient  [x]Family  [x]Nursing  []Case Management  DVT Prophylaxis:  []Lovenox  []Hep SQ  []SCDs  [x]Coumadin   []On Heparin gtt    Objective:   VS:   Visit Vitals  /67   Pulse 72   Temp 98.5 °F (36.9 °C)   Resp 18   Ht 5' 6\" (1.676 m)   Wt 65.5 kg (144 lb 8 oz)   SpO2 96%   BMI 23.32 kg/m²      Tmax/24hrs: Temp (24hrs), Av.5 °F (36.9 °C), Min:98 °F (36.7 °C), Max:98.7 °F (37.1 °C)    Input/Output:     Intake/Output Summary (Last 24 hours) at 8/2/2021 1614  Last data filed at 8/2/2021 1123  Gross per 24 hour   Intake 460 ml   Output 1000 ml   Net -540 ml       General:  Awake, alert  Cardiovascular:  S1S2+, RRR  Pulmonary:  CTA b/l  GI:  Soft, BS+, NT, ND  Extremities:  trace edema          Labs:    Recent Results (from the past 24 hour(s))   SODIUM    Collection Time: 08/01/21  5:12 PM   Result Value Ref Range    Sodium 127 (L) 136 - 145 mmol/L   GLUCOSE, POC    Collection Time: 08/01/21  9:20 PM   Result Value Ref Range    Glucose (POC) 115 (H) 70 - 110 mg/dL   SODIUM    Collection Time: 08/01/21  9:40 PM   Result Value Ref Range    Sodium 130 (L) 136 - 145 mmol/L   CBC WITH AUTOMATED DIFF    Collection Time: 08/02/21  4:40 AM   Result Value Ref Range    WBC 3.1 (L) 4.6 - 13.2 K/uL    RBC 2.54 (L) 4.35 - 5.65 M/uL    HGB 8.6 (L) 13.0 - 16.0 g/dL    HCT 24.2 (L) 36.0 - 48.0 %    MCV 95.3 74.0 - 97.0 FL    MCH 33.9 24.0 - 34.0 PG    MCHC 35.5 31.0 - 37.0 g/dL    RDW 14.7 (H) 11.6 - 14.5 %    PLATELET 813 (L) 661 - 420 K/uL    MPV 9.0 (L) 9.2 - 11.8 FL    NEUTROPHILS 67 40 - 73 %    LYMPHOCYTES 18 (L) 21 - 52 %    MONOCYTES 13 (H) 3 - 10 %    EOSINOPHILS 0 0 - 5 %    BASOPHILS 1 0 - 2 %    ABS. NEUTROPHILS 2.1 1.8 - 8.0 K/UL    ABS. LYMPHOCYTES 0.6 (L) 0.9 - 3.6 K/UL    ABS. MONOCYTES 0.4 0.05 - 1.2 K/UL    ABS. EOSINOPHILS 0.0 0.0 - 0.4 K/UL    ABS.  BASOPHILS 0.0 0.0 - 0.1 K/UL    DF AUTOMATED     METABOLIC PANEL, BASIC    Collection Time: 08/02/21  4:40 AM   Result Value Ref Range    Sodium 129 (L) 136 - 145 mmol/L    Potassium 4.5 3.5 - 5.5 mmol/L    Chloride 97 (L) 100 - 111 mmol/L    CO2 28 21 - 32 mmol/L    Anion gap 4 3.0 - 18 mmol/L    Glucose 80 74 - 99 mg/dL    BUN 14 7.0 - 18 MG/DL    Creatinine 0.60 0.6 - 1.3 MG/DL    BUN/Creatinine ratio 23 (H) 12 - 20      GFR est AA >60 >60 ml/min/1.73m2    GFR est non-AA >60 >60 ml/min/1.73m2 Calcium 8.0 (L) 8.5 - 10.1 MG/DL   MAGNESIUM    Collection Time: 08/02/21  4:40 AM   Result Value Ref Range    Magnesium 1.7 1.6 - 2.6 mg/dL   PHOSPHORUS    Collection Time: 08/02/21  4:40 AM   Result Value Ref Range    Phosphorus 3.5 2.5 - 4.9 MG/DL   PROTHROMBIN TIME + INR    Collection Time: 08/02/21  4:40 AM   Result Value Ref Range    Prothrombin time 20.4 (H) 11.5 - 15.2 sec    INR 1.8 (H) 0.8 - 1.2     SODIUM    Collection Time: 08/02/21  4:40 AM   Result Value Ref Range    Sodium 129 (L) 136 - 145 mmol/L   GLUCOSE, POC    Collection Time: 08/02/21  7:37 AM   Result Value Ref Range    Glucose (POC) 93 70 - 110 mg/dL   SODIUM    Collection Time: 08/02/21 10:14 AM   Result Value Ref Range    Sodium 130 (L) 136 - 145 mmol/L   GLUCOSE, POC    Collection Time: 08/02/21 11:19 AM   Result Value Ref Range    Glucose (POC) 112 (H) 70 - 110 mg/dL         Signed By: Lana Julian MD     August 2, 2021

## 2021-08-02 NOTE — PROGRESS NOTES
Bedside turnover given from [facility] RN. CHRIS,MAR,ED summary given with updates R/T the night, a chance to ask questions was given. PT is on the cardiac tele monitor in stable condition. Bed is in the lowest position with the wheels locked. Call bell and personal effects  are on the bedside table within reach. Patient resting comfortably. Whiteboard updated.

## 2021-08-02 NOTE — PROGRESS NOTES
Continue tolvapton 150 mg daily   Check sodium daily   Continue to follow closely   Will follow if moving to rehab    Please call with questions    Melia Burnett MD FASN  Cell [phone]  Pager: [phone]

## 2021-08-02 NOTE — PROGRESS NOTES
Bedside turnover given from Satish Monsalve. SBAR,MAR,ED summary given with updates R/T the night, a chance to ask questions was given. PT is on the cardiac tele monitor in stable condition. Bed is in the lowest position with the wheels locked. Call bell and personal effects  are on the bedside table within reach. Patient resting comfortably. Whiteboard updated.

## 2021-08-02 NOTE — CONSULTS
[email or web address]  [phone]    GASTROENTEROLOGY CONSULT      Impression:   1. Nausea - no vomiting, not impairing oral intake. Requiring Phenergan about once a shift per RN. 2. Constipation - on Linzess  3. Abnormal CT Liver - AFP recently normal at 1.0 (6/8/21)  - Recent RUQ US normal  4. Cirrhosis - followed by Dr. Chanell Daugherty  5. Hx esophageal cancer s/p esophagectomy 2001  6. A fib - on coumadin  7. Hx Sick sinus syndrome - Pacemaker  8. Hyponatremia - Na 119 on admission - improved to 130  9. Pancytopenia      CT Chest, Abd, Pelvis 7/28/21:  IMPRESSION  1. Vague ovoid masslike focus in the left lobe of the liver inferior aspect  involving the segment 4B. Possibly from perceptual artifact from heterogeneous  parenchymal enhancement vs. intrinsic hepatic mass. Multiphasic CT or  correlation with MR will be helpful to confirm and further delineate the nature  of this finding. Small hypodensity in the right lobe of the liver, probably  attributable to small hepatic cyst.  2.  Mild to moderate ascites. 3.  Bilateral pleural effusion with associated atelectatic changes. No definite  airspace opacities. 4.  Status post esophagectomy with gastric pull-through. 6/22/2021 - Radiology: US Abdomen - diffuse heterogenous hepatic echogenicity suggestive of chronic liver disease no lesions      Plan:     1. Continue antiemetics PRN  2. Keep EGD as scheduled for 8/11/21 with Dr. Chanell Daugherty. - Given that he is tolerating diet no indication for urgent EGD this admission. He is on Coumadin which would have to be held prior to a procedure. Given that he is stable from GI standpoint, we will keep his procedure as previously scheduled. 3. Continue PPI  4. Outpatient follow up for abnormal CT - consider multiphasic CT vs MRI for follow up.   5. Continue medical management per primary team      Chief Complaint: Nausea, abnormal CT Liver      HPI:  Elli Torre is a 79 y.o. male who I am being asked to see in consultation for an opinion regarding Nausea, abnormal CT liver. He has a history of constipation, esophageal cancer s/p esophagectomy, cirrhosis, A fib, sick sinus syndrome. He was admitted last week for severe hyponatremia with sodium 119, now improved to 130. GI consulted today due to patient having recurrent nausea. He states it occurs almost daily, usually in mornings. Sometimes associated with standing/dizziness, being evaluated for orthostatic hypotension per RN. He has been able to tolerate a normal diet. He has been receiving promethazine about once per shift. Notes mild dysphagia to small particles with associated coughing/sneezing and discomfort at oropharynx. No sensation of esophageal dysphagia. He denies vomiting, early satiety, change in appetite, abdominal pain, change in bowel habits. He has chronic constipation which is stable on Linzess. He is currently scheduled for EGD w/ Dr. Aman Bassett on 8/11/21 as outpatient.         PMH:   Past Medical History:   Diagnosis Date    Abdominal pain, unspecified site     Acute upper respiratory infections of unspecified site     Atrial fibrillation ([address]     Cancer of esophagus ([address] 2001    RT and adjuvant chemotherapy, surgery    Esophageal reflux     Generalized osteoarthrosis, involving multiple sites     Gout, unspecified     Nausea alone     Sinoatrial node dysfunction ([address]     Status post thoracentesis 06/2019    Thromboembolus ([address] 2003 (approx)    DVT in leg (IVC filter placed)    Unspecified cataract     Unspecified constipation        PSH:   Past Surgical History:   Procedure Laterality Date    COLONOSCOPY N/A 12/13/2017    COLONOSCOPY with hot polypectomies performed by Emilie Olszewski, MD at [facility] ENDOSCOPY    HX GI      esophagectomy    HX HEENT  2001    Esophageal Cancer Surgery    HX HIP REPLACEMENT Left 2005 (approx)    HX OTHER SURGICAL  2003    ivc filter placed    HX PACEMAKER  2012    [employer]       Social HX:   Social History     Socioeconomic History    Marital status: SINGLE     Spouse name: Not on file    Number of children: Not on file    Years of education: Not on file    Highest education level: Not on file   Occupational History    Not on file   Tobacco Use    Smoking status: Former Smoker     Packs/day: 0.50     Years: 34.00     Pack years: 17.00    Smokeless tobacco: Never Used   Substance and Sexual Activity    Alcohol use: Yes     Comment: 2 drinks per day    Drug use: No    Sexual activity: Never   Other Topics Concern    Not on file   Social History Narrative    Not on file     Social Determinants of Health     Financial Resource Strain:     Difficulty of Paying Living Expenses:    Food Insecurity:     Worried About Running Out of Food in the Last Year:     [address] in the Last Year:    Transportation Needs:     Lack of Transportation (Medical):      Lack of Transportation (Non-Medical):    Physical Activity:     Days of Exercise per Week:     Minutes of Exercise per Session:    Stress:     Feeling of Stress :    Social Connections:     Frequency of Communication with Friends and Family:     Frequency of Social Gatherings with Friends and Family:     Attends Yazidism Services:     Active Member of Clubs or Organizations:     Attends Club or Organization Meetings:     Marital Status:    Intimate Partner Violence:     Fear of Current or Ex-Partner:     Emotionally Abused:     Physically Abused:     Sexually Abused:        FHX:   Family History   Problem Relation Age of Onset    Heart Disease Father     Alzheimer Father        Allergy:   Allergies   Allergen Reactions    Parafon Forte Dsc [Chlorzoxazone] Anaphylaxis    Aspirin Other (comments)     Cannot take due to on coumadin       Patient Active Problem List   Diagnosis Code    Nausea alone R11.0    Esophageal reflux K21.9    Gout, unspecified M10.9    Generalized osteoarthrosis, involving multiple sites M15.9    Atrial fibrillation ([address] [facility] I48.91  Pacemaker Z95.0    Dizziness R42    Personal history of esophageal cancer Z85.01    Weight loss R63.4    Hyponatremia E87.1       Home Medications:     Medications Prior to Admission   Medication Sig    ondansetron (ZOFRAN ODT) 4 mg disintegrating tablet DISSOLVE 1 TABLET ON TONGUE TWO TIMES A DAY AS NEEDED FOR NAUSEA    ipratropium (ATROVENT) 0.03 % nasal spray USE 2 SPRAYS EACH NOSTRIL TWO TIMES A DAY    spironolactone (ALDACTONE) 25 mg tablet Take 1 Tab by mouth daily.  warfarin (COUMADIN) 5 mg tablet TAKE ONE AND ONE-HALF TABLET BY MOUTH ONCE DAILY    raNITIdine hcl 150 mg capsule Take 150 mg by mouth two (2) times a day.  linaCLOtide (LINZESS) 72 mcg cap capsule Take  by mouth Daily (before breakfast).  megestrol (MEGACE) 40 mg tablet 1 tab daily    furosemide (LASIX) 20 mg tablet Take 20 mg by mouth daily.  omeprazole (PRILOSEC) 20 mg capsule TAKE ONE CAPSULE BY MOUTH EVERY MORNING    thiamine HCL (VITAMIN B-1) 100 mg tablet Take 100 mg by mouth daily.  allopurinol (ZYLOPRIM) 300 mg tablet TAKE ONE TABLET BY MOUTH ONCE DAILY    metoprolol tartrate (LOPRESSOR) 50 mg tablet Take 50 mg by mouth two (2) times a day.  magnesium 250 mg tab Take 1 Tab by mouth daily.  cyanocobalamin (VITAMIN B12) 500 mcg tablet Take 500 mcg by mouth daily.  cholecalciferol, vitamin D3, 2,000 unit tab Take 1 Tab by mouth daily. Review of Systems:     Constitutional: No fevers, chills, weight loss, fatigue. Skin: No rashes, pruritis, jaundice, ulcerations, erythema. HENT: No headaches, nosebleeds, sinus pressure, rhinorrhea, sore throat. Eyes: No visual changes, blurred vision, eye pain, photophobia, jaundice. Cardiovascular: No chest pain, heart palpitations. Respiratory: No cough, SOB, wheezing, chest discomfort, orthopnea. Gastrointestinal: See HPI   Genitourinary: No dysuria, bleeding, discharge, pyuria. Musculoskeletal: No weakness, arthralgias, wasting. Endo: No sweats. Heme: No bruising, easy bleeding. Allergies: As noted. Neurological: Cranial nerves intact. Alert and oriented. Gait not assessed. Psychiatric:  No anxiety, depression, hallucinations. Visit Vitals  /67   Pulse 72   Temp 98.5 °F (36.9 °C)   Resp 18   Ht 5' 6\" (1.676 m)   Wt 65.5 kg (144 lb 8 oz)   SpO2 96%   BMI 23.32 kg/m²       Physical Assessment:     constitutional: well developed, well nourished, normal habitus, no deformities, in no acute distress. skin: no rashes, ulcers, icterus or other lesions  eyes: normal conjunctivae and lids; no jaundice pupils: normal  HEENT: normocephalic, atraumatic  neck: supple, normal ROM   respiratory: normal chest excursion; no intercostal retraction or accessory muscle use; clear to ascultation bilaterally    cardiovascular: regular rate and rhythm, no murmur, rub or gallop.   abdominal: normal bowel sounds, soft, no tenderness or masses. no hernias appreciated. liver: normal size and consistency. spleen: not palpable. rectal: hemoccult/guaiac: not performed. extremities: no significant deformity or contracture, no edema. Gait not assessed   neurologic: cranial nerves: II-XII normal.   psychiatric: judgement/insight: within normal limits. memory: within normal limits for recent and remote events. mood and affect: no evidence of depression, anxiety or agitation. orientation: oriented to time, space and person. Basic Metabolic Profile   Recent Labs     08/02/21  1014 08/02/21  0440 08/02/21  0440   *   < > 129*  129*   K  --   --  4.5   CL  --   --  97*   CO2  --   --  28   BUN  --   --  14   GLU  --   --  80   CA  --   --  8.0*   MG  --   --  1.7   PHOS  --   --  3.5    < > = values in this interval not displayed.          CBC w/Diff    Recent Labs     08/02/21  0440   WBC 3.1*   RBC 2.54*   HGB 8.6*   HCT 24.2*   MCV 95.3   MCH 33.9   MCHC 35.5   RDW 14.7*   *    Recent Labs     08/02/21  0440   GRANS 67   LYMPH 18*   EOS 0 Hepatic Function   No results for input(s): ALB, TP, TBILI, AP, AML, LPSE in the last 72 hours. No lab exists for component: DBILI, GPT, SGOT     Coags   Recent Labs     08/02/21  0440 08/01/21  0536   PTP 20.4* 22.9*   INR 1.8* 2.1*           Peggy Loyola.   08/02/21, 2:17 PM   [facility] [address] 1948, [address] [place] [address]  Cell: [phone]  [email or web address]

## 2021-08-02 NOTE — PROGRESS NOTES
Problem: Pressure Injury - Risk of  Goal: *Prevention of pressure injury  Description: Document Angelito Scale and appropriate interventions in the flowsheet. Outcome: Progressing Towards Goal  Note: Pressure Injury Interventions:  Sensory Interventions: Assess changes in LOC, Check visual cues for pain, Discuss PT/OT consult with provider, Keep linens dry and wrinkle-free, Maintain/enhance activity level, Minimize linen layers, Monitor skin under medical devices, Pressure redistribution bed/mattress (bed type), Turn and reposition approx. every two hours (pillows and wedges if needed)    Moisture Interventions: Absorbent underpads, Apply protective barrier, creams and emollients, Check for incontinence Q2 hours and as needed, Internal/External urinary devices, Maintain skin hydration (lotion/cream), Minimize layers, Offer toileting Q_hr    Activity Interventions: Increase time out of bed, Pressure redistribution bed/mattress(bed type), PT/OT evaluation    Mobility Interventions: Turn and reposition approx.  every two hours(pillow and wedges), PT/OT evaluation, Pressure redistribution bed/mattress (bed type), HOB 30 degrees or less    Nutrition Interventions: Document food/fluid/supplement intake, Discuss nutritional consult with provider    Friction and Shear Interventions: HOB 30 degrees or less, Minimize layers, Foam dressings/transparent film/skin sealants, Apply protective barrier, creams and emollients                Problem: Patient Education: Go to Patient Education Activity  Goal: Patient/Family Education  Outcome: Progressing Towards Goal     Problem: Patient Education: Go to Patient Education Activity  Goal: Patient/Family Education  Outcome: Progressing Towards Goal     Problem: Patient Education: Go to Patient Education Activity  Goal: Patient/Family Education  Outcome: Progressing Towards Goal     Problem: Falls - Risk of  Goal: *Absence of Falls  Description: Document Abdiel Fall Risk and appropriate interventions in the flowsheet.   Outcome: Progressing Towards Goal  Note: Fall Risk Interventions:  Mobility Interventions: Communicate number of staff needed for ambulation/transfer, Patient to call before getting OOB, PT Consult for mobility concerns, Utilize walker, cane, or other assistive device         Medication Interventions: Assess postural VS orthostatic hypotension, Evaluate medications/consider consulting pharmacy, Patient to call before getting OOB, Teach patient to arise slowly    Elimination Interventions: Call light in reach, Toileting schedule/hourly rounds, Patient to call for help with toileting needs              Problem: Patient Education: Go to Patient Education Activity  Goal: Patient/Family Education  Outcome: Progressing Towards Goal

## 2021-08-02 NOTE — PROGRESS NOTES
Bedside turnover given to [facility] RN. SBAR,MAR,ED summary given with updates R/T the night, a chance to ask questions was given. PT is on the cardiac tele monitor in stable condition. Bed is in the lowest position with the wheels locked. Call bell and personal effects  are on the bedside table within reach.

## 2021-08-02 NOTE — PROGRESS NOTES
Bedside turnover given to [facility] RN. SBAR,MAR,ED summary given with updates R/T the night, a chance to ask questions was given. PT is on the cardiac tele monitor in stable condition. Bed is in the lowest position with the wheels locked. Call bell and personal effects  are on the bedside table within reach.

## 2021-08-02 NOTE — PROGRESS NOTES
0700: Bedside and Verbal shift change report given to MARVIN Ellsworth (oncoming nurse) by Teena Berger RN (offgoing nurse). Report included the following information SBAR, Kardex, MAR and Cardiac Rhythm Pacemaker/ AV paced. 1155: Pt experienced nausea. Medication administered. Hydraguard lotion and new mepalex dressing applied to patient's stage 1 sacral wound. Pt got dizzy and lightheaded while standing. Pt got back in bed. 1519: Spoke with pharmacy about patient's warfarin dose. Was told to call physician if orders not entered by 5 pm. INR 1.8.    1533: Performed orthostatic blood pressures on patient. Lying flat- 117/64. Sitting- 125/61. Standing- 126/67. Physician paged. 1755: Patient experiencing nausea. Medication administered. Also has slight headache. Otherwise stable. Urine bag emptied, 450 ml.    1900: Bedside and Verbal shift change report given to MARVIN Esteban (oncoming nurse) by Maggy Nova RN (offgoing nurse). Report included the following information SBAR, Kardex, MAR and Cardiac Rhythm Pacemaker/ AV paced.

## 2021-08-02 NOTE — PROGRESS NOTES
[facility] REFERRAL CONTACT NOTE  [id number] [place] [address] for Physical Rehabilitation    RE: Edward Root,     Thank you for the opportunity to review this patient's case for admission to [id number] [place] [address] for Physical Rehabilitation. Based on our pre-admission screening:     [x ] Our Team/Medical Director is following this case. Comments: Will continue to follow for medical stability, therapy progress and bed availability. We have no bed available at this time. Again, Thank you for this referral. Should you have any questions please do not hesitate to call. Sincerely,  Deann Groves. [facility] [place], [address] [facility] [place], RN  Admissions [facility] for Physical Rehabilitation  [phone]

## 2021-08-03 LAB
ANION GAP SERPL CALC-SCNC: 5 MMOL/L (ref 3–18)
ANION GAP SERPL CALC-SCNC: 5 MMOL/L (ref 3–18)
BASOPHILS # BLD: 0 K/UL (ref 0–0.1)
BASOPHILS NFR BLD: 1 % (ref 0–2)
BUN SERPL-MCNC: 12 MG/DL (ref 7–18)
BUN SERPL-MCNC: 15 MG/DL (ref 7–18)
BUN/CREAT SERPL: 22 (ref 12–20)
BUN/CREAT SERPL: 25 (ref 12–20)
CALCIUM SERPL-MCNC: 8.1 MG/DL (ref 8.5–10.1)
CALCIUM SERPL-MCNC: 8.6 MG/DL (ref 8.5–10.1)
CHLORIDE SERPL-SCNC: 99 MMOL/L (ref 100–111)
CHLORIDE SERPL-SCNC: 99 MMOL/L (ref 100–111)
CO2 SERPL-SCNC: 28 MMOL/L (ref 21–32)
CO2 SERPL-SCNC: 29 MMOL/L (ref 21–32)
COVID-19 RAPID TEST, COVR: NOT DETECTED
CREAT SERPL-MCNC: 0.55 MG/DL (ref 0.6–1.3)
CREAT SERPL-MCNC: 0.61 MG/DL (ref 0.6–1.3)
DIFFERENTIAL METHOD BLD: ABNORMAL
EOSINOPHIL # BLD: 0 K/UL (ref 0–0.4)
EOSINOPHIL NFR BLD: 0 % (ref 0–5)
ERYTHROCYTE [DISTWIDTH] IN BLOOD BY AUTOMATED COUNT: 14.7 % (ref 11.6–14.5)
GLUCOSE BLD STRIP.AUTO-MCNC: 103 MG/DL (ref 70–110)
GLUCOSE BLD STRIP.AUTO-MCNC: 105 MG/DL (ref 70–110)
GLUCOSE BLD STRIP.AUTO-MCNC: 119 MG/DL (ref 70–110)
GLUCOSE BLD STRIP.AUTO-MCNC: 156 MG/DL (ref 70–110)
GLUCOSE SERPL-MCNC: 90 MG/DL (ref 74–99)
GLUCOSE SERPL-MCNC: 97 MG/DL (ref 74–99)
HCT VFR BLD AUTO: 25.6 % (ref 36–48)
HGB BLD-MCNC: 9.1 G/DL (ref 13–16)
INR PPP: 2 (ref 0.8–1.2)
LYMPHOCYTES # BLD: 0.6 K/UL (ref 0.9–3.6)
LYMPHOCYTES NFR BLD: 19 % (ref 21–52)
MAGNESIUM SERPL-MCNC: 1.9 MG/DL (ref 1.6–2.6)
MCH RBC QN AUTO: 33.8 PG (ref 24–34)
MCHC RBC AUTO-ENTMCNC: 35.5 G/DL (ref 31–37)
MCV RBC AUTO: 95.2 FL (ref 74–97)
MONOCYTES # BLD: 0.4 K/UL (ref 0.05–1.2)
MONOCYTES NFR BLD: 12 % (ref 3–10)
NEUTS SEG # BLD: 2.3 K/UL (ref 1.8–8)
NEUTS SEG NFR BLD: 68 % (ref 40–73)
PHOSPHATE SERPL-MCNC: 3.6 MG/DL (ref 2.5–4.9)
PLATELET # BLD AUTO: 108 K/UL (ref 135–420)
PMV BLD AUTO: 9 FL (ref 9.2–11.8)
POTASSIUM SERPL-SCNC: 4.2 MMOL/L (ref 3.5–5.5)
POTASSIUM SERPL-SCNC: 4.3 MMOL/L (ref 3.5–5.5)
PROTHROMBIN TIME: 22.4 SEC (ref 11.5–15.2)
RBC # BLD AUTO: 2.69 M/UL (ref 4.35–5.65)
SODIUM SERPL-SCNC: 130 MMOL/L (ref 136–145)
SODIUM SERPL-SCNC: 132 MMOL/L (ref 136–145)
SODIUM SERPL-SCNC: 133 MMOL/L (ref 136–145)
SOURCE, COVRS: NORMAL
WBC # BLD AUTO: 3.3 K/UL (ref 4.6–13.2)

## 2021-08-03 PROCEDURE — 74011250636 HC RX REV CODE- 250/636: Performed by: EMERGENCY MEDICINE

## 2021-08-03 PROCEDURE — 82024 ASSAY OF ACTH: CPT

## 2021-08-03 PROCEDURE — 85025 COMPLETE CBC W/AUTO DIFF WBC: CPT

## 2021-08-03 PROCEDURE — 97535 SELF CARE MNGMENT TRAINING: CPT

## 2021-08-03 PROCEDURE — 87635 SARS-COV-2 COVID-19 AMP PRB: CPT

## 2021-08-03 PROCEDURE — 97116 GAIT TRAINING THERAPY: CPT

## 2021-08-03 PROCEDURE — 97530 THERAPEUTIC ACTIVITIES: CPT

## 2021-08-03 PROCEDURE — 82533 TOTAL CORTISOL: CPT

## 2021-08-03 PROCEDURE — 84244 ASSAY OF RENIN: CPT

## 2021-08-03 PROCEDURE — 99232 SBSQ HOSP IP/OBS MODERATE 35: CPT | Performed by: EMERGENCY MEDICINE

## 2021-08-03 PROCEDURE — 74011250637 HC RX REV CODE- 250/637: Performed by: STUDENT IN AN ORGANIZED HEALTH CARE EDUCATION/TRAINING PROGRAM

## 2021-08-03 PROCEDURE — 80048 BASIC METABOLIC PNL TOTAL CA: CPT

## 2021-08-03 PROCEDURE — 65660000004 HC RM CVT STEPDOWN

## 2021-08-03 PROCEDURE — 74011250636 HC RX REV CODE- 250/636: Performed by: INTERNAL MEDICINE

## 2021-08-03 PROCEDURE — 84295 ASSAY OF SERUM SODIUM: CPT

## 2021-08-03 PROCEDURE — 74011000258 HC RX REV CODE- 258: Performed by: EMERGENCY MEDICINE

## 2021-08-03 PROCEDURE — 85610 PROTHROMBIN TIME: CPT

## 2021-08-03 PROCEDURE — 74011250637 HC RX REV CODE- 250/637: Performed by: EMERGENCY MEDICINE

## 2021-08-03 PROCEDURE — 74011250637 HC RX REV CODE- 250/637: Performed by: INTERNAL MEDICINE

## 2021-08-03 PROCEDURE — 84100 ASSAY OF PHOSPHORUS: CPT

## 2021-08-03 PROCEDURE — 36415 COLL VENOUS BLD VENIPUNCTURE: CPT

## 2021-08-03 PROCEDURE — 82088 ASSAY OF ALDOSTERONE: CPT

## 2021-08-03 PROCEDURE — 82962 GLUCOSE BLOOD TEST: CPT

## 2021-08-03 PROCEDURE — 83735 ASSAY OF MAGNESIUM: CPT

## 2021-08-03 RX ORDER — WARFARIN SODIUM 5 MG/1
10 TABLET ORAL ONCE
Status: COMPLETED | OUTPATIENT
Start: 2021-08-03 | End: 2021-08-03

## 2021-08-03 RX ORDER — DOCUSATE SODIUM 100 MG/1
100 CAPSULE, LIQUID FILLED ORAL 2 TIMES DAILY
Qty: 60 CAPSULE | Refills: 0 | Status: SHIPPED | OUTPATIENT
Start: 2021-08-03

## 2021-08-03 RX ORDER — WARFARIN SODIUM 5 MG/1
TABLET ORAL
Qty: 30 TABLET | Refills: 0 | Status: SHIPPED
Start: 2021-08-03

## 2021-08-03 RX ORDER — COSYNTROPIN 0.25 MG/ML
0.25 INJECTION, POWDER, FOR SOLUTION INTRAMUSCULAR; INTRAVENOUS ONCE
Status: COMPLETED | OUTPATIENT
Start: 2021-08-03 | End: 2021-08-03

## 2021-08-03 RX ADMIN — COSYNTROPIN 0.25 MG: 0.25 INJECTION, POWDER, LYOPHILIZED, FOR SOLUTION INTRAMUSCULAR; INTRAVENOUS at 11:47

## 2021-08-03 RX ADMIN — LINACLOTIDE 145 MCG: 145 CAPSULE, GELATIN COATED ORAL at 07:57

## 2021-08-03 RX ADMIN — SODIUM CHLORIDE 12.5 MG: 900 INJECTION, SOLUTION INTRAVENOUS at 19:18

## 2021-08-03 RX ADMIN — DIPHENHYDRAMINE HYDROCHLORIDE 25 MG: 25 CAPSULE ORAL at 21:06

## 2021-08-03 RX ADMIN — WARFARIN SODIUM 10 MG: 5 TABLET ORAL at 18:26

## 2021-08-03 RX ADMIN — Medication 10 ML: at 14:51

## 2021-08-03 RX ADMIN — IPRATROPIUM BROMIDE 1 SPRAY: 21 SPRAY NASAL at 08:00

## 2021-08-03 RX ADMIN — DOCUSATE SODIUM 100 MG: 100 CAPSULE, LIQUID FILLED ORAL at 18:26

## 2021-08-03 RX ADMIN — PANTOPRAZOLE SODIUM 40 MG: 40 TABLET, DELAYED RELEASE ORAL at 07:57

## 2021-08-03 RX ADMIN — DOCUSATE SODIUM 100 MG: 100 CAPSULE, LIQUID FILLED ORAL at 07:57

## 2021-08-03 RX ADMIN — CYANOCOBALAMIN TAB 500 MCG 500 MCG: 500 TAB at 07:57

## 2021-08-03 RX ADMIN — Medication 10 ML: at 06:30

## 2021-08-03 RX ADMIN — CHOLECALCIFEROL TAB 25 MCG (1000 UNIT) 2000 UNITS: 25 TAB at 07:57

## 2021-08-03 RX ADMIN — Medication 1 PACKET: at 18:26

## 2021-08-03 RX ADMIN — TOLVAPTAN 15 MG: 30 TABLET ORAL at 01:33

## 2021-08-03 NOTE — PROGRESS NOTES
Problem: Self Care Deficits Care Plan (Adult)  Goal: *Acute Goals and Plan of Care (Insert Text)  Description: Occupational Therapy Goals  Initiated 7/30/2021 within 7 day(s). 1.  Patient will perform grooming at sink with modified independence. 2.  Patient will perform lower body dressing with modified independence. 3.  Patient will perform all aspects of toileting with modified independence. 4.  Patient will utilize energy conservation techniques during functional activities with minimum verbal cues. Prior Level of Function: Patient lived alone PTA and was Modified Independent in self care tasks using rolling walker, sock aide, and grab bars in shower. Outcome: Progressing Towards Goal   OCCUPATIONAL THERAPY TREATMENT    Patient: Marquise Munoz (54 y.o. male)  Date: 8/3/2021  Diagnosis: Hyponatremia [E87.1] <principal problem not specified>       Precautions: Fall, Skin  PLOF: Patient lived alone PTA and was Modified Independent in self care tasks using rolling walker, sock aide, and grab bars in shower. Chart, occupational therapy assessment, plan of care, and goals were reviewed. ASSESSMENT:  Pt cleared by RN for OT tx at this time. Pt presented supine in bed finishing breakfast upon therapist arrival requiring encouragement for OOB activity. Pt transitioned to EOB from supine with SBA in prep for functional task. Once sitting EOB pt was educated on EC/WS techniques to increase safety and (I) during all ADL tasks, pt verbalized understanding. Pt performed STS transfer CGA with Rollator. He stood ~ 2 mins with B UE support demonstrating G balance to improve standing tolerance needed for self cares. Pt maneuvered to reclining chair ~ 4 ft CGA with Rollator simulating toilet transfer. Pt was left sitting in reclining chair @ end of session and encouraged to sit up ~ 1-2 hours for increased strength and endurance.   Pillow placed under buttocks for increased comfort with B LE's elevated and all needs left within reach. RN made aware of pt's participation and position. Orthostatic BP taken:  Supine: 111/56  Sitting EOB: 116/64  Standin/61    Progression toward goals:  []          Improving appropriately and progressing toward goals  [x]          Improving slowly and progressing toward goals  []          Not making progress toward goals and plan of care will be adjusted     PLAN:  Patient continues to benefit from skilled intervention to address the above impairments. Continue treatment per established plan of care. Discharge Recommendations:  with / Supervision vs Rehab   Further Equipment Recommendations for Discharge: Shower Chair     SUBJECTIVE:   Patient stated  I am getting a sore of my bottom. \"     OBJECTIVE DATA SUMMARY:   Cognitive/Behavioral Status:  Neurologic State: Alert  Orientation Level: Oriented X4  Cognition: Appropriate decision making, Appropriate for age attention/concentration, Appropriate safety awareness  Safety/Judgement: Fall prevention, Home safety    Functional Mobility and Transfers for ADLs:   Bed Mobility:     Supine to Sit: Stand-by assistance     Scooting: Stand-by assistance (short sitting in recliner )   Transfers:  Sit to Stand: Contact guard assistance  Stand to Sit: Contact guard assistance    Balance:  Sitting: Intact  Standing: Impaired; With support  Standing - Static: Good  Standing - Dynamic : Fair (+ )      Pain:  Pain level pre-treatment: 0/10   Pain level post-treatment: 0/10  Pain Intervention(s): Medication (see MAR); Rest,  Repositioning   Response to intervention: Nurse notified, See doc flow    Activity Tolerance:    Fair   Please refer to the flowsheet for vital signs taken during this treatment.   After treatment:   [x]  Patient left in no apparent distress sitting up in chair  []  Patient left in no apparent distress in bed  [x]  Call bell left within reach  [x]  Nursing notified  []  Caregiver present  []  Bed alarm activated    COMMUNICATION/EDUCATION:   [x] Role of Occupational Therapy in the acute care setting  [] Home safety education was provided and the patient/caregiver indicated understanding. [x] Patient/family have participated as able in working towards goals and plan of care. [x] Patient/family agree to work toward stated goals and plan of care. [] Patient understands intent and goals of therapy, but is neutral about his/her participation. [] Patient is unable to participate in goal setting and plan of care.       Thank you for this referral.  RENATO Lott  Time Calculation: 23 mins

## 2021-08-03 NOTE — MED STUDENT NOTES
*ATTENTION:  This note has been created by a medical student for educational purposes only. Please do not refer to the content of this note for clinical decision-making, billing, or other purposes. Please see attending physicians note to obtain clinical information on this patient. *            Progress Note    Patient: William Salazar MRN: [id number]  SSN: [id number]    YOB: 1940  Age: 79 y.o. Sex: male      Admit Date: 7/27/2021    LOS: 7 days     Subjective:     Patient reports no nausea after breakfast today. He mentioned he was happy about this since it can sometimes last all day. He says he experiences nausea at home, but the dizziness was totally new. He was dizzy yesterday morning but not in the afternoon. Objective:     Vitals:    08/02/21 2122 08/03/21 0044 08/03/21 0516 08/03/21 0739   BP: (!) 101/58 (!) 112/52 126/66 124/63   Pulse: 70 70 71 75   Resp: 20 18 16 17   Temp: 98.4 °F (36.9 °C) 98.6 °F (37 °C) 99.1 °F (37.3 °C) 97.9 °F (36.6 °C)   SpO2: 98% 95% 93% 93%   Weight:       Height:            Intake and Output:  Current Shift: 08/03 0701 - 08/03 1900  In: -   Out: 300 [Urine:300]  Last three shifts: 08/01 1901 - 08/03 0700  In: 880 [P.O.:780; I.V.:100]  Out: 3450 [Urine:3450]    Physical Exam:   GENERAL: alert, cooperative, no distress, appears stated age    Lab/Data Review: All lab results for the last 24 hours reviewed. Other staff entered, no other tests performed    Assessment:     Hyponatremia, multifactorial in the setting of CHF and diuretic use and possible SIADH  Chronic systolic congestive heart failureEF of 45%  History of atrial fibrillationon Coumadin  History of sick sinus syndromehas a pacemaker  Remote history of esophagectomy  Possible liver mass on CT scan. Patient states that he follows up with gastroenterology Dr. Romain Jansen regarding his liver issues.   I called and discussed with the gastroenterology APC and she will schedule a close outpatient follow-up with Dr. Chanell Daugherty to follow-up on the possible liver mass noted on CT head. Pancytopenia of unclear etiology.   Close outpatient follow-up with hematology    Plan:     He was about to go on a walk with the therapists    Signed By: Tate Cisneros     August 3, 2021

## 2021-08-03 NOTE — PROGRESS NOTES
In Patient Progress note      Admit Date: 7/27/2021    Impression:     #1 severe euvolumic AC on chronic hyponatremia, etiology d/t SIADH , urine osm was 570 , urine Na 80s   Etiology of SIADH unclear , but workup for etiology in process,  #2 history of CHF with 45% EF, appears to be compensated  #3 history of atrial fibrillation  #4 history of hypothyroidism  #5 previous history of DVT on anticoagulation  #6 possibility of liver mass     Improving symptoms  Orthostatics negative   Na 133 this am  ORIENTED x 3   HTN stable   No dizzyness    Plan:  #1 urea Na 15 gm BID   #2 ACTH stim test as random cortisol was low   #3 check sodium daily   #4 hold diuretics for now     Ok to be discharged to rehab. I will follow the patient in rehab daily     Please call with questions,     Sabrina Concepcion MD FASN  Cell [phone]  Pager: [phone]    Subjective:     - No acute over night events. - respiratory - stable  - hemodynamics - stable, no pressrs  - UOP-ok  - Nutrition -poor     Objective:     Visit Vitals  /62 (BP 1 Location: Left upper arm, BP Patient Position: Standing)   Pulse 78   Temp 97.8 °F (36.6 °C)   Resp 17   Ht 5' 6\" (1.676 m)   Wt 65.5 kg (144 lb 8 oz)   SpO2 93%   BMI 23.32 kg/m²         Intake/Output Summary (Last 24 hours) at 8/3/2021 1321  Last data filed at 8/3/2021 0900  Gross per 24 hour   Intake 780 ml   Output 2850 ml   Net -2070 ml       Physical Exam:        Patient is in no apparent distress. HEENT: mmm  Neck: no cervical lymphadenopathy or thyromegaly. Lungs: good air entry, clear to auscultation bilaterally. Cardiovascular system: S1, S2, regular rate and rhythm. Abdomen: soft,distended   Extremities: trace edema   Integumentary: skin is grossly intact. Neurologic: Alert, oriented time three.      Data Review:    Recent Labs     08/03/21 0524   WBC 3.3*   RBC 2.69*   HCT 25.6*   MCV 95.2   MCH 33.8   MCHC 35.5   RDW 14.7*     Recent Labs     08/03/21 0824 08/03/21 0524 08/03/21  0300 08/02/21  2045 08/02/21  1400 08/02/21  1014 08/02/21  0440 08/01/21  2140 08/01/21  1712 08/01/21  0843 08/01/21  0536 07/31/21  1430   BUN  --  12 15  --   --   --  14  --   --   --  15  --    CREA  --  0.55* 0.61  --   --   --  0.60  --   --   --  0.58*  --    CA  --  8.6 8.1*  --   --   --  8.0*  --   --   --  8.5  --    K  --  4.2 4.3  --   --   --  4.5  --   --   --  4.3  --    * 132* 133* 129* 128* 130* 129*  129* 130* 127* 129* 128* 125*   CL  --  99* 99*  --   --   --  97*  --   --   --  96*  --    CO2  --  28 29  --   --   --  28  --   --   --  27  --    PHOS  --  3.6  --   --   --   --  3.5  --   --   --  3.2  --    GLU  --  90 97  --   --   --  80  --   --   --  74  --        Carolyn Espinoza MD

## 2021-08-03 NOTE — PROGRESS NOTES
[facility] Hospitalist Group  Progress Note    Patient: Juan Payton Age: 79 y.o. : 1940 MR#: [id number] SSN: [id number]  Date/Time: 8/3/2021    Subjective:     Patient is sitting in bed in no apparent distress, awake and alert. Patient states that he walked in the hallways with therapy today. Patient denies any nausea or dizziness. Nephew at bedside is    Assessment/Plan:     Hyponatremia, multifactorial in the setting of CHF and diuretic use and possible SIADH  Chronic systolic congestive heart failure-EF of 45%  History of atrial fibrillation-on Coumadin  History of sick sinus syndrome-has a pacemaker  Remote history of esophagectomy  Possible liver mass on CT scan. Patient states that he follows up with gastroenterology Dr. Shruthi Saeed regarding his liver issues. I called and discussed with the gastroenterology APC and she will schedule a close outpatient follow-up with Dr. Shruthi Saeed to follow-up on the possible liver mass noted on CT head. Pancytopenia of unclear etiology. Close outpatient follow-up with hematology. Plan  Monitor sodium, status post tolvaptan, nephrology is following  Coumadin 10 mg today  GI recommends outpatient follow-up for EGD  Patient is not orthostatic  PT and OT  Case management has been consulted  Palliative care input noted. Patient is DNR and DNI  Discussed with patient and family member at bedside. Discussed with .   Discussed with nephrology  I called and updated patient's daughter at phone #[phone]    Case discussed with:  [x]Patient  [x]Family  [x]Nursing  []Case Management  DVT Prophylaxis:  []Lovenox  []Hep SQ  []SCDs  [x]Coumadin   []On Heparin gtt    Objective:   VS:   Visit Vitals  /60 (BP 1 Location: Left upper arm, BP Patient Position: At rest;Sitting)   Pulse 77   Temp 97.3 °F (36.3 °C)   Resp 18   Ht 5' 6\" (1.676 m)   Wt 65.5 kg (144 lb 8 oz)   SpO2 95%   BMI 23.32 kg/m²      Tmax/24hrs: Temp (24hrs), Av.2 °F (36.8 °C), Min:97.3 °F (36.3 °C), Max:99.1 °F (37.3 °C)    Input/Output:     Intake/Output Summary (Last 24 hours) at 8/3/2021 1627  Last data filed at 8/3/2021 1500  Gross per 24 hour   Intake 1140 ml   Output 3350 ml   Net -2210 ml       General:  Awake, alert  Cardiovascular:  S1S2+, RRR  Pulmonary:  CTA b/l  GI:  Soft, BS+, NT, ND  Extremities:  trace edema        Labs:    Recent Results (from the past 24 hour(s))   SODIUM    Collection Time: 08/02/21  8:45 PM   Result Value Ref Range    Sodium 129 (L) 136 - 058 mmol/L   METABOLIC PANEL, BASIC    Collection Time: 08/03/21  3:00 AM   Result Value Ref Range    Sodium 133 (L) 136 - 145 mmol/L    Potassium 4.3 3.5 - 5.5 mmol/L    Chloride 99 (L) 100 - 111 mmol/L    CO2 29 21 - 32 mmol/L    Anion gap 5 3.0 - 18 mmol/L    Glucose 97 74 - 99 mg/dL    BUN 15 7.0 - 18 MG/DL    Creatinine 0.61 0.6 - 1.3 MG/DL    BUN/Creatinine ratio 25 (H) 12 - 20      GFR est AA >60 >60 ml/min/1.73m2    GFR est non-AA >60 >60 ml/min/1.73m2    Calcium 8.1 (L) 8.5 - 10.1 MG/DL   CBC WITH AUTOMATED DIFF    Collection Time: 08/03/21  5:24 AM   Result Value Ref Range    WBC 3.3 (L) 4.6 - 13.2 K/uL    RBC 2.69 (L) 4.35 - 5.65 M/uL    HGB 9.1 (L) 13.0 - 16.0 g/dL    HCT 25.6 (L) 36.0 - 48.0 %    MCV 95.2 74.0 - 97.0 FL    MCH 33.8 24.0 - 34.0 PG    MCHC 35.5 31.0 - 37.0 g/dL    RDW 14.7 (H) 11.6 - 14.5 %    PLATELET 922 (L) 269 - 420 K/uL    MPV 9.0 (L) 9.2 - 11.8 FL    NEUTROPHILS 68 40 - 73 %    LYMPHOCYTES 19 (L) 21 - 52 %    MONOCYTES 12 (H) 3 - 10 %    EOSINOPHILS 0 0 - 5 %    BASOPHILS 1 0 - 2 %    ABS. NEUTROPHILS 2.3 1.8 - 8.0 K/UL    ABS. LYMPHOCYTES 0.6 (L) 0.9 - 3.6 K/UL    ABS. MONOCYTES 0.4 0.05 - 1.2 K/UL    ABS. EOSINOPHILS 0.0 0.0 - 0.4 K/UL    ABS.  BASOPHILS 0.0 0.0 - 0.1 K/UL    DF AUTOMATED     METABOLIC PANEL, BASIC    Collection Time: 08/03/21  5:24 AM   Result Value Ref Range    Sodium 132 (L) 136 - 145 mmol/L    Potassium 4.2 3.5 - 5.5 mmol/L    Chloride 99 (L) 100 - 111 mmol/L CO2 28 21 - 32 mmol/L    Anion gap 5 3.0 - 18 mmol/L    Glucose 90 74 - 99 mg/dL    BUN 12 7.0 - 18 MG/DL    Creatinine 0.55 (L) 0.6 - 1.3 MG/DL    BUN/Creatinine ratio 22 (H) 12 - 20      GFR est AA >60 >60 ml/min/1.73m2    GFR est non-AA >60 >60 ml/min/1.73m2    Calcium 8.6 8.5 - 10.1 MG/DL   MAGNESIUM    Collection Time: 08/03/21  5:24 AM   Result Value Ref Range    Magnesium 1.9 1.6 - 2.6 mg/dL   PHOSPHORUS    Collection Time: 08/03/21  5:24 AM   Result Value Ref Range    Phosphorus 3.6 2.5 - 4.9 MG/DL   PROTHROMBIN TIME + INR    Collection Time: 08/03/21  5:24 AM   Result Value Ref Range    Prothrombin time 22.4 (H) 11.5 - 15.2 sec    INR 2.0 (H) 0.8 - 1.2     GLUCOSE, POC    Collection Time: 08/03/21  7:42 AM   Result Value Ref Range    Glucose (POC) 105 70 - 110 mg/dL   SODIUM    Collection Time: 08/03/21  8:24 AM   Result Value Ref Range    Sodium 132 (L) 136 - 145 mmol/L   GLUCOSE, POC    Collection Time: 08/03/21 10:57 AM   Result Value Ref Range    Glucose (POC) 103 70 - 110 mg/dL   SODIUM    Collection Time: 08/03/21  2:20 PM   Result Value Ref Range    Sodium 132 (L) 136 - 145 mmol/L   GLUCOSE, POC    Collection Time: 08/03/21  3:42 PM   Result Value Ref Range    Glucose (POC) 119 (H) 70 - 110 mg/dL         Signed By: Mayte Harper MD     August 3, 2021

## 2021-08-03 NOTE — DISCHARGE INSTRUCTIONS
Patient Education        Hyponatremia: Care Instructions  Your Care Instructions     Hyponatremia (say \"iy-nh-kjm-TREE-naveen-uh\") means that you don't have enough sodium in your blood. It can cause nausea, vomiting, and headaches. Or you may not feel hungry. In serious cases, it can cause seizures, a coma, or even death. Hyponatremia is not a disease. It is a problem caused by something else, such as medicines or exercising for a long time in hot weather. You can get hyponatremia if you lose a lot of fluids and then you drink a lot of water or other liquids that don't have much sodium. You can also get it if you have kidney, liver, heart, or other health problems. Treatment is focused on getting your sodium levels back to normal.  Follow-up care is a key part of your treatment and safety. Be sure to make and go to all appointments, and call your doctor if you are having problems. It's also a good idea to know your test results and keep a list of the medicines you take. How can you care for yourself at home? · If your doctor recommends it, drink fluids that have sodium. Sports drinks are a good choice. Or you can eat salty foods. · If your doctor recommends it, limit the amount of water you drink. And limit fluids that are mostly water. These include tea, coffee, and juice. · Take your medicines exactly as prescribed. Call your doctor if you have any problems with your medicine. · Get your sodium levels tested when your doctor tells you to. When should you call for help? Call 911 anytime you think you may need emergency care. For example, call if:    · You have a seizure.     · You passed out (lost consciousness).    Call your doctor now or seek immediate medical care if:    · You are confused or it is hard to focus.     · You have little or no appetite.     · You feel sick to your stomach or you vomit.     · You have a headache.     · You have mood changes.     · You feel more tired than usual.   Watch closely for changes in your health, and be sure to contact your doctor if:    · You do not get better as expected. Where can you learn more? Go to http://www.gray.com/  Enter U075 in the search box to learn more about \"Hyponatremia: Care Instructions. \"  Current as of: September 23, 2020               Content Version: 12.8  © 2006-2021 [employer]. Care instructions adapted under license by [employer] (which disclaims liability or warranty for this information). If you have questions about a medical condition or this instruction, always ask your healthcare professional. [place] [address] any warranty or liability for your use of this information. Patient Education        Taking Warfarin Safely: Care Instructions  Your Care Instructions     Warfarin is a medicine that you take to prevent blood clots. It is often called a blood thinner. Doctors give warfarin (such as Coumadin) to reduce the risk of blood clots. You may be at risk for blood clots if you have atrial fibrillation or deep vein thrombosis. Some other health problems may also put you at risk. Warfarin slows the amount of time it takes for your blood to clot. It can cause bleeding problems. Even if you've been taking warfarin for a while, it's important to know how to take it safely. Foods and other medicines can affect the way warfarin works. Some can make warfarin work too well. This can cause bleeding problems. And some can make it work poorly, so that it does not prevent blood clots very well. You will need regular blood tests to check how long it takes for your blood to form a clot. This test is called a PT or prothrombin time test. The result of the test is called an INR level. Depending on the test results, your doctor or anticoagulation clinic may adjust your dose of warfarin. Follow-up care is a key part of your treatment and safety.  Be sure to make and go to all appointments, and call your doctor if you are having problems. It's also a good idea to know your test results and keep a list of the medicines you take. How can you care for yourself at home? Take warfarin safely  · Take your warfarin at the same time each day. · If you miss a dose of warfarin, don't take an extra dose to make up for it. Your doctor can tell you exactly what to do so you don't take too much or too little. · Wear medical alert jewelry that lets others know that you take warfarin. You can buy this at most [employer]. · Don't take warfarin if you are pregnant or planning to get pregnant. Talk to your doctor about how you can prevent getting pregnant while you are taking it. · Don't change your dose or stop taking warfarin unless your doctor tells you to. Effects of medicines and food on warfarin  · Don't start or stop taking any medicines, vitamins, or natural remedies unless you first talk to your doctor. Many medicines can affect how warfarin works. These include aspirin and other pain relievers, over-the-counter medicines, multivitamins, dietary supplements, and herbal products. · Tell all of your doctors and pharmacists that you take warfarin. Some prescription medicines can affect how warfarin works. · Keep the amount of vitamin K in your diet about the same from day to day. Do not suddenly eat a lot more or a lot less food that is rich in vitamin K than you usually do. Vitamin K affects how warfarin works and how your blood clots. Talk with your doctor before making big changes in your diet. Foods that have a lot of vitamin K include cooked green vegetables, such as:  ? Kale, spinach, turnip greens, coy greens, Swiss chard, and mustard greens. ? [place] sprouts, broccoli, and cabbage. · Limit your use of alcohol. Avoid bleeding by preventing falls and injuries  · Wear slippers or shoes with nonskid soles. · Remove throw rugs and clutter.   · Rearrange furniture and electrical cords to keep them out of walking paths. · Keep stairways, porches, and outside walkways well lit. Use night-lights in hallways and bathrooms. · Be extra careful when you work with sharp tools or knives. When should you call for help? Call 911 anytime you think you may need emergency care. For example, call if:    · You have a sudden, severe headache that is different from past headaches. Call your doctor now or seek immediate medical care if:    · You have any abnormal bleeding, such as:  ? Nosebleeds. ? Vaginal bleeding that is different (heavier, more frequent, at a different time of the month) than what you are used to.  ? Bloody or black stools, or rectal bleeding. ? Bloody or pink urine. Watch closely for changes in your health, and be sure to contact your doctor if you have any problems. Where can you learn more? Go to http://www.gray.com/  Enter Z813 in the search box to learn more about \"Taking Warfarin Safely: Care Instructions. \"  Current as of: August 31, 2020               Content Version: 12.8  © 7436-0223 [employer]. Care instructions adapted under license by [employer] (which disclaims liability or warranty for this information). If you have questions about a medical condition or this instruction, always ask your healthcare professional. [place] [address] any warranty or liability for your use of this information.

## 2021-08-03 NOTE — PROGRESS NOTES
0730:  Report received, care assumed. 0800:  Complete assessment performed. AAOx4. Denies pain or discomfort. Reinforced need to turn side-side, stay off sacrum due to stage 1 redness; pt verbalizes understanding info. Assisted with reposition to left side. Breakfast served. 0900: Therapy assisted oob to recliner. 1000: Therapy assisted to ambulate in halls. Tolerated well. Therapy returned to bed per pt request.  1030:  Dr Aiken Lay on rounds. Orthostatic BP checked, negative, values filed in chart and shown to MD. Oral fluid restriction increased from 1000cc/day to 1200cc/day by MD now. Pt continues to self turn side-side. 1100:  New orders received including labs and discharge, see chart. 1530:  Ambulated in CVT halls using rolling walker approx 150ft, RN at side, minimal assist; tolerated well. Returned to recliner, legs elevated. Swab for Rapid Covid performed, sent to lab.  1600:  Noted Discharge order is discontinued. 1840:  Rapid Covid Negative. Isolation discontinued. 1900:  Daughter leaving bedside. Bedside and Verbal shift change report given to MARVIN Orozco (oncoming nurse) by Mirlande Bonilla RN(offgoing nurse). Report included the following information SBAR, Kardex, Intake/Output, MAR, Accordion, Recent Results, Cardiac Rhythm AV paced. and Alarm Parameters .

## 2021-08-03 NOTE — PROGRESS NOTES
ARU/IPR REFERRAL CONTACT NOTE  [address] [place] [address] for Physical Rehabilitation    RE: Cullen Perry     Thank you for the opportunity to review this patient's case for admission to [address] [place] [address] for Physical Rehabilitation. Based on our pre-admission screening:     [x ] This patient does not meet criteria for admission to [facility] for Physical        Rehabilitation due to:    [x ] Too functional, per documentation, patient does not require both Physical and Occupational Therapy Services at an acute rehabilitation level of intensity. Again, Thank you for this referral. Should you have any questions please do not hesitate to call. Sincerely,  Amarilys Villarreal. Javier Pham, [address]   Javier Pham, MARVIN  Admissions [facility] for Physical Rehabilitation  [phone]

## 2021-08-03 NOTE — PROGRESS NOTES
[email or web address]  [phone]    Gastroenterology follow up-Progress note    Impression:  1. Nausea - improved, usually occurs after breakfast, gets relief with Phenergan 1 to 2 times per day  2. Constipation - on Linzess  3. Abnormal CT Liver - AFP recently normal at 1.0 (6/8/21)  - Recent RUQ US normal  4. Cirrhosis - followed by Dr. Jerzy Priest  5. Hx esophageal cancer s/p esophagectomy 2001  6. A fib - on coumadin  7. Hx Sick sinus syndrome - Pacemaker  8. Hyponatremia - Na 119 on admission - improved to 130  9. Pancytopenia    Plan:  1. Continue antiemetics prn  2. Keep EGD as scheduled for 8/11/21 with Dr. Jerzy Priest. - Given that he is tolerating diet no indication for urgent EGD this admission. He is on Coumadin which would have to be held prior to a procedure. Given that he is stable from GI standpoint, we will keep his procedure as previously scheduled. 3. Continue PPI  4. Outpatient follow up for abnormal CT - consider multiphasic CT vs MRI for follow up. 5. Continue medical management per primary team    Chief Complaint: Nausea, abnormal CT liver      Subjective:  He is feeling better, nausea usually starts after breakfast, ate less this morning to see what happens, has been getting phenergan 1 to 2 times per day. He is concerned about when to stop coumadin and if he will be able to have EGD scheduled for next week. There are plans to go to rehab. ROS: Denies any fevers, chills, rash.      Eyes: conjunctiva normal, EOM normal   Neck: ROM normal, supple and trachea normal   Cardiovascular: heart normal, intact distal pulses, normal rate and regular rhythm   Pulmonary/Chest Wall: breath sounds normal and effort normal   Abdominal: appearance normal, bowel sounds normal and soft, non-acute, non-tender     Patient Active Problem List   Diagnosis Code    Nausea alone R11.0    Esophageal reflux K21.9    Gout, unspecified M10.9    Generalized osteoarthrosis, involving multiple sites M15.9    Atrial fibrillation ([address] I48.91    Pacemaker Z95.0    Dizziness R42    Personal history of esophageal cancer Z85.01    Weight loss R63.4    Hyponatremia E87.1         Visit Vitals  /62 (BP 1 Location: Left upper arm, BP Patient Position: Standing)   Pulse 78   Temp 97.8 °F (36.6 °C)   Resp 17   Ht 5' 6\" (1.676 m)   Wt 65.5 kg (144 lb 8 oz)   SpO2 93%   BMI 23.32 kg/m²           Intake/Output Summary (Last 24 hours) at 8/3/2021 1152  Last data filed at 8/3/2021 0900  Gross per 24 hour   Intake 780 ml   Output 2850 ml   Net -2070 ml       CBC w/Diff    Lab Results   Component Value Date/Time    WBC 3.3 (L) 08/03/2021 05:24 AM    RBC 2.69 (L) 08/03/2021 05:24 AM    HGB 9.1 (L) 08/03/2021 05:24 AM    HCT 25.6 (L) 08/03/2021 05:24 AM    MCV 95.2 08/03/2021 05:24 AM    MCH 33.8 08/03/2021 05:24 AM    MCHC 35.5 08/03/2021 05:24 AM    RDW 14.7 (H) 08/03/2021 05:24 AM     (L) 08/03/2021 05:24 AM    Lab Results   Component Value Date/Time    GRANS 68 08/03/2021 05:24 AM    LYMPH 19 (L) 08/03/2021 05:24 AM    EOS 0 08/03/2021 05:24 AM    BASOS 1 08/03/2021 05:24 AM      Basic Metabolic Profile   Recent Labs     08/03/21  0524   *   K 4.2   CL 99*   CO2 28   BUN 12   CA 8.6   MG 1.9   PHOS 3.6        Hepatic Function    Lab Results   Component Value Date/Time    ALB 3.4 07/27/2021 07:55 PM    TP 7.8 07/27/2021 07:55 PM     (H) 07/27/2021 07:55 PM    No results found for: TBIL       Coags   Recent Labs     08/03/21  0524 08/02/21  0440   PTP 22.4* 20.4*   INR 2.0* 1.8*               TROY Gonzalez    [facility]. [email or web address]  Phone: [phone]  Pager: [phone]

## 2021-08-03 NOTE — PROGRESS NOTES
56- CM spoke with Tay Buddy from [facility]. They are currently closed for admissions at this time with no estimated opening date. 1132-CM spoke with Bryanna Kendrick from [facility]. She will contact CM back after huddle to discuss if pt is accepted to ARU for admission. 1215- CM received a message from Bryanna Kendrick. ARU is unable to accept. Pt is too high level. 1217- CM called Nati Rashid from [facility] again to discuss if they could accept pt. Nati Rashid reports that she will look at pt's chart and contact CM back with their decision. 1220- CM sent a message to [facility] to asking if they are able to accept pt. Awaiting a return call. [facility] has declined pt.     1300- Pt has been accepted by [facility]. They are requesting a COVID test prior to admission. CM discussed with MD Femi Moe. MD to order COVID test.    1400- CM spoke with pt's daughter Nasra Arcos. CM informed her of the 2 accepting SNFs. Pt's daughter requesting an explanation why pt was not accepted to ARU. She voiced that she has Cindi's number and will contact the MD in ARU. At this time, Nasra Arcos is in agreement to pursue [facility] [place] [facility] tomorrow afternoon.      Michelle Yang RN BSN  Care Manager  [phone]

## 2021-08-03 NOTE — PROGRESS NOTES
Ua and urine sodium ordered  Tolvapton 15 mg po X1   Check sodium in am  Transition to urea or sodium tab/Lasix in am   Fluid restriction 1 lit     Marcin md langn

## 2021-08-03 NOTE — PROGRESS NOTES
Problem: Mobility Impaired (Adult and Pediatric)  Goal: *Acute Goals and Plan of Care (Insert Text)  Description: Physical Therapy Goals  Initiated 7/30/2021 and to be accomplished within 7 day(s)  1. Patient will move from supine to sit and sit to supine  in bed with modified independence. 2.  Patient will transfer from bed to chair and chair to bed with modified independence using the least restrictive device. 3.  Patient will perform sit to stand with modified independence. 4.  Patient will ambulate with modified independence for 150 feet with the least restrictive device. 5.  Patient will ascend/descend 3 stairs with B handrail(s) with supervision/set-up. PLOF: pt lives alone in a [facility] [place] with 3 JONES. He was Dinorah with a SPC but was recently using a RW. Outcome: Progressing Towards Goal       PHYSICAL THERAPY TREATMENT    Patient: Sofi Montoya (8 y.o. male)  Date: 8/3/2021  Diagnosis: Hyponatremia [E87.1] <principal problem not specified>       Precautions: Fall, Skin      ASSESSMENT:  Pt cleared to participate in PT session, pt received semi-reclined in bedside recliner and agreeable to therapy session. Pt completing sit to stand with BUE push off from armrests to RW. Pt standing with trunk flexion and forward head with decreased weight shift to LLE. Pt reporting previous L EDITA with continued pain and decreased strength. Pt ambulating x100 feet with CGA-SBA. Pt requesting to return to room after 50 feet. Pt demonstrating shortened step length through LLE and reports of fatigue. Pt reporting unable to speak with PT while ambulating and to save questions for after session was over. Pt motivated to get up again and PT discussed with RN. Pt requesting to return to supine in bed with increased time and SBA. Pt reporting he has an adjustable bed at home. Pt positioned for comfort and educated to call for assist before getting up, pt verbalized understanding.  Pt left with all needs met and call bell in reach. RN notified of position and participation. Progression toward goals:   []      Improving appropriately and progressing toward goals  [x]      Improving slowly and progressing toward goals  []      Not making progress toward goals and plan of care will be adjusted     PLAN:  Patient continues to benefit from skilled intervention to address the above impairments. Continue treatment per established plan of care. Discharge Recommendations:  Home Health with 24/7 supervision vs Rehab  Further Equipment Recommendations for Discharge:  has RW at home     SUBJECTIVE:   Patient stated It feels good to get up, .    OBJECTIVE DATA SUMMARY:   Critical Behavior:  Neurologic State: Alert  Orientation Level: Oriented X4  Cognition: Appropriate decision making, Appropriate for age attention/concentration, Appropriate safety awareness  Safety/Judgement: Fall prevention, Home safety  Functional Mobility Training:  Bed Mobility:     Supine to Sit:  (found sitting in recliner )     Scooting: Stand-by assistance (short sitting in recliner )    Transfers:  Sit to Stand: Contact guard assistance  Stand to Sit: Contact guard assistance    Balance:  Sitting: Intact  Standing: Impaired; With support  Standing - Static: Good  Standing - Dynamic : Fair (+ )      Posture:   Posture (WDL): Exceptions to WDL   Posture Assessment: Forward head;Trunk flexion     Ambulation/Gait Training:  Distance (ft): 100 Feet (ft)  Assistive Device: Walker, rolling  Ambulation - Level of Assistance: Stand-by assistance;Contact guard assistance    Gait Abnormalities: Decreased step clearance    Base of Support: Narrowed  Stance: Left decreased  Speed/Donna: Slow;Shuffled  Step Length: Right shortened;Left shortened      Pain:  Pain level pre-treatment: 0/10  Pain level post-treatment: 0/10    Activity Tolerance:   Improving tolerance     Please refer to the flowsheet for vital signs taken during this treatment.   After treatment:   [] Patient left in no apparent distress sitting up in chair  [x] Patient left in no apparent distress in bed  [x] Call bell left within reach  [x] Nursing notified  [] Caregiver present  [] Bed alarm activated  [] SCDs applied      COMMUNICATION/EDUCATION:   [x]         Role of Physical Therapy in the acute care setting. [x]         Fall prevention education was provided and the patient/caregiver indicated understanding. [x]         Patient/family have participated as able in working toward goals and plan of care. [x]         Patient/family agree to work toward stated goals and plan of care. []         Patient understands intent and goals of therapy, but is neutral about his/her participation.   []         Patient is unable to participate in stated goals/plan of care: ongoing with therapy staff.  []         Other:        Leatha Bates PT   Time Calculation: 17 mins

## 2021-08-03 NOTE — PROGRESS NOTES
Bedside turnover given to Landy. CHRIS,MAR,ED summary given with updates R/T the night, a chance to ask questions was given. PT is on the cardiac tele monitor in stable condition. Bed is in the lowest position with the wheels locked. Call bell and personal effects  are on the bedside table within reach.

## 2021-08-04 VITALS
SYSTOLIC BLOOD PRESSURE: 107 MMHG | HEIGHT: 66 IN | DIASTOLIC BLOOD PRESSURE: 50 MMHG | BODY MASS INDEX: 23.22 KG/M2 | OXYGEN SATURATION: 100 % | TEMPERATURE: 97.6 F | WEIGHT: 144.5 LBS | RESPIRATION RATE: 19 BRPM | HEART RATE: 90 BPM

## 2021-08-04 LAB
ACTH PLAS-MCNC: 32.4 PG/ML (ref 7.2–63.3)
ANION GAP SERPL CALC-SCNC: 4 MMOL/L (ref 3–18)
BASOPHILS # BLD: 0 K/UL (ref 0–0.1)
BASOPHILS NFR BLD: 1 % (ref 0–2)
BUN SERPL-MCNC: 22 MG/DL (ref 7–18)
BUN/CREAT SERPL: 42 (ref 12–20)
CALCIUM SERPL-MCNC: 8.4 MG/DL (ref 8.5–10.1)
CHLORIDE SERPL-SCNC: 99 MMOL/L (ref 100–111)
CO2 SERPL-SCNC: 29 MMOL/L (ref 21–32)
CORTIS 1H P CHAL SERPL-MCNC: 19.4 UG/DL
CORTIS 30M P CHAL SERPL-MCNC: 17.6 UG/DL
CORTIS BS SERPL-MCNC: 11.3 UG/DL
CORTIS SERPL-MCNC: 11.2 UG/DL
CREAT SERPL-MCNC: 0.53 MG/DL (ref 0.6–1.3)
DIFFERENTIAL METHOD BLD: ABNORMAL
EOSINOPHIL # BLD: 0 K/UL (ref 0–0.4)
EOSINOPHIL NFR BLD: 0 % (ref 0–5)
ERYTHROCYTE [DISTWIDTH] IN BLOOD BY AUTOMATED COUNT: 14.8 % (ref 11.6–14.5)
GLUCOSE SERPL-MCNC: 94 MG/DL (ref 74–99)
HCT VFR BLD AUTO: 25.9 % (ref 36–48)
HGB BLD-MCNC: 9 G/DL (ref 13–16)
INR PPP: 3.3 (ref 0.8–1.2)
LYMPHOCYTES # BLD: 0.6 K/UL (ref 0.9–3.6)
LYMPHOCYTES NFR BLD: 19 % (ref 21–52)
MAGNESIUM SERPL-MCNC: 1.8 MG/DL (ref 1.6–2.6)
MCH RBC QN AUTO: 33.5 PG (ref 24–34)
MCHC RBC AUTO-ENTMCNC: 34.7 G/DL (ref 31–37)
MCV RBC AUTO: 96.3 FL (ref 74–97)
MONOCYTES # BLD: 0.3 K/UL (ref 0.05–1.2)
MONOCYTES NFR BLD: 10 % (ref 3–10)
NEUTS SEG # BLD: 2.2 K/UL (ref 1.8–8)
NEUTS SEG NFR BLD: 70 % (ref 40–73)
PHOSPHATE SERPL-MCNC: 3.5 MG/DL (ref 2.5–4.9)
PLATELET # BLD AUTO: 104 K/UL (ref 135–420)
PMV BLD AUTO: 9 FL (ref 9.2–11.8)
POTASSIUM SERPL-SCNC: 4.2 MMOL/L (ref 3.5–5.5)
PROTHROMBIN TIME: 32.7 SEC (ref 11.5–15.2)
RBC # BLD AUTO: 2.69 M/UL (ref 4.35–5.65)
SODIUM SERPL-SCNC: 132 MMOL/L (ref 136–145)
SODIUM SERPL-SCNC: 134 MMOL/L (ref 136–145)
WBC # BLD AUTO: 3.1 K/UL (ref 4.6–13.2)

## 2021-08-04 PROCEDURE — 36415 COLL VENOUS BLD VENIPUNCTURE: CPT

## 2021-08-04 PROCEDURE — 84295 ASSAY OF SERUM SODIUM: CPT

## 2021-08-04 PROCEDURE — 74011250637 HC RX REV CODE- 250/637: Performed by: EMERGENCY MEDICINE

## 2021-08-04 PROCEDURE — 85025 COMPLETE CBC W/AUTO DIFF WBC: CPT

## 2021-08-04 PROCEDURE — 99239 HOSP IP/OBS DSCHRG MGMT >30: CPT | Performed by: FAMILY MEDICINE

## 2021-08-04 PROCEDURE — 84100 ASSAY OF PHOSPHORUS: CPT

## 2021-08-04 PROCEDURE — 80048 BASIC METABOLIC PNL TOTAL CA: CPT

## 2021-08-04 PROCEDURE — 74011250637 HC RX REV CODE- 250/637: Performed by: INTERNAL MEDICINE

## 2021-08-04 PROCEDURE — 83735 ASSAY OF MAGNESIUM: CPT

## 2021-08-04 PROCEDURE — 85610 PROTHROMBIN TIME: CPT

## 2021-08-04 RX ADMIN — PANTOPRAZOLE SODIUM 40 MG: 40 TABLET, DELAYED RELEASE ORAL at 09:12

## 2021-08-04 RX ADMIN — Medication 10 ML: at 00:03

## 2021-08-04 RX ADMIN — Medication 1 PACKET: at 09:13

## 2021-08-04 RX ADMIN — CYANOCOBALAMIN TAB 500 MCG 500 MCG: 500 TAB at 09:12

## 2021-08-04 RX ADMIN — CHOLECALCIFEROL TAB 25 MCG (1000 UNIT) 2000 UNITS: 25 TAB at 09:12

## 2021-08-04 RX ADMIN — LINACLOTIDE 145 MCG: 145 CAPSULE, GELATIN COATED ORAL at 09:13

## 2021-08-04 RX ADMIN — DOCUSATE SODIUM 100 MG: 100 CAPSULE, LIQUID FILLED ORAL at 09:12

## 2021-08-04 RX ADMIN — Medication 10 ML: at 05:54

## 2021-08-04 NOTE — PROGRESS NOTES
1730 - Verbal shift change report given to Donna Reich RN at [facility], Report included the following information SBAR, Kardex, Intake/Output, MAR, Recent Results and Cardiac Rhythm Paced.  for pt set up for 1800    1745 - I have reviewed discharge instructions with the patient and daughter. The patient and daughter verbalized understanding.     1850 - transport on unit to take pt

## 2021-08-04 NOTE — PROGRESS NOTES
[email or web address]  [phone]    Gastroenterology follow up-Progress note    Impression:  1. Nausea - improved, no need for phenergan today  2. Constipation - on Linzess  3. Abnormal CT Liver - AFP recently normal at 1.0 (6/8/21)  - Recent RUQ US normal  4. Cirrhosis - plat 104, followed by Dr. Genesis Knowles  5. Hx esophageal cancer s/p esophagectomy 2001  6. A fib - on coumadin  7. Hx Sick sinus syndrome - Pacemaker  8. Hyponatremia - Na 119 on admission - improved to 130  9. Pancytopenia    Plan:  1. Continue antiemetics prn  2. Will plan for previously scheduled EGD once he completes rehab at SNF  3. Continue PPI  4. Outpatient follow up for abnormal CT - consider multiphasic CT vs MRI for follow up. 5. Continue medical management per primary team    Will sign off-Thank you for this consultation and the opportunity to participate in the care of this patient. Please do not hesitate to call with any questions or concerns, or should event occur that may necessitate additional GI evaluation. Chief Complaint: Nausea, abnormal liver CT      Subjective:  He feels better today, no nausea after consuming two meals. Discussed rescheduling what appears to be a non-urgent EGD, this can be done when he is finished with rehab. ROS: Denies any fevers, chills, rash.      Eyes: conjunctiva normal, EOM normal   Neck: ROM normal, supple and trachea normal   Cardiovascular: heart normal, intact distal pulses, normal rate and regular rhythm   Pulmonary/Chest Wall: breath sounds normal and effort normal   Abdominal: appearance normal, bowel sounds normal and soft, non-acute, non-tender     Patient Active Problem List   Diagnosis Code    Nausea alone R11.0    Esophageal reflux K21.9    Gout, unspecified M10.9    Generalized osteoarthrosis, involving multiple sites M15.9    Atrial fibrillation (HCC) I48.91    Pacemaker Z95.0    Dizziness R42    Personal history of esophageal cancer Z85.01    Weight loss R63.4    Hyponatremia E87.1         Visit Vitals  BP (!) 95/56 (BP 1 Location: Left upper arm, BP Patient Position: At rest) Comment: RN notified   Pulse 68   Temp 97.3 °F (36.3 °C)   Resp 16   Ht 5' 6\" (1.676 m)   Wt 65.5 kg (144 lb 8 oz)   SpO2 96%   BMI 23.32 kg/m²           Intake/Output Summary (Last 24 hours) at 8/4/2021 1411  Last data filed at 8/4/2021 1311  Gross per 24 hour   Intake 763 ml   Output 1800 ml   Net -1037 ml       CBC w/Diff    Lab Results   Component Value Date/Time    WBC 3.1 (L) 08/04/2021 03:25 AM    RBC 2.69 (L) 08/04/2021 03:25 AM    HGB 9.0 (L) 08/04/2021 03:25 AM    HCT 25.9 (L) 08/04/2021 03:25 AM    MCV 96.3 08/04/2021 03:25 AM    MCH 33.5 08/04/2021 03:25 AM    MCHC 34.7 08/04/2021 03:25 AM    RDW 14.8 (H) 08/04/2021 03:25 AM     (L) 08/04/2021 03:25 AM    Lab Results   Component Value Date/Time    GRANS 70 08/04/2021 03:25 AM    LYMPH 19 (L) 08/04/2021 03:25 AM    EOS 0 08/04/2021 03:25 AM    BASOS 1 08/04/2021 03:25 AM      Basic Metabolic Profile   Recent Labs     08/04/21  0946 08/04/21  0325 08/04/21  0325   *   < > 132*  134*   K  --   --  4.2   CL  --   --  99*   CO2  --   --  29   BUN  --   --  22*   CA  --   --  8.4*   MG  --   --  1.8   PHOS  --   --  3.5    < > = values in this interval not displayed. Hepatic Function    Lab Results   Component Value Date/Time    ALB 3.4 07/27/2021 07:55 PM    TP 7.8 07/27/2021 07:55 PM     (H) 07/27/2021 07:55 PM    No results found for: TBIL       Coags   Recent Labs     08/04/21  0325 08/03/21  0524   PTP 32.7* 22.4*   INR 3.3* 2.0*               TROY Gonzalez    [facility]. [employer]  Phone: [phone]  Pager: [phone]    ]

## 2021-08-04 NOTE — PROGRESS NOTES
In Patient Progress note      Admit Date: 7/27/2021    Impression:     #1 severe euvolumic AC on chronic hyponatremia, etiology d/t SIADH , urine osm was 570 , urine Na 80s   Etiology of SIADH unclear , but workup for etiology in process,  #2 history of CHF with 45% EF, appears to be compensated  #3 history of atrial fibrillation  #4 history of hypothyroidism  #5 previous history of DVT on anticoagulation  #6 possibility of liver mass       Na 132 this am  ORIENTED x 3   HTN stable   No dizziness  Able to ambulate     Plan:  #1 continue urea Na 15 gm BID   #2 f/u results on  ACTH stim test , renin , marcus   #3 check sodium weekly  #4 hold off  diuretics for now     Ok to be discharged to SNF. F/u in 2-3 weeks      Please call with questions,     Jaimee Horne MD [facility]  Cell [phone]  Pager: [phone]    Subjective:     - No acute over night events. - respiratory - stable  - hemodynamics - stable, no pressors  - UOP-ok  - Nutrition -poor     Objective:     Visit Vitals  /64 (BP 1 Location: Left upper arm, BP Patient Position: At rest)   Pulse 78   Temp 97.7 °F (36.5 °C)   Resp 17   Ht 5' 6\" (1.676 m)   Wt 65.5 kg (144 lb 8 oz)   SpO2 94%   BMI 23.32 kg/m²         Intake/Output Summary (Last 24 hours) at 8/4/2021 1136  Last data filed at 8/4/2021 3509  Gross per 24 hour   Intake 1003 ml   Output 1600 ml   Net -597 ml       Physical Exam:        Patient is in no apparent distress. HEENT: mmm  Neck: no cervical lymphadenopathy or thyromegaly. Lungs: good air entry, clear to auscultation bilaterally. Cardiovascular system: S1, S2, regular rate and rhythm. Abdomen: soft,distended   Extremities: trace edema   Integumentary: skin is grossly intact. Neurologic: Alert, oriented time three.      Data Review:    Recent Labs     08/04/21  0325   WBC 3.1*   RBC 2.69*   HCT 25.9*   MCV 96.3   MCH 33.5   MCHC 34.7   RDW 14.8*     Recent Labs     08/04/21  0946 08/04/21  0325 08/03/21  2047 08/03/21  1420 08/03/21  0824 08/03/21  0524 08/03/21  0300 08/02/21  2045 08/02/21  1400 08/02/21  1014 08/02/21  0440 08/01/21  2140 08/01/21  1712   BUN  --  22*  --   --   --  12 15  --   --   --  14  --   --    CREA  --  0.53*  --   --   --  0.55* 0.61  --   --   --  0.60  --   --    CA  --  8.4*  --   --   --  8.6 8.1*  --   --   --  8.0*  --   --    K  --  4.2  --   --   --  4.2 4.3  --   --   --  4.5  --   --    * 132*  134* 130* 132* 132* 132* 133* 129* 128* 130* 129*  129* 130* 127*   CL  --  99*  --   --   --  99* 99*  --   --   --  97*  --   --    CO2  --  29  --   --   --  28 29  --   --   --  28  --   --    PHOS  --  3.5  --   --   --  3.6  --   --   --   --  3.5  --   --    GLU  --  94  --   --   --  90 97  --   --   --  80  --   --        Sebastien Slaughter MD

## 2021-08-04 NOTE — PROGRESS NOTES
Bedside report received on pt. No complaint voiced, call light within pt's reach.     PRN benadryl administered per pt request.

## 2021-08-04 NOTE — PROGRESS NOTES
Transition of Care Plan to SNF/Rehab    SNF/Rehab Transition:  Patient has been accepted to [facility] and meets criteria for admission. Patient will be transported by [facility] and expected to leave at 6pm.    Communication to Patient/Family:  Met with patient and daughter Marifer Kumari (identified care giver) and they are agreeable to the transition plan. Communication to SNF/Rehab:  Bedside RN, Ayla Hi, has been notified of the transition plan to the facility and to call report (phone number [phone]). Discharge information has been updated on the AVS. And communicated to facility via [facility], or CC link. SNF/Rehab Transition:    PCP/Specialist: Jorge Luis Stanford Please include all hard scripts for controlled substances, med rec and dc summary, and AVS in packet. Reviewed and confirmed with facility representative, Mira Jeffrey at [facility] they can manage the patient care needs for the following:     Ronnie Mcgraw with (X) only those applicable:    COVID Status  Patient has had the Covid vaccine Yes. If yes, dates:  4/27 and 5/18/2021.   Patient is Covid Negative      Medication:  [x]  Medications will be available at the facility  []  IV Antibiotics   []  Controlled Substance - hard copy to be sent with patient   []  Weekly Labs    Documents:  [] Hard RX  Number sent   [] MAR  [] Kardex  [x] AVS  [] Wound care note  [x]Transfer Summary/Discharge Summary    Equipment:  []  CPAP/BiPAP  []  Wound Vacuum  []  Dugan or Urinary Device  []  PICC/Central Line  []  Nebulizer  []  Ventilator    Treatment:  []Isolation (for MRSA, VRE, etc.)  []Surgical Drain Management  []Tracheostomy Care  []Dressing Changes  []Dialysis with transportation and chair time  Center Mode of tranpsort   []PEG Care  []Oxygen  []Daily Weights for Heart Failure    Dietary:  []Any diet limitations  []Tube Feedings   []Total Parenteral Management (TPN)    Eligible for Medicaid Long Term Services and Supports  Yes:  [] Eligible for medical assistance or will become eligible within 180 days and LTSS completed. [] Provider/Patient and/or support system has requested screening.  [] LTSS copy provided to patient or responsible party, .  [] LTSS unavailable at discharge will send once processed to SNF provider. [x] LTSS  Submitted for processing this date.  [] LTSS performed by outside agency  on  with tracking number   No:   [] Private pay and is not financially eligible for Medicaid within the next 180 days. [] Reside out-of-state. [] A residents of a state owned/operated facility that is licensed  by [place] [address] [employer] or [facility]  [] Enrollment in [facility] hospice services  [] [address]  [] Patient /Family declines to have screening completed or provide financial information for screening       LTSS/UAI submitted for processing  Tracking #[id number]     Financial Resources:  Medicaid    [] Initiated and application pending   [] Full coverage      Advanced Care Plan:  []Surrogate Decision Maker of Care  []POA  [x]Communicated Code Status/ sent DNR/ POST    Other  Medicare pt has received, reviewed, and signed 2nd IM letter informing them of their right to appeal the discharge. Signed copy has been placed on pt bedside chart.         Eugenia Tang RN BSN  Care Manager  [phone]

## 2021-08-04 NOTE — PROGRESS NOTES
Problem: Pressure Injury - Risk of  Goal: *Prevention of pressure injury  Description: Document Angelito Scale and appropriate interventions in the flowsheet. Outcome: Progressing Towards Goal  Note: Pressure Injury Interventions:  Sensory Interventions: Assess changes in LOC, Assess need for specialty bed, Avoid rigorous massage over bony prominences, Chair cushion, Check visual cues for pain, Discuss PT/OT consult with provider, Float heels, Keep linens dry and wrinkle-free, Maintain/enhance activity level, Minimize linen layers, Monitor skin under medical devices, Pad between skin to skin, Pressure redistribution bed/mattress (bed type), Sit a 90-degree angle/use footstool if needed, Turn and reposition approx. every two hours (pillows and wedges if needed), Use 30-degree side-lying position    Moisture Interventions: Absorbent underpads, Apply protective barrier, creams and emollients, Assess need for specialty bed, Check for incontinence Q2 hours and as needed, Contain wound drainage, Internal/External urinary devices, Limit adult briefs, Maintain skin hydration (lotion/cream), Minimize layers, Moisture barrier, Offer toileting Q_hr    Activity Interventions: Pressure redistribution bed/mattress(bed type)    Mobility Interventions: HOB 30 degrees or less, Pressure redistribution bed/mattress (bed type)    Nutrition Interventions: Document food/fluid/supplement intake, Offer support with meals,snacks and hydration    Friction and Shear Interventions: Apply protective barrier, creams and emollients, Foam dressings/transparent film/skin sealants, Minimize layers          Problem: Falls - Risk of  Goal: *Absence of Falls  Description: Document Abdiel Fall Risk and appropriate interventions in the flowsheet.   Outcome: Progressing Towards Goal  Note: Fall Risk Interventions:  Mobility Interventions: Bed/chair exit alarm, Communicate number of staff needed for ambulation/transfer, Patient to call before getting OOB Medication Interventions: Bed/chair exit alarm, Evaluate medications/consider consulting pharmacy, Patient to call before getting OOB    Elimination Interventions: Bed/chair exit alarm, Call light in reach, Patient to call for help with toileting needs, Toilet paper/wipes in reach, Urinal in reach       Problem: Risk for Spread of Infection  Goal: Prevent transmission of infectious organism to others  Description: Prevent the transmission of infectious organisms to other patients, staff members, and visitors.   Outcome: Progressing Towards Goal     Problem: Patient Education:  Go to Education Activity  Goal: Patient/Family Education  Outcome: Progressing Towards Goal

## 2021-08-04 NOTE — DISCHARGE SUMMARY
Discharge Summary    Patient: Sofi Montoya MRN: [id number]  CSN: [id number]    YOB: 1940  Age: 79 y.o. Sex: male    DOA: 7/27/2021 LOS:  LOS: 8 days   Discharge Date: 8/4/2021     Admission Diagnoses: Hyponatremia [E87.1]    Discharge Diagnoses:    Hyponatremia, multifactorial secondary to CHF and diuretic use as well as SIADH  Chronic systolic congestive heart failure, EF 45%  Atrial fibrillation, on anticoagulation therapy with Coumadin  Abnormal CT abdomen, concern for possible liver mass  Pancytopenia, etiology uncertain  History of sick sinus syndrome status post permanent pacemaker implantation  History of esophagectomy    Discharge Condition: Stable    PHYSICAL EXAM  Visit Vitals  /64 (BP 1 Location: Left upper arm, BP Patient Position: At rest)   Pulse 78   Temp 97.7 °F (36.5 °C)   Resp 17   Ht 5' 6\" (1.676 m)   Wt 65.5 kg (144 lb 8 oz)   SpO2 94%   BMI 23.32 kg/m²       General: In NAD. Nontoxic-appearing. HEENT: NCAT. Sclerae anicteric. Lungs:  Clear, no wheezes. No accessory muscle use. Heart:  RRR. Abdomen: Soft, NT/ND. Extremities: Warm, no edema or ischemia. Psych:   Mood normal.  Neurologic:  Awake and alert, moves extremities spontaneously. Hospital Course:   See admission H&P for full details of HPI. Patient was admitted to the hospital on 7/28 after presenting to the emergency department at the request of his PCP for evaluation of hyponatremia. On arrival to the emergency department sodium was noted to be severely low at 114 although there was no evidence for any acute neurologic complication. Patient was admitted to the ICU for acute management with nephrology in consultation. Sodium level has improved appropriately with treatment and patient did require tolvaptan and administration of this hospitalization. Urea has been initiated and recommended to be continued at discharge.   Nephrology also recommends a 1200 cc/day fluid restriction going forward. Patient was noted to have an abnormal CT of the abdomen with concern for possible liver mass. Gastroenterology has evaluated patient this hospitalization and recommendation is for outpatient follow-up for further imaging/work-up as deemed necessary. Patient was ordered Lopressor prior to admission for history of atrial fibrillation. This has been held this hospitalization due to marginal blood pressures. If blood pressures improve and remained stable could consider resuming this given potential issues that may develop with rate control. Recommend continuing to hold this for now, however. Physical and occupational therapy evaluations have been obtained and recommendation is rehab at discharge. Patient has been evaluated by ARU/[facility] but is not a candidate for their unit and has been declined admission here. He has been accepted to a subacute rehab facility, however, and is medically stable for transition there today for further management. Patient was seen in consultation by palliative care medicine this hospitalization and has been made DNR/DNI. POST form has been completed. Consults:   Palliative care medicine  Gastroenterology  Nephrology    Significant Diagnostic Studies/Procedures:   2D echo: Interpretation Summary    Result status: Final result   · Image quality for this study was technically difficult. · LV: Estimated LVEF is 55 - 60%. Visually measured ejection fraction. Normal cavity size and systolic function (ejection fraction normal). Moderate concentric hypertrophy. Wall motion: normal. Inconclusive left ventricular diastolic function. · LA: Severely dilated left atrium. Left Atrium volume index is 90 mL/m2. · MV: Mitral valve thickening. Mitral annular calcification. Mild mitral valve regurgitation is present. · RV: Reduced systolic function. · TV: Mild tricuspid valve regurgitation is present. · PA: Mild pulmonary hypertension.  Pulmonary arterial systolic pressure is 41 mmHg. Comparison Study Information    Prior Study    There is a prior study available for comparison. Prior study date: 11/6/2019. As compared to the previous study, there are no significant changes. CXR 7/27/2021:  FINDINGS: No pneumothorax identified. Interstitial opacities are noted. Small  bilateral pleural effusions are present. Pacemaker is unchanged with right  atrial and right ventricular leads. The cardiac silhouette is enlarged. The  pulmonary vasculature is prominent. Degenerative changes shoulders and spine are  noted.     IMPRESSION  1. Mild pulmonary edema and small bilateral pleural effusions. Discharge Medications:     Current Discharge Medication List      START taking these medications    Details   docusate sodium (COLACE) 100 mg capsule Take 1 Capsule by mouth two (2) times a day. Qty: 60 Capsule, Refills: 0      urea (URE-NA) 15 gram packet Take 1 Packet by mouth two (2) times a day. Qty: 30 Packet, Refills: 0      OTHER Graded compression stockings bilateral lower extremity-use as directed  Qty: 1 Each, Refills: 0         CONTINUE these medications which have CHANGED    Details   warfarin (COUMADIN) 5 mg tablet 10 mg of Coumadin p.o. for 1 dose on August 3, 2021 at 6 PM.  Further Coumadin dosing based on daily PT/INR checks-will defer to rehab doctor. Qty: 30 Tablet, Refills: 0         CONTINUE these medications which have NOT CHANGED    Details   ondansetron (ZOFRAN ODT) 4 mg disintegrating tablet DISSOLVE 1 TABLET ON TONGUE TWO TIMES A DAY AS NEEDED FOR NAUSEA  Qty: 50 Tab, Refills: 0      ipratropium (ATROVENT) 0.03 % nasal spray USE 2 SPRAYS EACH NOSTRIL TWO TIMES A DAY  Qty: 30 mL, Refills: 2      linaCLOtide (LINZESS) 72 mcg cap capsule Take  by mouth Daily (before breakfast).       megestrol (MEGACE) 40 mg tablet 1 tab daily  Qty: 30 Tab, Refills: 2      omeprazole (PRILOSEC) 20 mg capsule TAKE ONE CAPSULE BY MOUTH EVERY MORNING  Qty: 30 Cap, Refills: 0 allopurinol (ZYLOPRIM) 300 mg tablet TAKE ONE TABLET BY MOUTH ONCE DAILY  Qty: 90 Tab, Refills: 0      cyanocobalamin (VITAMIN B12) 500 mcg tablet Take 500 mcg by mouth daily. cholecalciferol, vitamin D3, 2,000 unit tab Take 1 Tab by mouth daily. STOP taking these medications       spironolactone (ALDACTONE) 25 mg tablet Comments:   Reason for Stopping:         raNITIdine hcl 150 mg capsule Comments:   Reason for Stopping:         furosemide (LASIX) 20 mg tablet Comments:   Reason for Stopping:         thiamine HCL (VITAMIN B-1) 100 mg tablet Comments:   Reason for Stopping:         metoprolol tartrate (LOPRESSOR) 50 mg tablet Comments:   Reason for Stopping:         magnesium 250 mg tab Comments:   Reason for Stopping:               Activity: As tolerated. Diet: Adult regular with 1200 mL/day fluid restriction. Disposition: SNF. Follow-up: Gastroenterology 1-2 weeks or as directed for follow-up of abnormal abdominal CT. Minutes spent on discharge: >30 minutes spent coordinating this discharge. Sunshine Biswas MD  [address] [place] [address]    Disclaimer: Sections of this note are dictated using utilizing voice recognition software. Minor typographical errors may be present. If questions arise, please do not hesitate to contact me or call our department.

## 2021-08-04 NOTE — ROUTINE PROCESS
Bedside shift change report given to Luana Patel RN (oncoming nurse) by [facility], RN (offgoing nurse). Report included the following information SBAR, Procedure Summary, Intake/Output, MAR, Recent Results and Cardiac Rhythm Paced.

## 2021-08-06 LAB — ALDOST SERPL-MCNC: 1.6 NG/DL (ref 0–30)

## 2021-08-08 LAB — RENIN PLAS-CCNC: 0.3 NG/ML/HR (ref 0.17–5.38)

## 2021-09-06 ENCOUNTER — DOCUMENTATION ONLY (OUTPATIENT)
Dept: NEPHROLOGY | Age: 81
End: 2021-09-06

## 2021-09-06 NOTE — PROGRESS NOTES
[facility]  Appointment: 9/3/2021 11:00 AM  Location: [facility]  Patient #: [id number]  : 1940  Undefined / Language: Yajaira Marx / Race: Undefined  Male      History of Present Illness Angelito Espinosa MD; 2021 6:44 AM)  The patient is a 79year old male who presents with a hyponatremia. Note: Patient f/u in office post hospital stay for hyponatremia      Hospital Course/summary:  Patient was admitted to the hospital on  after presenting to the emergency department at the request of his PCP for evaluation of hyponatremia. On arrival to the emergency department sodium was noted to be severely low at 114 although there was no evidence for any acute neurologic complication. Patient was admitted to the ICU for acute management with nephrology in consultation. Sodium level has improved appropriately with treatment and patient did require tolvaptan and administration of this hospitalization. Urea has been initiated and recommended to be continued at discharge. Nephrology also recommends a 1200 cc/day fluid restriction going forward. Patient was noted to have an abnormal CT of the abdomen with concern for possible liver mass. Gastroenterology has evaluated patient this hospitalization and recommendation is for outpatient follow-up for further imaging/work-up as deemed necessary. Patient was ordered Lopressor prior to admission for history of atrial fibrillation. This has been held this hospitalization due to marginal blood pressures. If blood pressures improve and remained stable could consider resuming this given potential issues that may develop with rate control.   Discharge Diagnoses:    Hyponatremia, multifactorial secondary to CHF and diuretic use as well as SIADH  Chronic systolic congestive heart failure, EF 45%  Atrial fibrillation, on anticoagulation therapy with Coumadin  Abnormal CT abdomen, concern for possible liver mass  Pancytopenia, etiology uncertain  History of sick sinus syndrome status post permanent pacemaker implantation  History of esophagectomy    Interval history:    sodium improved  no symptoms  denies SOB /CP    Problem List/Past Medical (Tonio Schroeder; 9/3/2021 11:05 AM)  Gout    Esophage Reflux    Osteoarthrosis    Atrial Fibrillation    esophageal cancer    pacemaker    Hyponatremia (E87.1)    Problems Reconciled      Allergies (Alissa Rice; 9/3/2021 11:05 AM)  Aspirin *ANALGESICS - NonNarcotic*    Parafon Forte DSC *MUSCULOSKELETAL THERAPY AGENTS*   Anaphylaxis. Allergies Reconciled      Social History (Tonio Schroeder; 9/2/2021 10:29 AM)  Alcohol Use   Moderate alcohol use. Tobacco use   Former smoker. Medication History (Alissa Rice; 9/3/2021 11:11 AM)  Docusate Sodium  (100MG Capsule, Oral) Active. (COLACE)  Urea  (15G) Active. (URE-NA)  Warfarin Sodium  (5MG Tablet, Oral) Active. (COUMADIN)  Ondansetron  (4MG Tablet Disint, Oral) Active. (ZOFRAN)  Ipratropium Bromide  (0.03% Solution, Nasal) Active. (ATROVENT)  linaCLOtide  (72MCG Capsule, Oral) Active. Eric Halter)  Megestrol Acetate  (40MG Tablet, Oral) Active. (MEGACE)  Omeprazole  (20MG Capsule DR, Oral) Active. (PRILOSEC)  Allopurinol  (300MG Tablet, Oral) Active. (ZYLOPRIM)  Cyanocobalamin  (500MCG Tablet, Oral) Active. (VITAMIN B12)  Cholecalciferol  (50 MCG(2000 UT) Tablet, Oral) Active. (VITAMIN D3)  Medications Reconciled     Past Surgical History (Tonionatalie Fernandezpanchito; 9/2/2021 10:34 AM)  Colonoscopy/endoscopy   [12/13/2017]:  pacemaker medtronic   [2012]:  IVC filter placed   [2003]: HEENT Esophageal Cancer Surgery   [2001]:  gi esophagectomy    hip replacement   [2005]: Health Maintenance History (Tonionatalie Fernandezpanchito; 9/3/2021 11:05 AM)  Colonoscopy, Screening   [12/13/2017]:  Pneumovax   [07/10/2018]: Flu Vaccine   [09/2020]:        Review of Systems Claudia Cortes MD; 9/6/2021 6:35 AM)  General Not Present- Anorexia, Chills, Fatigue and Fever. Skin Not Present- Bruising, Pruritus, Rash and Ulcer.   HEENT Not Present- Dry Mucous Membranes, Dysgeusia, Oral Ulcers, Periorbital Puffiness and Sore Throat. Respiratory Not Present- Cough, Difficulty Breathing on Exertion, Dyspnea and Hemoptysis. Cardiovascular Not Present- Chest Pain, Claudications, Orthopnea, Palpitations, Paroxysmal Nocturnal Dyspnea and Swelling of Extremities. Gastrointestinal Not Present- Abdominal Pain, Abdominal Swelling, Constipation, Diarrhea, Hematochezia, Melena, Nausea and Vomiting. Musculoskeletal Not Present- Joint Pain, Joint Redness, Joint Stiffness, Joint Swelling, Leg Cramps and Myalgia. Neurological Not Present- Dizziness, Headaches, Syncope and Trouble walking. Endocrine Not Present- Appetite Changes, Excessive Thirst, Polydipsia and Polyuria. Hematology Not Present- Easy Bruising and Excessive bleeding. Vitals (Miller Brill; 9/3/2021 11:07 AM)  9/3/2021 11:06 AM  Weight: 135 lb   Height: 63 in   Body Surface Area: 1.64 m²   Body Mass Index: 23.91 kg/m²    Pain Level: 0/10    Temp.: 97.8° F (Temporal)    Pulse: 83 (Regular)    P. OX: 95% (Room air)  BP: 112/62(Sitting, Left Arm, Standard)              Physical Exam Melia Burnett MD; 9/6/2021 6:35 AM)  General  General Appearance - Not in acute distress. Build & Nutrition - Well nourished and Well developed. Integumentary  General Characteristics  Overall examination of the patient's skin reveals - no rashes. Eye  Sclera/Conjunctiva - Bilateral - Nonicteric. ENMT  Mouth and Throat  Oral Cavity/Oropharynx - Gingiva - no ulcerations noted, No excessive growth of soft tissue present. Oral Mucosa - moist. Oropharynx - No presence of white exudate noted. Chest and Lung Exam  Auscultation  Breath sounds - Normal and Clear. Adventitious sounds - No Adventitious sounds. Cardiovascular  Cardiovascular examination reveals  - on palpation PMI is normal in location and amplitude, no palpable S3 or S4. Normal cardiac borders. , normal heart sounds, regular rate and rhythm with no murmurs, carotid auscultation reveals no bruits, abdominal aorta auscultation reveals no bruits, normal pedal pulses bilaterally and no digital clubbing, cyanosis, edema, increased warmth or tenderness. Abdomen  Inspection  Inspection of the abdomen reveals - No Abnormal pulsations. Palpation/Percussion  Palpation and Percussion of the abdomen reveal - Soft, Non Tender, No hepatosplenomegaly and No Palpable abdominal masses. Auscultation  Auscultation of the abdomen reveals - Bowel sounds normal and No Abdominal bruits. Neurologic  Neurologic evaluation reveals  - alert and oriented x 3 with no impairment of recent or remote memory. Lymphatic  General Lymphatics  Description - No Cervical lymphadeopathy. 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, etiology d/t SIADH, sodium 135  #2 history of CHF with 45% EF, appears to be compensated  #3 history of atrial fibrillation  #4 history of hypothyroidism  #5 previous history of DVT on anticoagulation  #6 possibility of liver mass       Plan:  #1 urea Na 15 gm every other day, discussed with patient  #2 check sodium level monthly X 3  #3 avoid Aldactone , ok to use lasix as needed  #4 hold off diuretics for now  #5 we will prescribe present dose of megestrol and zofran untill patient sees her PCP       Current Plans  CBC (17332)  CMP (45969)  RENAL FUNCTION PANEL (90090)  SODIUM URINE (24575)    Gout    Current Plans  URIC ACID BLOOD (79434)    Portal Access Education (Z71.9)    Current Plans  Pt Education - How to Access Health Information Online using Patient Portal and 3rd Party Apps: discussed with patient and provided information.   Note: f/u in 3-4 mths    Signed electronically by Cem Juan MD (9/6/2021 6:44 AM)

## 2021-09-09 ENCOUNTER — CLINICAL SUPPORT (OUTPATIENT)
Dept: CARDIOLOGY CLINIC | Age: 81
End: 2021-09-09

## 2021-09-09 ENCOUNTER — OFFICE VISIT (OUTPATIENT)
Dept: CARDIOLOGY CLINIC | Age: 81
End: 2021-09-09
Payer: MEDICARE

## 2021-09-09 ENCOUNTER — DOCUMENTATION ONLY (OUTPATIENT)
Dept: CARDIOLOGY CLINIC | Age: 81
End: 2021-09-09

## 2021-09-09 VITALS — OXYGEN SATURATION: 96 % | WEIGHT: 139 LBS | HEART RATE: 84 BPM | BODY MASS INDEX: 22.34 KG/M2 | HEIGHT: 66 IN

## 2021-09-09 DIAGNOSIS — Z86.39 HISTORY OF SIADH: ICD-10-CM

## 2021-09-09 DIAGNOSIS — I48.91 ATRIAL FIBRILLATION, UNSPECIFIED TYPE (HCC): ICD-10-CM

## 2021-09-09 DIAGNOSIS — I34.0 NONRHEUMATIC MITRAL VALVE REGURGITATION: ICD-10-CM

## 2021-09-09 DIAGNOSIS — I48.21 PERMANENT ATRIAL FIBRILLATION (HCC): ICD-10-CM

## 2021-09-09 DIAGNOSIS — Z95.0 PACEMAKER: Primary | ICD-10-CM

## 2021-09-09 DIAGNOSIS — E87.1 HYPONATREMIA: ICD-10-CM

## 2021-09-09 DIAGNOSIS — Z79.01 WARFARIN ANTICOAGULATION: ICD-10-CM

## 2021-09-09 DIAGNOSIS — R79.89 ABNORMALITY IN OTHER LIVER FUNCTION TEST: ICD-10-CM

## 2021-09-09 PROCEDURE — G8536 NO DOC ELDER MAL SCRN: HCPCS | Performed by: INTERNAL MEDICINE

## 2021-09-09 PROCEDURE — 93280 PM DEVICE PROGR EVAL DUAL: CPT | Performed by: INTERNAL MEDICINE

## 2021-09-09 PROCEDURE — G8427 DOCREV CUR MEDS BY ELIG CLIN: HCPCS | Performed by: INTERNAL MEDICINE

## 2021-09-09 PROCEDURE — 1101F PT FALLS ASSESS-DOCD LE1/YR: CPT | Performed by: INTERNAL MEDICINE

## 2021-09-09 PROCEDURE — G8420 CALC BMI NORM PARAMETERS: HCPCS | Performed by: INTERNAL MEDICINE

## 2021-09-09 PROCEDURE — G8510 SCR DEP NEG, NO PLAN REQD: HCPCS | Performed by: INTERNAL MEDICINE

## 2021-09-09 PROCEDURE — 99205 OFFICE O/P NEW HI 60 MIN: CPT | Performed by: INTERNAL MEDICINE

## 2021-09-09 RX ORDER — FUROSEMIDE 20 MG/1
TABLET ORAL
Qty: 30 TABLET | Refills: 6 | Status: SHIPPED | OUTPATIENT
Start: 2021-09-09 | End: 2022-07-21 | Stop reason: SDUPTHER

## 2021-09-09 NOTE — PROGRESS NOTES
Received calll from [facility] to verify that patient will be following in our Coumadin clinic for his INR checks and Coumadin dosing. Order form faxed from their office received and signed by Dr. Aggie Day; and faxed back to their office. Patient will have INR checked by their home health while under their services. Once patient discharged from their home health he will be given a follow up appointment in our office for INR checks.   INR range: 2-3  Dx: A fib  Current dose: Coumadin 5mg daily  Last INR 9/9/7/21: 2.0      [facility]  [address]    [facility], [address] [place] [address]  [phone]  Fax: [address] Nurse: Jesús Giraldo LPN  [phone]  : Jens Lambert RN  [phone]

## 2021-09-09 NOTE — PATIENT INSTRUCTIONS
Follow up with Dr. Nazario Doing in 3 months  Start Furosemide 20mg tablet every other day alternating with 1/2 tablet every other day.   Coumadin dosing to be followed by our Coumadin clinic

## 2021-09-09 NOTE — PROGRESS NOTES
History of Present Illness:  79year old male here to establish care. He previously saw Dr. Jose Monterroso and then Dr. Celi Mercer. He was in the hospital and admitted the end of July with hyponatremia, thought to be due to a combination of heart failure, SIADH and sodium had been down to 114. Since discharge he has been doing well. No new chest pain, dyspnea, PND, orthopnea. He does have lower extremity edema, which he is worried about. There was concern for liver abnormality and MRI scan has been recommended, however he has a non compatible MRI device. He is accompanied by family today, continues to live alone with the assistance of family. Impression:  1. Recent admission for hyponatremia, sodium 114, thought to be a combination of heart failure and SIADH, improved. 2. Chronic diastolic heart failure with echo July, 2021 with normal function. He is not taking Lasix 10 mg daily, but had been on 20 before. 3. Hx of pleural effusion. 4. Hx of SIADH. 5. Chronic and permanent a-fib, on Coumadin. 6. Dual chamber Medtronic pacemaker 2012. This is a non MRI compatible device. 7. Liver abnormality, contemplating MRI scan, unable to proceed. Plan:  I directed them to follow up with Dr. Christian Abdalla to let their office know that he does not have an MRI compatible pacemaker. At his next change-out I can switch it out for an MRI compatible one. In regards to diuretics, he has underlying diastolic heart failure, but whether or not other liver disease can be excluded is being evaluated. I have recommended he take Lasix 10 mg and 20 mg alternating. I am going to set him up for home Coumadin monitoring. He has been slowly gaining some weight with improved appetite. His pacemaker is functioning normally. All questions answered. I will see back in three months.         Past Medical History:   Diagnosis Date    Abdominal pain, unspecified site     Acute upper respiratory infections of unspecified site     Atrial fibrillation ([facility]     Cancer of esophagus ([facility] 2001    RT and adjuvant chemotherapy, surgery    Esophageal reflux     Generalized osteoarthrosis, involving multiple sites     Gout, unspecified     Nausea alone     Sinoatrial node dysfunction ([facility]     Status post thoracentesis 06/2019    Thromboembolus ([facility] 2003 (approx)    DVT in leg (IVC filter placed)    Unspecified cataract     Unspecified constipation        Current Outpatient Medications   Medication Sig Dispense Refill    furosemide (LASIX) 20 mg tablet Take 1 tablet 20mg every other day alternating with 1/2 tablet every other day 30 Tablet 6    docusate sodium (COLACE) 100 mg capsule Take 1 Capsule by mouth two (2) times a day. 60 Capsule 0    urea (URE-NA) 15 gram packet Take 1 Packet by mouth two (2) times a day. 30 Packet 0    OTHER Graded compression stockings bilateral lower extremity-use as directed 1 Each 0    warfarin (COUMADIN) 5 mg tablet 10 mg of Coumadin p.o. for 1 dose on August 3, 2021 at 6 PM.  Further Coumadin dosing based on daily PT/INR checks-will defer to rehab doctor. 30 Tablet 0    ondansetron (ZOFRAN ODT) 4 mg disintegrating tablet DISSOLVE 1 TABLET ON TONGUE TWO TIMES A DAY AS NEEDED FOR NAUSEA 50 Tab 0    ipratropium (ATROVENT) 0.03 % nasal spray USE 2 SPRAYS EACH NOSTRIL TWO TIMES A DAY 30 mL 2    linaCLOtide (LINZESS) 72 mcg cap capsule Take  by mouth Daily (before breakfast).  megestrol (MEGACE) 40 mg tablet 1 tab daily 30 Tab 2    omeprazole (PRILOSEC) 20 mg capsule TAKE ONE CAPSULE BY MOUTH EVERY MORNING 30 Cap 0    allopurinol (ZYLOPRIM) 300 mg tablet TAKE ONE TABLET BY MOUTH ONCE DAILY 90 Tab 0    cyanocobalamin (VITAMIN B12) 500 mcg tablet Take 500 mcg by mouth daily.  cholecalciferol, vitamin D3, 2,000 unit tab Take 1 Tab by mouth daily. Social History   reports that he has quit smoking. He has a 17.00 pack-year smoking history.  He has never used smokeless tobacco.   reports current alcohol use. Family History  family history includes Alzheimer in his father; Heart Disease in his father. Review of Systems  Except as stated above include:  Constitutional: Negative for fever, chills and malaise/fatigue. HEENT: No congestion or recent URI. Gastrointestinal: No nausea, vomiting, abdominal pain, bloody stools. Pulmonary:  Negative except as stated above. Cardiac:  Negative except as stated above. Musculoskeletal: Negative except as stated above. Neurological:  No localized symptoms. Skin:  Negative except as stated above. Psych:  Negative except as stated above. Endocrine:  Negative except as stated above. PHYSICAL EXAM  BP Readings from Last 3 Encounters:   08/04/21 (!) 107/50   12/09/19 122/68   11/07/19 120/72     Pulse Readings from Last 3 Encounters:   09/09/21 84   08/04/21 90   12/09/19 86     Wt Readings from Last 3 Encounters:   09/09/21 63 kg (139 lb)   08/04/21 65.5 kg (144 lb 8 oz)   12/09/19 65.3 kg (144 lb)     General:   Well developed, well groomed. Head/Neck:   No obvious jugular venous distention     No obvious carotid pulsations. No evidence of xanthelasma. Lungs:   No respiratory distress. Clear bilaterally. Heart:  Regular rate and rhythm. Normal S1/S2. Palpation grossly normal.    No significant murmurs, rubs or gallops. Pacer pocket intact. Abdomen:   Non-acute abdomen. No obvious pulsations. Extremities:   Intact peripheral pulses. Mild edema. Neurological:   Alert and oriented to person, place, time. No focal neurological deficit visually. Skin:   No obvious rash    Blood Pressure Metric:  Monitor recommended and adjustments stated if needed.

## 2021-09-09 NOTE — PROGRESS NOTES
Jackie Cano presents today for   Chief Complaint   Patient presents with   Surinder Babin New Patient     establish care with new cardiologist - no cardiac complaints        Jackie Cano preferred language for health care discussion is english/other. Is someone accompanying this pt? daughter    Is the patient using any DME equipment during [address]? no    Depression Screening:  3 most recent PHQ Screens 9/9/2021   Little interest or pleasure in doing things Not at all   Feeling down, depressed, irritable, or hopeless Not at all   Total Score PHQ 2 0       Learning Assessment:  Learning Assessment 1/19/2016   PRIMARY LEARNER Patient   HIGHEST LEVEL OF EDUCATION - PRIMARY LEARNER  SOME COLLEGE   BARRIERS PRIMARY LEARNER NONE   CO-LEARNER CAREGIVER No   PRIMARY LANGUAGE ENGLISH    NEED No   LEARNER PREFERENCE PRIMARY DEMONSTRATION   ANSWERED BY patient   RELATIONSHIP SELF       Abuse Screening:  Abuse Screening Questionnaire 10/15/2019   Do you ever feel afraid of your partner? N   Are you in a relationship with someone who physically or mentally threatens you? N   Is it safe for you to go home? Y       Fall Risk  Fall Risk Assessment, last 12 mths 9/9/2021   Able to walk? Yes   Fall in past 12 months? 0   Do you feel unsteady? 0   Are you worried about falling 0       Pt currently taking Anticoagulant therapy? Warfarin     Coordination of Care:  1. Have you been to the ER, urgent care clinic since your last visit? Hospitalized since your last visit? no    2. Have you seen or consulted any other health care providers outside of the [address] [place] [address] since your last visit? Include any pap smears or colon screening.  no

## 2021-09-13 ENCOUNTER — TELEPHONE ANTICOAG (OUTPATIENT)
Dept: CARDIOLOGY CLINIC | Age: 81
End: 2021-09-13

## 2021-09-13 DIAGNOSIS — I48.91 ATRIAL FIBRILLATION, UNSPECIFIED TYPE (HCC): Primary | ICD-10-CM

## 2021-09-13 LAB — INR, EXTERNAL: 1.9

## 2021-09-13 NOTE — PROGRESS NOTES
Verbal order and read back per Edi Standing, NP  iNR below therapeutic range  Coumadin 7.5mg today, then continue Coumadin 5mg daily. Recheck INR in 1 week per home health nurse. Patient's home health nurse, Marline Sheriff made aware of dosing and recheck instructions, no questions.

## 2021-09-23 NOTE — PROGRESS NOTES
I have personally seen and evaluated the device findings. Interrogation reviewed and I agree with assessment.     Rafael Becerra

## 2021-10-15 ENCOUNTER — ANTI-COAG VISIT (OUTPATIENT)
Dept: CARDIOLOGY CLINIC | Age: 81
End: 2021-10-15
Payer: MEDICARE

## 2021-10-15 DIAGNOSIS — I48.21 PERMANENT ATRIAL FIBRILLATION (HCC): Primary | ICD-10-CM

## 2021-10-15 LAB
INR BLD: 2.2
PT POC: 26.7 SECONDS
VALID INTERNAL CONTROL?: YES

## 2021-10-15 PROCEDURE — 85610 PROTHROMBIN TIME: CPT | Performed by: NURSE PRACTITIONER

## 2021-10-15 NOTE — PROGRESS NOTES
The INR is stable and therapeutic. Continue same dose of coumadin: Continue Coumadin 5mg daily. Recheck INR in 3 weeks.

## 2021-11-05 ENCOUNTER — ANTI-COAG VISIT (OUTPATIENT)
Dept: CARDIOLOGY CLINIC | Age: 81
End: 2021-11-05
Payer: MEDICARE

## 2021-11-05 DIAGNOSIS — I48.91 ATRIAL FIBRILLATION, UNSPECIFIED TYPE (HCC): Primary | ICD-10-CM

## 2021-11-05 LAB
INR BLD: 2.4
PT POC: 28.9 SECONDS
VALID INTERNAL CONTROL?: YES

## 2021-11-05 PROCEDURE — 85610 PROTHROMBIN TIME: CPT | Performed by: NURSE PRACTITIONER

## 2021-12-01 ENCOUNTER — ANTI-COAG VISIT (OUTPATIENT)
Dept: CARDIOLOGY CLINIC | Age: 81
End: 2021-12-01

## 2021-12-01 ENCOUNTER — OFFICE VISIT (OUTPATIENT)
Dept: CARDIOLOGY CLINIC | Age: 81
End: 2021-12-01
Payer: MEDICARE

## 2021-12-01 ENCOUNTER — CLINICAL SUPPORT (OUTPATIENT)
Dept: CARDIOLOGY CLINIC | Age: 81
End: 2021-12-01

## 2021-12-01 VITALS
SYSTOLIC BLOOD PRESSURE: 100 MMHG | HEART RATE: 64 BPM | DIASTOLIC BLOOD PRESSURE: 60 MMHG | HEIGHT: 66 IN | OXYGEN SATURATION: 96 % | WEIGHT: 147 LBS | BODY MASS INDEX: 23.63 KG/M2

## 2021-12-01 DIAGNOSIS — Z86.39 HISTORY OF SIADH: ICD-10-CM

## 2021-12-01 DIAGNOSIS — I48.21 PERMANENT ATRIAL FIBRILLATION (HCC): ICD-10-CM

## 2021-12-01 DIAGNOSIS — Z79.01 WARFARIN ANTICOAGULATION: ICD-10-CM

## 2021-12-01 DIAGNOSIS — Z95.0 PACEMAKER: Primary | ICD-10-CM

## 2021-12-01 DIAGNOSIS — I50.32 CHRONIC DIASTOLIC CONGESTIVE HEART FAILURE (HCC): ICD-10-CM

## 2021-12-01 DIAGNOSIS — I34.0 NONRHEUMATIC MITRAL VALVE REGURGITATION: ICD-10-CM

## 2021-12-01 DIAGNOSIS — Z79.01 ENCOUNTER FOR CURRENT LONG-TERM USE OF ANTICOAGULANTS: Primary | ICD-10-CM

## 2021-12-01 LAB
INR BLD: 2.2
PT POC: 26.4 SECONDS
VALID INTERNAL CONTROL?: YES

## 2021-12-01 PROCEDURE — G8510 SCR DEP NEG, NO PLAN REQD: HCPCS | Performed by: INTERNAL MEDICINE

## 2021-12-01 PROCEDURE — 99214 OFFICE O/P EST MOD 30 MIN: CPT | Performed by: INTERNAL MEDICINE

## 2021-12-01 PROCEDURE — G8427 DOCREV CUR MEDS BY ELIG CLIN: HCPCS | Performed by: INTERNAL MEDICINE

## 2021-12-01 PROCEDURE — 93280 PM DEVICE PROGR EVAL DUAL: CPT | Performed by: INTERNAL MEDICINE

## 2021-12-01 PROCEDURE — 1101F PT FALLS ASSESS-DOCD LE1/YR: CPT | Performed by: INTERNAL MEDICINE

## 2021-12-01 PROCEDURE — 85610 PROTHROMBIN TIME: CPT | Performed by: NURSE PRACTITIONER

## 2021-12-01 PROCEDURE — G8420 CALC BMI NORM PARAMETERS: HCPCS | Performed by: INTERNAL MEDICINE

## 2021-12-01 PROCEDURE — G8536 NO DOC ELDER MAL SCRN: HCPCS | Performed by: INTERNAL MEDICINE

## 2021-12-01 NOTE — PROGRESS NOTES
Nilton Barrios presents today for   Chief Complaint   Patient presents with    Follow-up     3 months        Nilton Barrios preferred language for health care discussion is english/other. Is someone accompanying this pt? no    Is the patient using any DME equipment during [address]? no    Depression Screening:  3 most recent PHQ Screens 12/1/2021   Little interest or pleasure in doing things Not at all   Feeling down, depressed, irritable, or hopeless Not at all   Total Score PHQ 2 0       Learning Assessment:  Learning Assessment 1/19/2016   PRIMARY LEARNER Patient   HIGHEST LEVEL OF EDUCATION - PRIMARY LEARNER  SOME COLLEGE   BARRIERS PRIMARY LEARNER NONE   CO-LEARNER CAREGIVER No   PRIMARY LANGUAGE ENGLISH    NEED No   LEARNER PREFERENCE PRIMARY DEMONSTRATION   ANSWERED BY patient   RELATIONSHIP SELF       Abuse Screening:  Abuse Screening Questionnaire 10/15/2019   Do you ever feel afraid of your partner? N   Are you in a relationship with someone who physically or mentally threatens you? N   Is it safe for you to go home? Y       Fall Risk  Fall Risk Assessment, last 12 mths 12/1/2021   Able to walk? Yes   Fall in past 12 months? 0   Do you feel unsteady? 0   Are you worried about falling 0       Pt currently taking Anticoagulant therapy? Warfarin     Coordination of Care:  1. Have you been to the ER, urgent care clinic since your last visit? Hospitalized since your last visit? no    2. Have you seen or consulted any other health care providers outside of the [address] [place] [address] since your last visit? Include any pap smears or colon screening.  no

## 2021-12-01 NOTE — PROGRESS NOTES
History of Present Illness:  79year old male here for follow up. I last saw him in September. He was admitted to the hospital in July with diastolic heart failure and SIADH. He has been doing well. No new chest pain, dyspnea, PND, orthopnea or edema. He lives home alone, but his children check on him frequently. He monitors his diet closely for salt. Impression:  1. History of hyponatremia with sodium 114 July, 2021, thought to be combination of heart failure, SIADH, resolved. 2. Chronic diastolic heart failure with echo July, 2021 with normal function. He is taking Lasix 10 mg and 20 mg alternating daily. 3. History of SIADH. 4. Chronic permanent a-fib. 5. Chronic Coumadin use with INR 2.2.  6. Dual chamber Medtronic pacemaker 2012, non MRI compatible. 7. History of liver abnormality, contemplating MRI scan, unable to proceed due to non MRI device. Plan:  His INR is 2.2 today, no bleeding issues. He has chronic atrial fibrillation, rate controlled. His pacemaker is functioning normally, about 1.5 years remaining on the battery. He has chronic diastolic heart failure. Echo earlier this year showing normal function. Plan to set up home INR monitoring and also check pacemaker in three months remotely.       Past Medical History:   Diagnosis Date    Abdominal pain, unspecified site     Acute upper respiratory infections of unspecified site     Atrial fibrillation ([address]     Cancer of esophagus ([address] 2001    RT and adjuvant chemotherapy, surgery    Esophageal reflux     Generalized osteoarthrosis, involving multiple sites     Gout, unspecified     Nausea alone     Sinoatrial node dysfunction ([facility])     Status post thoracentesis 06/2019    Thromboembolus ([address] 2003 (approx)    DVT in leg (IVC filter placed)    Unspecified cataract     Unspecified constipation        Current Outpatient Medications   Medication Sig Dispense Refill    furosemide (LASIX) 20 mg tablet Take 1 tablet 20mg every other day alternating with 1/2 tablet every other day 30 Tablet 6    docusate sodium (COLACE) 100 mg capsule Take 1 Capsule by mouth two (2) times a day. 60 Capsule 0    urea (URE-NA) 15 gram packet Take 1 Packet by mouth two (2) times a day. 30 Packet 0    OTHER Graded compression stockings bilateral lower extremityuse as directed 1 Each 0    warfarin (COUMADIN) 5 mg tablet 10 mg of Coumadin p.o. for 1 dose on August 3, 2021 at 6 PM.  Further Coumadin dosing based on daily PT/INR checkswill defer to rehab doctor. 30 Tablet 0    ondansetron (ZOFRAN ODT) 4 mg disintegrating tablet DISSOLVE 1 TABLET ON TONGUE TWO TIMES A DAY AS NEEDED FOR NAUSEA 50 Tab 0    ipratropium (ATROVENT) 0.03 % nasal spray USE 2 SPRAYS EACH NOSTRIL TWO TIMES A DAY 30 mL 2    linaCLOtide (LINZESS) 72 mcg cap capsule Take  by mouth Daily (before breakfast).  megestrol (MEGACE) 40 mg tablet 1 tab daily 30 Tab 2    omeprazole (PRILOSEC) 20 mg capsule TAKE ONE CAPSULE BY MOUTH EVERY MORNING 30 Cap 0    allopurinol (ZYLOPRIM) 300 mg tablet TAKE ONE TABLET BY MOUTH ONCE DAILY 90 Tab 0    cyanocobalamin (VITAMIN B12) 500 mcg tablet Take 500 mcg by mouth daily.  cholecalciferol, vitamin D3, 2,000 unit tab Take 1 Tab by mouth daily. Social History   reports that he has quit smoking. He has a 17.00 pack-year smoking history. He has never used smokeless tobacco.   reports current alcohol use. Family History  family history includes Alzheimer's Disease in his father; Heart Disease in his father. Review of Systems  Except as stated above include:  Constitutional: Negative for fever, chills and malaise/fatigue. HEENT: No congestion or recent URI. Gastrointestinal: No nausea, vomiting, abdominal pain, bloody stools. Pulmonary:  Negative except as stated above. Cardiac:  Negative except as stated above. Musculoskeletal: Negative except as stated above. Neurological:  No localized symptoms.   Skin:  Negative except as stated above. Psych:  Negative except as stated above. Endocrine:  Negative except as stated above. PHYSICAL EXAM  BP Readings from Last 3 Encounters:   08/04/21 (!) 107/50   12/09/19 122/68   11/07/19 120/72     Pulse Readings from Last 3 Encounters:   09/09/21 84   08/04/21 90   12/09/19 86     Wt Readings from Last 3 Encounters:   09/09/21 63 kg (139 lb)   08/04/21 65.5 kg (144 lb 8 oz)   12/09/19 65.3 kg (144 lb)     General:   Well developed, well groomed. Head/Neck:   No obvious jugular venous distention     No obvious carotid pulsations. No evidence of xanthelasma. Lungs:   No respiratory distress. Clear bilaterally. Heart:  Regular rate and rhythm. Normal S1/S2. Palpation grossly normal.    No significant murmurs, rubs or gallops. Pacer pocket intact. Abdomen:   Non-acute abdomen. No obvious pulsations. Extremities:   Intact peripheral pulses. No significant edema. Neurological:   Alert and oriented to person, place, time. No focal neurological deficit visually. Skin:   No obvious rash    Blood Pressure Metric:  Monitor recommended and adjustments stated if needed.

## 2021-12-03 NOTE — PROGRESS NOTES
I have personally seen and evaluated the device findings. Interrogation reviewed and I agree with assessment.     Zamzam Carter

## 2021-12-16 ENCOUNTER — TELEPHONE ANTICOAG (OUTPATIENT)
Dept: CARDIOLOGY CLINIC | Age: 81
End: 2021-12-16

## 2021-12-16 DIAGNOSIS — I48.91 ATRIAL FIBRILLATION, UNSPECIFIED TYPE (HCC): Primary | ICD-10-CM

## 2021-12-16 LAB — INR, EXTERNAL: 2.7

## 2021-12-16 NOTE — PROGRESS NOTES
Verbal order and read back per Maxime Dominique NP  INR within therapeutic range  Continue Coumadin 5mg daily. Recheck iNR in 1 month  Patient made aware of dosing and recheck instructions, no questions.

## 2022-01-13 LAB — INR, EXTERNAL: 2.5

## 2022-01-14 ENCOUNTER — TELEPHONE ANTICOAG (OUTPATIENT)
Dept: CARDIOLOGY CLINIC | Age: 82
End: 2022-01-14

## 2022-01-14 DIAGNOSIS — I48.11 LONGSTANDING PERSISTENT ATRIAL FIBRILLATION (HCC): Primary | ICD-10-CM

## 2022-01-14 NOTE — PROGRESS NOTES
Verbal order and read back per Nidia Kumari, NP  Continue Coumadin 5mg daily.  Recheck iNR in 1 month

## 2022-01-27 ENCOUNTER — TRANSCRIBE ORDER (OUTPATIENT)
Dept: SCHEDULING | Age: 82
End: 2022-01-27

## 2022-01-27 DIAGNOSIS — K58.9 IRRITABLE BOWEL SYNDROME: Primary | ICD-10-CM

## 2022-01-27 DIAGNOSIS — K59.00 CONSTIPATION: ICD-10-CM

## 2022-01-27 DIAGNOSIS — D50.9 ANEMIA, IRON DEFICIENCY: ICD-10-CM

## 2022-01-27 DIAGNOSIS — K76.0 NON-ALCOHOLIC FATTY LIVER DISEASE: ICD-10-CM

## 2022-01-28 ENCOUNTER — TRANSCRIBE ORDER (OUTPATIENT)
Dept: SCHEDULING | Age: 82
End: 2022-01-28

## 2022-01-28 DIAGNOSIS — K76.0 FATTY LIVER: Primary | ICD-10-CM

## 2022-01-28 DIAGNOSIS — K59.00 CONSTIPATION: ICD-10-CM

## 2022-02-17 ENCOUNTER — HOSPITAL ENCOUNTER (OUTPATIENT)
Dept: ULTRASOUND IMAGING | Age: 82
Discharge: HOME | End: 2022-02-17
Attending: INTERNAL MEDICINE
Payer: MEDICARE

## 2022-02-17 DIAGNOSIS — K59.00 CONSTIPATION: ICD-10-CM

## 2022-02-17 DIAGNOSIS — K76.0 FATTY LIVER: ICD-10-CM

## 2022-02-17 LAB — INR, EXTERNAL: 2.8

## 2022-02-17 PROCEDURE — 76705 ECHO EXAM OF ABDOMEN: CPT

## 2022-02-18 ENCOUNTER — TELEPHONE ANTICOAG (OUTPATIENT)
Dept: CARDIOLOGY CLINIC | Age: 82
End: 2022-02-18

## 2022-02-18 DIAGNOSIS — I48.91 ATRIAL FIBRILLATION, UNSPECIFIED TYPE (HCC): Primary | ICD-10-CM

## 2022-02-18 NOTE — PROGRESS NOTES
Verbal order and read back per Brandi Luz NP  INR within therapeutic range  Continue Coumadin 5mg daily. Recheck iNR in 1 month  Patient made aware of dosing and recheck instructions, no questions.

## 2022-03-18 ENCOUNTER — TELEPHONE ANTICOAG (OUTPATIENT)
Dept: CARDIOLOGY CLINIC | Age: 82
End: 2022-03-18

## 2022-03-18 DIAGNOSIS — I48.91 ATRIAL FIBRILLATION, UNSPECIFIED TYPE (HCC): Primary | ICD-10-CM

## 2022-03-18 PROBLEM — Z85.01 PERSONAL HISTORY OF ESOPHAGEAL CANCER: Status: ACTIVE | Noted: 2018-05-22

## 2022-03-18 LAB — INR, EXTERNAL: 3.8

## 2022-03-18 NOTE — PROGRESS NOTES
Verbal order and read back per Deandre Contreras MD  iNR above therapeutic range  Hold Coumadin today, then Continue Coumadin 5mg daily. Recheck iNR in 1 month  Eat greens  Patient made aware of dosing and recheck instructions, no questions.

## 2022-03-19 PROBLEM — R42 DIZZINESS: Status: ACTIVE | Noted: 2018-04-17

## 2022-03-19 PROBLEM — E87.1 HYPONATREMIA: Status: ACTIVE | Noted: 2021-07-27

## 2022-03-19 PROBLEM — Z95.0 PACEMAKER: Status: ACTIVE | Noted: 2018-03-17

## 2022-03-19 PROBLEM — R63.4 WEIGHT LOSS: Status: ACTIVE | Noted: 2018-07-10

## 2022-04-14 ENCOUNTER — TELEPHONE ANTICOAG (OUTPATIENT)
Dept: CARDIOLOGY CLINIC | Age: 82
End: 2022-04-14

## 2022-04-14 DIAGNOSIS — I48.91 ATRIAL FIBRILLATION, UNSPECIFIED TYPE (HCC): Primary | ICD-10-CM

## 2022-04-14 LAB — INR, EXTERNAL: 4.7

## 2022-04-14 NOTE — PROGRESS NOTES
Verbal order and read back per Shana Voss NP  iNR above therapeutic range  Hold Coumadin x2 days, then Change to Coumadin 5mg daily except Coumadin 2.5mg on Mondays. . Jimena Nolan in 1 month  Patient made aware of dosing and recheck instructions, no questions.

## 2022-04-20 PROBLEM — R26.2 DIFFICULTY WALKING: Status: ACTIVE | Noted: 2021-08-04

## 2022-04-20 PROBLEM — I50.22 CHRONIC SYSTOLIC CONGESTIVE HEART FAILURE (HCC): Status: ACTIVE | Noted: 2021-08-04

## 2022-04-20 PROBLEM — E87.1 HYPO-OSMOLALITY AND HYPONATREMIA: Status: ACTIVE | Noted: 2021-08-04

## 2022-04-20 PROBLEM — D61.818 OTHER PANCYTOPENIA (HCC): Status: ACTIVE | Noted: 2021-08-04

## 2022-04-20 PROBLEM — Z90.49: Status: ACTIVE | Noted: 2021-08-04

## 2022-04-20 PROBLEM — R06.02 SOB (SHORTNESS OF BREATH): Status: ACTIVE | Noted: 2022-04-20

## 2022-04-20 PROBLEM — E87.1 HYPOSMOLALITY SYNDROME: Status: ACTIVE | Noted: 2021-08-09

## 2022-04-20 PROBLEM — R00.2 PALPITATIONS: Status: ACTIVE | Noted: 2017-02-28

## 2022-04-20 PROBLEM — M62.81 MUSCLE WEAKNESS (GENERALIZED): Status: ACTIVE | Noted: 2021-08-04

## 2022-04-20 PROBLEM — I51.89 DIASTOLIC DYSFUNCTION: Status: ACTIVE | Noted: 2021-11-16

## 2022-04-26 ENCOUNTER — OFFICE VISIT (OUTPATIENT)
Dept: PULMONOLOGY | Age: 82
End: 2022-04-26
Payer: MEDICARE

## 2022-04-26 VITALS
OXYGEN SATURATION: 98 % | DIASTOLIC BLOOD PRESSURE: 67 MMHG | WEIGHT: 144.2 LBS | SYSTOLIC BLOOD PRESSURE: 126 MMHG | HEIGHT: 66 IN | BODY MASS INDEX: 23.18 KG/M2 | RESPIRATION RATE: 16 BRPM | HEART RATE: 91 BPM | TEMPERATURE: 97.8 F

## 2022-04-26 DIAGNOSIS — J44.9 COPD, GROUP A, BY GOLD 2017 CLASSIFICATION (HCC): ICD-10-CM

## 2022-04-26 DIAGNOSIS — R60.1 ANASARCA: ICD-10-CM

## 2022-04-26 DIAGNOSIS — R06.09 DYSPNEA ON EXERTION: Primary | ICD-10-CM

## 2022-04-26 DIAGNOSIS — R53.81 PHYSICAL DECONDITIONING: ICD-10-CM

## 2022-04-26 DIAGNOSIS — I50.32 CHRONIC DIASTOLIC CONGESTIVE HEART FAILURE (HCC): ICD-10-CM

## 2022-04-26 PROBLEM — E22.2 SIADH (SYNDROME OF INAPPROPRIATE ADH PRODUCTION) (HCC): Status: ACTIVE | Noted: 2022-03-28

## 2022-04-26 PROBLEM — I50.43 ACUTE ON CHRONIC COMBINED SYSTOLIC AND DIASTOLIC CONGESTIVE HEART FAILURE (HCC): Status: ACTIVE | Noted: 2022-03-28

## 2022-04-26 PROCEDURE — G8427 DOCREV CUR MEDS BY ELIG CLIN: HCPCS | Performed by: INTERNAL MEDICINE

## 2022-04-26 PROCEDURE — G8536 NO DOC ELDER MAL SCRN: HCPCS | Performed by: INTERNAL MEDICINE

## 2022-04-26 PROCEDURE — 99214 OFFICE O/P EST MOD 30 MIN: CPT | Performed by: INTERNAL MEDICINE

## 2022-04-26 PROCEDURE — G8510 SCR DEP NEG, NO PLAN REQD: HCPCS | Performed by: INTERNAL MEDICINE

## 2022-04-26 PROCEDURE — G8420 CALC BMI NORM PARAMETERS: HCPCS | Performed by: INTERNAL MEDICINE

## 2022-04-26 PROCEDURE — 1101F PT FALLS ASSESS-DOCD LE1/YR: CPT | Performed by: INTERNAL MEDICINE

## 2022-04-26 RX ORDER — CHOLECALCIFEROL (VITAMIN D3) 50 MCG
1 CAPSULE ORAL DAILY
COMMUNITY

## 2022-04-26 RX ORDER — GABAPENTIN 100 MG/1
100 CAPSULE ORAL AS NEEDED
COMMUNITY
Start: 2022-03-28

## 2022-04-26 RX ORDER — LACTULOSE 10 G/15ML
2 SOLUTION ORAL; RECTAL DAILY
COMMUNITY
Start: 2021-12-29

## 2022-04-26 RX ORDER — LANOLIN ALCOHOL/MO/W.PET/CERES
325 CREAM (GRAM) TOPICAL
COMMUNITY
Start: 2022-03-30

## 2022-04-26 RX ORDER — FOLIC ACID 1 MG/1
TABLET ORAL DAILY
COMMUNITY

## 2022-04-26 RX ORDER — LANOLIN ALCOHOL/MO/W.PET/CERES
100 CREAM (GRAM) TOPICAL DAILY
COMMUNITY

## 2022-04-26 NOTE — LETTER
4/26/2022    Patient: Christie Wei   YOB: 1940   Date of Visit: 4/26/2022     Rui Espinoza, [address]  Via Fax: [address], [place]  [address]  Via In [facility] [address]  [address] [place] [address]  Via In [facility] [address]    Dear MD Liz Chin MD Thomas Alamin, MD,      Thank you for referring Mr. Bennett Negrete to [address] [place] [address] for evaluation. My notes for this consultation are attached. If you have questions, please do not hesitate to call me. I look forward to following your patient along with you.       Sincerely,    Latoya Lucero MD/MPH     Pulmonary, Critical Care Medicine  [facility]

## 2022-04-26 NOTE — PROGRESS NOTES
Pierre Regan presents today for   Chief Complaint   Patient presents with    Shortness of Breath     per patient's daughter    Follow-up     last seen 7/24/2019 for right pleural effusion, CAMPO, restrictive lung disease , CHF and decreased diffusion capacity    Results     HRCT 12/20/2021 Cheral Ronit), CXR 11/17/2021 Cheral Ronit), CT chest, abd & pelv 7/28/2021       Is someone accompanying this pt? Yes. daughter    Is the patient using any DME equipment during OV? Yes. [employer]    -DME Company N/A    Depression Screening:  3 most recent PHQ Screens 4/26/2022   Little interest or pleasure in doing things Not at all   Feeling down, depressed, irritable, or hopeless Not at all   Total Score PHQ 2 0       Learning Assessment:  Learning Assessment 4/26/2022   PRIMARY LEARNER Patient   HIGHEST LEVEL OF EDUCATION - PRIMARY LEARNER  -   BARRIERS PRIMARY LEARNER -   [address] [facility]    NEED -   LEARNER PREFERENCE PRIMARY DEMONSTRATION   ANSWERED BY Patient   RELATIONSHIP SELF       Abuse Screening:  Abuse Screening Questionnaire 4/26/2022   Do you ever feel afraid of your partner? N   Are you in a relationship with someone who physically or mentally threatens you? N   Is it safe for you to go home? Y       Fall Risk  Fall Risk Assessment, last 12 mths 12/1/2021   Able to walk? Yes   Fall in past 12 months? 0   Do you feel unsteady? 0   Are you worried about falling 0         Coordination of Care:  1. Have you been to the ER, urgent care clinic since your last visit? Hospitalized since your last visit? Yes; Where: [facility], When: 7/27-8/4/2021-hyponatremia    2. Have you seen or consulted any other health care providers outside of the [address] [place] [address] since your last visit? Include any pap smears or colon screening. Yes.  Dr. Grisel Ocasio, PCP, Dr. Ivet Briseno, nephrologist, Dr. Yolie Arzate, cardiologist, Dr. Luis A De Jesus, GI

## 2022-04-26 NOTE — PROGRESS NOTES
[address] [place] [address] [facility]  [phone]    [address] [place] [address] [facility]  Pulmonary, Critical Care, and Sleep Medicine    Pulmonary Office F/U  Name: Larry Roberson 79 y.o. male  MRN: [id number]  : 1940  Service Date: 22  Chief Complaint:   Chief Complaint   Patient presents with    Shortness of Breath     per patient's daughter    Follow-up     last seen 2019 for right pleural effusion, CAMPO, restrictive lung disease , CHF and decreased diffusion capacity    Results     HRCT 2021 Levora Plbret), CXR 2021 Levora Plbret), CT chest, abd & pelv 2021       History of Present Illness:  (pt accompanied by his daughter)  Larry Roberson is a 79 y.o. male, who presents to Pulmonary clinic referred for SOB. Pt was last seen by pulmonary service while admitted as an inpatient to [facility] in 2021. In the interval, they report severe dyspnea on exertion  Patient is unable to walk in from kitchen to living room  Progressively worsening  No recurrence episodic associated with B/L severe LE swelling. Patient had a severe episode on Easter weekend to the point where his legs were swollen and weeping. They contacted his nephrologist, Dr. Flower Braga who prescribed additional Lasix and they report that his leg swelling is gone down. Patient also reports associated orthopnea. Denies any sputum or hemoptysis production. Pt states he exercises daily but unable to do that due to fatigue  Daughter reports the patient is mainly sedentary now.       Past Medical History:   Diagnosis Date    Abdominal pain, unspecified site     Acute upper respiratory infections of unspecified site     Atrial fibrillation ([address]     Cancer of es[address] [address]     RT and adjuvant chemotherapy, surgery    Esophageal reflux     Generalized osteoarthrosis, involving multiple sites     Gout, unspecified     Nausea alone     Sinoatrial node dysfunction (HCC)     Status post thoracentesis 2019  Thromboembolus ([facility] [address] 2003 (approx)    DVT in leg (IVC filter placed)    Unspecified cataract     Unspecified constipation      Past Surgical History:   Procedure Laterality Date    COLONOSCOPY N/A 12/13/2017    COLONOSCOPY with hot polypectomies performed by Noe Mallory MD at [facility] ENDOSCOPY    HX GI      esophagectomy    HX HEENT  2001    Esophageal Cancer Surgery    HX HIP REPLACEMENT Left 2005 (approx)    HX OTHER SURGICAL  2003    ivc filter placed    HX PACEMAKER  2012    Medtronic     Family History   Problem Relation Age of Onset    Heart Disease Father     Alzheimer's Disease Father      Social History     Socioeconomic History    Marital status: SINGLE     Spouse name: Not on file    Number of children: Not on file    Years of education: Not on file    Highest education level: Not on file   Occupational History    Not on file   Tobacco Use    Smoking status: Former Smoker     Packs/day: 0.50     Years: 34.00     Pack years: 17.00    Smokeless tobacco: Never Used   Vaping Use    Vaping Use: Never used   Substance and Sexual Activity    Alcohol use: Yes     Comment: 2 drinks per day    Drug use: No    Sexual activity: Never   Other Topics Concern    Not on file   Social History Narrative    Not on file     Social Determinants of Health     Financial Resource Strain:     Difficulty of Paying Living Expenses: Not on file   Food Insecurity:     Worried About Running Out of Food in the Last Year: Not on file    Anuradha of Food in the Last Year: Not on file   Transportation Needs:     Lack of Transportation (Medical): Not on file    Lack of Transportation (Non-Medical):  Not on file   Physical Activity:     Days of Exercise per Week: Not on file    Minutes of Exercise per Session: Not on file   Stress:     Feeling of Stress : Not on file   Social Connections:     Frequency of Communication with Friends and Family: Not on file    Frequency of Social Gatherings with Friends and Family: Not on file    Attends Jain Services: Not on file    Active Member of Clubs or Organizations: Not on file    Attends Club or Organization Meetings: Not on file    Marital Status: Not on file   Intimate Partner Violence:     Fear of Current or Ex-Partner: Not on file    Emotionally Abused: Not on file    Physically Abused: Not on file    Sexually Abused: Not on file   Housing Stability:     Unable to Pay for Housing in the Last Year: Not on file    Number of [place] in the Last Year: Not on file    Unstable Housing in the Last Year: Not on file     Allergies   Allergen Reactions    Parafon Forte Dsc [Chlorzoxazone] Anaphylaxis    Aspirin Other (comments)     Cannot take due to on coumadin     Prior to Admission medications    Medication Sig Start Date End Date Taking? Authorizing Provider   ferrous sulfate 325 mg (65 mg iron) tablet Take 325 mg by mouth. 3/30/22  Yes Provider, Historical   lactulose (Constulose) 10 gram/15 mL solution  12/29/21  Yes Provider, Historical   gabapentin (NEURONTIN) 100 mg capsule  3/28/22   Provider, Historical   furosemide (LASIX) 20 mg tablet Take 1 tablet 20mg every other day alternating with 1/2 tablet every other day 9/9/21   Ed Wilde MD   docusate sodium (COLACE) 100 mg capsule Take 1 Capsule by mouth two (2) times a day. 8/3/21   Ivan Kelley MD   urea (URE-NA) 15 gram packet Take 1 Packet by mouth two (2) times a day. 8/3/21   Ivan Kelley MD   OTHER Graded compression stockings bilateral lower extremityuse as directed 8/3/21   Ivan Kelley MD   warfarin (COUMADIN) 5 mg tablet 10 mg of Coumadin p.o. for 1 dose on August 3, 2021 at 6 PM.  Further Coumadin dosing based on daily PT/INR checkswill defer to rehab doctor.  8/3/21   Ivan Kelley MD   ondansetron (ZOFRAN ODT) 4 mg disintegrating tablet DISSOLVE 1 TABLET ON TONGUE TWO TIMES A DAY AS NEEDED FOR NAUSEA 12/23/19   Annabella Leal MD   ipratropium (ATROVENT) 0.03 % nasal spray USE 2 SPRAYS EACH NOSTRIL TWO TIMES A DAY 12/19/19   Jaelyn Lugo MD   [facility]) 72 mcg cap capsule Take  by mouth Daily (before breakfast). Provider, Historical   megestrol (MEGACE) 40 mg tablet 1 tab daily 11/7/19   Jaelyn Lugo MD   omeprazole (PRILOSEC) 20 mg capsule TAKE ONE CAPSULE BY MOUTH EVERY MORNING 7/19/19   Jaelyn Lugo MD   allopurinol (ZYLOPRIM) 300 mg tablet TAKE ONE TABLET BY MOUTH ONCE DAILY 1/23/19   Jaelyn Lugo MD   cyanocobalamin (VITAMIN B12) 500 mcg tablet Take 500 mcg by mouth daily. Provider, Historical   cholecalciferol, vitamin D3, 2,000 unit tab Take 1 Tab by mouth daily. Provider, Historical     Immunization History   Administered Date(s) Administered    COVID-19, Pfizer Purple top, DILUTE for use, 12+ yrs, 30mcg/0.3mL dose 04/27/2021, 05/18/2021, 12/09/2021    Influenza High Dose Vaccine PF 09/13/2016, 09/19/2017    Influenza Vaccine 11/16/2021    Influenza Vaccine (Quad) PF (>6 Mo Flulaval, Fluarix, and >3 Yrs Afluria, Fluzone 70769) 10/13/2015    Influenza Vaccine (Tri) Adjuvanted (>65 Yrs FLUAD TRI 43190) 10/15/2019    Pneumococcal Conjugate (PCV-13) 07/10/2018    Pneumococcal Polysaccharide (PPSV-23) 11/17/2005, 07/10/2018    Pneumococcal Vaccine (Unspecified Type) 11/17/2005    Td 11/20/2009    Zoster Vaccine, Live 01/09/2014       Review of Systems:  A complete review of systems was performed as stated in the HPI, all others are negative.       Objective:    Physical Exam:  /67 (BP 1 Location: Left upper arm, BP Patient Position: Sitting, BP Cuff Size: Adult long)   Pulse 91   Temp 97.8 °F (36.6 °C) (Temporal)   Resp 16   Ht 5' 6\" (1.676 m)   Wt 65.4 kg (144 lb 3.2 oz)   SpO2 98%   BMI 23.27 kg/m²   Vitals were personally reviewed  Gen: no acute distress, pleasant and cooperative, sitting up in chair, frail, ambulates with cane  HEENT: normocephalic/atraumatic, no ocular drainage, EOMI, no scleral icterus, nasal bridge midline, unable to assess nasal and oral cavities due to patient wearing mask in the setting of COVID-19 pandemic  Neck: supple, trachea midline  Lungs: fair air entry B/L, decreased in bases, CTABL, no wheezes/rales/rhonchi    Labs: I have reviewed the patient's available labs  Lab Results   Component Value Date/Time    WBC 3.1 (L) 08/04/2021 03:25 AM    HGB 9.0 (L) 08/04/2021 03:25 AM    HCT 25.9 (L) 08/04/2021 03:25 AM    PLATELET 908 (L) 55/91/4426 03:25 AM    MCV 96.3 08/04/2021 03:25 AM     Lab Results   Component Value Date/Time    Sodium 132 (L) 08/04/2021 02:00 PM    Potassium 4.2 08/04/2021 03:25 AM    Chloride 99 (L) 08/04/2021 03:25 AM    CO2 29 08/04/2021 03:25 AM    Anion gap 4 08/04/2021 03:25 AM    Glucose 94 08/04/2021 03:25 AM    BUN 22 (H) 08/04/2021 03:25 AM    Creatinine 0.53 (L) 08/04/2021 03:25 AM    BUN/Creatinine ratio 42 (H) 08/04/2021 03:25 AM    GFR est AA >60 08/04/2021 03:25 AM    GFR est non-AA >60 08/04/2021 03:25 AM    Calcium 8.4 (L) 08/04/2021 03:25 AM    Bilirubin, total 0.8 07/27/2021 07:55 PM    Alk. phosphatase 172 (H) 07/27/2021 07:55 PM    Protein, total 7.8 07/27/2021 07:55 PM    Albumin 3.4 07/27/2021 07:55 PM    Globulin 4.4 (H) 07/27/2021 07:55 PM    A-G Ratio 0.8 07/27/2021 07:55 PM    ALT (SGPT) 20 07/27/2021 07:55 PM    AST (SGOT) 56 (H) 07/27/2021 07:55 PM     Imaging:  I have personally reviewed patient's imaging as follows: CT chest abdomen pelvis from 7/28/2021 shows bilateral effusions, mild to moderate in nature with bilateral compressive atelectasis, otherwise clear without infiltrate, consolidation, effusion. Patient also has esophagectomy with gastric pull-through. Official report per radiology:  CT Results (most recent):  Results from Hospital Encounter encounter on 07/27/21    CT CHEST ABD PELV W CONT    Narrative  EXAM:  CT Chest, Abdomen, Pelvis with Contrast.    CLINICAL INDICATION:  Severe hyponatremia , ? SIADH , looking for causes of  SIADH, i.e. tumour/ mass    COMPARISON:  02/26/19 (CT chest), 11/01/10 (CT chest-abdomen-pelvis). TECHNIQUE:    - Helically scanned volumetric axial sections of the chest, abdomen and pelvis  are obtained following IV and oral contrast administration. Coronal and  sagittal reconstruction images are generated to improve anatomic delineation.  - IV contrast dose of 100 mL Isovue-300. - Radiation dose optimization techniques are utilized as appropriate to the  exam, with combination of automated exposure control, adjustment of the mA  and/or kV according to patient's size (Including appropriate matching for  site-specific examinations), or use of iterative reconstruction technique. FINDINGS:    CT Chest    Lungs, Pleura: Moderate right pleural effusion. Mild left pleural effusion. These pleural effusion collections are associated with atelectatic changes along  the posterior aspects of both lungs. No airspace opacities in the  non-atelectatic portions of the lungs. Mediastinum:  No mediastinal or hilar adenopathy. Cardiovascular:  No acute aortic abnormalities. Moderate to pronounced coronary  artery calcifications. Base of Neck:  Grossly unremarkable. Axillae, Chest Sidewall:  No evidence of significant axillary adenopathy. ICD  hub in the left upper torso region with 2 intact leads through the left  subclavian approach. Esophagus:  Status post esophagectomy with gastric pull-through. CT Abdomen-Pelvis    Liver:    - Small hypodensity in the right lobe of the liver measuring about 0.8 cm (axial  #118). - Vague ovoid density in the left lobe of the liver segment 4B measuring about  3.7 x 3.3 x 4.5 cm (axial #103, coronal #18, sagittal #31). Gallbladder, Adrenal Glands, Spleen, Pancreas:  Unremarkable. Kidneys:  Punctate 0.2 cm intrarenal stone in the left kidney (axial #118). GI Tract, Peritoneal Cavity:  Mild to moderate ascites. The source for the  ascites is unclear.   No acute small bowel abnormalities. The appendix is not  confidently visualized. No evidence of acute colitis or acute diverticulitis. Vascular: Moderate to severe atherosclerotic calcifications in the aorta,  visceral arteries and iliofemoral arteries. IVC filter deployment into the  infrarenal IVC. Nodes:  No mesenteric or retroperitoneal adenopathy. Bones:  Status post left hip joint replacement. The prosthetic components  produce severe Beam scatter artifact. No acute bony abnormalities. Degenerative changes in the lumbar spine and the right hip. Impression  1. Vague ovoid masslike focus in the left lobe of the liver inferior aspect  involving the segment 4B. Possibly from perceptual artifact from heterogeneous  parenchymal enhancement vs. intrinsic hepatic mass. Multiphasic CT or  correlation with MR will be helpful to confirm and further delineate the nature  of this finding. Small hypodensity in the right lobe of the liver, probably  attributable to small hepatic cyst.    2.  Mild to moderate ascites. 3.  Bilateral pleural effusion with associated atelectatic changes. No definite  airspace opacities. 4.  Status post esophagectomy with gastric pull-through. PFTs: Shows spirometry suggestive of a restrictive defect without BD response. Lung volumes show a moderate restriction. Diffusion capacity is moderately reduced. 7/9/2019    TTE:  I have reviewed the patient's TTE results  No results found for this or any previous visit.  07/27/21    ECHO ADULT COMPLETE 07/28/2021 7/28/2021    Interpretation Summary  · Image quality for this study was technically difficult. · LV: Estimated LVEF is 55 - 60%. Visually measured ejection fraction. Normal cavity size and systolic function (ejection fraction normal). Moderate concentric hypertrophy. Wall motion: normal. Inconclusive left ventricular diastolic function. · LA: Severely dilated left atrium. Left Atrium volume index is 90 mL/m2.   · MV: Mitral valve thickening. Mitral annular calcification. Mild mitral valve regurgitation is present. · RV: Reduced systolic function. · TV: Mild tricuspid valve regurgitation is present. · PA: Mild pulmonary hypertension. Pulmonary arterial systolic pressure is 41 mmHg. Signed by: Dustin Gallardo MD on 7/28/2021  4:12 PM        Assessment and Plan:  79 y.o. male with:    Impression:  1. Dyspnea on exertion/shortness of breath: Etiology is mainly cardiac in nature given abnormal echo's, bilateral pleural effusions, which is secondary to CHF/cardiomyopathy, as well as liver disease. Patient has undergone thoracentesis previously (6/19/2019), pleural fluid studies are consistent with transudate. Imaging shows no pulmonary parenchymal involvement  2. Bilateral pleural effusions: As noted above, pleural fluid studies are consistent with a transudate. This is secondary to patient's CHF/cardiomyopathy, as well as liver disease  3. Diastolic CHF/cardiomyopathy/valvular heart disease: Seen on transthoracic echo showing biatrial dilation with moderate mitral regurg and grade 2 diastolic dysfunction  4. Liver lesion: Seen on CT abdomen pelvis with IV contrast from 2/7/2019. Report shows heterogeneous hypoattenuation of the liver with a hypodense lesion 5 mm in size in the inferior right hepatic lobe. US now showing cirrhosis  5. History of DVT: Diagnosed in 2003. Patient on warfarin therapy for chronic A. Fib. 6.  History of tobacco use: Quit over 20 years ago, prior 1PPD smoker at max  7. Mild dysphagia: Etiology due to prior esophagectomy. 8.  Restrictive lung disease: Secondary to body habitus, no evidence of ILD on CT scan  9. Possible COPD, gold risk catogory A  10. Deconditioning  11. Hx of SIADH:  Follows with Nephrology - Dr. Adam Matos:  -Reviewed imaging report and previous pleural fluid studies. Patient has a transudate as noted above.   No indication for repeat thoracentesis, any attempt at pleurodesis would be unsuccessful. Patient should have diuretics titrated per nephrology and cardiology  -Continue diuretics and management of CHFpEF/cardiomyopathy/valvular heart disease per cardiology and nephrology. I discussed the case with Dr. Hany Alba.  -Management of liver disease per GI  -Counseled patient regarding aspiration lifestyle precautions  -I offered rescue inhaler/ELLI therapy --- pt declines  -Advised to discuss with PCP possibility of PT referral for strengthening given deconditioning. Can discuss cardiac rehab with Cardiology  -Advised patient to engage in graded exercise  -Immunizations reviewed, COVID, pneumococcal and influenza vaccinations up-to-date.  -Counseled patient regarding lifestyle precautions in COVID-19 pandemic including wearing mask in public and confined spaces, social/physical distancing, frequent hand hygiene, etc.    Follow-up and Dispositions    · Return in about 6 months (around 10/26/2022).          James Bear MD/MPH     Pulmonary, Critical Care Medicine  [facility]

## 2022-05-12 ENCOUNTER — TELEPHONE ANTICOAG (OUTPATIENT)
Dept: CARDIOLOGY CLINIC | Age: 82
End: 2022-05-12

## 2022-05-12 DIAGNOSIS — I48.91 ATRIAL FIBRILLATION, UNSPECIFIED TYPE (HCC): Primary | ICD-10-CM

## 2022-05-12 LAB — INR, EXTERNAL: 3.9

## 2022-05-12 NOTE — PROGRESS NOTES
Verbal order and read back per Mary Stubbs NP  INR 3.9  Hold Coumadin today, then Change to Coumadin 5mg daily except Coumadin 2.5mg on Mondays and Wednesdays. . Recheck iNR in 2 weeks. Patient made aware of dosing and recheck instructions, no questions.

## 2022-05-26 ENCOUNTER — TELEPHONE ANTICOAG (OUTPATIENT)
Dept: CARDIOLOGY CLINIC | Age: 82
End: 2022-05-26

## 2022-05-26 DIAGNOSIS — I48.91 ATRIAL FIBRILLATION, UNSPECIFIED TYPE (HCC): Primary | ICD-10-CM

## 2022-05-26 LAB — INR, EXTERNAL: 1.9

## 2022-05-26 NOTE — PROGRESS NOTES
Verbal order and read back per Jenna York NP  INR 1.9  Continue Coumadin 5mg daily except Coumadin 2.5mg on Mondays and Wednesdays. Patient made aware of dosing and recheck instructions, no questions.

## 2022-06-01 ENCOUNTER — CLINICAL SUPPORT (OUTPATIENT)
Dept: CARDIOLOGY CLINIC | Age: 82
End: 2022-06-01

## 2022-06-01 ENCOUNTER — OFFICE VISIT (OUTPATIENT)
Dept: CARDIOLOGY CLINIC | Age: 82
End: 2022-06-01
Payer: MEDICARE

## 2022-06-01 VITALS
DIASTOLIC BLOOD PRESSURE: 60 MMHG | WEIGHT: 141 LBS | OXYGEN SATURATION: 98 % | SYSTOLIC BLOOD PRESSURE: 110 MMHG | HEART RATE: 89 BPM | BODY MASS INDEX: 22.66 KG/M2 | HEIGHT: 66 IN

## 2022-06-01 DIAGNOSIS — R60.0 LOWER EXTREMITY EDEMA: ICD-10-CM

## 2022-06-01 DIAGNOSIS — Z79.01 ENCOUNTER FOR CURRENT LONG-TERM USE OF ANTICOAGULANTS: ICD-10-CM

## 2022-06-01 DIAGNOSIS — I48.11 LONGSTANDING PERSISTENT ATRIAL FIBRILLATION (HCC): ICD-10-CM

## 2022-06-01 DIAGNOSIS — Z95.0 PACEMAKER: Primary | ICD-10-CM

## 2022-06-01 DIAGNOSIS — I50.32 CHRONIC DIASTOLIC CONGESTIVE HEART FAILURE (HCC): ICD-10-CM

## 2022-06-01 DIAGNOSIS — R06.02 SOB (SHORTNESS OF BREATH): ICD-10-CM

## 2022-06-01 DIAGNOSIS — Z86.39 HISTORY OF SIADH: ICD-10-CM

## 2022-06-01 PROCEDURE — G8432 DEP SCR NOT DOC, RNG: HCPCS | Performed by: INTERNAL MEDICINE

## 2022-06-01 PROCEDURE — G8536 NO DOC ELDER MAL SCRN: HCPCS | Performed by: INTERNAL MEDICINE

## 2022-06-01 PROCEDURE — 93288 INTERROG EVL PM/LDLS PM IP: CPT | Performed by: INTERNAL MEDICINE

## 2022-06-01 PROCEDURE — 99215 OFFICE O/P EST HI 40 MIN: CPT | Performed by: INTERNAL MEDICINE

## 2022-06-01 PROCEDURE — 1123F ACP DISCUSS/DSCN MKR DOCD: CPT | Performed by: INTERNAL MEDICINE

## 2022-06-01 PROCEDURE — 1101F PT FALLS ASSESS-DOCD LE1/YR: CPT | Performed by: INTERNAL MEDICINE

## 2022-06-01 PROCEDURE — G8420 CALC BMI NORM PARAMETERS: HCPCS | Performed by: INTERNAL MEDICINE

## 2022-06-01 PROCEDURE — G8427 DOCREV CUR MEDS BY ELIG CLIN: HCPCS | Performed by: INTERNAL MEDICINE

## 2022-06-01 NOTE — PROGRESS NOTES
History of Present Illness:  79 YOM here for follow up. He has intermittent dyspnea, which is progressive. He has underlying diastolic heart failure, SIADH, followed by nephrology. He had been seen by pulmonary due to concern for some progressive atelectasis and fluid buildup in the lungs. He is accompanied by his daughter today. He continues to live alone. He has intermittent lower extremity edema and he cannot tolerate stockings. He tries to elevate his legs as much as he can and minimize salt intake and fluid. His last sodium was 132 last week. Impression:  1. Chronic diastolic heart failure with echo July 2021 with normal function. He takes Lasix 10 mg and 20 mg alternating and then will go to 20 mg at times when the edema worsens. 2. History of SIADH and CKD, followed by Dr. Nabor Nick.  3. Chronic permanent a-fib. 4. Dual chamber Medtronic pacemaker 2012, non MRI, normal function. 5. History of questionable liver disease. 6. Chronic Coumadin use. Plan:  I had a lengthy discussion about his chronic hyponatremia combined with chronic diastolic heart failure and edema. Unfortunately diuretics are limited due to his hyponatremia and renal disease. I will defer to Dr. Nabor Nick for adjustment of diuretics since he seems to be so sensitive and easily lowers his sodium with increased Lasix. Pacemaker is functioning normally. I will plan follow up echocardiogram to make sure there is no change in left ventricular function or evidence of pulmonary hypertension.       Past Medical History:   Diagnosis Date    Abdominal pain, unspecified site     Acute upper respiratory infections of unspecified site     Atrial fibrillation (HCC)     Cancer of esophagus ([facility] [address] 2001    RT and adjuvant chemotherapy, surgery    Esophageal reflux     Generalized osteoarthrosis, involving multiple sites     Gout, unspecified     Nausea alone     Sinoatrial node dysfunction (HCC)     Status post thoracentesis 06/2019    Thromboembolus ([facility] [address] 2003 (approx)    DVT in leg (IVC filter placed)    Unspecified cataract     Unspecified constipation        Current Outpatient Medications   Medication Sig Dispense Refill    ferrous sulfate 325 mg (65 mg iron) tablet Take 325 mg by mouth every Monday, Wednesday, Friday.  gabapentin (NEURONTIN) 100 mg capsule Take 100 mg by mouth as needed.  lactulose (Constulose) 10 gram/15 mL solution Take 2 tsp by mouth daily.  folic acid (FOLVITE) 1 mg tablet Take  by mouth daily.  thiamine HCL (B-1) 100 mg tablet Take 100 mg by mouth daily.  B.animalis,bifid,infantis,long (Probiotic 4X) 10-15 mg TbEC Take 1 Tablet by mouth daily.  furosemide (LASIX) 20 mg tablet Take 1 tablet 20mg every other day alternating with 1/2 tablet every other day (Patient taking differently: Take 20 mg by mouth daily.) 30 Tablet 6    docusate sodium (COLACE) 100 mg capsule Take 1 Capsule by mouth two (2) times a day. 60 Capsule 0    urea (URE-NA) 15 gram packet Take 1 Packet by mouth two (2) times a day. (Patient taking differently: Take 1 Packet by mouth daily.) 30 Packet 0    warfarin (COUMADIN) 5 mg tablet 10 mg of Coumadin p.o. for 1 dose on August 3, 2021 at 6 PM.  Further Coumadin dosing based on daily PT/INR checks-will defer to rehab doctor. (Patient taking differently: Take 5 mg by mouth daily. 10 mg of Coumadin p.o. for 1 dose on August 3, 2021 at 6 PM.  Further Coumadin dosing based on daily PT/INR checks-will defer to rehab doctor. 1/2 tab on Mondays per patient) 30 Tablet 0    ondansetron (ZOFRAN ODT) 4 mg disintegrating tablet DISSOLVE 1 TABLET ON TONGUE TWO TIMES A DAY AS NEEDED FOR NAUSEA (Patient taking differently: Take 4 mg by mouth two (2) times daily as needed.  DISSOLVE 1 TABLET ON TONGUE TWO TIMES A DAY AS NEEDED FOR NAUSEA) 50 Tab 0    ipratropium (ATROVENT) 0.03 % nasal spray USE 2 SPRAYS EACH NOSTRIL TWO TIMES A DAY (Patient taking differently: 2 Sprays by Both Nostrils route two (2) times a day.) 30 mL 2    omeprazole (PRILOSEC) 20 mg capsule TAKE ONE CAPSULE BY MOUTH EVERY MORNING (Patient taking differently: Take 20 mg by mouth daily.) 30 Cap 0    allopurinol (ZYLOPRIM) 300 mg tablet TAKE ONE TABLET BY MOUTH ONCE DAILY (Patient taking differently: Take 300 mg by mouth daily.) 90 Tab 0    cyanocobalamin (VITAMIN B12) 500 mcg tablet Take 1,000 mcg by mouth daily.  cholecalciferol, vitamin D3, 2,000 unit tab Take 1 Tab by mouth daily. Social History   reports that he has quit smoking. He has a 17.00 pack-year smoking history. He has never used smokeless tobacco.   reports current alcohol use. Family History  family history includes Alzheimer's Disease in his father; Heart Disease in his father. Review of Systems  Except as stated above include:  Constitutional: Negative for fever, chills and malaise/fatigue. HEENT: No congestion or recent URI. Gastrointestinal: No nausea, vomiting, abdominal pain, bloody stools. Pulmonary:  Negative except as stated above. Cardiac:  Negative except as stated above. Musculoskeletal: Negative except as stated above. Neurological:  No localized symptoms. Skin:  Negative except as stated above. Psych:  Negative except as stated above. Endocrine:  Negative except as stated above. PHYSICAL EXAM  BP Readings from Last 3 Encounters:   06/01/22 110/60   04/26/22 126/67   12/01/21 100/60     Pulse Readings from Last 3 Encounters:   06/01/22 89   04/26/22 91   12/01/21 64     Wt Readings from Last 3 Encounters:   06/01/22 64 kg (141 lb)   04/26/22 65.4 kg (144 lb 3.2 oz)   12/01/21 66.7 kg (147 lb)     General:   Well developed, well groomed. Head/Neck:   No obvious jugular venous distention     No obvious carotid pulsations. No evidence of xanthelasma. Lungs:   No respiratory distress. Clear bilaterally. Heart:  Regular rate and rhythm. Normal S1/S2.       Palpation grossly normal.    No significant murmurs, rubs or gallops. Abdomen:   Non-acute abdomen. No obvious pulsations. Extremities:   Intact peripheral pulses. + LE edema. Neurological:   Alert and oriented to person, place, time. No focal neurological deficit visually. Skin:   No obvious rash    Blood Pressure Metric:  Monitor recommended and adjustments stated if needed.

## 2022-06-03 NOTE — PROGRESS NOTES
I have personally seen and evaluated the device findings. Interrogation reviewed and I agree with assessment.     Zachariah Asa

## 2022-06-14 LAB — INR, EXTERNAL: 1.9

## 2022-06-15 ENCOUNTER — TELEPHONE ANTICOAG (OUTPATIENT)
Dept: CARDIOLOGY CLINIC | Age: 82
End: 2022-06-15

## 2022-06-15 DIAGNOSIS — I48.11 LONGSTANDING PERSISTENT ATRIAL FIBRILLATION (HCC): Primary | ICD-10-CM

## 2022-06-15 NOTE — PROGRESS NOTES
Verbal order and read back per Umm Moses, DO    The INR is below the therapeutic range. Continue Coumadin 5mg daily except Coumadin 2.5mg on Mondays and Wednesdays. Recheck iNR in 1 month.     -left message

## 2022-07-12 ENCOUNTER — TELEPHONE ANTICOAG (OUTPATIENT)
Dept: CARDIOLOGY CLINIC | Age: 82
End: 2022-07-12

## 2022-07-12 DIAGNOSIS — I48.91 ATRIAL FIBRILLATION, UNSPECIFIED TYPE (HCC): Primary | ICD-10-CM

## 2022-07-12 LAB — INR, EXTERNAL: 3.2

## 2022-07-13 NOTE — PROGRESS NOTES
Verbal order and read back per Zainab Mcdonnell NP  INR 3.2  Hold Coumadin today, then Continue Coumadin 5mg daily except Coumadin 2.5mg on Mondays and Wednesdays. . Recheck iNR in 1 month. Patient made aware of dosing and recheck instructions, no questions.

## 2022-07-21 RX ORDER — FUROSEMIDE 20 MG/1
40 TABLET ORAL DAILY
Qty: 60 TABLET | Refills: 4 | Status: SHIPPED | OUTPATIENT
Start: 2022-07-21

## 2022-08-02 ENCOUNTER — TELEPHONE (OUTPATIENT)
Dept: CARDIOLOGY CLINIC | Age: 82
End: 2022-08-02

## 2022-08-02 NOTE — TELEPHONE ENCOUNTER
Called pt to let them know that Dr. Apurva Cabrera MD read echocardiogram  test results and they were as follows: -    --- Message from Apurva Cabrera MD sent at 7/22/2022  1:28 PM EDT -----  Regarding: tests  Please let the patient know his echocardiogram is normal.  Thank you. Called pt and left voicemail to call us back for nonurgent test results.

## 2022-08-15 LAB — INR, EXTERNAL: 1.8

## 2022-08-16 ENCOUNTER — TELEPHONE ANTICOAG (OUTPATIENT)
Dept: CARDIOLOGY CLINIC | Age: 82
End: 2022-08-16

## 2022-08-16 DIAGNOSIS — I48.91 ATRIAL FIBRILLATION, UNSPECIFIED TYPE (HCC): Primary | ICD-10-CM

## 2022-08-16 NOTE — PROGRESS NOTES
Verbal order and read back per Josephine Newton NP  INR 1.8  Coumadin 7.5mg today, then Continue Coumadin 5mg daily except Coumadin 2.5mg on Mondays and Wednesdays. . Recheck iNR in 1 month. Patient made aware of dosing and recheck instructions, no questions.

## 2022-09-22 ENCOUNTER — TELEPHONE ANTICOAG (OUTPATIENT)
Dept: CARDIOLOGY CLINIC | Age: 82
End: 2022-09-22

## 2022-09-22 DIAGNOSIS — I48.91 ATRIAL FIBRILLATION, UNSPECIFIED TYPE (HCC): Primary | ICD-10-CM

## 2022-09-22 LAB — INR, EXTERNAL: 2

## 2022-09-22 NOTE — PROGRESS NOTES
Verbal order and read back per Daljit Dennison NP  INR within therapeutic range  Continue Coumadin 5mg daily except Coumadin 2.5mg on Mondays and Wednesdays. . Recheck iNR in 1 month. Patient made aware of dosing and recheck instructions, no questions.

## 2022-10-21 LAB — INR, EXTERNAL: 2.3

## 2022-10-24 ENCOUNTER — TELEPHONE ANTICOAG (OUTPATIENT)
Dept: CARDIOLOGY CLINIC | Age: 82
End: 2022-10-24

## 2022-10-24 DIAGNOSIS — I48.91 ATRIAL FIBRILLATION, UNSPECIFIED TYPE (HCC): Primary | ICD-10-CM

## 2022-10-24 NOTE — PROGRESS NOTES
Verbal order and read back per Juan Diego Medina MD  INR within therapeutic range  Continue Coumadin 5mg daily except Coumadin 2.5mg on Mondays and Wednesdays. . Recheck iNR in 1 month. Patient made aware of dosing and recheck instructions, no questions.

## 2022-11-21 LAB — INR, EXTERNAL: 2.2

## 2022-11-22 ENCOUNTER — TELEPHONE ANTICOAG (OUTPATIENT)
Dept: CARDIOLOGY CLINIC | Age: 82
End: 2022-11-22

## 2022-11-22 DIAGNOSIS — I48.91 ATRIAL FIBRILLATION, UNSPECIFIED TYPE (HCC): Primary | ICD-10-CM

## 2022-11-22 NOTE — PROGRESS NOTES
Verbal order and read back per Yuliana Pham NP  INR within therapeutic range  Continue Coumadin 5mg daily except Coumadin 2.5mg on Mondays and Wednesdays. . Recheck iNR in 1 month.     Message left for patient to call our office

## 2022-12-16 ENCOUNTER — TRANSCRIBE ORDER (OUTPATIENT)
Dept: SCHEDULING | Age: 82
End: 2022-12-16

## 2022-12-16 DIAGNOSIS — K75.81 NONALCOHOLIC STEATOHEPATITIS: Primary | ICD-10-CM

## 2022-12-16 DIAGNOSIS — D50.9 IRON DEFICIENCY ANEMIA, UNSPECIFIED: Primary | ICD-10-CM

## 2022-12-16 RX ORDER — WARFARIN SODIUM 5 MG/1
TABLET ORAL
Qty: 90 TABLET | Refills: 3 | Status: SHIPPED | OUTPATIENT
Start: 2022-12-16

## 2022-12-20 ENCOUNTER — TELEPHONE ANTICOAG (OUTPATIENT)
Dept: CARDIOLOGY CLINIC | Age: 82
End: 2022-12-20

## 2022-12-20 DIAGNOSIS — I48.91 ATRIAL FIBRILLATION, UNSPECIFIED TYPE (HCC): Primary | ICD-10-CM

## 2022-12-20 LAB — INR, EXTERNAL: 2.8

## 2022-12-20 NOTE — PROGRESS NOTES
Verbal order and read back per Amber Sanders MD  INR within therapeutic range  Continue Coumadin 5mg daily except Coumadin 2.5mg on Mondays and Wednesdays. . Recheck iNR in 1 month. Patient made aware of dosing and recheck instructions, no questions.

## 2023-01-11 ENCOUNTER — HOSPITAL ENCOUNTER (OUTPATIENT)
Dept: ULTRASOUND IMAGING | Age: 83
Discharge: HOME | End: 2023-01-11
Attending: INTERNAL MEDICINE
Payer: MEDICARE

## 2023-01-11 DIAGNOSIS — K75.81 NONALCOHOLIC STEATOHEPATITIS: ICD-10-CM

## 2023-01-11 PROCEDURE — 76705 ECHO EXAM OF ABDOMEN: CPT

## 2023-01-25 ENCOUNTER — OFFICE VISIT (OUTPATIENT)
Dept: CARDIOLOGY CLINIC | Age: 83
End: 2023-01-25
Payer: MEDICARE

## 2023-01-25 VITALS
BODY MASS INDEX: 21.53 KG/M2 | SYSTOLIC BLOOD PRESSURE: 120 MMHG | HEIGHT: 66 IN | DIASTOLIC BLOOD PRESSURE: 62 MMHG | WEIGHT: 134 LBS | HEART RATE: 89 BPM

## 2023-01-25 DIAGNOSIS — I50.32 CHRONIC DIASTOLIC CONGESTIVE HEART FAILURE (HCC): ICD-10-CM

## 2023-01-25 DIAGNOSIS — Z79.01 ENCOUNTER FOR CURRENT LONG-TERM USE OF ANTICOAGULANTS: ICD-10-CM

## 2023-01-25 DIAGNOSIS — I48.91 ATRIAL FIBRILLATION, UNSPECIFIED TYPE (HCC): Primary | ICD-10-CM

## 2023-01-25 DIAGNOSIS — R06.02 SOB (SHORTNESS OF BREATH): ICD-10-CM

## 2023-01-25 DIAGNOSIS — Z86.39 HISTORY OF SIADH: ICD-10-CM

## 2023-01-25 DIAGNOSIS — Z95.0 PACEMAKER: ICD-10-CM

## 2023-01-25 PROCEDURE — G8510 SCR DEP NEG, NO PLAN REQD: HCPCS | Performed by: INTERNAL MEDICINE

## 2023-01-25 PROCEDURE — 1123F ACP DISCUSS/DSCN MKR DOCD: CPT | Performed by: INTERNAL MEDICINE

## 2023-01-25 PROCEDURE — 1101F PT FALLS ASSESS-DOCD LE1/YR: CPT | Performed by: INTERNAL MEDICINE

## 2023-01-25 PROCEDURE — G8420 CALC BMI NORM PARAMETERS: HCPCS | Performed by: INTERNAL MEDICINE

## 2023-01-25 PROCEDURE — G8536 NO DOC ELDER MAL SCRN: HCPCS | Performed by: INTERNAL MEDICINE

## 2023-01-25 PROCEDURE — G8427 DOCREV CUR MEDS BY ELIG CLIN: HCPCS | Performed by: INTERNAL MEDICINE

## 2023-01-25 PROCEDURE — 99214 OFFICE O/P EST MOD 30 MIN: CPT | Performed by: INTERNAL MEDICINE

## 2023-01-25 NOTE — PROGRESS NOTES
History of Present Illness:  79 YOM here for follow up. Overall he is doing well. He has chronic dyspnea, which is unchanged. He had an echocardiogram in July that showed normal function. No new chest pain, syncope, PND, orthopnea or edema. His device is approaching NATALIIA. He does have intermittent lower extremity edema and is now taking Lasix 40 mg daily when needed and decreasing it to 20 on other days. Impression:  Chronic diastolic heart failure July 2022 with normal function. He takes Lasix 40 mg daily when he has more swelling, otherwise 20 mg daily maintenance. History of SIADH and CKD, followed by Dr. Yuan Aponte. Chronic permanent atrial fibrillation, on Coumadin. Dual chamber Medtronic pacemaker 2012, non MRI. History of questionable liver disease. Plan:  He is doing well on current dose of diuretics, he monitors his weight and swelling and adjusts Lasix as needed. He follows closely with Dr. Yuan Aponte.  Echocardiogram from last July showed normal function. Pacemaker is functioning normally, but he has about two months remaining. Will plan to check device in one month. I discussed he will likely need a generator change-out in the next two to three months and the procedure was explained and all questions answered.         Wt Readings from Last 3 Encounters:   01/25/23 60.8 kg (134 lb)   07/21/22 64 kg (141 lb)   06/01/22 64 kg (141 lb)     Past Medical History:   Diagnosis Date    Abdominal pain, unspecified site     Acute upper respiratory infections of unspecified site     Atrial fibrillation ([address]     Cancer of esophagus ([address] 2001    RT and adjuvant chemotherapy, surgery    Esophageal reflux     Generalized osteoarthrosis, involving multiple sites     Gout, unspecified     Nausea alone     Sinoatrial node dysfunction ([address]     Status post thoracentesis 06/2019    Thromboembolus ([address] 2003 (approx)    DVT in leg (IVC filter placed)    Unspecified cataract     Unspecified constipation Current Outpatient Medications   Medication Sig Dispense Refill    warfarin (COUMADIN) 5 mg tablet Take 1 tablet by mouth daily or as directed per physician. 90 Tablet 3    furosemide (LASIX) 20 mg tablet Take 2 Tablets by mouth in the morning. 60 Tablet 4    ferrous sulfate 325 mg (65 mg iron) tablet Take 325 mg by mouth every Monday, Wednesday, Friday. gabapentin (NEURONTIN) 100 mg capsule Take 100 mg by mouth as needed. lactulose (CHRONULAC) 10 gram/15 mL solution Take 2 tsp by mouth daily. folic acid (FOLVITE) 1 mg tablet Take  by mouth daily. thiamine HCL (B-1) 100 mg tablet Take 100 mg by mouth daily. B.animalis,bifid,infantis,long (Probiotic 4X) 10-15 mg TbEC Take 1 Tablet by mouth daily. docusate sodium (COLACE) 100 mg capsule Take 1 Capsule by mouth two (2) times a day. 60 Capsule 0    urea (URE-NA) 15 gram packet Take 1 Packet by mouth two (2) times a day. (Patient taking differently: Take 1 Packet by mouth daily.) 30 Packet 0    ondansetron (ZOFRAN ODT) 4 mg disintegrating tablet DISSOLVE 1 TABLET ON TONGUE TWO TIMES A DAY AS NEEDED FOR NAUSEA (Patient taking differently: Take 4 mg by mouth two (2) times daily as needed. DISSOLVE 1 TABLET ON TONGUE TWO TIMES A DAY AS NEEDED FOR NAUSEA) 50 Tab 0    ipratropium (ATROVENT) 0.03 % nasal spray USE 2 SPRAYS EACH NOSTRIL TWO TIMES A DAY (Patient taking differently: 2 Sprays by Both Nostrils route two (2) times a day.) 30 mL 2    omeprazole (PRILOSEC) 20 mg capsule TAKE ONE CAPSULE BY MOUTH EVERY MORNING (Patient taking differently: Take 20 mg by mouth daily.) 30 Cap 0    allopurinol (ZYLOPRIM) 300 mg tablet TAKE ONE TABLET BY MOUTH ONCE DAILY (Patient taking differently: Take 300 mg by mouth daily.) 90 Tab 0    cyanocobalamin (VITAMIN B12) 500 mcg tablet Take 1,000 mcg by mouth daily. cholecalciferol, vitamin D3, 2,000 unit tab Take 1 Tab by mouth daily. Social History   reports that he has quit smoking.  He has a 17.00 pack-year smoking history. He has never used smokeless tobacco.   reports current alcohol use. Family History  family history includes Alzheimer's Disease in his father; Heart Disease in his father. Review of Systems  Except as stated above include:  Constitutional: Negative for fever, chills and malaise/fatigue. HEENT: No congestion or recent URI. Gastrointestinal: No nausea, vomiting, abdominal pain, bloody stools. Pulmonary:  Negative except as stated above. Cardiac:  Negative except as stated above. Musculoskeletal: Negative except as stated above. Neurological:  No localized symptoms. Skin:  Negative except as stated above. Psych:  Negative except as stated above. Endocrine:  Negative except as stated above. PHYSICAL EXAM  BP Readings from Last 3 Encounters:   01/25/23 120/62   07/21/22 110/60   06/01/22 110/60     Pulse Readings from Last 3 Encounters:   01/25/23 89   06/01/22 89   04/26/22 91       General:   Well developed, well groomed. Head/Neck:   No obvious jugular venous distention     No obvious carotid pulsations. No evidence of xanthelasma. Lungs:   No respiratory distress. Clear bilaterally. Heart:  Regular rate and rhythm. Normal S1/S2. Palpation grossly normal.    No significant murmurs, rubs or gallops. Pacer pocket intact. Abdomen:   Non-acute abdomen. No obvious pulsations. Extremities:   Intact peripheral pulses. No significant edema. Neurological:   Alert and oriented to person, place, time. No focal neurological deficit visually. Skin:   No obvious rash    Blood Pressure Metric:  Monitor recommended and adjustments stated if needed.

## 2023-01-25 NOTE — PROGRESS NOTES
Nilton Barrios presents today for   Chief Complaint   Patient presents with    Follow-up     6 month, device check       Nilton Barrios preferred language for health care discussion is english/other. Is someone accompanying this pt? no    Is the patient using any DME equipment during [address]? no    Depression Screening:  3 most recent PHQ Screens 1/25/2023   Little interest or pleasure in doing things Not at all   Feeling down, depressed, irritable, or hopeless Not at all   Total Score PHQ 2 0       Learning Assessment:  Learning Assessment 4/26/2022   PRIMARY LEARNER Patient   HIGHEST LEVEL OF EDUCATION - PRIMARY LEARNER  -   BARRIERS PRIMARY LEARNER -   [facility]    NEED -   LEARNER PREFERENCE PRIMARY DEMONSTRATION   ANSWERED BY Patient   RELATIONSHIP SELF       Abuse Screening:  Abuse Screening Questionnaire 4/26/2022   Do you ever feel afraid of your partner? N   Are you in a relationship with someone who physically or mentally threatens you? N   Is it safe for you to go home? Y       Fall Risk  Fall Risk Assessment, last 12 mths 4/26/2022   Able to walk? Yes   Fall in past 12 months? 0   Do you feel unsteady? 1   Are you worried about falling 1   Is the gait abnormal? 1   Number of falls in past 12 months 0       Pt currently taking Anticoagulant therapy? Warfarin 5mg    Coordination of Care:  1. Have you been to the ER, urgent care clinic since your last visit? Hospitalized since your last visit? no    2. Have you seen or consulted any other health care providers outside of the [address] [place] [address] since your last visit? Include any pap smears or colon screening.  no

## 2023-01-29 LAB — INR, EXTERNAL: 2.8

## 2023-01-30 ENCOUNTER — TELEPHONE ANTICOAG (OUTPATIENT)
Dept: CARDIOLOGY CLINIC | Age: 83
End: 2023-01-30

## 2023-01-30 DIAGNOSIS — I48.21 PERMANENT ATRIAL FIBRILLATION (HCC): Primary | ICD-10-CM

## 2023-01-30 NOTE — PROGRESS NOTES
Verbal order and read back per Simón Healy NP  INR within therapeutic range  Continue Coumadin 5mg daily except Coumadin 2.5mg on Mondays and Wednesdays. . Recheck INR in 1 month. Patient made aware of dosing and recheck instructions. No questions at this time.

## 2023-02-14 RX ORDER — FUROSEMIDE 20 MG/1
TABLET ORAL
Qty: 60 TABLET | Refills: 11 | Status: SHIPPED | OUTPATIENT
Start: 2023-02-14

## 2023-02-23 ENCOUNTER — NURSE ONLY (OUTPATIENT)
Age: 83
End: 2023-02-23

## 2023-02-23 ENCOUNTER — PREP FOR PROCEDURE (OUTPATIENT)
Age: 83
End: 2023-02-23

## 2023-02-23 DIAGNOSIS — I48.11 LONGSTANDING PERSISTENT ATRIAL FIBRILLATION (HCC): ICD-10-CM

## 2023-02-23 DIAGNOSIS — R00.1 BRADYCARDIA: ICD-10-CM

## 2023-02-23 DIAGNOSIS — Z95.0 PRESENCE OF CARDIAC PACEMAKER: Primary | ICD-10-CM

## 2023-02-23 NOTE — PATIENT INSTRUCTIONS
[facility]          Patient  EP Instructions     You are scheduled to have a Pacemaker replacement on  ___________ , at ___________. **Please arrive for check in One hour & 30 minutes  before your scheduled time. Please go to [facility] and park in the outpatient parking lot that is located around to the back of the hospital and enter through the [facility]. Once you enter through the [facility] check in with the  there. The  will either give you directions or assist you in getting to the cath holding area. 3.  You are not to eat or drink anything after midnight the night before your procedure. Please continue to take your medications with a small sip of water on the morning of the procedure with the following exceptions:            Do not take your Coumadin 5 days before your procedure. If you are diabetic, do not take your insulin/sugar pill the morning of the procedure. We encourage families to wait in the waiting room on the first floor while the procedure is being done. The Doctor will come out and talk with you as soon as the procedure is over. There is the possibility that you may spend the night in the hospital, depending on the results of the procedure. This will be determined after the procedure is done. 8.   If you or your family have any questions, please call our office Monday-Friday 9:00am         -4:30 pm , at [phone], and ask to speak to one of the nurses.

## 2023-02-23 NOTE — PROGRESS NOTES
[facility]          Patient  EP Instructions    You are scheduled to have a Pacemaker replacement on  ___________ , at ___________. **Please arrive for check in One hour & 30 minutes  before your scheduled time. Please go to [facility] and park in the outpatient parking lot that is located around to the back of the hospital and enter through the [facility]. Once you enter through the [facility] check in with the  there. The  will either give you directions or assist you in getting to the cath holding area. 3.  You are not to eat or drink anything after midnight the night before your procedure. Please continue to take your medications with a small sip of water on the morning of the procedure with the following exceptions:            Do not take your Coumadin 5 days before your procedure. If you are diabetic, do not take your insulin/sugar pill the morning of the procedure. We encourage families to wait in the waiting room on the first floor while the procedure is being done. The Doctor will come out and talk with you as soon as the procedure is over. There is the possibility that you may spend the night in the hospital, depending on the results of the procedure. This will be determined after the procedure is done. 8.   If you or your family have any questions, please call our office Monday-Friday 9:00am         -4:30 pm , at [phone], and ask to speak to one of the nurses.

## 2023-03-04 LAB — INR BLD: 1.8

## 2023-03-06 ENCOUNTER — ANTI-COAG VISIT (OUTPATIENT)
Age: 83
End: 2023-03-06

## 2023-03-06 NOTE — PROGRESS NOTES
Verbal order and read back per SANDRA Anand NP  INR-1.8  Take 5 mg today, then continue Coumadin 5 mg daily except 2.5 mg on Mondays- Wednesdays recheck in 2 weeks   Patient made aware of dosing and recheck instructions, no questions.

## 2023-03-15 ENCOUNTER — CLINICAL DOCUMENTATION (OUTPATIENT)
Facility: HOSPITAL | Age: 83
End: 2023-03-15

## 2023-03-16 NOTE — PROGRESS NOTES
[facility]  Appointment: 3/15/2023 11:15 AM  Location: [facility]  Patient #: [id number]  : 1940  Undefined / Language: [place] / Race: White  Male      History of Present Illness Chevy Gomez MD; 3/16/2023 7:11 AM)  The patient is a 79year old male. Note: The patient is a 79year old male who presents for a Recheck of Hyponatremia. Hospital Course/summary:  Patient was admitted to the hospital on  after presenting to the emergency department at the request of his PCP for evaluation of hyponatremia. On arrival to the emergency department sodium was noted to be severely low at 114 although there was no evidence for any acute neurologic complication. Patient was admitted to the ICU for acute management with nephrology in consultation. Sodium level has improved appropriately with treatment and patient did require tolvaptan and administration of this hospitalization. Urea has been initiated and recommended to be continued at discharge. Nephrology also recommends a 1200 cc/day fluid restriction going forward. Patient was noted to have an abnormal CT of the abdomen with concern for possible liver mass. Gastroenterology has evaluated patient this hospitalization and recommendation is for outpatient follow-up for further imaging/work-up as deemed necessary. Patient was ordered Lopressor prior to admission for history of atrial fibrillation. This has been held this hospitalization due to marginal blood pressures. If blood pressures improve and remained stable could consider resuming this given potential issues that may develop with rate control.   Discharge Diagnoses:    Hyponatremia, multifactorial secondary to CHF and diuretic use as well as SIADH  Chronic systolic congestive heart failure, EF 45%  Atrial fibrillation, on anticoagulation therapy with Coumadin  Abnormal CT abdomen, concern for possible liver mass  Pancytopenia, etiology uncertain  History of sick sinus syndrome status post permanent pacemaker implantation  History of esophagectomy    Interval history:  sodium 132  urea 15 gm daily  Lasix 20 mg daily  denies SOB /CP/dizzyness  albumin 4.0  overall feels ok    Problem List/Past Medical ([facility]; 3/15/2023 11:31 AM)  Gout (M10.9)    Hyponatremia (E87.1)    Overweight (BMI 25.0 to 29.9) (E66.3)    Osteoarthrosis (M19.90)    Esophageal reflux (K21.9)    Atrial fibrillation (I48.91)    Pacemaker (Z95.0)    Esophageal cancer (C15.9)    Problems Reconciled      Allergies (Ene Rice; 3/15/2023 11:31 AM)  Parafon Forte DSC *MUSCULOSKELETAL THERAPY AGENTS*   Anaphylaxis. Aspirin *ANALGESICS - NonNarcotic*    Allergies Reconciled      Social History [facility]; 3/15/2023 11:37 AM)  Tobacco use   Former smoker. Alcohol Use   Moderate alcohol use. No Drug Use      Medication History (Ene Rice; 3/15/2023 11:31 AM)  Urea  (15 G Daily as directed, Taken starting 01/06/2022) Active. (URE-NA)  Docusate Sodium  (100MG capsule, Oral) Active. (COLACE)  Warfarin Sodium  (5MG tablet, 2 1/2 Mon/Wed Oral 5 mg the rest of the week) Active. (COUMADIN)  Ipratropium Bromide  (0.03% Aerosol, Spray, Nasal) Active. (ATROVENT)  linaCLOtide  (145MCG capsule, Oral) Active. Mcnally Duos)  Omeprazole  (20MG capsule,delayed, Oral) Active. (PRILOSEC)  Allopurinol  (300MG tablet, Oral) Active. (ZYLOPRIM)  Cyanocobalamin  (500MCG tablet, Oral) Active. (VITAMIN B12)  Cholecalciferol  (50 MCG(2000 UT) tablet, Oral) Active. (VITAMIN D3)  Constulose  (10GM/15ML solution, oral, Oral three times daily) Active. Unisom  (25MG tablet, 1/4 tablet Oral at bedtime) Active. Gabapentin  (100MG capsule, 1 Oral daily as needed) Active. Vitamin B1  (daily) Active. folic acid  (daily) Active. Furosemide  (20MG tablet, 10 mg Oral every other day titrate up to daily as needed) Active.  (Per Dr. Luis Enrique Barragan 1/5/2022)  Medications Reconciled     Past Surgical History Kevin Light; 3/15/2023 11:31 AM)  hip replacement   [2005]:  gi esophagectomy    HEENT Esophageal Cancer Surgery   [2001]:  IVC filter placed   [2003]:  pacemaker medtronic   [2012]:  Colonoscopy/endoscopy   [12/13/2017]: Health Maintenance History (Loren Saenz; 3/15/2023 11:32 AM)  Flu Vaccine   [11/2022]:  Pneumovax   [07/10/2018]:  covid19 vaccine dates: 4/27/2021, 5/18/2021 & 12/9/2021      Other Problems (Loren Saenz; 3/15/2023 11:31 AM)  Portal Access Education (Z71.9)          Review of Systems Haroon Rodriguez MD; 3/16/2023 7:11 AM)  General Not Present- Anorexia, Chills, Fatigue and Fever. Skin Not Present- Bruising, Pruritus, Rash and Ulcer. HEENT Not Present- Dry Mucous Membranes, Dysgeusia, Oral Ulcers, Periorbital Puffiness and Sore Throat. Respiratory Not Present- Cough, Difficulty Breathing on Exertion, Dyspnea and Hemoptysis. Cardiovascular Not Present- Chest Pain, Claudications, Orthopnea, Palpitations, Paroxysmal Nocturnal Dyspnea and Swelling of Extremities. Gastrointestinal Not Present- Abdominal Pain, Abdominal Swelling, Constipation, Diarrhea, Hematochezia, Melena, Nausea and Vomiting. Musculoskeletal Not Present- Joint Pain, Joint Redness, Joint Stiffness, Joint Swelling, Leg Cramps and Myalgia. Neurological Present- Dizziness and Incoordination. Not Present- Headaches, Syncope and Trouble walking. Endocrine Not Present- Appetite Changes, Excessive Thirst, Polydipsia and Polyuria. Hematology Not Present- Easy Bruising and Excessive bleeding. Vitals (Loren Saenz; 3/15/2023 11:33 AM)  3/15/2023 11:33 AM  Weight: 138 lb   Height: 63 in   Height was reported by patient. Body Surface Area: 1.65 m²   Body Mass Index: 24.45 kg/m²    Pain Level: 0/10    Temp.: 97.3° F    Pulse: 85 (Regular)    Resp.: 12 (Unlabored)     BP: 110/70(Sitting, Left Arm, Standard)  couldn't get 02 hand to cold and bad circulation            Physical Exam Haroon Rodriguez MD; 3/16/2023 7:11 AM)  General  General Appearance - Not in acute distress.   Build & Nutrition - Well nourished and Well developed. Integumentary  General Characteristics  Overall examination of the patient's skin reveals - no rashes. Eye  Sclera/Conjunctiva - Bilateral - Nonicteric. ENMT  Mouth and Throat  Oral Cavity/Oropharynx - Gingiva - no ulcerations noted, No excessive growth of soft tissue present. Oral Mucosa - moist. Oropharynx - No presence of white exudate noted. Chest and Lung Exam  Auscultation  Breath sounds - Normal and Clear. Adventitious sounds - No Adventitious sounds. Cardiovascular  Cardiovascular examination reveals  - on palpation PMI is normal in location and amplitude, no palpable S3 or S4. Normal cardiac borders. , normal heart sounds, regular rate and rhythm with no murmurs, carotid auscultation reveals no bruits, abdominal aorta auscultation reveals no bruits, normal pedal pulses bilaterally and no digital clubbing, cyanosis, edema, increased warmth or tenderness. Edema  Location - Note: kathy LE 2+. Abdomen  Inspection  Inspection of the abdomen reveals - No Abnormal pulsations. Palpation/Percussion  Palpation and Percussion of the abdomen reveal - Soft, Non Tender, No hepatosplenomegaly and No Palpable abdominal masses. Auscultation  Auscultation of the abdomen reveals - Bowel sounds normal and No Abdominal bruits. Neurologic  Neurologic evaluation reveals  - alert and oriented x 3 with no impairment of recent or remote memory. Lymphatic  General Lymphatics  Description - No Cervical lymphadeopathy.         Assessment & Plan Bahman Farmer MD; 3/16/2023 7:12 AM)    Hyponatremia (E87.1)  Impression:  #1 severe Euvolemic AC on chronic hyponatremia, etiology d/t SIADH, improved, sodium 132  #2 history of CHF with 45% EF, compensated  #3 history of atrial fibrillation  #4 history of hypothyroidism  #5 previous history of DVT on anticoagulation    Plan:  #1 continue urea Na 15 gm daily  #2 lasix 20 mg daily  #3 NO Aldactone  #4 daily wts  #5 fluid restrict 1500ml/day    f/u in 6-9 mths with renal panel  please cc with thanks to DR Pancho Holder     Signed by Chevy Gomez MD (3/16/2023 7:13 AM)

## 2023-03-22 ENCOUNTER — ANTI-COAG VISIT (OUTPATIENT)
Age: 83
End: 2023-03-22

## 2023-03-22 LAB — INR BLD: 2.7

## 2023-03-30 NOTE — H&P
scans but CT scans are acceptable. History of Present Illness: This is a 79 y.o. male admitted for AICD at end of battery life [Z45.02]. Patient complains of:    Patient presents today for pacemaker generator change out. He has atrial fibrillation chronically on Coumadin. No new chest pain, dyspnea. No nausea, vomiting. No fevers, chills. He has held his Coumadin. Review of Symptoms:  Except as stated above include:  Constitutional:  Negative  Ears, nose, throat:  Negative  Respiratory:  negative  Cardiovascular:  negative  Gastrointestinal: negative  Genitourinary:  negative  Musculoskeletal:  Negative  Neurological:  Negative  Dermatological:  Negative  Hematological: Negative  Endocrinological: Negative  Allergy: Negative  Psychological:  Negative       Past Medical History:     Past Medical History:   Diagnosis Date    Abdominal pain, unspecified site     Acute upper respiratory infections of unspecified site     Atrial fibrillation ([address]     Cancer of esophagus ([address] 2001    RT and adjuvant chemotherapy, surgery    Esophageal reflux     Generalized osteoarthrosis, involving multiple sites     Gout, unspecified     Nausea alone     Sinoatrial node dysfunction (H[facility])     Status post thoracentesis 06/2019    Thromboembolus ([address] 2003 (approx)    DVT in leg (IVC filter placed)    Unspecified cataract     Unspecified constipation          Social History:     Social History     Socioeconomic History    Marital status: Single   Tobacco Use    Smoking status: Former     Packs/day: 0.50     Types: Cigarettes    Smokeless tobacco: Never   Substance and Sexual Activity    Alcohol use: Yes    Drug use: No        Family History:     Family History   Problem Relation Age of Onset    Heart Disease Father     Alzheimer's Disease Father         Medications:      Allergies   Allergen Reactions    Chlorzoxazone Anaphylaxis    Aspirin Other (See Comments)     Cannot take due to on coumadin        No current extremities normal, atraumatic, no cyanosis or edema  Skin: warm and dry, no hyperpigmentation, vitiligo, or suspicious lesions  Neuro: alert, oriented x3, affect appropriate, no focal neurological deficits, moves all extremities well, no involuntary movements, and reflexes at knee and ankle intact  Psych: non focal     Data Review:     No results for input(s): WBC, HGB, HCT, PLT in the last 72 hours. No results for input(s): NA, K, CL, CO2, GLU, BUN, CREA, CA, MG, PHOS, ALB, ALT, INR in the last 72 hours.     Invalid input(s): TBIL, SGOT,  BNP  [unfilled]      Signed By: Dilip Gayle MD     March 30, 2023

## 2023-03-31 ENCOUNTER — HOSPITAL ENCOUNTER (OUTPATIENT)
Facility: HOSPITAL | Age: 83
Setting detail: OUTPATIENT SURGERY
Discharge: HOME | End: 2023-03-31
Attending: INTERNAL MEDICINE | Admitting: INTERNAL MEDICINE
Payer: MEDICARE

## 2023-03-31 VITALS
OXYGEN SATURATION: 99 % | HEART RATE: 73 BPM | RESPIRATION RATE: 19 BRPM | DIASTOLIC BLOOD PRESSURE: 70 MMHG | SYSTOLIC BLOOD PRESSURE: 127 MMHG

## 2023-03-31 DIAGNOSIS — Z45.02 AICD AT END OF BATTERY LIFE: ICD-10-CM

## 2023-03-31 DIAGNOSIS — Z45.010 PACEMAKER AT END OF BATTERY LIFE: Primary | ICD-10-CM

## 2023-03-31 LAB
ANION GAP SERPL CALC-SCNC: 6 MMOL/L (ref 3–18)
BASOPHILS # BLD: 0 K/UL (ref 0–0.1)
BASOPHILS NFR BLD: 1 % (ref 0–2)
BUN SERPL-MCNC: 23 MG/DL (ref 7–18)
BUN/CREAT SERPL: 30 (ref 12–20)
CALCIUM SERPL-MCNC: 9.4 MG/DL (ref 8.5–10.1)
CHLORIDE SERPL-SCNC: 98 MMOL/L (ref 100–111)
CO2 SERPL-SCNC: 29 MMOL/L (ref 21–32)
CREAT SERPL-MCNC: 0.76 MG/DL (ref 0.6–1.3)
DIFFERENTIAL METHOD BLD: ABNORMAL
EOSINOPHIL # BLD: 0 K/UL (ref 0–0.4)
EOSINOPHIL NFR BLD: 1 % (ref 0–5)
ERYTHROCYTE [DISTWIDTH] IN BLOOD BY AUTOMATED COUNT: 14.4 % (ref 11.6–14.5)
GLUCOSE SERPL-MCNC: 91 MG/DL (ref 74–99)
HCT VFR BLD AUTO: 32.1 % (ref 36–48)
HGB BLD-MCNC: 11 G/DL (ref 13–16)
IMM GRANULOCYTES # BLD AUTO: 0 K/UL (ref 0–0.04)
IMM GRANULOCYTES NFR BLD AUTO: 1 % (ref 0–0.5)
INR PPP: 1.3 (ref 0.8–1.2)
LYMPHOCYTES # BLD: 0.9 K/UL (ref 0.9–3.6)
LYMPHOCYTES NFR BLD: 41 % (ref 21–52)
MCH RBC QN AUTO: 33.8 PG (ref 24–34)
MCHC RBC AUTO-ENTMCNC: 34.3 G/DL (ref 31–37)
MCV RBC AUTO: 98.8 FL (ref 78–100)
MONOCYTES # BLD: 0.2 K/UL (ref 0.05–1.2)
MONOCYTES NFR BLD: 8 % (ref 3–10)
NEUTS SEG # BLD: 1 K/UL (ref 1.8–8)
NEUTS SEG NFR BLD: 47 % (ref 40–73)
NRBC # BLD: 0 K/UL (ref 0–0.01)
NRBC BLD-RTO: 0 PER 100 WBC
PLATELET # BLD AUTO: 117 K/UL (ref 135–420)
PMV BLD AUTO: 9.6 FL (ref 9.2–11.8)
POTASSIUM SERPL-SCNC: 3.5 MMOL/L (ref 3.5–5.5)
PROTHROMBIN TIME: 16.7 SEC (ref 11.5–15.2)
RBC # BLD AUTO: 3.25 M/UL (ref 4.35–5.65)
SODIUM SERPL-SCNC: 133 MMOL/L (ref 136–145)
WBC # BLD AUTO: 2.2 K/UL (ref 4.6–13.2)

## 2023-03-31 PROCEDURE — 2500000003 HC RX 250 WO HCPCS: Performed by: INTERNAL MEDICINE

## 2023-03-31 PROCEDURE — 85610 PROTHROMBIN TIME: CPT

## 2023-03-31 PROCEDURE — 85025 COMPLETE CBC W/AUTO DIFF WBC: CPT

## 2023-03-31 PROCEDURE — 6360000002 HC RX W HCPCS: Performed by: INTERNAL MEDICINE

## 2023-03-31 PROCEDURE — 2720000010 HC SURG SUPPLY STERILE: Performed by: INTERNAL MEDICINE

## 2023-03-31 PROCEDURE — C1721 AICD, DUAL CHAMBER: HCPCS | Performed by: INTERNAL MEDICINE

## 2023-03-31 PROCEDURE — 33228 REMV&REPLC PM GEN DUAL LEAD: CPT | Performed by: INTERNAL MEDICINE

## 2023-03-31 PROCEDURE — 2709999900 HC NON-CHARGEABLE SUPPLY: Performed by: INTERNAL MEDICINE

## 2023-03-31 PROCEDURE — 80048 BASIC METABOLIC PNL TOTAL CA: CPT

## 2023-03-31 DEVICE — ICD-DR DDMB1D4 EVERA MRI XT US IS1/DF4
Type: IMPLANTABLE DEVICE | Status: FUNCTIONAL
Brand: EVERA MRI™ XT DR SURESCAN®

## 2023-03-31 RX ORDER — OXYCODONE HYDROCHLORIDE AND ACETAMINOPHEN 5; 325 MG/1; MG/1
1 TABLET ORAL EVERY 6 HOURS PRN
Qty: 12 TABLET | Refills: 0 | Status: SHIPPED | OUTPATIENT
Start: 2023-03-31 | End: 2023-04-03

## 2023-03-31 RX ORDER — CEFAZOLIN SODIUM 1 G/3ML
INJECTION, POWDER, FOR SOLUTION INTRAMUSCULAR; INTRAVENOUS PRN
Status: DISCONTINUED | OUTPATIENT
Start: 2023-03-31 | End: 2023-03-31 | Stop reason: HOSPADM

## 2023-03-31 RX ORDER — WARFARIN SODIUM 5 MG/1
TABLET ORAL
Qty: 30 TABLET | Refills: 3 | Status: SHIPPED | OUTPATIENT
Start: 2023-04-03

## 2023-03-31 RX ORDER — OXYCODONE HYDROCHLORIDE AND ACETAMINOPHEN 5; 325 MG/1; MG/1
1 TABLET ORAL EVERY 4 HOURS PRN
Status: DISCONTINUED | OUTPATIENT
Start: 2023-03-31 | End: 2023-03-31 | Stop reason: HOSPADM

## 2023-03-31 RX ORDER — ACETAMINOPHEN 325 MG/1
650 TABLET ORAL EVERY 4 HOURS PRN
Status: DISCONTINUED | OUTPATIENT
Start: 2023-03-31 | End: 2023-03-31 | Stop reason: HOSPADM

## 2023-03-31 RX ORDER — HEPARIN SODIUM 200 [USP'U]/100ML
INJECTION, SOLUTION INTRAVENOUS CONTINUOUS PRN
Status: COMPLETED | OUTPATIENT
Start: 2023-03-31 | End: 2023-03-31

## 2023-03-31 NOTE — Clinical Note
TRANSFER - OUT REPORT:     Verbal report given to: Francie Kinney RN. Report consisted of patient's Situation, Background, Assessment and   Recommendations(SBAR). Opportunity for questions and clarification was provided. Patient transported with a Cardiac Cath Tech / Patient Care Tech.

## 2023-03-31 NOTE — DISCHARGE INSTRUCTIONS
Disposition:  Will need follow-up with device/wound check in 7 days in my office. Please contact office at [phone] to confirm appointment. Main Office:    [address], [place] [address]    Restrictions: For affected arm:  No lifting greater than 10 lbs or lifting elbow above shoulder for 4 weeks. Keep incision clean and dry for a total of 72 hours after procedure. Remove dressing in 7 days if not already removed. No hot tubs or pools for 2 weeks. OK to shower with \"pat\" dry incision after 72 hours. Absolutely no driving for 24 hours, minimize ideally for 1 week due to concern for airbag.

## 2023-03-31 NOTE — PROGRESS NOTES
Cath holding summary     Patient escorted to cath holding from waiting area ambulatory, alert and oriented x 4, voicing no complaints. Changed into gown and placed on monitor. Lab results, med rec and H&P reviewed on chart. PIV x 1 inserted without difficulty. Patient had stated that he had half a cup of coffee and a gingersnap this AM due to his hypoglycemia. Patient and daughter informed that procedure cannot take place if patient had anything to eat or drink.  Patient agreed to using local sedation only so procedure is able to take place as planned

## 2023-03-31 NOTE — PROGRESS NOTES
AVS Discharge instructions reviewed with patient and copy given to patient. All questions answered. Patient verbalized understanding to all discharge instructions. PIV  X 1 removed. Procedural site within normal limits. No hematoma or bleeding noted from procedural and PIV site. No pain noted at discharge. Patient discharged with support person in stable condition. Escorted out to vehicle for transport home.

## 2023-04-05 ENCOUNTER — NURSE ONLY (OUTPATIENT)
Age: 83
End: 2023-04-05

## 2023-04-05 DIAGNOSIS — I48.21 PERMANENT ATRIAL FIBRILLATION (HCC): ICD-10-CM

## 2023-04-05 DIAGNOSIS — Z95.0 PRESENCE OF CARDIAC PACEMAKER: Primary | ICD-10-CM

## 2023-04-05 DIAGNOSIS — R00.1 BRADYCARDIA: ICD-10-CM

## 2023-04-20 ENCOUNTER — OFFICE VISIT (OUTPATIENT)
Age: 83
End: 2023-04-20

## 2023-04-20 ENCOUNTER — ANTI-COAG VISIT (OUTPATIENT)
Age: 83
End: 2023-04-20

## 2023-04-20 VITALS
WEIGHT: 134 LBS | SYSTOLIC BLOOD PRESSURE: 94 MMHG | HEIGHT: 66 IN | HEART RATE: 76 BPM | DIASTOLIC BLOOD PRESSURE: 60 MMHG | BODY MASS INDEX: 21.53 KG/M2 | OXYGEN SATURATION: 96 %

## 2023-04-20 DIAGNOSIS — I50.32 CHRONIC DIASTOLIC CONGESTIVE HEART FAILURE (HCC): Primary | ICD-10-CM

## 2023-04-20 LAB — INR BLD: 2.1

## 2023-04-20 NOTE — PROGRESS NOTES
04/20/23 75   03/31/23 73   01/25/23 89     General:   Well developed, well groomed. Head/Neck:   No obvious jugular venous distention     No obvious carotid pulsations. No evidence of xanthelasma. Lungs:   No respiratory distress. Clear bilaterally. Heart:  Regular rate and rhythm. Normal S1/S2. Palpation grossly normal.    No significant murmurs, rubs or gallops. Abdomen:   Non-acute abdomen. No obvious pulsations. Extremities:   Intact peripheral pulses. No significant edema. Neurological:   Alert and oriented to person, place, time. No focal neurological deficit visually. Skin:   No obvious rash    Blood Pressure Metric:  Monitor recommended and adjustments stated if needed.

## 2023-05-16 LAB — INR BLD: 2.2

## 2023-05-17 ENCOUNTER — ANTI-COAG VISIT (OUTPATIENT)
Age: 83
End: 2023-05-17

## 2023-05-22 NOTE — PROGRESS NOTES
Mr. Sravanthi Collins is here today for anticoagulation monitoring for the diagnosis of atrial fibrillation. His INR goal is 2.0-3.0 and his current Coumadin dose is 5 mg qd 2.5 mon, wed. Today's findings include an INR of 2.2     Considering Mr. Celestina Mccoy past history, todays findings, and per the coumadin policy/protocol, Mr. Villa Walters was instructed to take Coumadin as follows,  same dose. He was also instructed to come back in 4 weeks for an INR check. A full discussion of the nature of anticoagulants has been carried out. A full discussion of the need for frequent and regular monitoring, precise dosage adjustment and compliance was stressed. Side effects of potential bleeding were discussed and Mr. Villa Walters was instructed to call [phone] if there are any signs of abnormal bleeding. Mr. Villa Walters was instructed to avoid any OTC items containing aspirin or ibuprofen and prior to starting any new OTC products to consult with his physician or pharmacist to ensure no drug interactions are present. Mr. Villa Walters was instructed to avoid any major changes in his general diet and to avoid alcohol consumption. .      Mr. Villa Walters was left detailed message of all instructions and will call the office with any questions, concerns, or signs of abnormal bleeding or blood clot.

## 2023-07-27 LAB — INR BLD: 2.3

## 2023-07-28 ENCOUNTER — ANTI-COAG VISIT (OUTPATIENT)
Age: 83
End: 2023-07-28

## 2023-07-31 ENCOUNTER — ANTI-COAG VISIT (OUTPATIENT)
Age: 83
End: 2023-07-31

## 2023-07-31 NOTE — PROGRESS NOTES
Mr. Rebeca Mercado is here today for anticoagulation monitoring for the diagnosis of atrial fibrillation. His INR goal is 2.0-3.0 and his current Coumadin dose is Continue Coumadin 5mg daily except 2.5mg on mondays - recheck 4 weeks . Today's findings include an INR of 2.3     Considering Mr. Johnson Dies past history, todays findings, and per the coumadin policy/protocol, Mr. Laura Woodard was instructed to take Coumadin as follows,  same dose. He was also instructed to come back in 2 weeks for an INR check. A full discussion of the nature of anticoagulants has been carried out. A full discussion of the need for frequent and regular monitoring, precise dosage adjustment and compliance was stressed. Side effects of potential bleeding were discussed and Mr. Laura Woodard was instructed to call [phone] if there are any signs of abnormal bleeding. Mr. Laura Woodard was instructed to avoid any OTC items containing aspirin or ibuprofen and prior to starting any new OTC products to consult with his physician or pharmacist to ensure no drug interactions are present. Mr. Laura Woodard was instructed to avoid any major changes in his general diet and to avoid alcohol consumption. .      Mr. Laura Woodard verbalized his understanding of all instructions and will call the office with any questions, concerns, or signs of abnormal bleeding or blood clot.

## 2023-09-07 LAB — INR BLD: 2.1

## 2023-09-08 ENCOUNTER — ANTI-COAG VISIT (OUTPATIENT)
Age: 83
End: 2023-09-08

## 2023-09-08 NOTE — PROGRESS NOTES
Mr. Magda Chatman is here today for anticoagulation monitoring for the diagnosis of atrial fibrillation. His INR goal is 2.0-3.0 and his current Coumadin dose is Continue Coumadin 5mg daily except 2.5mg on mondays - recheck 4 weeks . Today's findings include an INR of 2.1     Considering Mr. Edgar Lezama past history, todays findings, and per the coumadin policy/protocol, Mr. Fountain was instructed to take Coumadin as follows,  same. He was also instructed to come back in 4 weeks for an INR check. A full discussion of the nature of anticoagulants has been carried out. A full discussion of the need for frequent and regular monitoring, precise dosage adjustment and compliance was stressed. Side effects of potential bleeding were discussed and Mr. Fountain was instructed to call [phone] if there are any signs of abnormal bleeding. Mr. Fountain was instructed to avoid any OTC items containing aspirin or ibuprofen and prior to starting any new OTC products to consult with his physician or pharmacist to ensure no drug interactions are present. Mr. Fountain was instructed to avoid any major changes in his general diet and to avoid alcohol consumption. .      Mr. Fountain verbalized his understanding of all instructions and will call the office with any questions, concerns, or signs of abnormal bleeding or blood clot.

## 2023-09-20 ENCOUNTER — TELEPHONE (OUTPATIENT)
Age: 83
End: 2023-09-20

## 2023-09-20 DIAGNOSIS — R60.0 LOCALIZED EDEMA: Primary | ICD-10-CM

## 2023-09-20 NOTE — TELEPHONE ENCOUNTER
Has been taking 2 tabs of lasix but he has been taking 3 with the increased swelling and still has had no change in swelling. His legs and ankles are swelling. He also has increased SOB.

## 2023-09-20 NOTE — TELEPHONE ENCOUNTER
Per Dr. Meeta Keenan,     Keep lasix 3 daily for 1 week. Order BMP to check kidney function. Keep Oct 4 ladi.

## 2023-09-27 ENCOUNTER — HOSPITAL ENCOUNTER (OUTPATIENT)
Facility: HOSPITAL | Age: 83
Discharge: HOME | End: 2023-09-30
Payer: MEDICARE

## 2023-09-27 LAB
ANION GAP SERPL CALC-SCNC: 2 MMOL/L (ref 3–18)
BUN SERPL-MCNC: 31 MG/DL (ref 7–18)
BUN/CREAT SERPL: 38 (ref 12–20)
CALCIUM SERPL-MCNC: 8.8 MG/DL (ref 8.5–10.1)
CHLORIDE SERPL-SCNC: 96 MMOL/L (ref 100–111)
CO2 SERPL-SCNC: 32 MMOL/L (ref 21–32)
CREAT SERPL-MCNC: 0.81 MG/DL (ref 0.6–1.3)
GLUCOSE SERPL-MCNC: 82 MG/DL (ref 74–99)
POTASSIUM SERPL-SCNC: 4 MMOL/L (ref 3.5–5.5)
SODIUM SERPL-SCNC: 130 MMOL/L (ref 136–145)

## 2023-09-27 PROCEDURE — 36415 COLL VENOUS BLD VENIPUNCTURE: CPT

## 2023-09-27 PROCEDURE — 80048 BASIC METABOLIC PNL TOTAL CA: CPT

## 2023-10-03 LAB — INR BLD: 3.7

## 2023-10-04 ENCOUNTER — OFFICE VISIT (OUTPATIENT)
Age: 83
End: 2023-10-04
Payer: MEDICARE

## 2023-10-04 ENCOUNTER — ANTI-COAG VISIT (OUTPATIENT)
Age: 83
End: 2023-10-04

## 2023-10-04 ENCOUNTER — NURSE ONLY (OUTPATIENT)
Age: 83
End: 2023-10-04
Payer: MEDICARE

## 2023-10-04 VITALS
OXYGEN SATURATION: 93 % | WEIGHT: 138 LBS | HEART RATE: 54 BPM | SYSTOLIC BLOOD PRESSURE: 108 MMHG | BODY MASS INDEX: 22.18 KG/M2 | HEIGHT: 66 IN | DIASTOLIC BLOOD PRESSURE: 70 MMHG

## 2023-10-04 DIAGNOSIS — I48.0 PAROXYSMAL ATRIAL FIBRILLATION (HCC): ICD-10-CM

## 2023-10-04 DIAGNOSIS — R00.1 BRADYCARDIA: ICD-10-CM

## 2023-10-04 DIAGNOSIS — I50.32 CHRONIC DIASTOLIC CONGESTIVE HEART FAILURE (HCC): ICD-10-CM

## 2023-10-04 DIAGNOSIS — Z95.0 PACEMAKER: Primary | ICD-10-CM

## 2023-10-04 PROCEDURE — 1036F TOBACCO NON-USER: CPT | Performed by: INTERNAL MEDICINE

## 2023-10-04 PROCEDURE — G8484 FLU IMMUNIZE NO ADMIN: HCPCS | Performed by: INTERNAL MEDICINE

## 2023-10-04 PROCEDURE — 99214 OFFICE O/P EST MOD 30 MIN: CPT | Performed by: INTERNAL MEDICINE

## 2023-10-04 PROCEDURE — G8428 CUR MEDS NOT DOCUMENT: HCPCS | Performed by: INTERNAL MEDICINE

## 2023-10-04 PROCEDURE — 1123F ACP DISCUSS/DSCN MKR DOCD: CPT | Performed by: INTERNAL MEDICINE

## 2023-10-04 PROCEDURE — 93288 INTERROG EVL PM/LDLS PM IP: CPT | Performed by: INTERNAL MEDICINE

## 2023-10-04 PROCEDURE — G8420 CALC BMI NORM PARAMETERS: HCPCS | Performed by: INTERNAL MEDICINE

## 2023-10-04 RX ORDER — PANTOPRAZOLE SODIUM 20 MG/1
20 TABLET, DELAYED RELEASE ORAL
COMMUNITY
Start: 2023-08-09

## 2023-10-04 RX ORDER — ALLOPURINOL 300 MG/1
300 TABLET ORAL DAILY
COMMUNITY
Start: 2023-09-23

## 2023-10-04 RX ORDER — LACTULOSE 10 G/15ML
SOLUTION ORAL; RECTAL
COMMUNITY
Start: 2023-09-07

## 2023-10-04 RX ORDER — LINACLOTIDE 145 UG/1
CAPSULE, GELATIN COATED ORAL
COMMUNITY
Start: 2023-07-13

## 2023-10-04 NOTE — PROGRESS NOTES
History of Present Illness:  79 YOM here for follow up. About a week and a half to two weeks ago he had increasing shortness of breath, orthopnea and edema. His Lasix was changed from 40 mg a day to 60 mg a day and blood work last week showed sodium of 130. Earlier in the month it was 128. He has not yet seen Dr. Jessica Bryant. He is feeling better today with improvement in shortness of breath and orthopnea, but he does have chronic edema, using compression stockings. No bleeding issues. Impression:  Recent acute on chronic diastolic heart failure with Lasix 40 mg daily, increased to 60 mg daily for a few days, now back to 40 mg daily. History of SIADH and CKD, followed by Dr. Jessica Bryant. Fluid restriction recommended. History of atrial fibrillation. Dual chamber Medtronic pacemaker with change-out March 2023 with very thin skin, but healed. Plan:  His incision is well healed and he had heart failure last month, which improved with higher dose of Lasix. He is now back to 40 mg daily. They are wondering if Bumex might be better, but I will defer to Dr. Jessica Bryant. It seems he does respond to Lasix, we just need to monitor his sodium closely. He is on anticoagulation for history of atrial fibrillation. His pacemaker is functioning normally. I am going to follow up with a repeat echocardiogram since it has been greater than a year and see him back in three months.         Wt Readings from Last 3 Encounters:   10/04/23 138 lb (62.6 kg)   04/20/23 134 lb (60.8 kg)   01/25/23 134 lb (60.8 kg)     Past Medical History:   Diagnosis Date    Abdominal pain, unspecified site     Acute upper respiratory infections of unspecified site     Atrial fibrillation ([address])     Cancer of esophagus ([address]) 2001    RT and adjuvant chemotherapy, surgery    Esophageal reflux     Generalized osteoarthrosis, involving multiple sites     Gout, unspecified     Nausea alone     Sinoatrial node dysfunction (HCC)     Status post thoracentesis
Alert-The patient is alert, awake and responds to voice. The patient is oriented to time, place, and person. The triage nurse is able to obtain subjective information.

## 2023-10-10 ENCOUNTER — TELEPHONE (OUTPATIENT)
Age: 83
End: 2023-10-10

## 2023-10-10 NOTE — TELEPHONE ENCOUNTER
Ms. Clement called in regards to  Malu. She said Dr Rivera [place] office cannot see him until Oct 30 in regards to his sodium. 79

## 2023-10-24 ENCOUNTER — HOSPITAL ENCOUNTER (OUTPATIENT)
Facility: HOSPITAL | Age: 83
Discharge: HOME | End: 2023-10-27
Attending: INTERNAL MEDICINE
Payer: MEDICARE

## 2023-10-24 DIAGNOSIS — K75.81 LIVER CIRRHOSIS SECONDARY TO NASH (HCC): ICD-10-CM

## 2023-10-24 DIAGNOSIS — K59.00 CONSTIPATION, UNSPECIFIED CONSTIPATION TYPE: ICD-10-CM

## 2023-10-24 DIAGNOSIS — K74.60 LIVER CIRRHOSIS SECONDARY TO NASH (HCC): ICD-10-CM

## 2023-10-24 DIAGNOSIS — R19.4 CHANGE IN BOWEL HABITS: ICD-10-CM

## 2023-10-24 PROCEDURE — 76705 ECHO EXAM OF ABDOMEN: CPT

## 2023-11-13 LAB — INR BLD: 2.5

## 2023-11-14 ENCOUNTER — ANTI-COAG VISIT (OUTPATIENT)
Age: 83
End: 2023-11-14

## 2023-11-14 NOTE — PROGRESS NOTES
Verbal order and read back per SANDRA Denise NP    The INR is stable and therapeutic.    Continue Coumadin 5mg daily except 2.5mg on mondays   Recheck 4 weeks

## 2023-12-06 RX ORDER — WARFARIN SODIUM 5 MG/1
TABLET ORAL
Qty: 90 TABLET | Refills: 2 | Status: SHIPPED | OUTPATIENT
Start: 2023-12-06

## 2023-12-13 NOTE — PROGRESS NOTES
Mr. Angela Dowling is here today for anticoagulation monitoring for the diagnosis of Atrial Fibrillation. His INR goal is 2.0-3.0 and his current Coumadin dose is  5 mg daily. Today's findings include an INR of 3.6 (normal INR range 0.8-1.2) . Considering Mr. Romie Segovia past history, todays findings, and per the coumadin policy/protocol, Mr. Sandra Botello was instructed to take Coumadin as follows,  2.5 mg tues/friday, 5 mg all other days. He was also instructed to schedule an appointment in 2 weeks prior to leaving for an INR check. A full discussion of the nature of anticoagulants has been carried out. A full discussion of the need for frequent and regular monitoring, precise dosage adjustment and compliance was stressed. Side effects of potential bleeding were discussed and Mr. Sandra Botello was instructed to call [phone] if there are any signs of abnormal bleeding. Mr. Sandra Botello was instructed to avoid any OTC items containing aspirin or ibuprofen and prior to starting any new OTC products to consult with his physician or pharmacist to ensure no drug interactions are present. Mr. Sandra Botello was instructed to avoid any major changes in his general diet and to avoid alcohol consumption. . 
 
 
Mr. Sandra Botello verbalized his understanding of all instructions and will call the office with any questions, concerns, or signs of abnormal bleeding or blood clot. Parents in with siblings, referred to derm for eczema,.  Has been on Dupixit in past but now has run out since moving and eczema flaring, referred to [facility] but has a year wait, will refer to allergist and they perhaps can see him first

## 2023-12-14 ENCOUNTER — ANTI-COAG VISIT (OUTPATIENT)
Age: 83
End: 2023-12-14

## 2023-12-14 LAB — INR BLD: 2.5

## 2023-12-15 ENCOUNTER — TELEPHONE (OUTPATIENT)
Age: 83
End: 2023-12-15

## 2023-12-15 NOTE — TELEPHONE ENCOUNTER
----- Message from Meir Méndez MD sent at 12/12/2023  4:11 PM EST -----  Regarding: tests  Please call and let patient know echo is normal for age, good function.  Thanks    ----- Message -----  From: Meir Méndez MD  Sent: 12/12/2023   2:55 PM EST  To: Meir Méndez MD

## 2024-01-11 ENCOUNTER — OFFICE VISIT (OUTPATIENT)
Age: 84
End: 2024-01-11

## 2024-01-11 ENCOUNTER — PROCEDURE VISIT (OUTPATIENT)
Age: 84
End: 2024-01-11
Payer: MEDICARE

## 2024-01-11 VITALS
WEIGHT: 133 LBS | BODY MASS INDEX: 19.7 KG/M2 | HEIGHT: 69 IN | HEART RATE: 86 BPM | OXYGEN SATURATION: 96 % | SYSTOLIC BLOOD PRESSURE: 100 MMHG | DIASTOLIC BLOOD PRESSURE: 64 MMHG

## 2024-01-11 DIAGNOSIS — Z95.0 PACEMAKER: Primary | ICD-10-CM

## 2024-01-11 DIAGNOSIS — I50.32 CHRONIC DIASTOLIC CONGESTIVE HEART FAILURE (HCC): ICD-10-CM

## 2024-01-11 PROCEDURE — 93280 PM DEVICE PROGR EVAL DUAL: CPT | Performed by: INTERNAL MEDICINE

## 2024-01-11 RX ORDER — OMEPRAZOLE 20 MG/1
20 CAPSULE, DELAYED RELEASE ORAL DAILY
COMMUNITY
Start: 2024-01-05

## 2024-01-11 NOTE — PROGRESS NOTES
History of Present Illness:  83 YOM here for follow up.  He continues to see Dr. Yadav for his SIADH, chronic kidney disease.  He is scheduled next month.  His breathing is stable, as well as lower extremity edema.  He is taking Lasix, usually 40 mg a day, and will take an extra 20 mg as needed based upon his weight and dyspnea.  No chest pain or syncope. He uses a cane.    Impression:  Acute on chronic heart failure exacerbation last fall, now taking Lasix 40 mg daily, extra 20 mg as needed.  History of SIADH and CKD, followed by Dr. Yadav.  Dual chamber Medtronic pacemaker with change-out March 2023, healed.  History of remote esophageal cancer.    Plan:  His weight remains chronically on the lower side, his breathing is stable.  He is on Coumadin without any bleeding issues.  He is a poor candidate for a Watchman device given his frailty.  He will follow up with Dr. Yadav with his electrolytes and sodium. I will plan to see back in six months with pacemaker check.

## 2024-01-11 NOTE — PROGRESS NOTES
Mr. Malu Seaman is here today for anticoagulation monitoring for the diagnosis of atrial fibrillation.  His INR goal is 2.0-3.0 and his current Coumadin dose is Continue Coumadin 5mg daily except 2.5mg on mondays - recheck 4 weeks .     Today's findings include an INR of 2.5     Considering Mr. Seaman's past history, todays findings, and per the coumadin policy/protocol, Mr. Seaman was instructed to take Coumadin as follows,  same dose.  He was also instructed to come back in 4 weeks for an INR check.    A full discussion of the nature of anticoagulants has been carried out.  A full discussion of the need for frequent and regular monitoring, precise dosage adjustment and compliance was stressed.  Side effects of potential bleeding were discussed and Mr. Seaman was instructed to call [phone] if there are any signs of abnormal bleeding.  Mr. Seaman was instructed to avoid any OTC items containing aspirin or ibuprofen and prior to starting any new OTC products to consult with his physician or pharmacist to ensure no drug interactions are present.  Mr. Seaman was instructed to avoid any major changes in his general diet and to avoid alcohol consumption.  .      Mr. Seaman verbalized his understanding of all instructions and will call the office with any questions, concerns, or signs of abnormal bleeding or blood clot.

## 2024-01-11 NOTE — PROGRESS NOTES
Dictation on: 01/11/2024 10:30 AM by: JORDAN BARRON [[id number]]       Wt Readings from Last 3 Encounters:   01/11/24 60.3 kg (133 lb)   12/12/23 62.1 kg (137 lb)   10/04/23 62.6 kg (138 lb)     Past Medical History:   Diagnosis Date    Abdominal pain, unspecified site     Acute upper respiratory infections of unspecified site     Atrial fibrillation (HCC)     Cancer of esophagus (HCC) 2001    RT and adjuvant chemotherapy, surgery    Esophageal reflux     Generalized osteoarthrosis, involving multiple sites     Gout, unspecified     Nausea alone     Sinoatrial node dysfunction (HCC)     Status post thoracentesis 06/2019    Thromboembolus (HCC) 2003 (approx)    DVT in leg (IVC filter placed)    Unspecified cataract     Unspecified constipation        Current Outpatient Medications   Medication Sig Dispense Refill    omeprazole (PRILOSEC) 20 MG delayed release capsule Take 1 capsule by mouth Daily      warfarin (COUMADIN) 5 MG tablet TAKE 1 TABLET BY MOUTH DAILY OR AS DIRECTED PER PHYSICIAN. 90 tablet 2    LINZESS 145 MCG capsule       pantoprazole (PROTONIX) 20 MG tablet Take 1 tablet by mouth every morning (before breakfast)      ENULOSE 10 GM/15ML SOLN solution       allopurinol (ZYLOPRIM) 300 MG tablet Take 1 tablet by mouth daily      Cholecalciferol 50 MCG (2000 UT) TABS Take 1 tablet by mouth daily      cyanocobalamin 500 MCG tablet Take 2 tablets by mouth daily      docusate (COLACE, DULCOLAX) 100 MG CAPS Take 100 mg by mouth 2 times daily      ferrous sulfate (IRON 325) 325 (65 Fe) MG tablet Take 1 tablet by mouth      folic acid (FOLVITE) 1 MG tablet Take by mouth daily      furosemide (LASIX) 20 MG tablet Take 2 tablets by mouth daily      gabapentin (NEURONTIN) 100 MG capsule Take 1 capsule by mouth as needed.      lactulose (CHRONULAC) 10 GM/15ML solution Take 2 tsp by mouth daily      ondansetron (ZOFRAN-ODT) 4 MG disintegrating tablet DISSOLVE 1 TABLET ON TONGUE TWO TIMES A DAY AS NEEDED FOR NAUSEA

## 2024-01-30 ENCOUNTER — ANTI-COAG VISIT (OUTPATIENT)
Age: 84
End: 2024-01-30

## 2024-01-30 LAB — INR BLD: 4.2

## 2024-01-30 NOTE — PROGRESS NOTES
Mr. Malu Seaman is here today for anticoagulation monitoring for the diagnosis of atrial fibrillation.  His INR goal is 2.0-3.0 and his current Coumadin dose is Continue Coumadin 5mg daily except 2.5mg on mondays - recheck 4 weeks .     Today's findings include an INR of 4.2     Considering Mr. Seaman's past history, todays findings, and per the coumadin policy/protocol, Mr. Seaman was instructed to take Coumadin as follows,  hold x2 then resume same.  He was also instructed to come back in 1 weeks for an INR check.    A full discussion of the nature of anticoagulants has been carried out.  A full discussion of the need for frequent and regular monitoring, precise dosage adjustment and compliance was stressed.  Side effects of potential bleeding were discussed and Mr. Seaman was instructed to call [phone] if there are any signs of abnormal bleeding.  Mr. Seaman was instructed to avoid any OTC items containing aspirin or ibuprofen and prior to starting any new OTC products to consult with his physician or pharmacist to ensure no drug interactions are present.  Mr. Seaman was instructed to avoid any major changes in his general diet and to avoid alcohol consumption.  .      Mr. Seaman verbalized his understanding of all instructions and will call the office with any questions, concerns, or signs of abnormal bleeding or blood clot.

## 2024-02-06 ENCOUNTER — ANTI-COAG VISIT (OUTPATIENT)
Age: 84
End: 2024-02-06

## 2024-02-06 LAB — INR BLD: 2.6

## 2024-02-06 NOTE — PROGRESS NOTES
Mr. Malu Seaman is here today for anticoagulation monitoring for the diagnosis of Atrial FIb  His INR goal is 2.0-3.0 and his current Coumadin dose is Continue Coumadin 5mg daily except 2.5mg on mondays - recheck 4 weeks.     Today's findings include an INR of 2.6     Considering Mr. Seaman's past history, todays findings, and per the coumadin policy/protocol, Mr. Seaman was instructed to take Coumadin as follows,  same .  He was also instructed to come back in 2 weeks for an INR check.    A full discussion of the nature of anticoagulants has been carried out.  A full discussion of the need for frequent and regular monitoring, precise dosage adjustment and compliance was stressed.  Side effects of potential bleeding were discussed and Mr. Seaman was instructed to call [phone] if there are any signs of abnormal bleeding.  Mr. Seaman was instructed to avoid any OTC items containing aspirin or ibuprofen and prior to starting any new OTC products to consult with his physician or pharmacist to ensure no drug interactions are present.  Mr. Seaman was instructed to avoid any major changes in his general diet and to avoid alcohol consumption.  .      Mr. Seaman verbalized his understanding of all instructions and will call the office with any questions, concerns, or signs of abnormal bleeding or blood clot.

## 2024-02-08 NOTE — ED NOTES
poc chem 8 drawn and running

## 2024-03-15 LAB — INR BLD: 5.4

## 2024-03-18 ENCOUNTER — ANTI-COAG VISIT (OUTPATIENT)
Age: 84
End: 2024-03-18

## 2024-03-18 NOTE — PROGRESS NOTES
Verbal order and read back per SANDRA Anand NP    The INR is above the therapeutic range.  Ask the patient about bleeding complications.    Hold for 2x then continue Coumadin 5mg daily except 2.5mg on Mondays. Recheck in 4 weeks.

## 2024-04-12 ENCOUNTER — ANTI-COAG VISIT (OUTPATIENT)
Age: 84
End: 2024-04-12

## 2024-04-12 LAB — INR BLD: 6.5

## 2024-04-12 NOTE — PROGRESS NOTES
Mr. Malu Seaman is here today for anticoagulation monitoring for the diagnosis of atrial fibrillation.  His INR goal is 2.0-3.0 and his current Coumadin dose is Hold for 2x then continue Coumadin 5mg daily except 2.5mg on Mondays. Recheck in 4 weeks..     Today's findings include an INR of 6.5     Considering Mr. Seaman's past history, todays findings, and per the coumadin policy/protocol, Mr. Seaman was instructed to take Coumadin as follows,  hold x3 the  check on monday.  He was also instructed to come back in 3 days for an INR check.    A full discussion of the nature of anticoagulants has been carried out.  A full discussion of the need for frequent and regular monitoring, precise dosage adjustment and compliance was stressed.  Side effects of potential bleeding were discussed and Mr. Seaman was instructed to call [phone] if there are any signs of abnormal bleeding.  Mr. Seaman was instructed to avoid any OTC items containing aspirin or ibuprofen and prior to starting any new OTC products to consult with his physician or pharmacist to ensure no drug interactions are present.  Mr. Seaman was instructed to avoid any major changes in his general diet and to avoid alcohol consumption.  .      Mr. Seaman verbalized his understanding of all instructions and will call the office with any questions, concerns, or signs of abnormal bleeding or blood clot.

## 2024-04-15 LAB — INR BLD: 3

## 2024-04-15 RX ORDER — FUROSEMIDE 20 MG/1
TABLET ORAL
Qty: 180 TABLET | Refills: 3 | Status: SHIPPED | OUTPATIENT
Start: 2024-04-15

## 2024-04-16 ENCOUNTER — ANTI-COAG VISIT (OUTPATIENT)
Age: 84
End: 2024-04-16

## 2024-04-16 NOTE — PROGRESS NOTES
Mr. Malu Seaman is here today for anticoagulation monitoring for the diagnosis of atrial fibrillation.  His INR goal is 2.0-3.0 and his current Coumadin dose is Coumadin 5mg daily except 2.5mg on Mondays. Recheck in 2 weeks..     Today's findings include an INR of 3.0     Considering Mr. Seaman's past history, todays findings, and per the coumadin policy/protocol, Mr. Seaman was instructed to take Coumadin as follows,  same.  He was also instructed to come back in 2 weeks for an INR check.    A full discussion of the nature of anticoagulants has been carried out.  A full discussion of the need for frequent and regular monitoring, precise dosage adjustment and compliance was stressed.  Side effects of potential bleeding were discussed and Mr. Seaman was instructed to call [phone] if there are any signs of abnormal bleeding.  Mr. Seaman was instructed to avoid any OTC items containing aspirin or ibuprofen and prior to starting any new OTC products to consult with his physician or pharmacist to ensure no drug interactions are present.  Mr. Seaman was instructed to avoid any major changes in his general diet and to avoid alcohol consumption.  .      Mr. Seaman verbalized his understanding of all instructions and will call the office with any questions, concerns, or signs of abnormal bleeding or blood clot.

## 2024-04-17 ENCOUNTER — PROCEDURE VISIT (OUTPATIENT)
Age: 84
End: 2024-04-17

## 2024-04-17 DIAGNOSIS — Z95.0 PACEMAKER: Primary | ICD-10-CM

## 2024-04-17 DIAGNOSIS — I48.0 PAROXYSMAL ATRIAL FIBRILLATION (HCC): ICD-10-CM

## 2024-04-17 DIAGNOSIS — I50.32 CHRONIC DIASTOLIC CONGESTIVE HEART FAILURE (HCC): ICD-10-CM

## 2024-04-23 ENCOUNTER — ANTI-COAG VISIT (OUTPATIENT)
Age: 84
End: 2024-04-23

## 2024-04-23 LAB — INR BLD: 3.8

## 2024-04-23 NOTE — PROGRESS NOTES
Mr. Malu Seaman is here today for anticoagulation monitoring for the diagnosis of atrial fibrillation.  His INR goal is 2.0-3.0 and his current Coumadin dose is Coumadin 5mg daily except 2.5mg on Mondays. Recheck in 2 weeks.     Today's findings include an INR of 3.8     Considering Mr. Seaman's past history, todays findings, and per the coumadin policy/protocol, Mr. Seaman was instructed to take Coumadin as follows,  same.  He was also instructed to come back in 2 weeks for an INR check.    A full discussion of the nature of anticoagulants has been carried out.  A full discussion of the need for frequent and regular monitoring, precise dosage adjustment and compliance was stressed.  Side effects of potential bleeding were discussed and Mr. Seaman was instructed to call [phone] if there are any signs of abnormal bleeding.  Mr. Seaman was instructed to avoid any OTC items containing aspirin or ibuprofen and prior to starting any new OTC products to consult with his physician or pharmacist to ensure no drug interactions are present.  Mr. Seaman was instructed to avoid any major changes in his general diet and to avoid alcohol consumption.  .      Mr. Seaman verbalized his understanding of all instructions and will call the office with any questions, concerns, or signs of abnormal bleeding or blood clot.

## 2024-05-01 ENCOUNTER — ANTI-COAG VISIT (OUTPATIENT)
Age: 84
End: 2024-05-01

## 2024-05-01 LAB — INR BLD: 2.9

## 2024-05-22 ENCOUNTER — TELEPHONE (OUTPATIENT)
Age: 84
End: 2024-05-22

## 2024-05-22 NOTE — TELEPHONE ENCOUNTER
Fidel Lizarraga MD  You1 hour ago (2:58 PM)       Please schedule to see apc tomorrow or next week.  Need to order bmp if his nephrologist hasn't check in past week. Thanks

## 2024-05-22 NOTE — TELEPHONE ENCOUNTER
Patient calling in to report some lower extremity swelling and SOB. His swelling is from his calves to ankles. Patient is on 40mg Lasix daily as needed and has been taking an extra, making it 60mg daily for the last week with little to no improvement. He wants to know what else he can do. He is not able to get compression stockings on and I've asked him to prop his feet up as much as possible. His normal weight is about 132 and his weight today is 136.5lb on home scale.

## 2024-05-23 ENCOUNTER — OFFICE VISIT (OUTPATIENT)
Age: 84
End: 2024-05-23

## 2024-05-23 ENCOUNTER — NURSE ONLY (OUTPATIENT)
Age: 84
End: 2024-05-23

## 2024-05-23 VITALS
WEIGHT: 138 LBS | DIASTOLIC BLOOD PRESSURE: 62 MMHG | OXYGEN SATURATION: 92 % | HEART RATE: 72 BPM | BODY MASS INDEX: 22.18 KG/M2 | SYSTOLIC BLOOD PRESSURE: 108 MMHG | HEIGHT: 66 IN

## 2024-05-23 DIAGNOSIS — Z95.0 PACEMAKER: Primary | ICD-10-CM

## 2024-05-23 DIAGNOSIS — Z95.0 PACEMAKER: ICD-10-CM

## 2024-05-23 DIAGNOSIS — I48.0 PAROXYSMAL ATRIAL FIBRILLATION (HCC): ICD-10-CM

## 2024-05-23 DIAGNOSIS — I48.0 PAROXYSMAL ATRIAL FIBRILLATION (HCC): Primary | ICD-10-CM

## 2024-05-23 DIAGNOSIS — I48.11 LONGSTANDING PERSISTENT ATRIAL FIBRILLATION (HCC): ICD-10-CM

## 2024-05-23 DIAGNOSIS — I50.30 DIASTOLIC HEART FAILURE, UNSPECIFIED HF CHRONICITY (HCC): ICD-10-CM

## 2024-05-23 LAB
A/G RATIO: 0.8 RATIO (ref 1.1–2.6)
ALBUMIN: 3.2 G/DL (ref 3.5–5)
ALP BLD-CCNC: 125 U/L (ref 40–125)
ALT SERPL-CCNC: 7 U/L (ref 5–40)
ANION GAP SERPL CALCULATED.3IONS-SCNC: 9 MMOL/L (ref 3–15)
AST SERPL-CCNC: 35 U/L (ref 10–37)
BILIRUB SERPL-MCNC: 0.9 MG/DL (ref 0.2–1.2)
BILIRUBIN DIRECT: 0.3 MG/DL (ref 0–0.3)
BUN BLDV-MCNC: 24 MG/DL (ref 6–22)
CALCIUM SERPL-MCNC: 9 MG/DL (ref 8.4–10.5)
CHLORIDE BLD-SCNC: 96 MMOL/L (ref 98–110)
CO2: 31 MMOL/L (ref 20–32)
CREAT SERPL-MCNC: 0.6 MG/DL (ref 0.8–1.6)
GFR, ESTIMATED: >60 ML/MIN/1.73 SQ.M.
GLOBULIN: 4.1 G/DL (ref 2–4)
GLUCOSE: 144 MG/DL (ref 70–99)
HCT VFR BLD CALC: 28.4 % (ref 37.8–52.2)
HEMOGLOBIN: 9.7 G/DL (ref 12.6–17.1)
MAGNESIUM: 1.9 MG/DL (ref 1.6–2.5)
MCH RBC QN AUTO: 34 PG (ref 26–34)
MCHC RBC AUTO-ENTMCNC: 34 G/DL (ref 31–36)
MCV RBC AUTO: 100 FL (ref 80–95)
PDW BLD-RTO: 15.5 % (ref 10–15.5)
PLATELET # BLD: 115 K/UL (ref 140–440)
PMV BLD AUTO: 9.5 FL (ref 9–13)
POC INR: 8
POTASSIUM SERPL-SCNC: 3.2 MMOL/L (ref 3.5–5.5)
PROTHROMBIN TIME, POC: ABNORMAL
RBC # BLD: 2.83 M/UL (ref 3.8–5.8)
SODIUM BLD-SCNC: 136 MMOL/L (ref 133–145)
TOTAL PROTEIN: 7.3 G/DL (ref 6.2–8.1)
WBC # BLD: 2.8 K/UL (ref 4–11)

## 2024-05-23 RX ORDER — BUMETANIDE 1 MG/1
1 TABLET ORAL 2 TIMES DAILY
Qty: 60 TABLET | Refills: 1 | Status: SHIPPED | OUTPATIENT
Start: 2024-05-23

## 2024-05-23 NOTE — PROGRESS NOTES
History of Present Illness:     Malu eSaman is a 84 y.o. year old male here for follow-up.  He is followed by Dr. 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      He presents to the office today after calling office yesterday due to edema.  He reports he has had increased shortness of breath and BLE swelling over the past 2 weeks.  He reports he has increased his Lasix from 40 mg daily to 60 mg daily over the past 2 weeks, without expected diuresis.  He feels short of breath when going to bed, more so due to the activity prior, and reports that his breathing gradually gets better as he lies down, without PND.  He denies chest pain, palpitations, lightheadedness/dizziness or syncope.  He denies any changes in bowels/stool or hematuria.        Impression/Plan:     HFpEF, pt with recent worsening of BLE and shortness of breath with exertion.  Denies PND or chest discomfort.  Pt not having expected diuresis with increased dose of Lasix.  Discussed with Dr. Lizarraga and Dr. Yadav, will switch to PO Bumex 1 mg BID.  Advised evaluation in ER if symptoms worsen; pt declines ER evaluation at present.  Will check CMP, Mg.  Echo 12/2023 with LVEF 55-60%, mild concentric LVH, severely dilated LA, normal RV size with reduced systolic function.  Hx SIADH and CKD, followed by Dr. Yadav, who reported Na 135, K 3.3 on recent labs and planned labs with office visit next week.  Atrial fibrillation, on Coumadin.  INR checked in office today - 8.0.  Pt denies any BRBPR/melena; reports irregular bowel habits at baseline.  Will check CBC.  Pt advised to stop Coumadin, recheck INR on 5/28, call office with results and will make further recommendations regarding Coumadin at that time.  Pt was advised to present to ER if he develops any bleeding symptoms.    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

## 2024-05-29 ENCOUNTER — ANTI-COAG VISIT (OUTPATIENT)
Age: 84
End: 2024-05-29

## 2024-05-29 LAB — INR BLD: 1.7

## 2024-05-29 NOTE — PROGRESS NOTES
Mr. Malu Seaman is here today for anticoagulation monitoring for the diagnosis of atrial fibrillation.  His INR goal is 2.0-3.0 and his current Coumadin dose is Coumadin 5mg daily except 2.5mg on Mondays/Wednesdays. Recheck in 2 weeks..     Today's findings include an INR of 1.7     Considering Mr. Seaman's past history, todays findings, and per the coumadin policy/protocol, Mr. Seaman was instructed to take Coumadin as follows,  take 5mg today then resume same.  He was also instructed to come back in 4 weeks for an INR check.    A full discussion of the nature of anticoagulants has been carried out.  A full discussion of the need for frequent and regular monitoring, precise dosage adjustment and compliance was stressed.  Side effects of potential bleeding were discussed and Mr. Seaman was instructed to call [phone] if there are any signs of abnormal bleeding.  Mr. Seaman was instructed to avoid any OTC items containing aspirin or ibuprofen and prior to starting any new OTC products to consult with his physician or pharmacist to ensure no drug interactions are present.  Mr. Seaman was instructed to avoid any major changes in his general diet and to avoid alcohol consumption.  .      Mr. Seaman verbalized his understanding of all instructions and will call the office with any questions, concerns, or signs of abnormal bleeding or blood clot.

## 2024-06-11 ENCOUNTER — OFFICE VISIT (OUTPATIENT)
Age: 84
End: 2024-06-11
Payer: MEDICARE

## 2024-06-11 DIAGNOSIS — I89.0 LYMPHEDEMA: ICD-10-CM

## 2024-06-11 DIAGNOSIS — I51.89 DIASTOLIC DYSFUNCTION: ICD-10-CM

## 2024-06-11 DIAGNOSIS — I50.22 CHRONIC SYSTOLIC CONGESTIVE HEART FAILURE (HCC): Primary | ICD-10-CM

## 2024-06-11 PROCEDURE — G8427 DOCREV CUR MEDS BY ELIG CLIN: HCPCS | Performed by: SURGERY

## 2024-06-11 PROCEDURE — 1123F ACP DISCUSS/DSCN MKR DOCD: CPT | Performed by: SURGERY

## 2024-06-11 PROCEDURE — 99204 OFFICE O/P NEW MOD 45 MIN: CPT | Performed by: SURGERY

## 2024-06-11 PROCEDURE — G8420 CALC BMI NORM PARAMETERS: HCPCS | Performed by: SURGERY

## 2024-06-11 PROCEDURE — 1036F TOBACCO NON-USER: CPT | Performed by: SURGERY

## 2024-06-11 NOTE — PATIENT INSTRUCTIONS
Please start doing the following:     - elevate your legs at all times when sitting down  - elevate legs steeply 3x/day for 20 minutes \"toes above your nose\"  - wear compression stockings all day every day. You may take them off at night  - Moisturize legs daily (Eucerin or Aquaphor for extremely dry skin)  - Walk 3x a day at least to your mailbox and back     You may consider a protein shake supplement, such as Ensure. Would recommend taking this at least daily.

## 2024-06-11 NOTE — PROGRESS NOTES
Malu Seaman      History and Physical    Malu Seaman is a 84 y.o. male with PMH significant for GERD, gout, CHF, atrial fibrillation on Coumadin, pacemaker placement, SIADH, history of esophageal cancer,.  DVT status post IVC filter placement.    he presents today for lower extremity edema, referred by Dr. Yadav.     Today he describes BLE edema which has worsened over the past month. He states he thinks he has been a bit more short of breath during that time.   Denies pain associated with edema.   Edema improves significantly overnight.     He states he spends a lot of time in his recliner and elevates as much as possible.     Onset of symptoms was Years ago, worsened over the past month.     Associated symptoms:   [x] edema  [] varicose veins  [x] heaviness  [] fatigue  [] Pain  [] H/o or current ulcer(s)    Aggravating factors include standing. Relieving factors include elevation.     Patient   [] has   [x] has not   been wearing compression stockings.       Relevant history:   [] female gender  [] Family history of venous disease: n/a  [] history of pregnancy: n/a  [x] history of DVT/PE, s/p IVC filter placement  [] history of vein procedure  [] Personal or family history of coronary artery disease      Pertinent edema history:  [x] CHF/pulmonary HTN  [] DOROTHEA/snoring  [] CKD  [] Pulmonary disease  [] Obesity   [x] Activity level - low    Smoking status: quit smoking 25 years ago.         The most recent imaging study/studies was/were reviewed and discussed with the patient.   Recent echo with severely dilated bilateral atria, grade II diastolic dysfunction and pulmonary HTN      The following labs were reviewed:   Lab Results   Component Value Date     05/23/2024    K 3.2 (L) 05/23/2024    CL 96 (L) 05/23/2024    CO2 31 05/23/2024    BUN 24 (H) 05/23/2024    CREATININE 0.6 (L) 05/23/2024    GLUCOSE 144 (H) 05/23/2024    CALCIUM 9.0 05/23/2024    BILITOT 0.9 05/23/2024    ALKPHOS 125 05/23/2024    AST 35

## 2024-06-12 VITALS
SYSTOLIC BLOOD PRESSURE: 138 MMHG | OXYGEN SATURATION: 96 % | WEIGHT: 134 LBS | HEIGHT: 65 IN | RESPIRATION RATE: 20 BRPM | DIASTOLIC BLOOD PRESSURE: 70 MMHG | BODY MASS INDEX: 22.33 KG/M2

## 2024-06-14 ENCOUNTER — TELEPHONE (OUTPATIENT)
Age: 84
End: 2024-06-14

## 2024-06-14 NOTE — TELEPHONE ENCOUNTER
Pt fell at home on Wednesday. Echo on Thursday, concerns with heart and undergoing surgery.     BROKE SHOULDER AND BROKE ARM, OPEN FRACTURE. Pt is in [facility]     Please advise, pager number was given to relay to hospital providers.

## 2024-06-14 NOTE — TELEPHONE ENCOUNTER
Called pts daughter back to inform, spoke with her and Dr. Rodas's PA in hospital ( I believe her name was Hannah). She said they will be handling the pt from this point.

## (undated) DEVICE — DRAPE STRL ANGIO W/ 2 FLD COLLCTN PCH 86X135 218X343 CM 2

## (undated) DEVICE — FLEX ADVANTAGE 3000CC: Brand: FLEX ADVANTAGE

## (undated) DEVICE — 3M™ IOBAN™ 2 ANTIMICROBIAL INCISE DRAPE 6640EZ: Brand: IOBAN™ 2

## (undated) DEVICE — BASIN EMESIS 500CC ROSE 250/CS 60/PLT: Brand: MEDEGEN MEDICAL PRODUCTS, LLC

## (undated) DEVICE — (D)GLOVE EXAM LG NITRL NS -- DISC BY MFR NO SUB

## (undated) DEVICE — CANNULA ORIG TL CLR W FOAM CUSHIONS AND 14FT SUPL TB 3 CHN

## (undated) DEVICE — DRAPE SURG W25XL50IN E OPN CIR BND BG

## (undated) DEVICE — STERILE POLYISOPRENE POWDER-FREE SURGICAL GLOVES: Brand: PROTEXIS

## (undated) DEVICE — LIMB HOLDER, WRIST/ANKLE: Brand: DEROYAL

## (undated) DEVICE — GOWN ISOL IMPERV UNIV, DISP, OPEN BACK, BLUE --

## (undated) DEVICE — SYRINGE MED 25GA 3ML L5/8IN SUBQ PLAS W/ DETACH NDL SFTY

## (undated) DEVICE — AIRLIFE™ NASAL OXYGEN CANNULA CURVED, NONFLARED TIP WITH 14 FOOT (4.3 M) CRUSH-RESISTANT TUBING, OVER-THE-EAR STYLE: Brand: AIRLIFE™

## (undated) DEVICE — FLUFF AND POLYMER UNDERPAD,EXTRA HEAVY: Brand: WINGS

## (undated) DEVICE — (D)SYR 10ML 1/5ML GRAD NSAF -- PKGING CHANGE USE ITEM 338027

## (undated) DEVICE — SOLUTION IRRIG 1000ML H2O STRL BLT

## (undated) DEVICE — BITE BLOCK ENDOSCP UNIV AD 6 TO 9.4 MM

## (undated) DEVICE — GAUZE SPONGES,16 PLY: Brand: CURITY

## (undated) DEVICE — Device

## (undated) DEVICE — AIRLIFE™ NASAL OXYGEN CANNULA CURVED, FLARED TIP WITH 14 FOOT (4.3 M) CRUSH-RESISTANT TUBING, OVER-THE-EAR STYLE: Brand: AIRLIFE™

## (undated) DEVICE — SNARE POLYP M W27MMXL240CM OVL STIFF DISP CAPTIVATOR

## (undated) DEVICE — SYR 50ML SLIP TIP NSAF LF STRL --

## (undated) DEVICE — SUTURE ABSORBABLE BRAIDED 2-0 CT-1 27 IN UD VICRYL J259H

## (undated) DEVICE — MEDI-VAC NON-CONDUCTIVE SUCTION TUBING: Brand: CARDINAL HEALTH

## (undated) DEVICE — ENDOSCOPY PUMP TUBING/ CAP SET: Brand: ERBE

## (undated) DEVICE — ELECTRODE PT RET AD L9FT HI MOIST COND ADH HYDRGEL CORDED

## (undated) DEVICE — COVADERM: Brand: DEROYAL

## (undated) DEVICE — CATHETER SUCT TR FL TIP 14FR W/ O CTRL

## (undated) DEVICE — KENDALL RADIOLUCENT FOAM MONITORING ELECTRODE RECTANGULAR SHAPE: Brand: KENDALL

## (undated) DEVICE — MEDI-TRACE CADENCE ADULT, DEFIBRILLATION ELECTRODE -RTS  (10 PR/PK) - PHYSIO-CONTROL: Brand: MEDI-TRACE CADENCE

## (undated) DEVICE — PLASMABLADE PS200-040 4.0: Brand: PLASMABLADE™

## (undated) DEVICE — SYRINGE MED 20ML STD CLR PLAS LUERLOCK TIP N CTRL DISP

## (undated) DEVICE — MEDI-VAC SUCTION HIGH CAPACITY: Brand: CARDINAL HEALTH

## (undated) DEVICE — TRAP SPEC COLL POLYP POLYSTYR --

## (undated) DEVICE — REM POLYHESIVE ADULT PATIENT RETURN ELECTRODE: Brand: VALLEYLAB